# Patient Record
Sex: FEMALE | Race: BLACK OR AFRICAN AMERICAN | NOT HISPANIC OR LATINO | Employment: FULL TIME | URBAN - METROPOLITAN AREA
[De-identification: names, ages, dates, MRNs, and addresses within clinical notes are randomized per-mention and may not be internally consistent; named-entity substitution may affect disease eponyms.]

---

## 2018-11-07 ENCOUNTER — ANESTHESIA - HEALTHEAST (OUTPATIENT)
Dept: SURGERY | Facility: CLINIC | Age: 58
End: 2018-11-07

## 2018-11-07 ENCOUNTER — SURGERY - HEALTHEAST (OUTPATIENT)
Dept: SURGERY | Facility: CLINIC | Age: 58
End: 2018-11-07

## 2018-11-07 ASSESSMENT — MIFFLIN-ST. JEOR: SCORE: 1711.01

## 2018-11-08 ASSESSMENT — MIFFLIN-ST. JEOR: SCORE: 1721.22

## 2018-11-09 ASSESSMENT — MIFFLIN-ST. JEOR: SCORE: 1763.4

## 2018-11-29 ENCOUNTER — ANESTHESIA - HEALTHEAST (OUTPATIENT)
Dept: SURGERY | Facility: CLINIC | Age: 58
End: 2018-11-29

## 2018-11-30 ENCOUNTER — SURGERY - HEALTHEAST (OUTPATIENT)
Dept: SURGERY | Facility: CLINIC | Age: 58
End: 2018-11-30

## 2018-11-30 ASSESSMENT — MIFFLIN-ST. JEOR: SCORE: 1759.77

## 2018-12-04 ENCOUNTER — COMMUNICATION - HEALTHEAST (OUTPATIENT)
Dept: UROLOGY | Facility: CLINIC | Age: 58
End: 2018-12-04

## 2018-12-10 ENCOUNTER — AMBULATORY - HEALTHEAST (OUTPATIENT)
Dept: LAB | Facility: CLINIC | Age: 58
End: 2018-12-10

## 2018-12-10 ENCOUNTER — AMBULATORY - HEALTHEAST (OUTPATIENT)
Dept: UROLOGY | Facility: CLINIC | Age: 58
End: 2018-12-10

## 2018-12-10 DIAGNOSIS — N20.0 CALCULUS OF KIDNEY: ICD-10-CM

## 2018-12-10 LAB
ALBUMIN SERPL-MCNC: 2.6 G/DL (ref 3.5–5)
ALBUMIN UR-MCNC: ABNORMAL MG/DL
ANION GAP SERPL CALCULATED.3IONS-SCNC: 9 MMOL/L (ref 5–18)
APPEARANCE UR: ABNORMAL
BILIRUB UR QL STRIP: NEGATIVE
BUN SERPL-MCNC: 32 MG/DL (ref 8–22)
CALCIUM SERPL-MCNC: 9.2 MG/DL (ref 8.5–10.5)
CHLORIDE BLD-SCNC: 108 MMOL/L (ref 98–107)
CO2 SERPL-SCNC: 21 MMOL/L (ref 22–31)
COLOR UR AUTO: YELLOW
CREAT SERPL-MCNC: 4.51 MG/DL (ref 0.6–1.1)
GFR SERPL CREATININE-BSD FRML MDRD: 10 ML/MIN/1.73M2
GLUCOSE BLD-MCNC: 155 MG/DL (ref 70–125)
GLUCOSE UR STRIP-MCNC: ABNORMAL MG/DL
GRAM STAIN RESULT: NORMAL
HGB UR QL STRIP: ABNORMAL
KETONES UR STRIP-MCNC: NEGATIVE MG/DL
LEUKOCYTE ESTERASE UR QL STRIP: ABNORMAL
NITRATE UR QL: POSITIVE
PH UR STRIP: 7 [PH] (ref 5–8)
PHOSPHATE SERPL-MCNC: 3.5 MG/DL (ref 2.5–4.5)
POTASSIUM BLD-SCNC: 4.3 MMOL/L (ref 3.5–5)
SODIUM SERPL-SCNC: 138 MMOL/L (ref 136–145)
SP GR UR STRIP: 1.02 (ref 1–1.03)
UROBILINOGEN UR STRIP-ACNC: ABNORMAL

## 2018-12-13 ENCOUNTER — COMMUNICATION - HEALTHEAST (OUTPATIENT)
Dept: UROLOGY | Facility: CLINIC | Age: 58
End: 2018-12-13

## 2018-12-13 DIAGNOSIS — N30.01 ACUTE CYSTITIS WITH HEMATURIA: ICD-10-CM

## 2018-12-13 LAB — BACTERIA SPEC CULT: ABNORMAL

## 2018-12-17 ENCOUNTER — AMBULATORY - HEALTHEAST (OUTPATIENT)
Dept: UROLOGY | Facility: CLINIC | Age: 58
End: 2018-12-17

## 2018-12-17 DIAGNOSIS — N20.0 CALCULUS OF KIDNEY: ICD-10-CM

## 2018-12-17 DIAGNOSIS — N20.1 CALCULUS OF URETER: ICD-10-CM

## 2018-12-17 LAB
ALBUMIN UR-MCNC: ABNORMAL MG/DL
APPEARANCE UR: ABNORMAL
BILIRUB UR QL STRIP: NEGATIVE
COLOR UR AUTO: ABNORMAL
GLUCOSE UR STRIP-MCNC: ABNORMAL MG/DL
HGB UR QL STRIP: ABNORMAL
KETONES UR STRIP-MCNC: NEGATIVE MG/DL
LEUKOCYTE ESTERASE UR QL STRIP: ABNORMAL
NITRATE UR QL: NEGATIVE
PH UR STRIP: 6.5 [PH] (ref 5–8)
SP GR UR STRIP: 1.02 (ref 1–1.03)
UROBILINOGEN UR STRIP-ACNC: ABNORMAL

## 2019-08-03 ENCOUNTER — COMMUNICATION - HEALTHEAST (OUTPATIENT)
Dept: UROLOGY | Facility: CLINIC | Age: 59
End: 2019-08-03

## 2019-08-03 DIAGNOSIS — N20.1 CALCULUS OF URETER: ICD-10-CM

## 2020-08-30 ASSESSMENT — MIFFLIN-ST. JEOR: SCORE: 1754.33

## 2020-08-31 ENCOUNTER — SURGERY - HEALTHEAST (OUTPATIENT)
Dept: SURGERY | Facility: CLINIC | Age: 60
End: 2020-08-31

## 2020-08-31 ENCOUNTER — ANESTHESIA - HEALTHEAST (OUTPATIENT)
Dept: SURGERY | Facility: CLINIC | Age: 60
End: 2020-08-31

## 2020-09-01 ENCOUNTER — COMMUNICATION - HEALTHEAST (OUTPATIENT)
Dept: SCHEDULING | Facility: CLINIC | Age: 60
End: 2020-09-01

## 2020-09-05 ASSESSMENT — MIFFLIN-ST. JEOR: SCORE: 1728.47

## 2020-09-09 ENCOUNTER — COMMUNICATION - HEALTHEAST (OUTPATIENT)
Dept: UROLOGY | Facility: CLINIC | Age: 60
End: 2020-09-09

## 2020-09-15 ENCOUNTER — COMMUNICATION - HEALTHEAST (OUTPATIENT)
Dept: UROLOGY | Facility: CLINIC | Age: 60
End: 2020-09-15

## 2020-09-24 ENCOUNTER — AMBULATORY - HEALTHEAST (OUTPATIENT)
Dept: UROLOGY | Facility: CLINIC | Age: 60
End: 2020-09-24

## 2020-09-24 DIAGNOSIS — N20.1 CALCULUS OF URETER: ICD-10-CM

## 2020-09-24 DIAGNOSIS — N13.30 HYDRONEPHROSIS, UNSPECIFIED HYDRONEPHROSIS TYPE: ICD-10-CM

## 2020-10-22 ENCOUNTER — HOSPITAL ENCOUNTER (OUTPATIENT)
Dept: INTERVENTIONAL RADIOLOGY/VASCULAR | Facility: CLINIC | Age: 60
Discharge: HOME | End: 2020-10-22
Attending: INTERNAL MEDICINE | Admitting: RADIOLOGY

## 2020-10-22 DIAGNOSIS — N19 RENAL FAILURE: ICD-10-CM

## 2020-10-22 DIAGNOSIS — N17.9 ACUTE RENAL FAILURE, UNSPECIFIED ACUTE RENAL FAILURE TYPE (H): ICD-10-CM

## 2020-12-22 ENCOUNTER — ANESTHESIA - HEALTHEAST (OUTPATIENT)
Dept: SURGERY | Facility: CLINIC | Age: 60
End: 2020-12-22

## 2020-12-23 ENCOUNTER — SURGERY - HEALTHEAST (OUTPATIENT)
Dept: SURGERY | Facility: CLINIC | Age: 60
End: 2020-12-23

## 2020-12-23 ASSESSMENT — MIFFLIN-ST. JEOR: SCORE: 1605.55

## 2020-12-28 ENCOUNTER — AMBULATORY - HEALTHEAST (OUTPATIENT)
Dept: SURGERY | Facility: CLINIC | Age: 60
End: 2020-12-28

## 2020-12-28 DIAGNOSIS — Z11.59 ENCOUNTER FOR SCREENING FOR OTHER VIRAL DISEASES: ICD-10-CM

## 2020-12-29 ENCOUNTER — ANESTHESIA - HEALTHEAST (OUTPATIENT)
Dept: SURGERY | Facility: CLINIC | Age: 60
End: 2020-12-29

## 2020-12-29 ENCOUNTER — SURGERY - HEALTHEAST (OUTPATIENT)
Dept: SURGERY | Facility: CLINIC | Age: 60
End: 2020-12-29

## 2020-12-29 ASSESSMENT — MIFFLIN-ST. JEOR
SCORE: 1610.08
SCORE: 1628.23

## 2021-05-17 ENCOUNTER — RECORDS - HEALTHEAST (OUTPATIENT)
Dept: ADMINISTRATIVE | Facility: OTHER | Age: 61
End: 2021-05-17

## 2021-05-23 ENCOUNTER — HOSPITAL ENCOUNTER (EMERGENCY)
Dept: EMERGENCY MEDICINE | Facility: CLINIC | Age: 61
Discharge: HOME | End: 2021-05-23
Payer: COMMERCIAL

## 2021-05-23 DIAGNOSIS — H74.92 MASTOID DISORDER, LEFT: ICD-10-CM

## 2021-05-23 ASSESSMENT — MIFFLIN-ST. JEOR: SCORE: 1510.29

## 2021-05-27 VITALS — DIASTOLIC BLOOD PRESSURE: 57 MMHG | TEMPERATURE: 98.4 F | HEART RATE: 103 BPM | SYSTOLIC BLOOD PRESSURE: 122 MMHG

## 2021-05-29 ENCOUNTER — APPOINTMENT (OUTPATIENT)
Dept: CT IMAGING | Facility: CLINIC | Age: 61
End: 2021-05-29
Attending: EMERGENCY MEDICINE
Payer: COMMERCIAL

## 2021-05-29 ENCOUNTER — HOSPITAL ENCOUNTER (EMERGENCY)
Facility: CLINIC | Age: 61
Discharge: HOME | End: 2021-05-29
Attending: EMERGENCY MEDICINE | Admitting: EMERGENCY MEDICINE
Payer: COMMERCIAL

## 2021-05-29 ENCOUNTER — APPOINTMENT (OUTPATIENT)
Dept: GENERAL RADIOLOGY | Facility: CLINIC | Age: 61
End: 2021-05-29
Attending: EMERGENCY MEDICINE
Payer: COMMERCIAL

## 2021-05-29 VITALS
HEART RATE: 95 BPM | TEMPERATURE: 97.5 F | DIASTOLIC BLOOD PRESSURE: 86 MMHG | RESPIRATION RATE: 16 BRPM | OXYGEN SATURATION: 99 % | SYSTOLIC BLOOD PRESSURE: 140 MMHG

## 2021-05-29 DIAGNOSIS — Z98.890 HISTORY OF PERITONEAL DIALYSIS: ICD-10-CM

## 2021-05-29 DIAGNOSIS — R10.9 ABDOMINAL PAIN, UNSPECIFIED ABDOMINAL LOCATION: ICD-10-CM

## 2021-05-29 DIAGNOSIS — M25.552 HIP PAIN, LEFT: ICD-10-CM

## 2021-05-29 DIAGNOSIS — I27.20 PULMONARY HYPERTENSION (H): ICD-10-CM

## 2021-05-29 LAB
ALBUMIN SERPL-MCNC: 2.5 G/DL (ref 3.4–5)
ALBUMIN UR-MCNC: 100 MG/DL
ALP SERPL-CCNC: 71 U/L (ref 40–150)
ALT SERPL W P-5'-P-CCNC: 9 U/L (ref 0–50)
ANION GAP SERPL CALCULATED.3IONS-SCNC: 7 MMOL/L (ref 3–14)
APPEARANCE UR: CLEAR
AST SERPL W P-5'-P-CCNC: 5 U/L (ref 0–45)
BACTERIA #/AREA URNS HPF: ABNORMAL /HPF
BASOPHILS # BLD AUTO: 0.1 10E9/L (ref 0–0.2)
BASOPHILS NFR BLD AUTO: 0.8 %
BILIRUB SERPL-MCNC: 0.6 MG/DL (ref 0.2–1.3)
BILIRUB UR QL STRIP: NEGATIVE
BUN SERPL-MCNC: 43 MG/DL (ref 7–30)
CALCIUM SERPL-MCNC: 10.1 MG/DL (ref 8.5–10.1)
CHLORIDE SERPL-SCNC: 96 MMOL/L (ref 94–109)
CO2 SERPL-SCNC: 29 MMOL/L (ref 20–32)
COLOR UR AUTO: ABNORMAL
CREAT SERPL-MCNC: 12.3 MG/DL (ref 0.52–1.04)
DIFFERENTIAL METHOD BLD: ABNORMAL
EOSINOPHIL # BLD AUTO: 0.5 10E9/L (ref 0–0.7)
EOSINOPHIL NFR BLD AUTO: 3.8 %
ERYTHROCYTE [DISTWIDTH] IN BLOOD BY AUTOMATED COUNT: 14 % (ref 10–15)
GFR SERPL CREATININE-BSD FRML MDRD: 3 ML/MIN/{1.73_M2}
GLUCOSE SERPL-MCNC: 84 MG/DL (ref 70–99)
GLUCOSE UR STRIP-MCNC: NEGATIVE MG/DL
HCT VFR BLD AUTO: 41.2 % (ref 35–47)
HGB BLD-MCNC: 12.6 G/DL (ref 11.7–15.7)
HGB UR QL STRIP: ABNORMAL
IMM GRANULOCYTES # BLD: 0.1 10E9/L (ref 0–0.4)
IMM GRANULOCYTES NFR BLD: 0.5 %
KETONES UR STRIP-MCNC: NEGATIVE MG/DL
LACTATE BLD-SCNC: 1.3 MMOL/L (ref 0.7–2)
LEUKOCYTE ESTERASE UR QL STRIP: ABNORMAL
LIPASE SERPL-CCNC: 43 U/L (ref 73–393)
LYMPHOCYTES # BLD AUTO: 1.8 10E9/L (ref 0.8–5.3)
LYMPHOCYTES NFR BLD AUTO: 15.4 %
MCH RBC QN AUTO: 28.4 PG (ref 26.5–33)
MCHC RBC AUTO-ENTMCNC: 30.6 G/DL (ref 31.5–36.5)
MCV RBC AUTO: 93 FL (ref 78–100)
MONOCYTES # BLD AUTO: 1.1 10E9/L (ref 0–1.3)
MONOCYTES NFR BLD AUTO: 9.2 %
NEUTROPHILS # BLD AUTO: 8.3 10E9/L (ref 1.6–8.3)
NEUTROPHILS NFR BLD AUTO: 70.3 %
NITRATE UR QL: NEGATIVE
NRBC # BLD AUTO: 0 10*3/UL
NRBC BLD AUTO-RTO: 0 /100
PH UR STRIP: 7 PH (ref 5–7)
PLATELET # BLD AUTO: 321 10E9/L (ref 150–450)
POTASSIUM SERPL-SCNC: 3.7 MMOL/L (ref 3.4–5.3)
PROT SERPL-MCNC: 7.1 G/DL (ref 6.8–8.8)
RBC # BLD AUTO: 4.44 10E12/L (ref 3.8–5.2)
RBC #/AREA URNS AUTO: 8 /HPF (ref 0–2)
SODIUM SERPL-SCNC: 132 MMOL/L (ref 133–144)
SOURCE: ABNORMAL
SP GR UR STRIP: 1.01 (ref 1–1.03)
SQUAMOUS #/AREA URNS AUTO: <1 /HPF (ref 0–1)
TROPONIN I SERPL-MCNC: 0.02 UG/L (ref 0–0.04)
UROBILINOGEN UR STRIP-MCNC: NORMAL MG/DL (ref 0–2)
WBC # BLD AUTO: 11.8 10E9/L (ref 4–11)
WBC #/AREA URNS AUTO: 6 /HPF (ref 0–5)

## 2021-05-29 PROCEDURE — 96361 HYDRATE IV INFUSION ADD-ON: CPT

## 2021-05-29 PROCEDURE — 250N000009 HC RX 250: Performed by: EMERGENCY MEDICINE

## 2021-05-29 PROCEDURE — 85025 COMPLETE CBC W/AUTO DIFF WBC: CPT | Performed by: EMERGENCY MEDICINE

## 2021-05-29 PROCEDURE — 258N000003 HC RX IP 258 OP 636: Performed by: EMERGENCY MEDICINE

## 2021-05-29 PROCEDURE — 71275 CT ANGIOGRAPHY CHEST: CPT

## 2021-05-29 PROCEDURE — 83690 ASSAY OF LIPASE: CPT | Performed by: EMERGENCY MEDICINE

## 2021-05-29 PROCEDURE — 250N000011 HC RX IP 250 OP 636: Performed by: EMERGENCY MEDICINE

## 2021-05-29 PROCEDURE — 80053 COMPREHEN METABOLIC PANEL: CPT | Performed by: EMERGENCY MEDICINE

## 2021-05-29 PROCEDURE — 93005 ELECTROCARDIOGRAM TRACING: CPT

## 2021-05-29 PROCEDURE — 73502 X-RAY EXAM HIP UNI 2-3 VIEWS: CPT

## 2021-05-29 PROCEDURE — 96374 THER/PROPH/DIAG INJ IV PUSH: CPT | Mod: 59

## 2021-05-29 PROCEDURE — 84484 ASSAY OF TROPONIN QUANT: CPT | Performed by: EMERGENCY MEDICINE

## 2021-05-29 PROCEDURE — 83605 ASSAY OF LACTIC ACID: CPT | Performed by: EMERGENCY MEDICINE

## 2021-05-29 PROCEDURE — 99285 EMERGENCY DEPT VISIT HI MDM: CPT | Mod: 25

## 2021-05-29 PROCEDURE — 81001 URINALYSIS AUTO W/SCOPE: CPT | Performed by: EMERGENCY MEDICINE

## 2021-05-29 RX ORDER — FENTANYL CITRATE 50 UG/ML
50 INJECTION, SOLUTION INTRAMUSCULAR; INTRAVENOUS ONCE
Status: COMPLETED | OUTPATIENT
Start: 2021-05-29 | End: 2021-05-29

## 2021-05-29 RX ORDER — OXYCODONE HYDROCHLORIDE 5 MG/1
5 TABLET ORAL EVERY 6 HOURS PRN
Qty: 10 TABLET | Refills: 0 | Status: SHIPPED | OUTPATIENT
Start: 2021-05-29 | End: 2021-10-17

## 2021-05-29 RX ORDER — ASPIRIN 81 MG
100 TABLET, DELAYED RELEASE (ENTERIC COATED) ORAL DAILY
Qty: 10 TABLET | Refills: 0 | Status: SHIPPED | OUTPATIENT
Start: 2021-05-29 | End: 2021-10-17

## 2021-05-29 RX ORDER — ONDANSETRON 2 MG/ML
4 INJECTION INTRAMUSCULAR; INTRAVENOUS ONCE
Status: DISCONTINUED | OUTPATIENT
Start: 2021-05-29 | End: 2021-05-29 | Stop reason: HOSPADM

## 2021-05-29 RX ORDER — IOPAMIDOL 755 MG/ML
500 INJECTION, SOLUTION INTRAVASCULAR ONCE
Status: COMPLETED | OUTPATIENT
Start: 2021-05-29 | End: 2021-05-29

## 2021-05-29 RX ORDER — CEPHALEXIN 500 MG/1
500 CAPSULE ORAL 2 TIMES DAILY
Qty: 14 CAPSULE | Refills: 0 | Status: SHIPPED | OUTPATIENT
Start: 2021-05-29 | End: 2021-06-05

## 2021-05-29 RX ADMIN — FENTANYL CITRATE 50 MCG: 50 INJECTION, SOLUTION INTRAMUSCULAR; INTRAVENOUS at 16:36

## 2021-05-29 RX ADMIN — IOPAMIDOL 70 ML: 755 INJECTION, SOLUTION INTRAVENOUS at 17:20

## 2021-05-29 RX ADMIN — SODIUM CHLORIDE 87 ML: 9 INJECTION, SOLUTION INTRAVENOUS at 17:20

## 2021-05-29 RX ADMIN — SODIUM CHLORIDE 500 ML: 9 INJECTION, SOLUTION INTRAVENOUS at 16:37

## 2021-05-29 ASSESSMENT — ENCOUNTER SYMPTOMS
FATIGUE: 0
NAUSEA: 1
PALPITATIONS: 0
HEADACHES: 0
COUGH: 0
SHORTNESS OF BREATH: 1
ABDOMINAL PAIN: 1
NUMBNESS: 0
DIARRHEA: 1
APPETITE CHANGE: 1
BACK PAIN: 0
VOMITING: 0
FEVER: 0
WEAKNESS: 0
CHILLS: 0

## 2021-05-29 NOTE — ED TRIAGE NOTES
Patient presents to the ED reporting LLQ abdominal pain, decreased urine output and lower blood pressures at home. Is a peritoneal dialysis patient. States has also felt SOB.

## 2021-05-29 NOTE — ED PROVIDER NOTES
History     Chief Complaint:  Multiple complaints    HPI   Devora Garcia is a 60 year old female with a history of breast cancer, in remission, peritoneal dialysis, renal calculi presenting with a myriad of complaints.  Patient reports primarily coming to the ED given concerns for her blood pressure.  Yesterday she reports systolics in the high 80s.  She called her PCP who recommended she discontinue all of her blood pressure medications.  Of note, patient reports for the past month she has been on weekly vancomycin she instills every 5 days into her peritoneal dialysis.  She reports her last dose is supposed to be tomorrow.  She reports simultaneously feeling myalgias particularly in her lower legs, greatest in her left hip as well as mild abdominal pain.  She describes the abdominal pain as a cramping and intermittent sensation that is predominately in the left lower quadrant.  No exacerbating or relieving symptoms.  She does report accompanying decreased appetite, lower urine output, nausea and loose nonbloody stools.  She also admits to mild dyspnea at rest over the past few days though denies any at present.  No reported fever, cough, chest pain.  She denies any history of blood clots or trauma.  She is COVID-19 vaccinated.  No sick contacts or suspicious foods.      Result Date: 4/14/2021  EXAM: CT ABDOMEN PELVIS WO ORAL WO IV CONTRAST LOCATION: [facility] DATE/TIME: 4/14/2021   1. No abnormality to account for symptoms of left-sided abdominal pain. Interval placement of peritoneal dialysis catheter. No evidence of ascites nor loculated fluid collections to suggest abscess. 2. Resolution of previous right hydronephrosis with no urinary tract calculus.     Review of Systems   Constitutional: Positive for appetite change. Negative for chills, fatigue and fever.   Respiratory: Positive for shortness of breath. Negative for cough.    Cardiovascular: Negative for chest pain and  palpitations.   Gastrointestinal: Positive for abdominal pain, diarrhea and nausea. Negative for vomiting.   Genitourinary: Positive for decreased urine volume. Negative for dysuria, vaginal bleeding and vaginal discharge.   Musculoskeletal: Negative for back pain.   Neurological: Negative for weakness, numbness and headaches.   All other systems reviewed and are negative.    Allergies:    No Known Allergies      Medications:    Zyloprim  Norvasc  Bumex  Tums  Coreg  Catapres  Ergocalciferol  Lisinopril  Prilosec  Roxicodone  Vancomycin    Past Medical History:    Acute renal failure  Ureteral stone  Calculus of kidney  CKD  GUILLERMO  Type 2 diabetes  Cancer  Cyst of left ovary  GERD  Gout  Allergic rhinitis  Hypertension  Intraductal carcinoma  Psoriasis    Past Surgical History:    Reduction mammaplasty  Lumpectomy  Bariatric surgery  Cystoscopy  Ureteral stent    Family History:    Denies    Social History:  Denies smoking or alcohol use  Presents with     Physical Exam     Patient Vitals for the past 24 hrs:   BP Temp Pulse Resp SpO2   05/29/21 1556 (!) 151/89 97.5  F (36.4  C) 110 20 99 %       Physical Exam  Nursing note and vitals reviewed.  Constitutional: Well nourished. Resting comfortably.    Eyes: Conjunctiva normal.  Pupils are equal, round, and reactive to light.   ENT: Nose normal. Mucous membranes pink and moist.    Neck: Normal range of motion.  CVS: Sinus tachycardia.  Normal heart sounds.  2/2 DP pulses  Pulmonary: Lungs clear to auscultation bilaterally. No wheezes/rales/rhonchi.  GI: Abdomen soft. Minimal LLQ tenderness. No rigidity or guarding.  Peritoneal dialysis catheter in place.    MSK: No calf tenderness or swelling. L. Hip with minimal tenderness, though full active ROM. No overlying warmth/erythema  Neuro: Alert. Follows simple commands. Sensation intact x 4.  Skin: Skin is warm and dry. No rash noted.   Psychiatric: Normal affect.       Emergency Department Course   ECG:  ECG taken  at 1632, ECG read at 1632  Normal sinus rhythm. Indeterminate axis. Abnormal ECG   Rate 94 bpm. MN interval 192 ms. QRS duration 102 ms. QT/QTc 392/490 ms. P-R-T axes 45 204 29.     Imaging:  XR Pelvis w Hip Left 1 View   Final Result   IMPRESSION:       Left hip and pelvis negative for fracture or dislocation.      Minimal degenerative arthritic changes in both hips, with small marginal osteophytes but maintained joint spacing.      Heavy calcifications in the iliac and proximal femoral arteries.      Peritoneal tubing/catheter in place.      CT Chest (PE) Abdomen Pelvis w Contrast   Final Result   IMPRESSION:   1.  No pulmonary embolism. There is enlargement of central pulmonary arteries suggesting an element of pulmonary arterial hypertension.      2.  Previous vertical gastroplasty.      3.  No appendicitis.      4.  Atrophy of the native kidneys. Peritoneal dialysis catheter.      5.  Probable postsurgical changes in left breast with coarse calcifications.          Laboratory:  Labs Ordered and Resulted from Time of ED Arrival Up to the Time of Departure from the ED   CBC WITH PLATELETS DIFFERENTIAL - Abnormal; Notable for the following components:       Result Value    WBC 11.8 (*)     MCHC 30.6 (*)     All other components within normal limits   COMPREHENSIVE METABOLIC PANEL - Abnormal; Notable for the following components:    Sodium 132 (*)     Urea Nitrogen 43 (*)     Creatinine 12.30 (*)     GFR Estimate 3 (*)     GFR Estimate If Black 3 (*)     Albumin 2.5 (*)     All other components within normal limits   LIPASE - Abnormal; Notable for the following components:    Lipase 43 (*)     All other components within normal limits   UA MACROSCOPIC WITH REFLEX TO MICRO AND CULTURE - Abnormal; Notable for the following components:    Blood Urine Small (*)     Protein Albumin Urine 100 (*)     Leukocyte Esterase Urine Trace (*)     RBC Urine 8 (*)     WBC Urine 6 (*)     Bacteria Urine Few (*)     All other  components within normal limits   LACTIC ACID WHOLE BLOOD   TROPONIN I       Emergency Department Course:    Reviewed:    I reviewed nursing notes, vitals, past history and care everywhere    Assessments:   I obtained history and examined the patient as noted above.    I rechecked the patient and explained findings    Interventions:  Medications   fentaNYL (PF) (SUBLIMAZE) injection 50 mcg (50 mcg Intravenous Given 5/29/21 1636)   0.9% sodium chloride BOLUS (0 mLs Intravenous Stopped 5/29/21 1937)   iopamidol (ISOVUE-370) solution 500 mL (70 mLs Intravenous Given 5/29/21 1720)   sodium chloride 0.9 % bag 500mL for CT scan flush use (87 mLs Intravenous Given 5/29/21 1720)       Disposition:  The patient was discharged to home.    Impression & Plan      Medical Decision Making:  Patient is a 59 yo female known peritoneal dialysis patient currently on weekly antibiotic infusions presenting with a myriad of complaints.  Patient underwent a broad workup.  She expresses concerns for noted hypotension at home yesterday though none recorded here today.  Her labs are overall reassuring.  Patient reported mild dyspnea in addition to abdominal pain.  She did undergo formal CT which is fortunately without evidence of PE, pneumonia, fluid overload.  Concerns for pulmonary hypertension which I discussed with patient could be contributing mildly to her dyspnea.  EKG reassuring as is screening troponin; clinically lower suspicion for ACS.  She tested negative for COVID 19, sensitivity of testing discussed.  CT abdomen overall reassuring without evidence of intraabdominal catastrophe.  UA with concerns for possible early infection.  In the setting of her reported pain, will initiate keflex at this time, patient made aware urine culture pending and should this result negative, plan for antibiotic discontinuation.  Patient also reported L. Hip discomfort, though no evidence on exam to suggest septic joint/overlying cellulitis.  Xray  suggests arthritic changes which I discussed may be explaining patient's pain.  Incidental heavy calcifications in iliac and femoral arteries though patient neurovascularly intact, low suspicion for arterial occlusion or venous occlusion today.  Plan for close PCP f/u.  Patient repeat abdominal exam is benign; we discussed she is at risk for intraabdominal infection in the setting she is on peritoneal dialysis though is already currently getting antibiotics.  I do not feel further emergent workup is needed at this point.  Recommend close BP monitoring at home and to reinitiate her BP medications.  Recommend antibiotics for possible UTI.  Return for increasing dyspnea, chest pain, abdominal pain, fever or should symptoms worsen.     Covid-19  Devora Garcia was evaluated during a global COVID-19 pandemic, which necessitated consideration that the patient might be at risk for infection with the SARS-CoV-2 virus that causes COVID-19.   Applicable protocols for evaluation were followed during the patient's care.   COVID-19 was considered as part of the patient's evaluation. The plan for testing is:  a test was obtained during this visit.    Diagnosis:    ICD-10-CM    1. Abdominal pain, unspecified abdominal location  R10.9    2. Hip pain, left  M25.552    3. Pulmonary hypertension (H)  I27.20     concern for   4. History of peritoneal dialysis  Z98.890        Discharge Medications:  Discharge Medication List as of 5/29/2021  7:37 PM      START taking these medications    Details   cephALEXin (KEFLEX) 500 MG capsule Take 1 capsule (500 mg) by mouth 2 times daily for 7 days, Disp-14 capsule, R-0, Local Print      docusate sodium (COLACE) 100 MG tablet Take 1 tablet (100 mg) by mouth daily, Disp-10 tablet, R-0, Local Print      oxyCODONE (ROXICODONE) 5 MG tablet Take 1 tablet (5 mg) by mouth every 6 hours as needed for pain, Disp-10 tablet, R-0, Local Print               Scribe Disclosure:  Shanti PRAKASH, ,  am serving as a scribe at 4:02 PM on 5/29/2021 to document services personally performed by Shanti Toney DO based on my observations and the provider's statements to me.      Shanti Toney DO  05/30/21 0659

## 2021-05-30 ENCOUNTER — RECORDS - HEALTHEAST (OUTPATIENT)
Dept: ADMINISTRATIVE | Facility: CLINIC | Age: 61
End: 2021-05-30

## 2021-05-30 ASSESSMENT — ENCOUNTER SYMPTOMS: DYSURIA: 0

## 2021-06-01 LAB — INTERPRETATION ECG - MUSE: NORMAL

## 2021-06-02 ENCOUNTER — RECORDS - HEALTHEAST (OUTPATIENT)
Dept: ADMINISTRATIVE | Facility: CLINIC | Age: 61
End: 2021-06-02

## 2021-06-02 VITALS — BODY MASS INDEX: 51.92 KG/M2 | WEIGHT: 275 LBS | HEIGHT: 61 IN

## 2021-06-02 VITALS — WEIGHT: 275.8 LBS | HEIGHT: 61 IN | BODY MASS INDEX: 52.07 KG/M2

## 2021-06-03 ENCOUNTER — OFFICE VISIT - HEALTHEAST (OUTPATIENT)
Dept: OTOLARYNGOLOGY | Facility: CLINIC | Age: 61
End: 2021-06-03

## 2021-06-03 DIAGNOSIS — H70.12 MASTOIDITIS, CHRONIC, LEFT: ICD-10-CM

## 2021-06-03 DIAGNOSIS — H93.8X2 EAR FULLNESS, LEFT: ICD-10-CM

## 2021-06-04 VITALS — BODY MASS INDEX: 50.62 KG/M2 | HEIGHT: 61 IN | WEIGHT: 268.1 LBS

## 2021-06-05 VITALS — WEIGHT: 246 LBS | BODY MASS INDEX: 46.44 KG/M2 | HEIGHT: 61 IN

## 2021-06-05 VITALS — HEIGHT: 61 IN | BODY MASS INDEX: 45.5 KG/M2 | WEIGHT: 241 LBS

## 2021-06-11 NOTE — ANESTHESIA PREPROCEDURE EVALUATION
Anesthesia Evaluation      Patient summary reviewed   No history of anesthetic complications     Airway   Mallampati: III  Neck ROM: full   Pulmonary - normal exam    breath sounds clear to auscultation  (+) sleep apnea,   (-) COPD, asthma, not a smoker                         Cardiovascular - normal exam  (+) hypertension, ,     (-) murmur  Rhythm: regular  Rate: normal,    no murmur      Neuro/Psych    (-) no seizures, no CVA    Endo/Other    (+) diabetes mellitus type 2 poorly controlled using insulin, obesity (BMI 49.96),      GI/Hepatic/Renal    (+) GERD,   chronic renal disease (Acute on chronic kidney disease stage III-IV),      Other findings: Labs 11/30/18:  Na 140, K 4.2, BUN 32, Cr 4.3, Glucose 165      Dental - normal exam                          Anesthesia Plan  Planned anesthetic: MAC    ASA 3     Anesthetic plan and risks discussed with: patient  Anesthesia plan special considerations: antiemetics,   Post-op plan: routine recovery

## 2021-06-11 NOTE — PROGRESS NOTES
Patient educated regarding stent removal procedure and possible symptoms after removal.  Patient voiced understanding of information.  Handout given to patient.  Consent form signed.  Jackie Orantes RN    [facility] Timeout    Correct patient?: Yes  Correct site?:  Yes  Correct procedure?:  Yes  Correct laterality?:  Right  Consents verified?:  Yes  Relevant lab results available?:  Yes        Jackie Orantes RN

## 2021-06-11 NOTE — PATIENT INSTRUCTIONS - HE
Cystoscopy with Stent Removal    Cystoscopy is used to help diagnose urinary problems, or to remove a ureteral stent.    During a cystoscopy, your doctor examines the inside of your bladder with an instrument called a cystoscope. A cystoscope is a long, thin flexible tube with a camera at the end.    Your doctor will insert the scope into your urethra allowing him to visualize and evaluate the inside of the bladder for possible abnormalities. The urethra is the tube that carries urine to the outside of your body.    How is the stent removed?    Your stent will be removed in the [facility] with a small telescope and a grasping tool.  It usually takes less than 1 minute to remove the stent.    What should I expect after the stent is removed?     You should feel normal by the next day.    Some patients find:      An increase in back pain about an hour after the stent is removed as the kidney fills up with urine before it starts to empty.  It can be as uncomfortable as your initial stone episode.  Taking pain medications before stent removal may be helpful, but you would need someone else to drive you to and from your appointment.    Bladder symptoms usually disappear by the next morning.    Small amounts of blood in the urine may be seen occasionally for up to a week.    At Home:      It is important to drink plenty of fluids after your procedure    You may continue to use your pain medications as prescribed    What symptoms should I watch for?    Fever     Chills    Increasing back pain that is not relieved with pain medications    Large amounts of blood in the urine or large clots    Leakage of urine (incontinence)     Are not able to urinate for 8 hours    These symptoms may mean you have a blockage or infection. Call the [facility] 24 hours a day at [phone] immediately.

## 2021-06-11 NOTE — ANESTHESIA CARE TRANSFER NOTE
Last vitals:   Vitals:    08/31/20 1320   BP: 170/72   Pulse: 100   Resp: 16   Temp: 36.9  C (98.4  F)   SpO2: 100%     Patient's level of consciousness is drowsy  Spontaneous respirations: yes  Maintains airway independently: yes  Dentition unchanged: yes  Oropharynx: oropharynx clear of all foreign objects    QCDR Measures:  ASA# 20 - Surgical No data recorded  PQRS# 430 - Adult PONV Prevention: 4558F - Pt received => 2 anti-emetic agents (different classes) preop & intraop  ASA# 8 - Peds PONV Prevention: NA - Not pediatric patient, not GA or 2 or more risk factors NOT present  PQRS# 424 - [place]-op Temp Management: NA - MAC anesthesia or case < 60 minutes  PQRS# 426 - PACU Transfer Protocol: - Transfer of care checklist used  ASA# 14 - Acute Post-op Pain: ASA14B - Patient did NOT experience pain >= 7 out of 10

## 2021-06-11 NOTE — PROGRESS NOTES
Assessment/Plan:        Diagnoses and all orders for this visit:    Calculus of ureter  -     Cancel: Urinalysis Macroscopic  -     Cancel: Culture, Urine- Future; Future; Expected date: 10/24/2020  -     cephalexin capsule 500 mg (KEFLEX)  -     Cystoscopy with Stent Removal Education    Hydronephrosis, unspecified hydronephrosis type  -     Cystoscopy with Stent Removal Education    Other orders  -     cephalexin (KEFLEX) 250 MG capsule        Stone Management Plan  [facility] Stone Management 12/10/2018 12/17/2018 9/24/2020   Urinary Tract Infection Possible Infection No suspicion of infection No suspicion of infection   Renal Colic Well controlled symptoms Well controlled symptoms Well controlled symptoms   Renal Failure No suspicion of renal failure No suspicion of renal failure Chronic renal failure   Chronic Renal Failure - - <15ml/min/1.73m2   R Stone Event Established event Established event Established event   R Post-op status Other Stent Removal Stent Removal   L Stone Event No current event No current event No current event             Subjective:      HPI  Ms. Devora Garcia is a 59 y.o.  female returning to the [facility] for early postoperative follow up for anticipated stent removal.     She returns status post right ureteral stent insertion for renal failure and UTI. She has had no unanticipated post-operative events.    She has had no symptoms suspicious for infection and stent was mildly symptomatic with typical issues of flank discomfort and lower urinary tract irritation.     Flexible cystoscopy is performed and indwelling stent is removed without incident.    She is on dialysis with contact with nephrology. Will follow PRN.     ROS   Review of systems is negative except for HPI.    Past Medical History:   Diagnosis Date     Allergic rhinitis      Cancer (H)     left breast     Chronic kidney disease, stage 3 (H) 2010     Cyst of left ovary      Diabetes  mellitus (H)      GERD (gastroesophageal reflux disease)      Hypertension      Intraductal carcinoma 2015     Obstructive sleep apnea on CPAP     uses CPAP     Psoriasis        Past Surgical History:   Procedure Laterality Date     BARIATRIC SURGERY  2014     BREAST LUMPECTOMY Left 2015     IR TUNNELED CATHETER INSERT  9/2/2020     MS CYSTO/URETERO W/LITHOTRIPSY &INDWELL STENT INSRT Right 11/30/2018    Procedure: CYSTOSCOPY, RIGHT URETEROSCOPY, LASER LITHOTRIPSY STENT REMOVAL STENT INSERTION;  Surgeon: Dino Reynoso MD;  Location: [facility] OR;  Service: Urology     MS CYSTOSCOPY,INSERT URETERAL STENT Right 11/7/2018    Procedure: CYSTOSCOPY, WITH RIGHT URETERAL STENT INSERTION;  Surgeon: Dino Reynoso MD;  Location: [facility] OR;  Service: Urology     MS CYSTOSCOPY,INSERT URETERAL STENT Right 8/31/2020    Procedure: CYSTOSCOPY, WITH URETERAL STENT INSERTION;  Surgeon: Dino Reynoso MD;  Location: [facility] OR;  Service: Urology     REDUCTION MAMMAPLASTY  2015       Current Outpatient Medications   Medication Sig Dispense Refill     allopurinoL (ZYLOPRIM) 100 MG tablet Take 1 tablet (100 mg total) by mouth daily with breakfast. 90 tablet 0     amLODIPine (NORVASC) 5 MG tablet Take 1 tablet (5 mg total) by mouth 2 (two) times a day. 90 tablet 0     bumetanide (BUMEX) 2 MG tablet Take 1 tablet (2 mg total) by mouth 2 (two) times a day at 9am and 6pm. 90 tablet 0     calcium, as carbonate, (TUMS) 200 mg calcium (500 mg) chewable tablet Chew 1 tablet (200 mg total) 3 (three) times a day with meals.  0     carvediloL (COREG) 6.25 MG tablet Take 1 tablet (6.25 mg total) by mouth 2 (two) times a day. 90 tablet 0     ceFAZolin in NaCl 0.9 % 0.9 % 50 mL IVPB 2 gram IV on the first and second run of dialysis 1 each 0     cloNIDine (CATAPRES-TTS) 0.2 mg/24 hr Place 1 patch on the skin every 7 days.       ergocalciferol (ERGOCALCIFEROL) 1,250 mcg (50,000 unit) capsule Take 1 capsule (50,000 Units  total) by mouth once a week. 8 capsule 0     insulin detemir U-100 (LEVEMIR FLEXTOUCH U-100 INSULN) 100 unit/mL (3 mL) pen Inject 15 Units under the skin daily before breakfast. 3 mL PRN     insulin detemir U-100 (LEVEMIR FLEXTOUCH U-100 INSULN) 100 unit/mL (3 mL) pen Inject 10 Units under the skin at bedtime. 3 mL PRN     lisinopriL (PRINIVIL,ZESTRIL) 20 MG tablet Take 1 tablet (20 mg total) by mouth daily. 90 tablet 0     multivitamin therapeutic tablet Take 1 tablet by mouth daily.       NOVOLOG FLEXPEN U-100 INSULIN 100 unit/mL (3 mL) injection pen Check BG right before meal. BG  mg/dL: None - 0 Units  -180 mg/dL: 3 units  -220 mg/dL: 6 units  -260 mg/dL: 9 units  -300 mg/dL: 12 units  -340 mg/dL: 15 units  -400 mg/dL: 18 units  BG > 400 mg/dL: 21 units and call MD  0     omeprazole (PRILOSEC) 10 MG capsule Take 10 mg by mouth daily as needed.       sodium bicarbonate 650 MG tablet Take 2 tablets (1,300 mg total) by mouth 2 (two) times a day. OTC product 90 tablet 0     Current Facility-Administered Medications   Medication Dose Route Frequency Provider Last Rate Last Dose     cephalexin (KEFLEX) 250 MG capsule                Allergies   Allergen Reactions     Aspirin Nausea Only     Statins-Hmg-Coa Reductase Inhibitors Other (See Comments)     Other reaction(s): Renal Failure       Social History     Socioeconomic History     Marital status:      Spouse name: Not on file     Number of children: Not on file     Years of education: Not on file     Highest education level: Not on file   Occupational History     Not on file   Social Needs     Financial resource strain: Not on file     Food insecurity     Worry: Not on file     Inability: Not on file     Transportation needs     Medical: Not on file     Non-medical: Not on file   Tobacco Use     Smoking status: Never Smoker     Smokeless tobacco: Never Used   Substance and Sexual Activity     Alcohol use: Yes      Comment: Rare     Drug use: No     Sexual activity: Not on file   Lifestyle     Physical activity     Days per week: Not on file     Minutes per session: Not on file     Stress: Not on file   Relationships     Social connections     Talks on phone: Not on file     Gets together: Not on file     Attends Gnosticist service: Not on file     Active member of club or organization: Not on file     Attends meetings of clubs or organizations: Not on file     Relationship status: Not on file     Intimate partner violence     Fear of current or ex partner: Not on file     Emotionally abused: Not on file     Physically abused: Not on file     Forced sexual activity: Not on file   Other Topics Concern     Not on file   Social History Narrative     Not on file       Family History   Problem Relation Age of Onset     Kidney failure Father      Hypertension Father      Hypertension Brother      Diabetes Brother      Kidney failure Brother      Clotting disorder Neg Hx      Anesthesia problems Neg Hx      Objective:      Physical Exam  Vitals:    09/24/20 1042   BP: 122/57   Pulse: (!) 103   Temp: 98.4  F (36.9  C)     General - well developed, well nourished, appropriate for age. Appears no distress at this time  Abdomen - morbidly obese soft, non-tender, no hepatosplenomegaly, no masses.   - no flank tenderness, no suprapubic tenderness, kidney and bladder non-palpable  MSK - normal spinal curvature. no spinal tenderness. normal gait. muscular strength intact.  Psych - oriented to time, place, and person, normal mood and affect.      Labs   Urinalysis POC (Office):  Nitrite, UA   Date Value Ref Range Status   08/30/2020 Negative Negative Final   12/17/2018 Negative Negative Final   12/10/2018 Positive (!) Negative Final       Lab Urinalysis:  Blood, UA   Date Value Ref Range Status   08/30/2020 Moderate (!) Negative Final   12/17/2018 Large (!) Negative Final   12/10/2018 Small (!) Negative Final     Nitrite, UA   Date Value Ref  Range Status   08/30/2020 Negative Negative Final   12/17/2018 Negative Negative Final   12/10/2018 Positive (!) Negative Final     Leukocytes, UA   Date Value Ref Range Status   08/30/2020 Large (!) Negative Final   12/17/2018 Trace (!) Negative Final   12/10/2018 Small (!) Negative Final     pH, UA   Date Value Ref Range Status   08/30/2020 5.0 4.5 - 8.0 Final   12/17/2018 6.5 5.0 - 8.0 Final   12/10/2018 7.0 5.0 - 8.0 Final

## 2021-06-12 NOTE — PROCEDURES
[facility]    Procedure: IR Procedure Note    Date/Time: 10/22/2020 10:49 AM  Performed by: Maxx Galeas MD  Authorized by: Maxx Galeas MD       Universal Protocol    Site marked: Yes    Prior images obtained and reviewed: Yes    Required items: required blood products, implants, devices, and special equipment available    Patient identity confirmed: verbally with patient    Reevaluation: Patient was reevaluated immediately before administering moderate or deep sedation or anesthesia    Confirmation checklist: patient's identity using two indicators, relevant allergies, procedure was appropriate and matched the consent or emergent situation and correct equipment/implants were available    Time out: Immediately prior to procedure a time out was called to verify the correct patient, procedure, equipment, support staff and site/side marked as required    Universal Protocol: Joint Commission Universal Protocol was followed    Preparation: Patient was prepped and draped in the usual sterile fashion    Anesthesia    Local anesthesia used?: Yes    Anesthesia: see MAR for details    Sedation    Patient sedation: Yes    Sedation type: moderate (conscious) sedation    Vital signs: Vital signs monitored during sedation  Specimens: none  Complications: None    Post-procedure    Patient tolerance: Patient tolerated the procedure well with no immediate complications   Length of time physician present for 1:1 monitoring during sedation: 15

## 2021-06-14 NOTE — ANESTHESIA POSTPROCEDURE EVALUATION
Patient: Devora Garcia  Procedure(s):  LAPAROSCOPY , Enterolysis  Anesthesia type: general    Patient location: PACU  Last vitals:   Vitals Value Taken Time   /71 12/23/20 1000   Temp 36.7  C (98  F) 12/23/20 0933   Pulse 74 12/23/20 1010   Resp 16 12/23/20 0933   SpO2 96 % 12/23/20 1010   Vitals shown include unvalidated device data.  Post vital signs: stable  Level of consciousness: awake and responds to simple questions  Post-anesthesia pain: pain controlled  Post-anesthesia nausea and vomiting: no  Pulmonary: unassisted, return to baseline  Cardiovascular: stable and blood pressure at baseline  Hydration: adequate  Anesthetic events: no    QCDR Measures:  ASA# 11 - Idalia-op Cardiac Arrest: ASA11B - Patient did NOT experience unanticipated cardiac arrest  ASA# 12 - Idalia-op Mortality Rate: ASA12B - Patient did NOT die  ASA# 13 - PACU Re-Intubation Rate: ASA13B - Patient did NOT require a new airway mgmt  ASA# 10 - Composite Anes Safety: ASA10A - No serious adverse event    Additional Notes:

## 2021-06-14 NOTE — ANESTHESIA CARE TRANSFER NOTE
Last vitals:   Vitals:    12/29/20 1725   BP: 172/74   Pulse: 80   Resp: 16   Temp: 37.1  C (98.8  F)   SpO2: 100%     Patient's level of consciousness is drowsy  Spontaneous respirations: yes  Maintains airway independently: yes  Dentition unchanged: yes  Oropharynx: oropharynx clear of all foreign objects    QCDR Measures:  ASA# 20 - Surgical Safety Checklist: WHO surgical safety checklist completed prior to induction    PQRS# 430 - Adult PONV Prevention: 4558F - Pt received => 2 anti-emetic agents (different classes) preop & intraop  ASA# 8 - Peds PONV Prevention: NA - Not pediatric patient, not GA or 2 or more risk factors NOT present  PQRS# 424 - Idalia-op Temp Management: 4559F - At least one body temp DOCUMENTED => 35.5C or 95.9F within required timeframe  PQRS# 426 - PACU Transfer Protocol: - Transfer of care checklist used  ASA# 14 - Acute Post-op Pain: ASA14B - Patient did NOT experience pain >= 7 out of 10

## 2021-06-14 NOTE — ANESTHESIA POSTPROCEDURE EVALUATION
Patient: Devora Garcia  Procedure(s):  LAPAROSCOPY, De-Clot of Peritoneal Dialysis Catheter  Anesthesia type: general    Patient location: PACU  Last vitals:   Vitals Value Taken Time   /70 12/29/20 1750   Temp 37.1  C (98.8  F) 12/29/20 1725   Pulse 76 12/29/20 1757   Resp 19 12/29/20 1757   SpO2 94 % 12/29/20 1757   Vitals shown include unvalidated device data.  Post vital signs: stable  Level of consciousness: awake and responds to simple questions  Post-anesthesia pain: pain controlled  Post-anesthesia nausea and vomiting: no  Pulmonary: unassisted, return to baseline  Cardiovascular: stable and blood pressure at baseline  Hydration: adequate  Anesthetic events: no    QCDR Measures:  ASA# 11 - Idalia-op Cardiac Arrest: ASA11B - Patient did NOT experience unanticipated cardiac arrest  ASA# 12 - Idalia-op Mortality Rate: ASA12B - Patient did NOT die  ASA# 13 - PACU Re-Intubation Rate: ASA13B - Patient did NOT require a new airway mgmt  ASA# 10 - Composite Anes Safety: ASA10A - No serious adverse event    Additional Notes:

## 2021-06-14 NOTE — ANESTHESIA PREPROCEDURE EVALUATION
Anesthesia Evaluation      Patient summary reviewed   No history of anesthetic complications     Airway   Mallampati: II  Neck ROM: full   Pulmonary - negative ROS and normal exam    breath sounds clear to auscultation  (+) sleep apnea on CPAP, ,                          Cardiovascular - negative ROS and normal exam  Exercise tolerance: > or = 4 METS  (+) hypertension, ,     ECG reviewed (NSR 8/2020)  Rhythm: regular  Rate: normal,         Neuro/Psych - negative ROS     Endo/Other - negative ROS   (+) diabetes mellitus type 2 well controlled using insulin, obesity,      GI/Hepatic/Renal - negative ROS   (+)   chronic renal disease ESRD,           Dental - normal exam                        Anesthesia Plan  Planned anesthetic: general endotracheal    ASA 3   Induction: intravenous   Anesthetic plan and risks discussed with: patient  Anesthesia plan special considerations: antiemetics,   Post-op plan: routine recovery

## 2021-06-14 NOTE — ANESTHESIA CARE TRANSFER NOTE
Last vitals:   Vitals:    12/23/20 0848   BP: 180/79   Pulse: 78   Resp: 16   Temp: 36.5  C (97.7  F)   SpO2: 100%     Patient's level of consciousness is awake  Spontaneous respirations: yes  Maintains airway independently: yes  Dentition unchanged: yes  Oropharynx: oropharynx clear of all foreign objects    QCDR Measures:  ASA# 20 - Surgical Safety Checklist: WHO surgical safety checklist completed prior to induction    PQRS# 430 - Adult PONV Prevention: 4558F - Pt received => 2 anti-emetic agents (different classes) preop & intraop  ASA# 8 - Peds PONV Prevention: NA - Not pediatric patient, not GA or 2 or more risk factors NOT present  PQRS# 424 - Idalia-op Temp Management: 4559F - At least one body temp DOCUMENTED => 35.5C or 95.9F within required timeframe  PQRS# 426 - PACU Transfer Protocol: - Transfer of care checklist used  ASA# 14 - Acute Post-op Pain: ASA14B - Patient did NOT experience pain >= 7 out of 10

## 2021-06-16 PROBLEM — N17.9 ACUTE RENAL FAILURE (ARF) (H): Status: ACTIVE | Noted: 2020-08-30

## 2021-06-16 PROBLEM — N17.9 ACUTE RENAL FAILURE, UNSPECIFIED ACUTE RENAL FAILURE TYPE (H): Status: ACTIVE | Noted: 2020-08-30

## 2021-06-16 PROBLEM — T85.611A PERITONEAL DIALYSIS CATHETER DYSFUNCTION, INITIAL ENCOUNTER (H): Status: ACTIVE | Noted: 2020-12-26

## 2021-06-16 PROBLEM — N19 RENAL FAILURE: Status: ACTIVE | Noted: 2018-11-07

## 2021-06-16 PROBLEM — N20.1 URETERAL STONE: Status: ACTIVE | Noted: 2020-08-30

## 2021-06-16 PROBLEM — N20.1 CALCULUS OF URETER: Status: ACTIVE | Noted: 2018-11-07

## 2021-06-16 PROBLEM — N18.30 CKD (CHRONIC KIDNEY DISEASE) STAGE 3, GFR 30-59 ML/MIN (H): Status: ACTIVE | Noted: 2018-11-09

## 2021-06-17 NOTE — ED PROVIDER NOTES
EMERGENCY DEPARTMENT ENCOUNTER      NAME: Devora Garcia  AGE: 60 y.o. female  YOB: 1960  MRN: [id number]  EVALUATION DATE & TIME: 2021  8:51 PM    PCP: Josesito Evans MD    ED PROVIDER: Cristy Alegria PA-C      Chief Complaint   Patient presents with     Ear Pain         FINAL IMPRESSION:  1. Mastoid disorder, left          [employer] y.o. female with ESRD on peritoneal dialysis, T2DM presents to the Emergency Department for evaluation of 2 months of left ear fullness and discomfort.  Patient has been evaluated by her primary care provider and ENT for this without any abnormalities found. No drainage, no fevers.     Vital signs within normal range. On exam, patient appears well and is alert & oriented appropriately.  She is neurologically intact, no focal deficits.  No nystagmus or reproducible dizziness.  No visible left middle ear effusion, no TM erythema or bulging.  Normal external canal, no pain on manipulation of the pinna.  No erythema, warmth, tenderness of the mastoid.  No sinus tenderness.  Oropharynx is unremarkable.  There is no notable middle ear effusion on my exam, no other signs of suppurative otitis media or otitis externa.  No clinical signs of acute mastoiditis.  Patient is afebrile, no meningismus or other signs of meningitis/encephalitis.  Patient has not had any imaging yet for the symptoms, will obtain a CT to assess for effusion or masses.  Symptoms are less typical of vestibular neuroma.  Patient does not have neurologic deficits on exam that would suggest CVA.  She clinically appears well and I do not believe labs are indicated at this time.  She is declining any analgesia here.    CT IAC shows moderate left mastoid effusion without osseous destruction.  Minimal fluid in the middle ear cavity.  Soft tissue density in the upper right epitympanum is nonspecific but could be inflammatory cholesteatoma is less likely.  CT findings are likely  chronic in nature, low suspicion for acute mastoiditis or other infectious process at this time.  The right TM is unremarkable with no signs of infection.  Patient symptoms have been ongoing for 2 months and I do not believe further work-up is indicated in the emergency department.  Patient appears well, is ambulatory without difficulty, I believe she is appropriate for discharge and ENT follow-up.  I encouraged continued use of Sudafed, intranasal sprays, humidified air as these do provide some relief.  She has tried oral steroids in the past for the symptoms without relief, therefore I do not feel these are necessary today.  I placed a referral to our ENT group.  I discussed strict return precautions with the patient, including mastoid swelling or pain, severe headache, fevers, confusion.  Patient is agreeable and discharged in a comfortable, ambulatory state.    At the conclusion of the encounter I discussed the results of all of the tests and the disposition. The questions were answered. The patient or family acknowledged understanding and was agreeable with the care plan.     ED COURSE:  8:55 PM I met with the patient, obtained history, performed an initial exam, and discussed options and plan for diagnostics and treatment here in the ED. I was wearing PPE including gloves and surgical mask.  10:22 PM CT of internal auditory canals shows moderate left mastoid effusion without osseous destruction. Minimal fluid within the middle ear cavity.  10:29 PM I paged ENT.   10:42 PM I rechecked on the patient and updated her on imaging results. We discussed the plan for discharge and the patient is agreeable. Reviewed supportive cares, symptomatic treatment, outpatient follow up, and reasons to return to the Emergency Department. Patient to be discharged by ED RN.     MEDICATIONS GIVEN IN THE EMERGENCY:  Medications - No data to display    NEW PRESCRIPTIONS STARTED AT TODAY'S ER VISIT  Discharge Medication List as of  5/23/2021 10:49 PM      CONTINUE these medications which have NOT CHANGED    Details   allopurinoL (ZYLOPRIM) 100 MG tablet Take 1 tablet (100 mg total) by mouth daily with breakfast., Starting Sun 9/6/2020, Normal      amLODIPine (NORVASC) 5 MG tablet Take 1 tablet (5 mg total) by mouth 2 (two) times a day., Starting Sun 9/6/2020, Normal      bumetanide (BUMEX) 2 MG tablet Take 1 tablet (2 mg total) by mouth 2 (two) times a day at 9am and 6pm., Starting Mon 9/7/2020, Normal      calcium, as carbonate, (TUMS) 200 mg calcium (500 mg) chewable tablet Chew 1 tablet (200 mg total) 3 (three) times a day with meals., Starting Sun 9/6/2020, OTC      carvediloL (COREG) 6.25 MG tablet Take 1 tablet (6.25 mg total) by mouth 2 (two) times a day., Starting Sun 9/6/2020, Normal      cloNIDine (CATAPRES-TTS) 0.2 mg/24 hr Place 1 patch on the skin every 7 days. Mondays, Historical Med      ergocalciferol (ERGOCALCIFEROL) 1,250 mcg (50,000 unit) capsule Take 1 capsule (50,000 Units total) by mouth once a week., Starting Tue 9/8/2020, Normal      HYDROcodone-acetaminophen 5-325 mg per tablet Take 1 tablet by mouth every 8 (eight) hours as needed for pain., Starting Wed 4/14/2021, Print      lisinopriL (PRINIVIL,ZESTRIL) 20 MG tablet Take 1 tablet (20 mg total) by mouth daily., Starting Sun 9/6/2020, Normal      multivitamin therapeutic tablet Take 1 tablet by mouth daily., Until Discontinued, Historical Med      omeprazole (PRILOSEC) 10 MG capsule Take 10 mg by mouth daily as needed., Until Discontinued, Historical Med      oxyCODONE (ROXICODONE) 5 MG immediate release tablet Take 1 tablet (5 mg total) by mouth every 6 (six) hours as needed for pain., Starting Wed 12/23/2020, Normal                =================================================================    HPI    Patient information was obtained from: Patient    Use of : N/A        Devora Garcia is a 60 y.o. female with a pertinent history of hypertension,  "DM II, and CKD (stage 3) who presents to this ED via walk-in with her  for evaluation of ear pain.    Per chart review,   Patient was seen in clinic 05/11/21 for evaluation of sinus issues. She was given a shot of Rocephin and discharged with a 5 day course of low-dose prednisone. Patient was additionally seen by ENT 05/17 with similar symptoms and ear pain. No additional treatments at that time.     Patient reports intermittent left ear pain that feels like she has fluid in her ear for the past month. Her hearing is \"foggy\" and she hears an echo when talking. Patient also endorses associated positional dizziness as well as difficulty sleeping secondary to the discomfort. Patient states the steroids and antibiotics given to her in clinic did not help. She has additionally tried a  humidifier, Claritin, Sudafed, Flonase, and an inhaler which have provided temporary relief, but her pain and symptoms continue to return. Patient denies having previous imaging done pertaining to this issue. Of note, patient has a follow-up appointment with ENT 06/02.     Patient denies any ear discharge, fevers, chills, cough, congestion, sore throat, runny nose, vomiting, syncope, gait problem, or other symptoms or concerns at this time.    REVIEW OF SYSTEMS   Review of Systems   Constitutional: Negative for chills and fever.   HENT: Positive for ear pain (left, intermittent) and hearing loss (\"foggy\" hearing). Negative for congestion, ear discharge, rhinorrhea and sore throat.    Respiratory: Negative for cough.    Gastrointestinal: Negative for vomiting.   Musculoskeletal: Negative for gait problem.   Neurological: Positive for dizziness (associated with ear pain). Negative for syncope.   Psychiatric/Behavioral: Positive for sleep disturbance (secondary to ear pain).   All other systems reviewed and are negative.      PAST MEDICAL HISTORY:  Past Medical History:   Diagnosis Date     Allergic rhinitis      Cancer (H)     left " breast     Chronic kidney disease      Chronic kidney disease, stage 3 2010     Cyst of left ovary      Diabetes mellitus (H)     12/2020 states no longer takin insulin     GERD (gastroesophageal reflux disease)      Gout      Hypertension      Intraductal carcinoma 2015     Obstructive sleep apnea on CPAP     uses CPAP     Psoriasis        PAST SURGICAL HISTORY:  Past Surgical History:   Procedure Laterality Date     BARIATRIC SURGERY  2014     BREAST LUMPECTOMY Left 2015     IR TUNNELED CATHETER COMPLETE REPLACEMENT  10/22/2020     IR TUNNELED CATHETER INSERT  9/2/2020     peritoneal dialsis catheter placement  12/02/2020     NH CYSTO/URETERO W/LITHOTRIPSY &INDWELL STENT INSRT Right 11/30/2018    Procedure: CYSTOSCOPY, RIGHT URETEROSCOPY, LASER LITHOTRIPSY STENT REMOVAL STENT INSERTION;  Surgeon: Dino Reynoso MD;  Location: [facility] Main OR;  Service: Urology     NH CYSTOSCOPY,INSERT URETERAL STENT Right 11/7/2018    Procedure: CYSTOSCOPY, WITH RIGHT URETERAL STENT INSERTION;  Surgeon: Dino Reynoso MD;  Location: [facility] Main OR;  Service: Urology     NH CYSTOSCOPY,INSERT URETERAL STENT Right 8/31/2020    Procedure: CYSTOSCOPY, WITH URETERAL STENT INSERTION;  Surgeon: Dino Reynoso MD;  Location: [facility] Main OR;  Service: Urology     NH LAP INSERTION TUNNELED INTRAPERITONEAL CATHETER N/A 12/23/2020    Procedure: LAPAROSCOPY , Enterolysis;  Surgeon: Tigre Rivas MD;  Location: [facility] Main OR;  Service: General     NH LAP,DIAGNOSTIC ABDOMEN N/A 12/29/2020    Procedure: LAPAROSCOPY, De-Clot of Peritoneal Dialysis Catheter;  Surgeon: Tigre Rivas MD;  Location: [facility] Main OR;  Service: General     REDUCTION MAMMAPLASTY  2015           CURRENT MEDICATIONS:    No current facility-administered medications on file prior to encounter.      Current Outpatient Medications on File Prior to Encounter   Medication Sig     allopurinoL (ZYLOPRIM) 100 MG tablet Take 1 tablet (100 mg total)  by mouth daily with breakfast.     amLODIPine (NORVASC) 5 MG tablet Take 1 tablet (5 mg total) by mouth 2 (two) times a day. (Patient taking differently: Take 5 mg by mouth daily. )     bumetanide (BUMEX) 2 MG tablet Take 1 tablet (2 mg total) by mouth 2 (two) times a day at 9am and 6pm.     calcium, as carbonate, (TUMS) 200 mg calcium (500 mg) chewable tablet Chew 1 tablet (200 mg total) 3 (three) times a day with meals.     carvediloL (COREG) 6.25 MG tablet Take 1 tablet (6.25 mg total) by mouth 2 (two) times a day.     cloNIDine (CATAPRES-TTS) 0.2 mg/24 hr Place 1 patch on the skin every 7 days. Mondays     ergocalciferol (ERGOCALCIFEROL) 1,250 mcg (50,000 unit) capsule Take 1 capsule (50,000 Units total) by mouth once a week. (Patient taking differently: Take 50,000 Units by mouth once a week. Mondays)     HYDROcodone-acetaminophen 5-325 mg per tablet Take 1 tablet by mouth every 8 (eight) hours as needed for pain.     lisinopriL (PRINIVIL,ZESTRIL) 20 MG tablet Take 1 tablet (20 mg total) by mouth daily.     multivitamin therapeutic tablet Take 1 tablet by mouth daily.     omeprazole (PRILOSEC) 10 MG capsule Take 10 mg by mouth daily as needed.     oxyCODONE (ROXICODONE) 5 MG immediate release tablet Take 1 tablet (5 mg total) by mouth every 6 (six) hours as needed for pain.       ALLERGIES:  Allergies   Allergen Reactions     Aspirin Nausea Only     Statins-Hmg-Coa Reductase Inhibitors Other (See Comments)     Other reaction(s): Renal Failure       FAMILY HISTORY:  Family History   Problem Relation Age of Onset     Kidney failure Father      Hypertension Father      Hypertension Brother      Diabetes Brother      Kidney failure Brother      Clotting disorder Neg Hx      Anesthesia problems Neg Hx        SOCIAL HISTORY:   Social History     Socioeconomic History     Marital status:      Spouse name: None     Number of children: None     Years of education: None     Highest education level: None  "  Occupational History     None   Social Needs     Financial resource strain: None     Food insecurity     Worry: None     Inability: None     Transportation needs     Medical: None     Non-medical: None   Tobacco Use     Smoking status: Never Smoker     Smokeless tobacco: Never Used   Substance and Sexual Activity     Alcohol use: Yes     Comment: Rare, 2/month     Drug use: No     Sexual activity: None   Lifestyle     Physical activity     Days per week: None     Minutes per session: None     Stress: None   Relationships     Social connections     Talks on phone: None     Gets together: None     Attends Baptism service: None     Active member of club or organization: None     Attends meetings of clubs or organizations: None     Relationship status: None     Intimate partner violence     Fear of current or ex partner: None     Emotionally abused: None     Physically abused: None     Forced sexual activity: None   Other Topics Concern     None   Social History Narrative     None       VITALS:  Patient Vitals for the past 24 hrs:   BP Temp Temp src Pulse Resp SpO2 Height Weight   05/23/21 2251 119/75 -- -- 80 -- 98 % -- --   05/23/21 1953 139/86 97.6  F (36.4  C) Oral 83 16 98 % 5' 1\" (1.549 m) 220 lb (99.8 kg)       PHYSICAL EXAM    General Appearance:  Well developed, well nourished. Alert, cooperative, no acute distress, nontoxic, appears stated age.   HENT: Normocephalic without obvious deformity, atraumatic. Mucous membranes moist. No visible left middle ear effusion, no TM erythema or bulging.  Normal external canal, no pain on manipulation of the pinna.  No erythema, warmth, tenderness of the mastoid.  No sinus tenderness. Oropharynx without erythema, exudate, uvular deviation, or soft palate edema. Neck is supple, no cervical lymphadenopathy, no nuchal rigidity. Normal left and right TM.   Eyes: Conjunctiva clear. Lids normal. No discharge.   Respiratory: No distress. Lungs clear to ausculation bilaterally. " No wheezes, rhonchi, rales, or stridor.  Cardiovascular: Regular rate and rhythm. No gallops, rubs, or murmurs. Capillary refill less than 2 seconds. No peripheral edema.   GI: Abdomen soft, nontender, normal bowel sounds. No rebound or guarding.   : No CVA tenderness.   Musculoskeletal: Moving all extremities. No swelling. No deformity. Able to ambulate independently.   Integument: Warm, dry, no rashes, petechiae, or lesions.   Neurologic: Alert and orientated x3. No focal deficits. CN III-XII grossly intact. Normal motor function. Normal sensory function. GCS 15.   Psych: Normal mood and affect. Judgement normal.      LAB:  All pertinent labs reviewed and interpreted.  No results found for this visit on 05/23/21.    RADIOLOGY:  Reviewed all pertinent imaging. Please see official radiology report.  Ct Internal Auditory Canals Without Contrast    Result Date: 5/23/2021  EXAM: CT INTERNAL AUDITORY CANALS WO CONTRAST LOCATION: [facility] DATE/TIME: 5/23/2021 9:29 PM INDICATION: Hearing loss, sensorineural. Ear pain, fluid in ear x 2 months, on dialysis. COMPARISON: None. TECHNIQUE:  Routine without contrast including dedicated thin section multiplanar reformats of each temporal bone. Dose reduction techniques were used. FINDINGS: RIGHT TEMPORAL BONE: 2.0 x 2.5 x 2.0 mm rounded soft tissue density in the superior most aspect of the epitympanum. No evidence of osseous destruction. This is favored to represent inflammatory change. It is not in location typically seen for a paraganglioma.  Normal external auditory canal and tympanic membrane. Otherwise normal middle ear cavity and ossicles. No mastoid effusion. No evidence for otosclerosis. Normal cochlea, semicircular canals, vestibule, and vestibular aqueduct. Intact bony  carotid canal and facial nerve canal. LEFT TEMPORAL BONE: Normal external auditory canal and tympanic membrane. Normal middle ear cavity and ossicles. Moderate left mastoid  effusion without osseous destruction. Trace fluid in the medial mesotympanum. The scutum is not blunted. No evidence for otosclerosis. Normal cochlea, semicircular canals, vestibule, and vestibular aqueduct. Intact bony carotid canal and facial nerve canal. OTHER: No visualized paranasal sinus mucosal disease. The visualized intracranial compartment is unremarkable.     Right temporal bone CT: 1. Small rounded soft tissue density in the upper right epitympanum is nonspecific though may be inflammatory. Lesion such as cholesteatoma considered less likely. This is not in the typical location of a globus tympanicum paraganglioma. However, 3-6 month interval follow-up is suggested for further evaluation. Left temporal bone CT: 1. Moderate left mastoid effusion without osseous destruction. Minimal fluid within the middle ear cavity.        I, Ananya Gray, am serving as a scribe to document services personally performed by Cristy Alegria PA-C based on my observation and the provider's statements to me. I, Cristy Alegria PA-C, attest that Ananya Gray is acting in a scribe capacity, has observed my performance of the services and has documented them in accordance with my direction.      This note has been dictated using voice recognition software. Any grammatical or context distortions are unintentional and inherent to the software.      Cristy Alergia PA-C  Emergency Medicine  [facility] EMERGENCY ROOM  [address] [address]  Dept: [phone]  Loc: [phone]       Cristy Mead PA-C  05/23/21 0880

## 2021-06-17 NOTE — ED TRIAGE NOTES
Patient is here with left ear pain that has been there for 1.5 months and has worsened last week. She did go to her primary, ENT and checked out ok She cant hear well out of the ear it is muffled.

## 2021-06-21 NOTE — ANESTHESIA CARE TRANSFER NOTE
Last vitals:   Vitals:    11/07/18 2136   BP: (!) 183/86   Pulse: 89   Resp: 16   Temp: 36.8  C (98.2  F)   SpO2: 94%     Patient's level of consciousness is awake and drowsy  Spontaneous respirations: yes  Maintains airway independently: yes  Dentition unchanged: yes  Oropharynx: oropharynx clear of all foreign objects    QCDR Measures:  ASA# 20 - Surgical Safety Checklist: WHO surgical safety checklist completed prior to induction  PQRS# 430 - Adult PONV Prevention: 4558F - Pt received => 2 anti-emetic agents (different classes) preop & intraop  ASA# 8 - Peds PONV Prevention: NA - Not pediatric patient, not GA or 2 or more risk factors NOT present  PQRS# 424 - Idalia-op Temp Management: 4559F - At least one body temp DOCUMENTED => 35.5C or 95.9F within required timeframe  PQRS# 426 - PACU Transfer Protocol: - Transfer of care checklist used  ASA# 14 - Acute Post-op Pain: ASA14B - Patient did NOT experience pain >= 7 out of 10

## 2021-06-21 NOTE — ANESTHESIA PREPROCEDURE EVALUATION
Anesthesia Evaluation      Patient summary reviewed   No history of anesthetic complications     Airway   Mallampati: II  Neck ROM: full   Pulmonary - normal exam    breath sounds clear to auscultation  (+) sleep apnea on CPAP, ,   (-) COPD, asthma, not a smoker                         Cardiovascular - normal exam  (+) hypertension, ,     Rhythm: regular  Rate: normal,         Neuro/Psych    (-) no seizures, no CVA    Endo/Other    (+) diabetes mellitus type 2 poorly controlled using insulin, obesity (BMI 49.96),      GI/Hepatic/Renal    (+) GERD,   chronic renal disease (Acute on chronic kidney disease stage III-IV) CRI and ARF,      Other findings: Labs 11/7/18:  WBC 8.6, Hgb 11.4, Plt 254  Na 136, K 3.8, BUN 52, Cr 6.40, Glucose 313      Dental - normal exam                        Anesthesia Plan  Planned anesthetic: MAC  Routine sedation meds  ASA 3     Anesthetic plan and risks discussed with: patient  Anesthesia plan special considerations: increased risk of difficult airway,   Post-op plan: routine recovery

## 2021-06-22 NOTE — ANESTHESIA PREPROCEDURE EVALUATION
Anesthesia Evaluation      Patient summary reviewed   No history of anesthetic complications     Airway   Mallampati: III  Neck ROM: full   Pulmonary - normal exam    breath sounds clear to auscultation  (+) sleep apnea,   (-) COPD, asthma, not a smoker                         Cardiovascular - normal exam  (+) hypertension, ,     (-) murmur  Rhythm: regular  Rate: normal,    no murmur      Neuro/Psych    (-) no seizures, no CVA    Endo/Other    (+) diabetes mellitus type 2 poorly controlled using insulin, obesity (BMI 49.96),      GI/Hepatic/Renal    (+) GERD,   chronic renal disease (Acute on chronic kidney disease stage III-IV),      Other findings: Labs 11/30/18:  Na 140, K 4.2, BUN 32, Cr 4.3, Glucose 165      Dental - normal exam                          Anesthesia Plan  Planned anesthetic: general endotracheal  GETA  Ketamine 25 mg post-induction  Glidescope available.  Dexamethasone (4 mg 2/2 T2DM) and zofran for PONV ppx.  Discussed that her kidney function is unlikely to improve significantly by delaying surgery further and she is unlikely to be optimized further by additional delay in intervention. Discussed that she needs to establish care with a nephrologist and continue to manage her diabetes and HTN as she is trending down a path toward ESRD with eventual dialysis as is not an optimal candidate for transplant.   ASA 3   Induction: intravenous   Anesthetic plan and risks discussed with: patient  Anesthesia plan special considerations: video-assisted, antiemetics,   Post-op plan: routine recovery

## 2021-06-22 NOTE — ANESTHESIA CARE TRANSFER NOTE
Last vitals:   Vitals:    11/30/18 0947   BP: 139/79   Pulse: 94   Resp: 16   Temp: 36.9  C (98.5  F)   SpO2: 99%     Patient's level of consciousness is drowsy  Spontaneous respirations: yes  Maintains airway independently: yes  Dentition unchanged: yes  Oropharynx: oropharynx clear of all foreign objects    QCDR Measures:  ASA# 20 - Surgical Safety Checklist: WHO surgical safety checklist completed prior to induction    PQRS# 430 - Adult PONV Prevention: 4558F - Pt received => 2 anti-emetic agents (different classes) preop & intraop  ASA# 8 - Peds PONV Prevention: NA - Not pediatric patient, not GA or 2 or more risk factors NOT present  PQRS# 424 - Idalia-op Temp Management: 4559F - At least one body temp DOCUMENTED => 35.5C or 95.9F within required timeframe  PQRS# 426 - PACU Transfer Protocol: - Transfer of care checklist used  ASA# 14 - Acute Post-op Pain: ASA14B - Patient did NOT experience pain >= 7 out of 10

## 2021-06-22 NOTE — ANESTHESIA POSTPROCEDURE EVALUATION
Patient: Devora Garcia  CYSTOSCOPY, RIGHT URETEROSCOPY, LASER LITHOTRIPSY STENT REMOVAL STENT INSERTION  Anesthesia type: general    Patient location: PACU  Last vitals:   Vitals:    11/30/18 1115   BP: 174/74   Pulse: 84   Resp: 20   Temp:    SpO2: 95%     Post vital signs: stable  Level of consciousness: awake and responds to simple questions  Post-anesthesia pain: pain controlled  Post-anesthesia nausea and vomiting: no  Pulmonary: unassisted, return to baseline  Cardiovascular: stable and blood pressure at baseline  Hydration: adequate  Anesthetic events: no    QCDR Measures:  ASA# 11 - Idalia-op Cardiac Arrest: ASA11B - Patient did NOT experience unanticipated cardiac arrest  ASA# 12 - Idalia-op Mortality Rate: ASA12B - Patient did NOT die  ASA# 13 - PACU Re-Intubation Rate: ASA13B - Patient did NOT require a new airway mgmt  ASA# 10 - Composite Anes Safety: ASA10A - No serious adverse event    Additional Notes:

## 2021-06-22 NOTE — PROGRESS NOTES
Assessment/Plan:        Diagnoses and all orders for this visit:    Calculus of kidney  -     Urinalysis Macroscopic  -     Culture, Urine- Future; Future; Expected date: 01/09/2019  -     Culture, Urine- Future  -     Renal Function Profile- Today; Future; Expected date: 12/10/2018  -     Stat Gram Stain; Future; Expected date: 12/10/2018  -     Urine,Microscopic- Today; Future; Expected date: 12/10/2018  -     sulfamethoxazole-trimethoprim (BACTRIM) 400-80 mg per tablet; Take 1 tablet by mouth Daily at 5 pm. for 10 days.  Dispense: 10 tablet; Refill: 0      Stone Management Plan  [facility] Stone [facility] 12/10/2018   Urinary Tract Infection Possible Infection   Renal Colic Well controlled symptoms   Renal Failure No suspicion of renal failure   R Stone Event Established event   R Post-op status Other   L Stone Event No current event             Subjective:      HPI  Ms. Devora Garcia is a 58 y.o.  female returning to the [facility] [place] for early postoperative follow up for anticipated stent removal.     She returns status post right ureteroscopic laser lithotripsy for renal and ureteral stone. She has had no unanticipated post-operative events.    She has had no symptoms suspicious for infection and stent was very well tolerated.     Because of suspicion of active urinary tract infection, stent extraction was deferred today.    She was prescribed  Bactrim and will return for stent extraction pending urine culture and sensitivities.    Her pre-op urine culture and intra-op stone culture were no growth. We are reacting with caution to nitrite positive urine.    Will check urine micro, culture, and Gram stain. As well will recheck renal panel.     ROS   Review of systems is negative except for HPI.    Past Medical History:   Diagnosis Date     Allergic rhinitis      Cancer (H)     left breast     Chronic kidney disease, stage 3 (H) 2010     Cyst of left ovary      Diabetes mellitus  (H)      GERD (gastroesophageal reflux disease)      Hypertension      Intraductal carcinoma 2015     Obstructive sleep apnea on CPAP     uses CPAP     Psoriasis        Past Surgical History:   Procedure Laterality Date     BARIATRIC SURGERY  2014     BREAST LUMPECTOMY Left 2015     NE CYSTO/URETERO W/LITHOTRIPSY &INDWELL STENT INSRT Right 11/30/2018    Procedure: CYSTOSCOPY, RIGHT URETEROSCOPY, LASER LITHOTRIPSY STENT REMOVAL STENT INSERTION;  Surgeon: Dino Reynoso MD;  Location: [facility] OR;  Service: Urology     NE CYSTOSCOPY,INSERT URETERAL STENT Right 11/7/2018    Procedure: CYSTOSCOPY, WITH RIGHT URETERAL STENT INSERTION;  Surgeon: Dino Reynoso MD;  Location: [facility];  Service: Urology     REDUCTION MAMMAPLASTY  2015       Current Outpatient Medications   Medication Sig Dispense Refill     acetaminophen (TYLENOL) 500 MG tablet Take 1-2 tablets (500-1,000 mg total) by mouth every 6 (six) hours as needed.  0     allopurinol (ZYLOPRIM) 100 MG tablet Take 1 tablet (100 mg total) by mouth daily with breakfast. 30 tablet 0     amLODIPine (NORVASC) 5 MG tablet Take 5 mg by mouth daily.       ciprofloxacin HCl (CIPRO) 500 MG tablet Take 0.5 tablets (250 mg total) by mouth 2 (two) times a day for 14 days. 28 tablet 0     citric acid-sodium citrate (BICITRA) 500-334 mg/5 mL solution Take 15 mL by mouth 2 (two) times a day. 900 mL 0     cloNIDine HCl (CATAPRES) 0.1 MG tablet Take 0.1 mg by mouth 2 (two) times a day.       cyanocobalamin 1000 MCG tablet Take 1,000 mcg by mouth once a week. Sundays       insulin detemir U-100 (LEVEMIR) 100 unit/mL injection Inject 30 Units under the skin daily.       multivitamin therapeutic tablet Take 1 tablet by mouth daily.       NOVOLOG FLEXPEN U-100 INSULIN 100 unit/mL injection pen Check blood sugar four (4) times daily.  11.65 Type 2 with hyperglycemia  BD Ultra-fine Janina Pen Needles - NDC [address]-59 - dispense 1 case, refill PRN for 1 year  Accu-chek  Guide test strips (50 ct. boxes) - dispense 1, refill PRN for 1 year  Accu-chek FastClix lancets (box of 102 ct.) - dispense 1, refill PRN for 1 year 5 Pre-filled Pen Syringe PRN     omeprazole (PRILOSEC) 10 MG capsule Take 10 mg by mouth daily as needed.       oxyCODONE (ROXICODONE) 5 MG immediate release tablet 1-2 tablets every four hours as required for severe pain. 20 tablet 0     sulfamethoxazole-trimethoprim (BACTRIM) 400-80 mg per tablet Take 1 tablet by mouth Daily at 5 pm. for 10 days. 10 tablet 0     No current facility-administered medications for this visit.        Allergies   Allergen Reactions     Aspirin Nausea Only       Social History     Socioeconomic History     Marital status:      Spouse name: Not on file     Number of children: Not on file     Years of education: Not on file     Highest education level: Not on file   Social Needs     Financial resource strain: Not on file     Food insecurity - worry: Not on file     Food insecurity - inability: Not on file     Transportation needs - medical: Not on file     Transportation needs - non-medical: Not on file   Occupational History     Not on file   Tobacco Use     Smoking status: Never Smoker     Smokeless tobacco: Never Used   Substance and Sexual Activity     Alcohol use: Yes     Comment: Rare     Drug use: No     Sexual activity: Not on file   Other Topics Concern     Not on file   Social History Narrative     Not on file       Family History   Problem Relation Age of Onset     Kidney failure Father      Hypertension Father      Hypertension Brother      Diabetes Brother      Kidney failure Brother      Clotting disorder Neg Hx      Anesthesia problems Neg Hx      Objective:      Physical Exam  Vitals:    12/10/18 0924   BP: 151/66   Pulse: 64   Temp: 97.7  F (36.5  C)     General - well developed, well nourished, appropriate for age. Appears no distress at this time  Abdomen - moderately obese soft, non-tender, no hepatosplenomegaly, no  masses.   - no flank tenderness, no suprapubic tenderness, kidney and bladder non-palpable  MSK - normal spinal curvature. no spinal tenderness. normal gait. muscular strength intact.  Psych - oriented to time, place, and person, normal mood and affect.      Labs   Urinalysis POC (Office):  Nitrite, UA   Date Value Ref Range Status   12/10/2018 Positive (!) Negative Final   11/07/2018 Negative Negative Final   07/19/2010 Negative (Negative) Final       Lab Urinalysis:  Blood, UA   Date Value Ref Range Status   12/10/2018 Small (!) Negative Final   11/07/2018 Small (!) Negative Final   07/19/2010 Moderate (!) (Negative) Final     Nitrite, UA   Date Value Ref Range Status   12/10/2018 Positive (!) Negative Final   11/07/2018 Negative Negative Final   07/19/2010 Negative (Negative) Final     Leukocytes, UA   Date Value Ref Range Status   12/10/2018 Small (!) Negative Final   11/07/2018 Small (!) Negative Final   07/19/2010 Negative (Negative) Final     pH, UA   Date Value Ref Range Status   12/10/2018 7.0 5.0 - 8.0 Final   11/07/2018 5.0 4.5 - 8.0 Final   07/19/2010 5.0 4.5 - 8.0 Final    and Acute Labs   Urine Culture    Culture   Date Value Ref Range Status   11/30/2018 No Growth  Final   11/07/2018 No Growth  Final

## 2021-06-22 NOTE — PROGRESS NOTES
Assessment/Plan:        Diagnoses and all orders for this visit:    Calculus of ureter  -     Cystoscopy with Stent Removal Education  -     Patient Stated Goal: Prevent further stones  -     CT Abdomen Pelvis Without Oral Without IV Contrast; Future; Expected date: 01/17/2019  -     RENAL FUNCTION PROFILE; Future; Expected date: 01/17/2019    Calculus of kidney  -     Urinalysis Macroscopic  -     Cancel: Culture, Urine- Future; Future; Expected date: 01/16/2019  -     Culture, Urine- Future  -     CT Abdomen Pelvis Without Oral Without IV Contrast; Future; Expected date: 01/17/2019      Stone Management Plan  [facility] Stone Management 12/10/2018 12/17/2018   Urinary Tract Infection Possible Infection No suspicion of infection   Renal Colic Well controlled symptoms Well controlled symptoms   Renal Failure No suspicion of renal failure No suspicion of renal failure   R Stone Event Established event Established event   R Post-op status Other Stent Removal   L Stone Event No current event No current event             Subjective:      HPI  Ms. Devora Garcia is a 58 y.o.  female returning to the [facility] [place] for early postoperative follow up for anticipated stent removal.     She returns status post right ureteroscopic laser lithotripsy for renal and ureteral stone. She has had outpatient management of urinary tract infection.    She has had no symptoms suspicious for infection and stent was mildly symptomatic with typical issues of flank discomfort and lower urinary tract irritation.     Flexible cystoscopy is performed and indwelling stent is removed without incident.    She will follow up in the office in one month with imaging.    Her creatinine remains significantly elevated. She will be seeing Dr Wheeler from Nephrology in the next few weeks. She is encouraged to make the most of that encounter.     ROS   Review of systems is negative except for HPI.    Past Medical History:    Diagnosis Date     Allergic rhinitis      Cancer (H)     left breast     Chronic kidney disease, stage 3 (H) 2010     Cyst of left ovary      Diabetes mellitus (H)      GERD (gastroesophageal reflux disease)      Hypertension      Intraductal carcinoma 2015     Obstructive sleep apnea on CPAP     uses CPAP     Psoriasis        Past Surgical History:   Procedure Laterality Date     BARIATRIC SURGERY  2014     BREAST LUMPECTOMY Left 2015     [place] CYSTO/URETERO W/LITHOTRIPSY &INDWELL STENT INSRT Right 11/30/2018    Procedure: CYSTOSCOPY, RIGHT URETEROSCOPY, LASER LITHOTRIPSY STENT REMOVAL STENT INSERTION;  Surgeon: Dino Reynoso MD;  Location: [facility] OR;  Service: Urology     [place] CYSTOSCOPY,INSERT URETERAL STENT Right 11/7/2018    Procedure: CYSTOSCOPY, WITH RIGHT URETERAL STENT INSERTION;  Surgeon: Dino Reynoso MD;  Location: [facility];  Service: Urology     REDUCTION MAMMAPLASTY  2015       Current Outpatient Medications   Medication Sig Dispense Refill     allopurinol (ZYLOPRIM) 100 MG tablet Take 1 tablet (100 mg total) by mouth daily with breakfast. 30 tablet 0     amLODIPine (NORVASC) 5 MG tablet Take 5 mg by mouth daily.       citric acid-sodium citrate (BICITRA) 500-334 mg/5 mL solution Take 15 mL by mouth 2 (two) times a day. 900 mL 0     cloNIDine HCl (CATAPRES) 0.1 MG tablet Take 0.1 mg by mouth 2 (two) times a day.       cyanocobalamin 1000 MCG tablet Take 1,000 mcg by mouth once a week. Sundays       doxycycline (VIBRA-TABS) 100 MG tablet Take 1 tablet (100 mg total) by mouth 2 (two) times a day for 10 days. 20 tablet 0     insulin detemir U-100 (LEVEMIR) 100 unit/mL injection Inject 30 Units under the skin daily.       multivitamin therapeutic tablet Take 1 tablet by mouth daily.       NOVOLOG FLEXPEN U-100 INSULIN 100 unit/mL injection pen Check blood sugar four (4) times daily.  11.65 Type 2 with hyperglycemia  BD Ultra-fine Janina Pen Needles - NDC [address]-72 - dispense 1  case, refill PRN for 1 year  Accu-chek Guide test strips (50 ct. boxes) - dispense 1, refill PRN for 1 year  Accu-chek FastClix lancets (box of 102 ct.) - dispense 1, refill PRN for 1 year 5 Pre-filled Pen Syringe PRN     omeprazole (PRILOSEC) 10 MG capsule Take 10 mg by mouth daily as needed.       oxyCODONE (ROXICODONE) 5 MG immediate release tablet 1-2 tablets every four hours as required for severe pain. 20 tablet 0     acetaminophen (TYLENOL) 500 MG tablet Take 1-2 tablets (500-1,000 mg total) by mouth every 6 (six) hours as needed.  0     No current facility-administered medications for this visit.        Allergies   Allergen Reactions     Aspirin Nausea Only       Social History     Socioeconomic History     Marital status:      Spouse name: Not on file     Number of children: Not on file     Years of education: Not on file     Highest education level: Not on file   Social Needs     Financial resource strain: Not on file     Food insecurity - worry: Not on file     Food insecurity - inability: Not on file     Transportation needs - medical: Not on file     Transportation needs - non-medical: Not on file   Occupational History     Not on file   Tobacco Use     Smoking status: Never Smoker     Smokeless tobacco: Never Used   Substance and Sexual Activity     Alcohol use: Yes     Comment: Rare     Drug use: No     Sexual activity: Not on file   Other Topics Concern     Not on file   Social History Narrative     Not on file       Family History   Problem Relation Age of Onset     Kidney failure Father      Hypertension Father      Hypertension Brother      Diabetes Brother      Kidney failure Brother      Clotting disorder Neg Hx      Anesthesia problems Neg Hx      Objective:      Physical Exam  Vitals:    12/17/18 0850   BP: 152/72   Pulse: 78   Temp: 97.5  F (36.4  C)     General - well developed, well nourished, appropriate for age. Appears no distress at this time  Abdomen - moderately obese soft,  non-tender, no hepatosplenomegaly, no masses.   - no flank tenderness, no suprapubic tenderness, kidney and bladder non-palpable  MSK - normal spinal curvature. no spinal tenderness. normal gait. muscular strength intact.  Psych - oriented to time, place, and person, normal mood and affect.      Labs   Urinalysis POC (Office):  Nitrite, UA   Date Value Ref Range Status   12/17/2018 Negative Negative Final   12/10/2018 Positive (!) Negative Final   11/07/2018 Negative Negative Final       Lab Urinalysis:  Blood, UA   Date Value Ref Range Status   12/17/2018 Large (!) Negative Final   12/10/2018 Small (!) Negative Final   11/07/2018 Small (!) Negative Final     Nitrite, UA   Date Value Ref Range Status   12/17/2018 Negative Negative Final   12/10/2018 Positive (!) Negative Final   11/07/2018 Negative Negative Final     Leukocytes, UA   Date Value Ref Range Status   12/17/2018 Trace (!) Negative Final   12/10/2018 Small (!) Negative Final   11/07/2018 Small (!) Negative Final     pH, UA   Date Value Ref Range Status   12/17/2018 6.5 5.0 - 8.0 Final   12/10/2018 7.0 5.0 - 8.0 Final   11/07/2018 5.0 4.5 - 8.0 Final    and Acute Labs   Renal Panel  [facility]  Creatinine   Date Value Ref Range Status   12/10/2018 4.51 (H) 0.60 - 1.10 mg/dL Final   11/30/2018 4.30 (H) 0.60 - 1.10 mg/dL Final   11/30/2018 4.30 (H) 0.60 - 1.10 mg/dL Final     Potassium   Date Value Ref Range Status   12/10/2018 4.3 3.5 - 5.0 mmol/L Final   11/30/2018 4.2 3.5 - 5.0 mmol/L Final   11/30/2018 4.2 3.5 - 5.0 mmol/L Final     Calcium   Date Value Ref Range Status   12/10/2018 9.2 8.5 - 10.5 mg/dL Final   11/30/2018 9.0 8.5 - 10.5 mg/dL Final   11/30/2018 9.0 8.5 - 10.5 mg/dL Final

## 2021-06-22 NOTE — PROGRESS NOTES
Patient in office today for cystoscopy right ureteral stent removal procedure.    Urine analysis is nitrate positive.  No stent removal done today.

## 2021-06-25 NOTE — PROGRESS NOTES
CHIEF COMPLAINT:     Chief Complaint   Patient presents with     Consult     Mastoid disorder. Pressure and fluid both ears.            HISTORY OF PRESENT ILLNESS    Devora Garcia   was seen at the behest of Cristy Mead PA-C  for left ear fullness/pressure.     Patient was originally seen in the emergency department for some left-sided ear discomfort and fullness.  CT of the temporal bones showed fluid in the mastoid bone shaped.  She then had a follow-up appointment with and [facility] provider who recommended a repeat CT scan in 3 months time.  An audiogram was done at that time which showed possibly some slight sensory hearing loss in the high frequencies in the left ear with normal type a temps word recognition scores 100% bilaterally.  Patient denies vertigo.  She denies tinnitus.  She does have some lightheadedness when standing.  No history of otologic disease or otologic surgery.  Patient says symptoms began around Easter of this year.  She has had a significant weight loss of 30 of approximately 40 to 50 pounds over the past year.  Chief Complaint   Patient presents with     Consult     Mastoid disorder. Pressure and fluid both ears.        FINAL IMPRESSION:  1. Mastoid disorder, left             MEDICAL DECISION MAKIN y.o. female with ESRD on peritoneal dialysis, T2DM presents to the Emergency Department for evaluation of 2 months of left ear fullness and discomfort.  Patient has been evaluated by her primary care provider and ENT for this without any abnormalities found. No drainage, no fevers.     Left temporal bone CT:  1. Moderate left mastoid effusion without osseous destruction. Minimal fluid within the middle ear cavity.      Assessment: 60 y.o. female with:   (H74.92) Mastoid disorder, left (primary encounter diagnosis)  (H93.8X3) Sensation of fullness in both ears    Plan:   Ears look good on exam today. I have no concern for mastoid infection. Recommend we repeat CT temporal bone  in 3 months. Continue Flonase. Other causes of ear fullness include TMJ disorder. Soft diet recommend for 2 weeks. Questions answered.     Today I personally spent 30 minutes for patient care.     No orders of the defined types were placed in this encounter.         REVIEW OF SYSTEMS    Review of Systems: a 10-system review is reviewed at this encounter.  See scanned document.         PHYSICAL EXAM:        HEAD: Normal appearance and symmetry:  No cutaneous lesions.      EARS:    Normal TM's bilaterally. Normal auditory canals and external ears. Non-tender.  Avila:  256 hz midline         NOSE:    Dorsum:   straight  Septum: normal  Mucosa:  moist  Inferior turbinates:  normal       ORAL CAVITY/OROPHARYNX:    Lips:  Normal.  Tongue: normal, midline  Mucosa:   no lesions  Tonsils:  1+      NECK:  Trachea:  midline.   Thyroid:  normal   Adenopathy:  none       NEURO:   Alert and Oriented       RESPIRATORY:   Symmetry and Respiratory effort    PSYCH:   normal mood and affect    SKIN:  warm and dry     CT temporal bones shows an effusion in the left mastoid cavity middle ear cleft is clear no evidence of superior canal dehiscence    IMPRESSION:    Encounter Diagnoses   Name Primary?     Ear fullness, left Yes     Mastoiditis, chronic, left        RECOMMENDATIONS:    Discussed the nature of the effusion with the patient.  Most often these effusions are non-'s asymptomatic.  I do not see any evidence of eustachian tube dysfunction.  I offered her a trial of oral prednisone to see if we can clear the effusion and improve her symptoms of pressure fullness.  She is agreeable to this.  I described I discussed the risks including osteonecrosis of the femoral head.  We will see her back in 1 month with a repeat audiogram.  If she is not improved we may consider a sinus CT.  Another also in the differential diagnosis is patulous eustachian tube.  All questions were answered she is agreeable this plan of care  Medications Ordered    Medications     predniSONE (DELTASONE) 10 mg tablet     Sig: Take 10 mg by mouth 2 (two) times a day for 10 days.     Dispense:  20 tablet     Refill:  0     Take 1 tab with breakfast, 1 tab with lunch

## 2021-07-01 ENCOUNTER — HOSPITAL ENCOUNTER (OUTPATIENT)
Dept: CT IMAGING | Facility: CLINIC | Age: 61
Discharge: HOME | End: 2021-07-01
Attending: OTOLARYNGOLOGY
Payer: COMMERCIAL

## 2021-07-01 DIAGNOSIS — H93.8X3 EAR FULLNESS, BILATERAL: ICD-10-CM

## 2021-07-06 VITALS — BODY MASS INDEX: 41.54 KG/M2 | WEIGHT: 220 LBS | HEIGHT: 61 IN

## 2021-07-07 ENCOUNTER — AMBULATORY - HEALTHEAST (OUTPATIENT)
Dept: OTOLARYNGOLOGY | Facility: CLINIC | Age: 61
End: 2021-07-07

## 2021-07-07 DIAGNOSIS — H93.8X3 EAR FULLNESS, BILATERAL: ICD-10-CM

## 2021-07-07 DIAGNOSIS — Q16.5 TEGMEN DEFECT OF BASE OF SKULL: ICD-10-CM

## 2021-07-07 DIAGNOSIS — H70.93 UNSPECIFIED MASTOIDITIS, BILATERAL: ICD-10-CM

## 2021-07-08 ENCOUNTER — COMMUNICATION - HEALTHEAST (OUTPATIENT)
Dept: OTOLARYNGOLOGY | Facility: CLINIC | Age: 61
End: 2021-07-08

## 2021-07-08 NOTE — TELEPHONE ENCOUNTER
Telephone Encounter by Alma Oh RN at 7/8/2021 10:09 AM     Author: Alma Oh RN Service: -- Author Type: Registered Nurse    Filed: 7/8/2021 10:09 AM Encounter Date: 7/8/2021 Status: Signed    : Alma Oh RN (Registered Nurse)       ----- Message from Fitz Alfredo MD sent at 7/7/2021  1:39 PM CDT -----  Regarding: Please contact patient  I am ordering an MRI based her recent CT of the ear bones, which now shows fluid in the right ear (mastoid) as well.    Can you arrange follow up with me in 1-2 weeks.   ----- Message -----  From: Interface, Rad Results In  Sent: 7/2/2021  12:13 PM CDT  To: Fitz Alfredo MD

## 2021-07-11 ENCOUNTER — HOSPITAL ENCOUNTER (OUTPATIENT)
Dept: MRI IMAGING | Facility: CLINIC | Age: 61
Discharge: HOME | End: 2021-07-11
Attending: OTOLARYNGOLOGY | Admitting: OTOLARYNGOLOGY
Payer: COMMERCIAL

## 2021-07-11 DIAGNOSIS — H93.8X3 EAR FULLNESS, BILATERAL: ICD-10-CM

## 2021-07-11 DIAGNOSIS — H70.93 UNSPECIFIED MASTOIDITIS, BILATERAL: ICD-10-CM

## 2021-07-11 DIAGNOSIS — Q16.5 TEGMEN DEFECT OF BASE OF SKULL: ICD-10-CM

## 2021-07-11 PROCEDURE — 70553 MRI BRAIN STEM W/O & W/DYE: CPT

## 2021-07-11 PROCEDURE — 255N000002 HC RX 255 OP 636: Performed by: OTOLARYNGOLOGY

## 2021-07-11 PROCEDURE — A9585 GADOBUTROL INJECTION: HCPCS | Performed by: OTOLARYNGOLOGY

## 2021-07-11 RX ORDER — GADOBUTROL 604.72 MG/ML
7.5 INJECTION INTRAVENOUS ONCE
Status: COMPLETED | OUTPATIENT
Start: 2021-07-11 | End: 2021-07-11

## 2021-07-11 RX ADMIN — GADOBUTROL 5 ML: 604.72 INJECTION INTRAVENOUS at 17:02

## 2021-07-23 ENCOUNTER — OFFICE VISIT (OUTPATIENT)
Dept: OTOLARYNGOLOGY | Facility: CLINIC | Age: 61
End: 2021-07-23
Payer: COMMERCIAL

## 2021-07-23 DIAGNOSIS — R93.89 ABNORMAL FINDING ON CT SCAN: ICD-10-CM

## 2021-07-23 DIAGNOSIS — H69.01 PATULOUS EUSTACHIAN TUBE OF RIGHT EAR: Primary | ICD-10-CM

## 2021-07-23 PROCEDURE — 99214 OFFICE O/P EST MOD 30 MIN: CPT | Performed by: OTOLARYNGOLOGY

## 2021-07-23 NOTE — PROGRESS NOTES
CHIEF COMPLAINT:  Recheck      HISTORY OF PRESENT ILLNESS    Marcia was seen in follow up for CT/MRI review.  Patient is here for MRI CT review.  She continues to have fullness plugging in both ears right greater than left.  It is intermittent however.  She is suspicious that this may be having to do with her dialysis because they have been taking more fluid off and she has been issue having issues with low blood pressure.  She does find that if she puts her head in the dependent position improves or eliminates the symptoms      MRI review:     IMPRESSION:  1.  There is a nonspecific abnormal focus of signal intensity within the right epitympanum, but without significant associated enhancement. 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 However, this process demonstrated interval enlargement on the dedicated IAC CT study July 1, 2021. Given that ultimately it is indeterminate, and that it recently increased in size, would suggest short-term follow-up brain MR to ensure   stability with dedicated IAC imaging, as was performed on this study, in 3-6 months.     2.  Bilateral mastoid effusions again noted with unremarkable appearance of nasopharynx.    CT review:    IMPRESSION:  1.  Increased size of the rounded soft tissue density lesion in the right epitympanum. Dehiscence of the overlying tegmen tympani raises stability of cephalocele. Consider MRI for further characterization.  2.  New mild right mastoid effusion, nonspecific.  3.  Mildly improved left mastoid effusion.    RECOMMENDATIONS:  Bilateral aural fullness with no evident with normal hearing and no evidence of middle ear fluid.  Suspicious about that this is more an inflammatory reaction of her TM joint.  Recommend conservative therapy.  At patient's request we will repeat a CT of the temporal bones although I do not think the I think the middle ear fluid seen on the last CT was a  is a nonspecific finding and not contribute to her symptoms.  I will notify her CT results and have her start on prednisone if there is fluid present.  Otherwise we may consider a PT referral for TMJ related fullness.     No orders of the defined types were placed in this encounter.       REVIEW OF SYSTEMS    Review of Systems: a 10-system review is reviewed at this encounter.  See scanned document.     Aspirin and Statins-hmg-coa reductase inhibitors [hmg-coa-r inhibitors]     PHYSICAL EXAM:        HEAD: Normal appearance and symmetry:  No cutaneous lesions.      EARS:   Auricles normal         NOSE:    Dorsum:   straight       ORAL CAVITY/OROPHARYNX:    Lips:  Normal.     NECK:  Trachea:  midline       NEURO:   Alert and Oriented    GAIT AND STATION:  normal     RESPIRATORY:   Symmetry and Respiratory effort    PSYCH:   normal mood and affect    SKIN:  warm and dry         IMPRESSION:   Encounter Diagnoses   Name Primary?     Patulous eustachian tube of right ear Yes     Abnormal finding on CT scan               RECOMMENDATIONS:    Patient has signs and symptoms of patulous eustachian tube on the right-hand side likely due to dehydration from dialysis.  They are adjusting her dialysis regimen.  We discussed the nature of pathophysiology of patches stage III station 2.  Also recommended patch or land nasal drops that sometimes may be of benefit.  She can order these directly online if she wishes to pursue this.  I like to repeat a CT of the temporal bones in 6 months to make sure that the soft tissue in the epitympanum on the right-hand side is not changed which may represent a small encephalocele.

## 2021-07-23 NOTE — LETTER
7/23/2021         RE: Devora Garcia  [address] [place] [address]        Dear Colleague,    Thank you for referring your patient, Devora Garcia, to the [facility]. Please see a copy of my visit note below.    CHIEF COMPLAINT:  Recheck      HISTORY OF PRESENT ILLNESS    Marcia was seen in follow up for CT/MRI review.      MRI review:     IMPRESSION:  1.  There is a nonspecific abnormal focus of signal intensity within the right epitympanum, but without significant associated enhancement. 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 However, this process demonstrated interval enlargement on the dedicated IAC CT study July 1, 2021. Given that ultimately it is indeterminate, and that it recently increased in size, would suggest short-term follow-up brain MR to ensure   stability with dedicated IAC imaging, as was performed on this study, in 3-6 months.     2.  Bilateral mastoid effusions again noted with unremarkable appearance of nasopharynx.    CT review:    IMPRESSION:  1.  Increased size of the rounded soft tissue density lesion in the right epitympanum. Dehiscence of the overlying tegmen tympani raises stability of cephalocele. Consider MRI for further characterization.  2.  New mild right mastoid effusion, nonspecific.  3.  Mildly improved left mastoid effusion.    RECOMMENDATIONS:  Bilateral aural fullness with no evident with normal hearing and no evidence of middle ear fluid.  Suspicious about that this is more an inflammatory reaction of her TM joint.  Recommend conservative therapy.  At patient's request we will repeat a CT of the temporal bones although I do not think the I think the middle ear fluid seen on the last CT was a is a nonspecific finding and not contribute to her symptoms.  I will notify her CT results and have her start on prednisone if there is fluid present.  Otherwise we may  consider a PT referral for TMJ related fullness.     No orders of the defined types were placed in this encounter.       REVIEW OF SYSTEMS    Review of Systems: a 10-system review is reviewed at this encounter.  See scanned document.     Aspirin and Statins-hmg-coa reductase inhibitors [hmg-coa-r inhibitors]     PHYSICAL EXAM:        HEAD: Normal appearance and symmetry:  No cutaneous lesions.      EARS:   Auricles normal         NOSE:    Dorsum:   straight       ORAL CAVITY/OROPHARYNX:    Lips:  Normal.     NECK:  Trachea:  midline       NEURO:   Alert and Oriented    GAIT AND STATION:  normal     RESPIRATORY:   Symmetry and Respiratory effort    PSYCH:   normal mood and affect    SKIN:  warm and dry         IMPRESSION: No diagnosis found.           RECOMMENDATIONS:    No orders of the defined types were placed in this encounter.     No orders of the defined types were placed in this encounter.           Again, thank you for allowing me to participate in the care of your patient.        Sincerely,        Fitz Alfredo MD

## 2021-10-17 ENCOUNTER — APPOINTMENT (OUTPATIENT)
Dept: ULTRASOUND IMAGING | Facility: CLINIC | Age: 61
End: 2021-10-17
Payer: COMMERCIAL

## 2021-10-17 ENCOUNTER — HOSPITAL ENCOUNTER (EMERGENCY)
Facility: CLINIC | Age: 61
Discharge: HOME | End: 2021-10-17
Admitting: EMERGENCY MEDICINE
Payer: COMMERCIAL

## 2021-10-17 VITALS
OXYGEN SATURATION: 100 % | BODY MASS INDEX: 35.85 KG/M2 | SYSTOLIC BLOOD PRESSURE: 183 MMHG | RESPIRATION RATE: 16 BRPM | WEIGHT: 210 LBS | HEIGHT: 64 IN | TEMPERATURE: 96.2 F | HEART RATE: 67 BPM | DIASTOLIC BLOOD PRESSURE: 83 MMHG

## 2021-10-17 DIAGNOSIS — M79.651 PAIN OF RIGHT THIGH: ICD-10-CM

## 2021-10-17 PROBLEM — I10 HTN (HYPERTENSION): Status: ACTIVE | Noted: 2021-10-17

## 2021-10-17 PROCEDURE — 99284 EMERGENCY DEPT VISIT MOD MDM: CPT | Mod: 25

## 2021-10-17 PROCEDURE — 93971 EXTREMITY STUDY: CPT | Mod: RT

## 2021-10-17 RX ORDER — LISINOPRIL 40 MG/1
40 TABLET ORAL DAILY
COMMUNITY
Start: 2021-02-10 | End: 2023-01-01

## 2021-10-17 RX ORDER — ERGOCALCIFEROL 1.25 MG/1
50000 CAPSULE ORAL WEEKLY
COMMUNITY
Start: 2020-09-08 | End: 2023-01-01

## 2021-10-17 RX ORDER — BUMETANIDE 2 MG/1
TABLET ORAL
COMMUNITY
Start: 2021-01-22 | End: 2023-01-01

## 2021-10-17 RX ORDER — CLONIDINE 0.2 MG/24H
1 PATCH, EXTENDED RELEASE TRANSDERMAL
COMMUNITY
Start: 2021-01-14 | End: 2023-01-01

## 2021-10-17 RX ORDER — ALLOPURINOL 100 MG/1
100 TABLET ORAL
COMMUNITY
Start: 2020-09-06 | End: 2023-01-01

## 2021-10-17 RX ORDER — CARVEDILOL 6.25 MG/1
TABLET ORAL
COMMUNITY
Start: 2021-09-25 | End: 2023-01-01

## 2021-10-17 RX ORDER — OMEPRAZOLE 10 MG/1
CAPSULE, DELAYED RELEASE ORAL
COMMUNITY
Start: 2021-01-22 | End: 2023-01-01

## 2021-10-17 RX ORDER — FLURBIPROFEN 100 MG
TABLET ORAL
COMMUNITY
Start: 2021-02-23 | End: 2023-01-01

## 2021-10-17 RX ORDER — CYCLOBENZAPRINE HCL 10 MG
5 TABLET ORAL 2 TIMES DAILY PRN
Qty: 10 TABLET | Refills: 0 | Status: SHIPPED | OUTPATIENT
Start: 2021-10-17 | End: 2021-10-23

## 2021-10-17 RX ORDER — ESZOPICLONE 2 MG/1
TABLET, FILM COATED ORAL
COMMUNITY
Start: 2021-01-22 | End: 2023-01-01

## 2021-10-17 RX ORDER — AMLODIPINE BESYLATE 5 MG/1
5 TABLET ORAL
COMMUNITY
Start: 2020-09-06 | End: 2023-01-01

## 2021-10-17 ASSESSMENT — ENCOUNTER SYMPTOMS
COLOR CHANGE: 0
MYALGIAS: 1
FEVER: 0
CHILLS: 0

## 2021-10-17 ASSESSMENT — MIFFLIN-ST. JEOR: SCORE: 1507.55

## 2021-10-17 NOTE — DISCHARGE INSTRUCTIONS
The ultrasound of your right leg does not show any blood clot.    Take the muscle relaxer as prescribed.    Follow up with orthopedics in 1-2 weeks for recheck    Return to the ER for worsening pain, fever, swelling, redness, or other new / worsening symptoms or for other concerns.

## 2021-10-17 NOTE — ED TRIAGE NOTES
Patient is here with right upper thigh pain, worse when she sleeps. Walking seems to make it better. No known injury. Denies numbness or tingling in her leg.

## 2021-10-17 NOTE — ED PROVIDER NOTES
EMERGENCY DEPARTMENT ENCOUNTER      NAME: Devora Garcia  AGE: 61 year old female  YOB: 1960  MRN: [id number]  EVALUATION DATE & TIME: 10/17/2021  1:24 PM    PCP: Josesito Evans    ED PROVIDER: FAINA Martinez, CNP      Chief Complaint   Patient presents with     Leg Pain         FINAL IMPRESSION:  1. Pain of right thigh          ED COURSE & MEDICAL DECISION MAKIN:34 PM I met with the patient, obtained history, performed an initial exam, and discussed options and plan for treatment here in the ED.  1:46 PM I rechecked and updated the patient. I discussed the plan for discharge with the patient, and patient is agreeable. We discussed supportive cares at home and reasons for return to the ER including new or worsening symptoms - all questions and concerns addressed. Patient to be discharged by RN.    Pertinent Labs & Imaging studies reviewed. (See chart for details)  61 year old female presents to the Emergency Department for evaluation of right leg pain. Pain involves the lateral thigh. No evidence for infection on exam. No recent trauma and low suspicion fracture. US of the left leg negative for DVT. Denies other new symptoms. Declined xray. Will refer to orthopedics for ongoing evaluation. Requested muscle relaxer. Given flexeril. Also given return precautions.     At the conclusion of the encounter I discussed the results of all of the tests and the disposition. The questions were answered. The patient or family acknowledged understanding and was agreeable with the care plan.     PPE: Provider wore gloves, N95 mask, eye protection, surgical cap, and paper mask.     MEDICATIONS GIVEN IN THE EMERGENCY:  Medications - No data to display    NEW PRESCRIPTIONS STARTED AT TODAY'S ER VISIT  Discharge Medication List as of 10/17/2021  1:58 PM      START taking these medications    Details   cyclobenzaprine (FLEXERIL) 10 MG tablet Take 0.5 tablets (5 mg) by mouth 2 times daily as needed for  muscle spasms, Disp-10 tablet, R-0, Local Print                  =================================================================    HPI    Patient information was obtained from: the patient     Use of Intrepreter: N/A        Devora PHILLIP Garcia is a 61 year old female with a history of renal failure, type 2 diabetes mellitus, ductal carcinoma of breast, hypertension, and CAD who presents to the ED via walk in for evaluation of leg pain.     The patient reports that she has had ongoing right thigh pain for the past month. This pain gets worse at night, and wakes her up, and usually gets better during the day. Today the pain has not gotten better, even after she's walked around, which usually eases the pain. The patient endorses leg weakness and trouble sleeping through the night because of the pain. She denies swelling or redness to the area. She has taken tylenol for the pain, which offers brief relief. The patient partakes in peritoneal dialysis. The patient denies taking cholesterol medication. The patient denies fever, chills, and any other symptoms or complaints at this time.     REVIEW OF SYSTEMS   Review of Systems   Constitutional: Negative for chills and fever.   Cardiovascular: Negative for leg swelling.   Musculoskeletal: Positive for myalgias (right thigh).   Skin: Negative for color change.   All other systems reviewed and are negative.       PAST MEDICAL HISTORY:  Past Medical History:   Diagnosis Date     Allergic rhinitis      Cancer (H)     left breast     Chronic kidney disease      Chronic kidney disease, stage 3 (H) 2010     Cyst of left ovary      Diabetes mellitus (H)     12/2020 states no longer takin insulin     GERD (gastroesophageal reflux disease)      Gout      Hypertension      Intraductal carcinoma 2015     Obstructive sleep apnea on CPAP     uses CPAP     Psoriasis        PAST SURGICAL HISTORY:  Past Surgical History:   Procedure Laterality Date     BARIATRIC SURGERY  2014       CYSTOSCOPY,INSERT URETERAL STENT Right 11/7/2018    Procedure: CYSTOSCOPY, WITH RIGHT URETERAL STENT INSERTION;  Surgeon: Dino Reynoso MD;  Location: [facility] Main OR;  Service: Urology     HC CYSTOSCOPY,INSERT URETERAL STENT Right 8/31/2020    Procedure: CYSTOSCOPY, WITH URETERAL STENT INSERTION;  Surgeon: Dino Reynoso MD;  Location: [facility] Main OR;  Service: Urology     IR CVC TUNNEL PLACEMENT > 5 YRS OF AGE  9/2/2020     IR CVC TUNNEL REVISION RIGHT  10/22/2020     IR TUNNELED CATHETER COMPLETE REPLACEMENT  10/22/2020     IR TUNNELED CATHETER INSERT  9/2/2020     LUMPECTOMY BREAST Left 2015     MAMMOPLASTY REDUCTION  2015     OTHER SURGICAL HISTORY  12/02/2020    peritoneal dialsis catheter placement     MA CYSTO/URETERO W/LITHOTRIPSY &INDWELL STENT INSRT Right 11/30/2018    Procedure: CYSTOSCOPY, RIGHT URETEROSCOPY, LASER LITHOTRIPSY STENT REMOVAL STENT INSERTION;  Surgeon: Dino Reynoso MD;  Location: [facility] Main OR;  Service: Urology     MA LAP INSERTION TUNNELED INTRAPERITONEAL CATHETER N/A 12/23/2020    Procedure: LAPAROSCOPY , Enterolysis;  Surgeon: Tigre Rivas MD;  Location: [facility] Main OR;  Service: General     MA LAP,DIAGNOSTIC ABDOMEN N/A 12/29/2020    Procedure: LAPAROSCOPY, De-Clot of Peritoneal Dialysis Catheter;  Surgeon: Tigre Rivas MD;  Location: [facility] Main OR;  Service: General           CURRENT MEDICATIONS:    Prior to Admission Medications   Prescriptions Last Dose Informant Patient Reported? Taking?   allopurinol (ZYLOPRIM) 100 MG tablet   Yes Yes   Sig: Take 100 mg by mouth   amLODIPine (NORVASC) 5 MG tablet   Yes Yes   Sig: Take 5 mg by mouth   bumetanide (BUMEX) 2 MG tablet   Yes No   carvedilol (COREG) 6.25 MG tablet   Yes No   Sig: TAKE 1 TABLET BY MOUTH TWICE DAILY WITH MEALS   cloNIDine (CATAPRES-TTS2) 0.2 MG/24HR WK patch   Yes Yes   Sig: Place 1 patch onto the skin   ergocalciferol (ERGOCALCIFEROL) 1.25 MG (73158 UT) capsule   Yes Yes    eszopiclone (LUNESTA) 2 MG tablet   Yes No   Sig: TAKE 1 TABLET BY MOUTH AT BEDTIME AS NEEDED FOR SLEEP   flurbiprofen (ANSAID) 100 MG tablet   Yes No   Sig: TAKE 1 TABLET BY MOUTH EVERY 8 HOURS AS NEEDED FOR PAIN   insulin aspart (NOVOLOG PEN) 100 UNIT/ML pen   Yes Yes   Sig: Inject 6 Units Subcutaneous   lisinopril (ZESTRIL) 40 MG tablet   Yes No   Sig: Take 40 mg by mouth daily   omeprazole (PRILOSEC) 10 MG DR capsule   Yes No   Sig: TAKE 1 CAPSULE BY MOUTH EVERY DAY BEFORE A MEAL   vitamin B-12 (CYANOCOBALAMIN) 1000 MCG tablet   Yes No   Sig: Take 1,000 mcg by mouth      Facility-Administered Medications: None           ALLERGIES:  Allergies   Allergen Reactions     Aspirin Nausea     Statins-Hmg-Coa Reductase Inhibitors [Hmg-Coa-R Inhibitors] Other (See Comments)     Other reaction(s): Renal Failure, Other reaction(s): Renal Failure       FAMILY HISTORY:  Family History   Problem Relation Age of Onset     Kidney failure Father      Hypertension Father      Hypertension Brother      Diabetes Brother      Kidney failure Brother      Clotting Disorder No family hx of      Anesthesia Reaction No family hx of        SOCIAL HISTORY:   Social History     Socioeconomic History     Marital status:      Spouse name: None     Number of children: None     Years of education: None     Highest education level: None   Occupational History     None   Tobacco Use     Smoking status: Never Smoker     Smokeless tobacco: Never Used   Substance and Sexual Activity     Alcohol use: Yes     Comment: Alcoholic Drinks/day: Rare, 2/month     Drug use: No     Sexual activity: None   Other Topics Concern     None   Social History Narrative     None     Social Determinants of Health     Financial Resource Strain:      Difficulty of Paying Living Expenses:    Food Insecurity:      Worried About Running Out of Food in the Last Year:      Ran Out of Food in the Last Year:    Transportation Needs:      Lack of Transportation  "(Medical):      Lack of Transportation (Non-Medical):    Physical Activity:      Days of Exercise per Week:      Minutes of Exercise per Session:    Stress:      Feeling of Stress :    Social Connections:      Frequency of Communication with Friends and Family:      Frequency of Social Gatherings with Friends and Family:      Attends Rastafarian Services:      Active Member of Clubs or Organizations:      Attends Club or Organization Meetings:      Marital Status:    Intimate Partner Violence:      Fear of Current or Ex-Partner:      Emotionally Abused:      Physically Abused:      Sexually Abused:          VITALS:  Patient Vitals for the past 24 hrs:   BP Temp Temp src Pulse Resp SpO2 Height Weight   10/17/21 1404 (!) 183/83 -- -- 67 16 100 % -- --   10/17/21 1153 (!) 160/98 (!) 96.2  F (35.7  C) Temporal 80 16 100 % 1.626 m (5' 4\") 95.3 kg (210 lb)       PHYSICAL EXAM    Constitutional:  Alert, no distress  EYES: Conjunctivae clear  HENT:  Atraumatic, normocephalic  Respiratory:  No respiratory distress  Musculoskeletal: 1+ bilateral ankle edema.  There is tenderness right vastus lateralis.  No crepitus, swelling, or erythema.  DP pulses are 1+.  The pain in the right thigh does increase with right hip flexion and knee extension.  Integument: Warm, Dry  Neurologic:  Alert & oriented x 3     LAB:  All pertinent labs reviewed and interpreted.  Results for orders placed or performed during the hospital encounter of 10/17/21   US Lower Extremity Venous Duplex Right    Impression    IMPRESSION:  1.  No deep venous thrombosis in the right lower extremity.       RADIOLOGY:  Reviewed all pertinent imaging. Please see official radiology report.  US Lower Extremity Venous Duplex Right   Final Result   IMPRESSION:   1.  No deep venous thrombosis in the right lower extremity.            PROCEDURES:   None      Yasmin PRAKASH, am serving as a scribe to document services personally performed by Reza Harper CNP. based on my " observation and the provider's statements to me. I, Reza Harper CNP attest that Yasmin Abdullahi is acting in a scribe capacity, has observed my performance of the services and has documented them in accordance with my direction.    FAINA Martinez, CNP  Emergency Medicine  [facility] EMERGENCY ROOM  [address] [address]  [phone]  Dept: [phone]          Reza Harper APRN CNP  10/17/21 1527

## 2021-11-15 ENCOUNTER — APPOINTMENT (OUTPATIENT)
Dept: RADIOLOGY | Facility: CLINIC | Age: 61
End: 2021-11-15
Attending: EMERGENCY MEDICINE
Payer: COMMERCIAL

## 2021-11-15 ENCOUNTER — HOSPITAL ENCOUNTER (EMERGENCY)
Facility: CLINIC | Age: 61
Discharge: HOME | End: 2021-11-15
Admitting: PHYSICIAN ASSISTANT
Payer: COMMERCIAL

## 2021-11-15 VITALS
SYSTOLIC BLOOD PRESSURE: 168 MMHG | HEIGHT: 61 IN | RESPIRATION RATE: 18 BRPM | WEIGHT: 206 LBS | BODY MASS INDEX: 38.89 KG/M2 | TEMPERATURE: 98.1 F | HEART RATE: 80 BPM | DIASTOLIC BLOOD PRESSURE: 92 MMHG | OXYGEN SATURATION: 99 %

## 2021-11-15 DIAGNOSIS — S63.501A WRIST SPRAIN, RIGHT, INITIAL ENCOUNTER: ICD-10-CM

## 2021-11-15 PROCEDURE — 99283 EMERGENCY DEPT VISIT LOW MDM: CPT

## 2021-11-15 PROCEDURE — 73100 X-RAY EXAM OF WRIST: CPT | Mod: RT

## 2021-11-15 ASSESSMENT — ENCOUNTER SYMPTOMS
LIGHT-HEADEDNESS: 0
HEADACHES: 0
COLOR CHANGE: 1
DIZZINESS: 0
AGITATION: 0
CONFUSION: 0

## 2021-11-15 ASSESSMENT — MIFFLIN-ST. JEOR: SCORE: 1441.79

## 2021-11-15 NOTE — ED PROVIDER NOTES
EMERGENCY DEPARTMENT ENCOUNTER      NAME: Devora Garcia  AGE: 61 year old female  YOB: 1960  MRN: [id number]  EVALUATION DATE & TIME: 11/15/2021 11:58 AM    PCP: Josesito Evans    ED PROVIDER: Shanti Lezama PA-C      Chief Complaint   Patient presents with     Trauma     Wrist Pain         FINAL IMPRESSION:  1. Wrist sprain, right, initial encounter          MEDICAL DECISION MAKING:    Pertinent Labs & Imaging studies reviewed. (See chart for details)  61 year old female with a h/o HTN, DM II, cancer, ESRD, presents to the Emergency Department for evaluation of right wrist pain following a fall on the outstretched hand last night.  She reports tenderness and swelling to the thenar eminence.  No decreased range of motion.  She has been applying ice and wrapping it in an Ace wrap.    On exam she is alert, nontoxic-appearing in no acute distress.  Vital signs are WNL.  She does have full range of motion of the wrist with flexion extension, she is able to extend all of her fingers and radial and ulnar deviation are intact.  There is no tenderness to palpation over the thenar eminence with overlying bruising.  She does not have discrete snuffbox tenderness, however there is tenderness at the base of her first meta carpal.    Differential diagnosis includes muscle strain, sprain, bony fracture or dislocation.  X-ray does not reveal any acute displaced right wrist fracture.  There is soft tissue swelling and mild right thumb CMC joint osteoarthritis.  We discussed conservative management, will provide a thumb spica splint.  She is recommended to proceed with Tylenol, ice.  To follow-up with primary care if her pain continues in 2 weeks time for consideration of repeat x-rays.    There is no evidence of acute or emergent process requiring intervention at this time. Pt is appropriate for outpatient management. Provisional nature of today's diagnosis was discussed and strict return precautions were  given. Pt expressed understanding and She was discharged to home in good condition.     CRITICAL CARE: None    ED COURSE  12:50 PM   Met and evaluated patient. Discussed ED plan.   1:17 PM discharged to home in good condition by RN.      MEDICATIONS GIVEN IN THE EMERGENCY:  Medications - No data to display    NEW PRESCRIPTIONS STARTED AT TODAY'S ER VISIT  Discharge Medication List as of 11/15/2021  1:10 PM             =================================================================    HPI    Patient information was obtained from: Patient    Use of Intrepreter: N/A         Devora Garcia is a 61 year old female with a history of HTN, DM II, cancer, ESRD, who presents for evaluation of right wrist pain.    Patient reports last night she tripped and fell, landing and catching her weight on her right hand. She endorses pain and swelling to her right wrist as well as bruising to the palm of her right hand. Denies any elbow or shoulder pain. Patient does not a history of right wrist fracture. She has been taking Tylenol with minimal relief. Patient is right handed. Patient denies additional medical concerns or complaints at this time.      REVIEW OF SYSTEMS   Review of Systems   Musculoskeletal:        Positive for right wrist pain and swelling. No elbow or shoulder pain.   Skin: Positive for color change (bruising to palm of right hand).   Neurological: Negative for dizziness, light-headedness and headaches.   Psychiatric/Behavioral: Negative for agitation, behavioral problems and confusion.   All other systems reviewed and are negative.        PAST MEDICAL HISTORY:  Past Medical History:   Diagnosis Date     Allergic rhinitis      Cancer (H)     left breast     Chronic kidney disease      Chronic kidney disease, stage 3 (H) 2010     Cyst of left ovary      Diabetes mellitus (H)     12/2020 states no longer takin insulin     GERD (gastroesophageal reflux disease)      Gout      Hypertension      Intraductal  carcinoma 2015     Obstructive sleep apnea on CPAP     uses CPAP     Psoriasis        PAST SURGICAL HISTORY:  Past Surgical History:   Procedure Laterality Date     BARIATRIC SURGERY  2014     HC CYSTOSCOPY,INSERT URETERAL STENT Right 11/7/2018    Procedure: CYSTOSCOPY, WITH RIGHT URETERAL STENT INSERTION;  Surgeon: Dino Reynoso MD;  Location: [facility] Main OR;  Service: Urology     HC CYSTOSCOPY,INSERT URETERAL STENT Right 8/31/2020    Procedure: CYSTOSCOPY, WITH URETERAL STENT INSERTION;  Surgeon: Dino Reynoso MD;  Location: [facility] Main OR;  Service: Urology     IR CVC TUNNEL PLACEMENT > 5 YRS OF AGE  9/2/2020     IR CVC TUNNEL REVISION RIGHT  10/22/2020     IR TUNNELED CATHETER COMPLETE REPLACEMENT  10/22/2020     IR TUNNELED CATHETER INSERT  9/2/2020     LUMPECTOMY BREAST Left 2015     MAMMOPLASTY REDUCTION  2015     OTHER SURGICAL HISTORY  12/02/2020    peritoneal dialsis catheter placement     ID CYSTO/URETERO W/LITHOTRIPSY &INDWELL STENT INSRT Right 11/30/2018    Procedure: CYSTOSCOPY, RIGHT URETEROSCOPY, LASER LITHOTRIPSY STENT REMOVAL STENT INSERTION;  Surgeon: Dino Reynoso MD;  Location: [facility] Main OR;  Service: Urology     ID LAP INSERTION TUNNELED INTRAPERITONEAL CATHETER N/A 12/23/2020    Procedure: LAPAROSCOPY , Enterolysis;  Surgeon: Tigre Rivas MD;  Location: [facility] Main OR;  Service: General     ID LAP,DIAGNOSTIC ABDOMEN N/A 12/29/2020    Procedure: LAPAROSCOPY, De-Clot of Peritoneal Dialysis Catheter;  Surgeon: Tigre Rivas MD;  Location: [facility] Main OR;  Service: General           CURRENT MEDICATIONS:    allopurinol (ZYLOPRIM) 100 MG tablet  amLODIPine (NORVASC) 5 MG tablet  bumetanide (BUMEX) 2 MG tablet  carvedilol (COREG) 6.25 MG tablet  cloNIDine (CATAPRES-TTS2) 0.2 MG/24HR WK patch  ergocalciferol (ERGOCALCIFEROL) 1.25 MG (97965 UT) capsule  eszopiclone (LUNESTA) 2 MG tablet  flurbiprofen (ANSAID) 100 MG tablet  insulin aspart (NOVOLOG PEN) 100  "UNIT/ML pen  lisinopril (ZESTRIL) 40 MG tablet  omeprazole (PRILOSEC) 10 MG DR capsule  vitamin B-12 (CYANOCOBALAMIN) 1000 MCG tablet        ALLERGIES:  Allergies   Allergen Reactions     Aspirin Nausea     Statins-Hmg-Coa Reductase Inhibitors [Hmg-Coa-R Inhibitors] Other (See Comments)     Other reaction(s): Renal Failure, Other reaction(s): Renal Failure       FAMILY HISTORY:  Family History   Problem Relation Age of Onset     Kidney failure Father      Hypertension Father      Hypertension Brother      Diabetes Brother      Kidney failure Brother      Clotting Disorder No family hx of      Anesthesia Reaction No family hx of        SOCIAL HISTORY:   Social History     Socioeconomic History     Marital status:      Spouse name: Not on file     Number of children: Not on file     Years of education: Not on file     Highest education level: Not on file   Occupational History     Not on file   Tobacco Use     Smoking status: Never Smoker     Smokeless tobacco: Never Used   Substance and Sexual Activity     Alcohol use: Yes     Comment: Alcoholic Drinks/day: Rare, 2/month     Drug use: No     Sexual activity: Not on file   Other Topics Concern     Not on file   Social History Narrative     Not on file     Social Determinants of Health     Financial Resource Strain: Not on file   Food Insecurity: Not on file   Transportation Needs: Not on file   Physical Activity: Not on file   Stress: Not on file   Social Connections: Not on file   Intimate Partner Violence: Not on file   Housing Stability: Not on file         VITALS:  Patient Vitals for the past 24 hrs:   BP Temp Temp src Pulse Resp SpO2 Height Weight   11/15/21 1329 (!) 168/92 -- -- 80 18 99 % -- --   11/15/21 1153 (!) 174/102 98.1  F (36.7  C) Oral 77 16 98 % 1.549 m (5' 1\") 93.4 kg (206 lb)       PHYSICAL EXAM    Physical Exam  Vitals reviewed.   Constitutional:       General: She is not in acute distress.     Appearance: Normal appearance. She is not " ill-appearing, toxic-appearing or diaphoretic.   HENT:      Head: Normocephalic and atraumatic.      Nose: No congestion.      Mouth/Throat:      Mouth: Mucous membranes are moist.      Pharynx: Oropharynx is clear.   Eyes:      General: No scleral icterus.        Right eye: No discharge.         Left eye: No discharge.   Cardiovascular:      Rate and Rhythm: Normal rate and regular rhythm.      Heart sounds: No murmur heard.      Pulmonary:      Effort: Pulmonary effort is normal. No respiratory distress.   Musculoskeletal:         General: Swelling and tenderness (thenar emminence right hand) present. No deformity. Normal range of motion.      Cervical back: Normal range of motion and neck supple.      Right lower leg: No edema.      Left lower leg: No edema.   Skin:     General: Skin is warm.      Capillary Refill: Capillary refill takes less than 2 seconds.      Findings: No rash.   Neurological:      General: No focal deficit present.      Mental Status: She is alert and oriented to person, place, and time.   Psychiatric:         Mood and Affect: Mood normal.         Behavior: Behavior normal.          LAB:  All pertinent labs reviewed and interpreted.    Labs Ordered and Resulted from Time of ED Arrival to Time of ED Departure - No data to display      RADIOLOGY:  Reviewed all pertinent imaging. Please see official radiology report    XR Wrist Right 2 Views   Final Result   IMPRESSION:    Anatomic alignment right wrist. No acute displaced right wrist fracture. Mild right thumb CMC joint osteoarthritis. Mild dorsal metacarpal soft tissue swelling.          I, Selina Kang, am serving as a scribe to document services personally performed by Shanti Lezama PA-C based on my observation and the provider's statements to me. I, Shanti Lezama PA-C attest that Selina Kang is acting in a scribe capacity, has observed my performance of the services and has documented them in accordance with my  direction.      Shanti Lezama PA-C  Emergency Medicine  [facility] EMERGENCY ROOM  [address] [address] [phone]  Dept: [phone]    This note has in part been created with speech recognition technology and may create an occasional, unintended word/grammar substitution. Errors are generally corrected in real time. Please message me via [employer] In Basket if you note any errors requiring clarification.     Shanti Lezama PA-C  11/15/21 5099

## 2021-11-15 NOTE — ED TRIAGE NOTES
Tripped on a rug last night while getting out of a chair.  Fell onto her right hand hyperextending her right wrist.

## 2021-11-15 NOTE — DISCHARGE INSTRUCTIONS
You were seen in the emergency department for concern of right wrist pain after a fall onto the hand yesterday evening.  Thankfully, your x-ray does not show any evidence of a broken bone.  It is possible to have tiny breaks in the bones of the wrist that do not show up for couple weeks.  I do suspect you have a wrist sprain right now, we will place you in a splint.  You may use this for comfort along with ice and Tylenol.    If your pain has not improved in 10 to 14 days, you should get repeat x-ray imaging to make sure there is not a hidden fracture.    The recommend dosing for Tylenol is 4000 mg in 24 hours, I recommend that you take 8000 mg every 6 hours.    You may return to the emergency department for any other symptoms concerning to you, otherwise follow-up with primary care.  I do recommend you make an appointment for primary care for 2 weeks time, you may cancel this if you do not need it.

## 2022-01-01 ENCOUNTER — LAB REQUISITION (OUTPATIENT)
Dept: LAB | Facility: CLINIC | Age: 62
End: 2022-01-01
Payer: COMMERCIAL

## 2022-01-01 DIAGNOSIS — D64.9 ANEMIA, UNSPECIFIED: ICD-10-CM

## 2022-01-01 LAB
BASOPHILS # BLD AUTO: 0.1 10E3/UL (ref 0–0.2)
BASOPHILS NFR BLD AUTO: 1 %
EOSINOPHIL # BLD AUTO: 0.3 10E3/UL (ref 0–0.7)
EOSINOPHIL NFR BLD AUTO: 4 %
ERYTHROCYTE [DISTWIDTH] IN BLOOD BY AUTOMATED COUNT: 14.2 % (ref 10–15)
HCT VFR BLD AUTO: 35.9 % (ref 35–47)
HGB BLD-MCNC: 10.8 G/DL (ref 11.7–15.7)
IMM GRANULOCYTES # BLD: 0 10E3/UL
IMM GRANULOCYTES NFR BLD: 0 %
LYMPHOCYTES # BLD AUTO: 1.7 10E3/UL (ref 0.8–5.3)
LYMPHOCYTES NFR BLD AUTO: 23 %
MCH RBC QN AUTO: 27.7 PG (ref 26.5–33)
MCHC RBC AUTO-ENTMCNC: 30.1 G/DL (ref 31.5–36.5)
MCV RBC AUTO: 92 FL (ref 78–100)
MONOCYTES # BLD AUTO: 0.7 10E3/UL (ref 0–1.3)
MONOCYTES NFR BLD AUTO: 9 %
NEUTROPHILS # BLD AUTO: 4.5 10E3/UL (ref 1.6–8.3)
NEUTROPHILS NFR BLD AUTO: 63 %
NRBC # BLD AUTO: 0 10E3/UL
NRBC BLD AUTO-RTO: 0 /100
PLATELET # BLD AUTO: 272 10E3/UL (ref 150–450)
RBC # BLD AUTO: 3.9 10E6/UL (ref 3.8–5.2)
WBC # BLD AUTO: 7.3 10E3/UL (ref 4–11)

## 2022-01-01 PROCEDURE — 85025 COMPLETE CBC W/AUTO DIFF WBC: CPT | Mod: ORL | Performed by: INTERNAL MEDICINE

## 2022-06-07 ENCOUNTER — APPOINTMENT (OUTPATIENT)
Dept: CT IMAGING | Facility: CLINIC | Age: 62
End: 2022-06-07
Payer: COMMERCIAL

## 2022-06-07 ENCOUNTER — HOSPITAL ENCOUNTER (EMERGENCY)
Facility: CLINIC | Age: 62
Discharge: HOME | End: 2022-06-08
Attending: EMERGENCY MEDICINE | Admitting: EMERGENCY MEDICINE
Payer: COMMERCIAL

## 2022-06-07 DIAGNOSIS — R10.12 ABDOMINAL PAIN, LEFT UPPER QUADRANT: ICD-10-CM

## 2022-06-07 LAB
BASOPHILS # BLD AUTO: 0.1 10E3/UL (ref 0–0.2)
BASOPHILS NFR BLD AUTO: 1 %
EOSINOPHIL # BLD AUTO: 0.3 10E3/UL (ref 0–0.7)
EOSINOPHIL NFR BLD AUTO: 4 %
ERYTHROCYTE [DISTWIDTH] IN BLOOD BY AUTOMATED COUNT: 14.4 % (ref 10–15)
HCT VFR BLD AUTO: 33.3 % (ref 35–47)
HGB BLD-MCNC: 10.3 G/DL (ref 11.7–15.7)
IMM GRANULOCYTES # BLD: 0 10E3/UL
IMM GRANULOCYTES NFR BLD: 0 %
LYMPHOCYTES # BLD AUTO: 2.6 10E3/UL (ref 0.8–5.3)
LYMPHOCYTES NFR BLD AUTO: 31 %
MCH RBC QN AUTO: 28.5 PG (ref 26.5–33)
MCHC RBC AUTO-ENTMCNC: 30.9 G/DL (ref 31.5–36.5)
MCV RBC AUTO: 92 FL (ref 78–100)
MONOCYTES # BLD AUTO: 0.8 10E3/UL (ref 0–1.3)
MONOCYTES NFR BLD AUTO: 9 %
NEUTROPHILS # BLD AUTO: 4.6 10E3/UL (ref 1.6–8.3)
NEUTROPHILS NFR BLD AUTO: 55 %
NRBC # BLD AUTO: 0 10E3/UL
NRBC BLD AUTO-RTO: 0 /100
PLATELET # BLD AUTO: 309 10E3/UL (ref 150–450)
RBC # BLD AUTO: 3.61 10E6/UL (ref 3.8–5.2)
WBC # BLD AUTO: 8.4 10E3/UL (ref 4–11)

## 2022-06-07 PROCEDURE — 96374 THER/PROPH/DIAG INJ IV PUSH: CPT | Mod: 59

## 2022-06-07 PROCEDURE — 36415 COLL VENOUS BLD VENIPUNCTURE: CPT | Performed by: STUDENT IN AN ORGANIZED HEALTH CARE EDUCATION/TRAINING PROGRAM

## 2022-06-07 PROCEDURE — 87205 SMEAR GRAM STAIN: CPT | Performed by: EMERGENCY MEDICINE

## 2022-06-07 PROCEDURE — 74177 CT ABD & PELVIS W/CONTRAST: CPT

## 2022-06-07 PROCEDURE — 87070 CULTURE OTHR SPECIMN AEROBIC: CPT | Performed by: EMERGENCY MEDICINE

## 2022-06-07 PROCEDURE — 89050 BODY FLUID CELL COUNT: CPT | Performed by: EMERGENCY MEDICINE

## 2022-06-07 PROCEDURE — 250N000011 HC RX IP 250 OP 636: Performed by: EMERGENCY MEDICINE

## 2022-06-07 PROCEDURE — 85025 COMPLETE CBC W/AUTO DIFF WBC: CPT | Performed by: STUDENT IN AN ORGANIZED HEALTH CARE EDUCATION/TRAINING PROGRAM

## 2022-06-07 PROCEDURE — 99285 EMERGENCY DEPT VISIT HI MDM: CPT | Mod: 25

## 2022-06-07 RX ORDER — HYDROMORPHONE HYDROCHLORIDE 1 MG/ML
0.5 INJECTION, SOLUTION INTRAMUSCULAR; INTRAVENOUS; SUBCUTANEOUS ONCE
Status: COMPLETED | OUTPATIENT
Start: 2022-06-07 | End: 2022-06-08

## 2022-06-07 RX ORDER — IOPAMIDOL 755 MG/ML
100 INJECTION, SOLUTION INTRAVASCULAR ONCE
Status: COMPLETED | OUTPATIENT
Start: 2022-06-08 | End: 2022-06-07

## 2022-06-07 RX ADMIN — IOPAMIDOL 75 ML: 755 INJECTION, SOLUTION INTRAVENOUS at 23:51

## 2022-06-08 VITALS
RESPIRATION RATE: 19 BRPM | HEIGHT: 61 IN | DIASTOLIC BLOOD PRESSURE: 70 MMHG | WEIGHT: 189 LBS | BODY MASS INDEX: 35.68 KG/M2 | TEMPERATURE: 98.2 F | HEART RATE: 75 BPM | SYSTOLIC BLOOD PRESSURE: 155 MMHG | OXYGEN SATURATION: 95 %

## 2022-06-08 LAB
ALBUMIN SERPL-MCNC: 2.1 G/DL (ref 3.5–5)
ALP SERPL-CCNC: 49 U/L (ref 45–120)
ALT SERPL W P-5'-P-CCNC: <9 U/L (ref 0–45)
ANION GAP SERPL CALCULATED.3IONS-SCNC: 11 MMOL/L (ref 5–18)
AST SERPL W P-5'-P-CCNC: 8 U/L (ref 0–40)
BASOPHILS # BLD AUTO: 0.1 10E3/UL (ref 0–0.2)
BASOPHILS NFR BLD AUTO: 1 %
BILIRUB SERPL-MCNC: 0.3 MG/DL (ref 0–1)
BUN SERPL-MCNC: 45 MG/DL (ref 8–22)
CALCIUM SERPL-MCNC: 9.3 MG/DL (ref 8.5–10.5)
CHLORIDE BLD-SCNC: 101 MMOL/L (ref 98–107)
CO2 SERPL-SCNC: 25 MMOL/L (ref 22–31)
CREAT SERPL-MCNC: 10.38 MG/DL (ref 0.6–1.1)
EOSINOPHIL # BLD AUTO: 0.3 10E3/UL (ref 0–0.7)
EOSINOPHIL NFR BLD AUTO: 4 %
ERYTHROCYTE [DISTWIDTH] IN BLOOD BY AUTOMATED COUNT: 14.6 % (ref 10–15)
GFR SERPL CREATININE-BSD FRML MDRD: 4 ML/MIN/1.73M2
GLUCOSE BLD-MCNC: 79 MG/DL (ref 70–125)
HCT VFR BLD AUTO: 29.9 % (ref 35–47)
HGB BLD-MCNC: 9.6 G/DL (ref 11.7–15.7)
IMM GRANULOCYTES # BLD: 0 10E3/UL
IMM GRANULOCYTES NFR BLD: 0 %
LIPASE SERPL-CCNC: 114 U/L (ref 0–52)
LYMPHOCYTES # BLD AUTO: 2.4 10E3/UL (ref 0.8–5.3)
LYMPHOCYTES NFR BLD AUTO: 31 %
MCH RBC QN AUTO: 28.6 PG (ref 26.5–33)
MCHC RBC AUTO-ENTMCNC: 32.1 G/DL (ref 31.5–36.5)
MCV RBC AUTO: 89 FL (ref 78–100)
MONOCYTES # BLD AUTO: 0.7 10E3/UL (ref 0–1.3)
MONOCYTES NFR BLD AUTO: 9 %
NEUTROPHILS # BLD AUTO: 4.3 10E3/UL (ref 1.6–8.3)
NEUTROPHILS NFR BLD AUTO: 55 %
NRBC # BLD AUTO: 0 10E3/UL
NRBC BLD AUTO-RTO: 0 /100
PLATELET # BLD AUTO: 284 10E3/UL (ref 150–450)
POTASSIUM BLD-SCNC: 3.8 MMOL/L (ref 3.5–5)
PROT SERPL-MCNC: 5.7 G/DL (ref 6–8)
RBC # BLD AUTO: 3.36 10E6/UL (ref 3.8–5.2)
SODIUM SERPL-SCNC: 137 MMOL/L (ref 136–145)
WBC # BLD AUTO: 7.8 10E3/UL (ref 4–11)

## 2022-06-08 PROCEDURE — 36415 COLL VENOUS BLD VENIPUNCTURE: CPT | Performed by: EMERGENCY MEDICINE

## 2022-06-08 PROCEDURE — 250N000011 HC RX IP 250 OP 636: Performed by: STUDENT IN AN ORGANIZED HEALTH CARE EDUCATION/TRAINING PROGRAM

## 2022-06-08 PROCEDURE — 83690 ASSAY OF LIPASE: CPT | Performed by: EMERGENCY MEDICINE

## 2022-06-08 PROCEDURE — 85025 COMPLETE CBC W/AUTO DIFF WBC: CPT | Performed by: EMERGENCY MEDICINE

## 2022-06-08 PROCEDURE — 80053 COMPREHEN METABOLIC PANEL: CPT | Performed by: EMERGENCY MEDICINE

## 2022-06-08 RX ORDER — HYDROMORPHONE HYDROCHLORIDE 2 MG/1
2 TABLET ORAL EVERY 6 HOURS PRN
Qty: 10 TABLET | Refills: 0 | Status: SHIPPED | OUTPATIENT
Start: 2022-06-08 | End: 2022-06-11

## 2022-06-08 RX ADMIN — HYDROMORPHONE HYDROCHLORIDE 0.5 MG: 1 INJECTION, SOLUTION INTRAMUSCULAR; INTRAVENOUS; SUBCUTANEOUS at 00:00

## 2022-06-08 NOTE — ED TRIAGE NOTES
"Patient presents to the ED with c/o left mid/upper quadrant pain that has been going on for \"some time\" but over the last week the pain has been more constant. Patient does peritoneal dialysis during the day manually. Patient states she has a history of heart burn and isn't sure if her pain is related to a stomach ulcer or not.     Triage Assessment     Row Name 06/07/22 6991       Triage Assessment (Adult)    Airway WDL WDL       Respiratory WDL    Respiratory WDL WDL       Skin Circulation/Temperature WDL    Skin Circulation/Temperature WDL WDL       Cardiac WDL    Cardiac WDL WDL       Peripheral/Neurovascular WDL    Peripheral Neurovascular WDL WDL       Cognitive/Neuro/Behavioral WDL    Cognitive/Neuro/Behavioral WDL WDL              "

## 2022-06-08 NOTE — DISCHARGE INSTRUCTIONS
Take a daily stool softener if you are taking pain medication  Continue your daily antacid whether you think you needed or not  Discuss a possible ulcer with your doctor and the need for possible upper endoscopy to further evaluate for ulcer  We will call you if your dialysis fluid is infected  Return to the emergency department for worsening problems or concerns

## 2022-06-08 NOTE — ED PROVIDER NOTES
I am seeing this patient along with resident, Dr. Yessy Hernandez. I had a face to face encounter with this patient seen by the Advanced Practice Provider (JUAQUIN).  I have seen, examined, and discussed the patient with the JUAQUIN and agree with their assessment and plan of management. I personally saw the patient and performed a substantive portion of the visit including all aspects of the medical decision making.    HPI:  Devora Garcia is a 61 year old female who presents with left-sided abdominal pain. Reports she has felt an intermittent, left-sided, burning abdominal pain for the past 6 months. In April (2 months ago), pain became sharp and constant. Patient tried Tylenol without relief. She also thought this may be GERD and tried antacids without relief. Patient is on dialysis and her last run was yesterday. Reports that every time after dialysis, she feels this abdominal pain. Pain has never radiated anywhere other than the left side of her abdomen. Patient endorses having chronic constipation which she takes laxatives for. Last bowel movement was 6/5/2022 (2 days ago) and was normal. Patient is passing gas normally. Patient denies nausea, vomiting, fever, or any other complaints at this time.     ROS:    Const: Negative for fever  : Positive for abdominal pain (left-sided) and constipation (chronic). Negative for nausea or vomiting.    Physical Exam: Generally well-appearing patient, nontoxic.  Vital signs normal.  Mild left-sided upper, mid, and lower abdominal tenderness with no peritoneal signs.  Dialysis catheter present, clean dry and intact      LABS  Pertinent lab results reviewed in chart.  Labs Ordered and Resulted from Time of ED Arrival to Time of ED Departure - No data to display        RADIOLOGY  CT Abdomen Pelvis w Contrast    (Results Pending)       PROCEDURES       ED COURSE & MEDICAL DECISION MAKING    Pertinent Labs and Imagaing reviewed (see chart for details)    61 year old female here with  left-sided abdominal discomfort.  Patient is afebrile but does have new tenderness in the setting of a patient on peritoneal dialysis.  Obviously SBP is a concern, but we also considered other pathology including obstruction, diverticulitis, colitis or other typical things like ileus, constipation.  The patient was able to get only a couple of cc of peritoneal fluid out as she just had her dialysis, and we did prioritize sending this for a culture to evaluate for SBP.  There is been a delay in obtaining lab studies due to difficult IV access, and labs to look for something like leukocytosis are still pending.  I have also sent her for CT scan to evaluate for the other pathology.  Unfortunately all of this is still pending at time of signout and I will sign patient out to oncoming provider awaiting results.    10:10 PM Discussed patient with resident, Dr. Yessy Hernandez.  10:38 PM Introduced myself to the patient and performed a brief exam.       At the conclusion of the encounter I discussed  the results of all of the tests and the disposition.   The questions were answered.  The patient or family acknowledged understanding and was agreeable with the care plan.       FINAL IMPRESSION      No diagnosis found.   Abdominal pain              I, Soco Soriano, am serving as a scribe to document services personally performed by Rocio El MD, based on my observations and the provider's statements to me.  I, Rocio El MD, attest that Soco Soriano is acting in a scribe capacity, has observed my performance of the services and has documented them in accordance with my direction.      Rocio El MD  6/7/2022 10:10 PM     Rocio El MD  06/07/22 [id number]

## 2022-06-08 NOTE — ED PROVIDER NOTES
EMERGENCY DEPARTMENT SIGN OUT NOTE        ED COURSE AND MEDICAL DECISION MAKING  0000 Patient was signed out to me by Dr Rocio Levy.  0136 Introduced myself to patient. Assessed patient and updated her on her results.   Patient reports her pain has improved. I discussed the plan for discharge with the patient, and patient is agreeable. We discussed supportive cares at home and reasons for return to the ER including new or worsening symptoms - all questions and concerns addressed. Patient to be discharged by RN.      In brief, Devora Garcia is a 61 year old female who initially presented with left-sided abdominal pain. Pain has been worsening since April (2 months ago), becoming more sharp and burning.     At time of sign out, disposition was pending labs and CT scan.    FINAL IMPRESSION    1. Abdominal pain, left upper quadrant        ED MEDS  Medications   iohexol (OMNIPAQUE) 140 MG/ML solution for oral use 25 mL (25 mLs Oral Not Given 6/7/22 2349)   HYDROmorphone (PF) (DILAUDID) injection 0.5 mg (0.5 mg Intravenous Given 6/8/22 0000)   iopamidol (ISOVUE-370) solution 100 mL (75 mLs Intravenous Given 6/7/22 2351)       LAB  Labs Ordered and Resulted from Time of ED Arrival to Time of ED Departure   CBC WITH PLATELETS AND DIFFERENTIAL - Abnormal       Result Value    WBC Count 8.4      RBC Count 3.61 (*)     Hemoglobin 10.3 (*)     Hematocrit 33.3 (*)     MCV 92      MCH 28.5      MCHC 30.9 (*)     RDW 14.4      Platelet Count 309      % Neutrophils 55      % Lymphocytes 31      % Monocytes 9      % Eosinophils 4      % Basophils 1      % Immature Granulocytes 0      NRBCs per 100 WBC 0      Absolute Neutrophils 4.6      Absolute Lymphocytes 2.6      Absolute Monocytes 0.8      Absolute Eosinophils 0.3      Absolute Basophils 0.1      Absolute Immature Granulocytes 0.0      Absolute NRBCs 0.0     COMPREHENSIVE METABOLIC PANEL - Abnormal    Sodium 137      Potassium 3.8      Chloride 101      Carbon Dioxide  (CO2) 25      Anion Gap 11      Urea Nitrogen 45 (*)     Creatinine 10.38 (*)     Calcium 9.3      Glucose 79      Alkaline Phosphatase 49      AST 8      ALT <9      Protein Total 5.7 (*)     Albumin 2.1 (*)     Bilirubin Total 0.3      GFR Estimate 4 (*)    LIPASE - Abnormal    Lipase 114 (*)    CBC WITH PLATELETS AND DIFFERENTIAL - Abnormal    WBC Count 7.8      RBC Count 3.36 (*)     Hemoglobin 9.6 (*)     Hematocrit 29.9 (*)     MCV 89      MCH 28.6      MCHC 32.1      RDW 14.6      Platelet Count 284      % Neutrophils 55      % Lymphocytes 31      % Monocytes 9      % Eosinophils 4      % Basophils 1      % Immature Granulocytes 0      NRBCs per 100 WBC 0      Absolute Neutrophils 4.3      Absolute Lymphocytes 2.4      Absolute Monocytes 0.7      Absolute Eosinophils 0.3      Absolute Basophils 0.1      Absolute Immature Granulocytes 0.0      Absolute NRBCs 0.0     AEROBIC BACTERIAL CULTURE ROUTINE    Gram Stain Result No organisms seen           RADIOLOGY    CT Abdomen Pelvis w Contrast   Final Result   IMPRESSION:    1.  No evidence for an etiology for the patient's left-sided abdominal pain.      2.  No bowel dilatation or obstruction.      3.  Indwelling peritoneal dialysis catheter.      4.  Moderate subcutaneous edema.      5.  Marked bilateral renal atrophy without urinary collecting system dilatation or calculi.          DISCHARGE MEDS  New Prescriptions    HYDROMORPHONE (DILAUDID) 2 MG TABLET    Take 1 tablet (2 mg) by mouth every 6 hours as needed for pain       Savanah Lawson MD  [facility] EMERGENCY ROOM  [address] [address] [phone]

## 2022-06-08 NOTE — ED PROVIDER NOTES
EMERGENCY DEPARTMENT ENCOUNTER      NAME: Devora Garcia  AGE: 61 year old female  YOB: 1960  MRN: [id number]  EVALUATION DATE & TIME: 6/7/2022  9:49 PM    PCP: Josesito Evans    ED PROVIDER: Yessy Hernandez MD      Chief Complaint   Patient presents with     Abdominal Pain         FINAL IMPRESSION:  No diagnosis found.      ED COURSE & MEDICAL DECISION MAKING:    Pertinent Labs & Imaging studies reviewed. (See chart for details)  61 year old female presents to the Emergency Department for evaluation of abdominal pain over 6 months and became more worsening since last April.  Patient on peritoneal dialysis.  Previous history of SBP.  Diffuse abdomen but no rebound tenderness.  Broad differential includes SBP, diverticulitis, stool impaction, and organ irritation secondary to peritoneal cath.  All results were pending, will be reviewed by our physician.          minutes of critical care time     MEDICATIONS GIVEN IN THE EMERGENCY:  Medications   iohexol (OMNIPAQUE) 140 MG/ML solution for oral use 25 mL (has no administration in time range)   HYDROmorphone (PF) (DILAUDID) injection 0.5 mg (has no administration in time range)       NEW PRESCRIPTIONS STARTED AT TODAY'S ER VISIT  New Prescriptions    No medications on file          =================================================================    HPI    Patient information was obtained from: Pt    Use of : N/A       Devora Garcia is a 61 year old female with a pertinent history of CKD on peritoneal dialysis, GUILLERMO on CPAP, ductal carcinoma, hypertension, and kidney stone who presents to this ED [facility] for evaluation of abdominal pain.  Her pain started 6 months ago, mainly on the left side, intermittent, burning.  Since last April her pain worsening became sharp/burning, more radicular, no relieving or exacerbating factor and more local colitis on the left side, above her peritoneal dialysis cath.  Patient tried Tylenol and antiacid  without relief.  Endorses nausea, not vomited.  No fever.  Patient has a history of constipation, on laxative, last BM was 3 days ago.  Previous history of peritonitis.      REVIEW OF SYSTEMS   Review of Systems. per PHI    PAST MEDICAL HISTORY:  Past Medical History:   Diagnosis Date     Allergic rhinitis      Cancer (H)     left breast     Chronic kidney disease      Chronic kidney disease, stage 3 (H) 2010     Cyst of left ovary      Diabetes mellitus (H)     12/2020 states no longer takin insulin     GERD (gastroesophageal reflux disease)      Gout      Hypertension      Intraductal carcinoma 2015     Obstructive sleep apnea on CPAP     uses CPAP     Psoriasis        PAST SURGICAL HISTORY:  Past Surgical History:   Procedure Laterality Date     BARIATRIC SURGERY  2014     HC CYSTOSCOPY,INSERT URETERAL STENT Right 11/7/2018    Procedure: CYSTOSCOPY, WITH RIGHT URETERAL STENT INSERTION;  Surgeon: Dino Reynoso MD;  Location: [facility] OR;  Service: Urology     HC CYSTOSCOPY,INSERT URETERAL STENT Right 8/31/2020    Procedure: CYSTOSCOPY, WITH URETERAL STENT INSERTION;  Surgeon: Dino Reynoso MD;  Location: [facility] OR;  Service: Urology     IR CVC TUNNEL PLACEMENT > 5 YRS OF AGE  9/2/2020     IR CVC TUNNEL REVISION RIGHT  10/22/2020     IR TUNNELED CATHETER COMPLETE REPLACEMENT  10/22/2020     IR TUNNELED CATHETER INSERT  9/2/2020     LUMPECTOMY BREAST Left 2015     MAMMOPLASTY REDUCTION  2015     OTHER SURGICAL HISTORY  12/02/2020    peritoneal dialsis catheter placement     ME CYSTO/URETERO W/LITHOTRIPSY &INDWELL STENT INSRT Right 11/30/2018    Procedure: CYSTOSCOPY, RIGHT URETEROSCOPY, LASER LITHOTRIPSY STENT REMOVAL STENT INSERTION;  Surgeon: Dino Reynoso MD;  Location: [facility] OR;  Service: Urology     ME LAP INSERTION TUNNELED INTRAPERITONEAL CATHETER N/A 12/23/2020    Procedure: LAPAROSCOPY , Enterolysis;  Surgeon: Tigre Rivas MD;  Location: [facility] OR;   "Service: General     KS LAP,DIAGNOSTIC ABDOMEN N/A 12/29/2020    Procedure: LAPAROSCOPY, De-Clot of Peritoneal Dialysis Catheter;  Surgeon: Tigre Rivas MD;  Location: [facility] OR;  Service: General           CURRENT MEDICATIONS:    allopurinol (ZYLOPRIM) 100 MG tablet  amLODIPine (NORVASC) 5 MG tablet  bumetanide (BUMEX) 2 MG tablet  carvedilol (COREG) 6.25 MG tablet  cloNIDine (CATAPRES-TTS2) 0.2 MG/24HR WK patch  ergocalciferol (ERGOCALCIFEROL) 1.25 MG (53722 UT) capsule  eszopiclone (LUNESTA) 2 MG tablet  flurbiprofen (ANSAID) 100 MG tablet  insulin aspart (NOVOLOG PEN) 100 UNIT/ML pen  lisinopril (ZESTRIL) 40 MG tablet  omeprazole (PRILOSEC) 10 MG DR capsule  vitamin B-12 (CYANOCOBALAMIN) 1000 MCG tablet        ALLERGIES:  Allergies   Allergen Reactions     Aspirin Nausea     Statins-Hmg-Coa Reductase Inhibitors [Hmg-Coa-R Inhibitors] Other (See Comments)     Other reaction(s): Renal Failure, Other reaction(s): Renal Failure       FAMILY HISTORY:  Family History   Problem Relation Age of Onset     Kidney failure Father      Hypertension Father      Hypertension Brother      Diabetes Brother      Kidney failure Brother      Clotting Disorder No family hx of      Anesthesia Reaction No family hx of        SOCIAL HISTORY:   Social History     Socioeconomic History     Marital status:    Tobacco Use     Smoking status: Never Smoker     Smokeless tobacco: Never Used   Substance and Sexual Activity     Alcohol use: Yes     Comment: Alcoholic Drinks/day: Rare, 2/month     Drug use: No       VITALS:  BP (!) 182/94 (BP Location: Left arm, Patient Position: Chair, Cuff Size: Adult Small)   Pulse 76   Temp 98.2  F (36.8  C) (Oral)   Resp 19   Ht 1.549 m (5' 1\")   Wt 85.7 kg (189 lb)   SpO2 98%   BMI 35.71 kg/m      PHYSICAL EXAM    Constitutional: Well developed, Well nourished, NAD  HENT: Normocephalic, Atraumatic, Bilateral external ears normal, Oropharynx normal, mucous membranes moist, Nose " normal. Neck-  Normal range of motion, No tenderness, Supple, No stridor.    Eyes: PERRL, EOMI, Conjunctiva normal, No discharge.   Respiratory: Normal breath sounds, No respiratory distress, No wheezing, Speaks full sentences easily. No cough.    Cardiovascular: Normal heart rate, Regular rhythm,  No murmurs, No rubs, No gallops. Chest wall nontender.    GI: excessive obesity. Bowel sounds normal, diffusely tender, mainly on the left side.  Catheter area is well-healed, no erythema or discharge. No rebound or guarding.    : Deferred  Musculoskeletal: 2+ DP pulses. No edema.  No cyanosis, No clubbing. Good range of motion in all major joints. No tenderness to palpation or major deformities noted. No tenderness of the CTLS spine.    Integument: Warm, Dry, No erythema, No rash. No petechiae.  tattoos.    Neurologic: Alert & oriented x 3, Normal motor function, Normal sensory function, No focal deficits noted. Normal gait.    Psychiatric: Affect normal, Judgment normal, Mood normal. Cooperative.      LAB:  All pertinent labs reviewed and interpreted.       RADIOLOGY:  Reviewed all pertinent imaging. Please see official radiology report.  CT Abdomen Pelvis w Contrast    (Results Pending)       EKG:    Performed at: Was not done    I have independently reviewed and interpreted the EKG(s) documented above.    PROCEDURES:   None      [facility] Documentation:   CMS Diagnoses:         Patient was discussed with attending physician, Dr. El who agreed for the plan  Yessy Hernandez MD  [facility] EMERGENCY ROOM  [facility] [facility] [id number] [phone]     Yessy Hernandez MD  Resident  06/07/22 [id number]

## 2022-06-13 LAB
BACTERIA FLD CULT: NO GROWTH
GRAM STAIN RESULT: NORMAL
GRAM STAIN RESULT: NORMAL

## 2023-01-01 ENCOUNTER — APPOINTMENT (OUTPATIENT)
Dept: CARDIOLOGY | Facility: CLINIC | Age: 63
End: 2023-01-01
Attending: INTERNAL MEDICINE
Payer: MEDICARE

## 2023-01-01 ENCOUNTER — APPOINTMENT (OUTPATIENT)
Dept: OCCUPATIONAL THERAPY | Facility: CLINIC | Age: 63
End: 2023-01-01
Payer: MEDICARE

## 2023-01-01 ENCOUNTER — APPOINTMENT (OUTPATIENT)
Dept: CT IMAGING | Facility: CLINIC | Age: 63
End: 2023-01-01
Attending: EMERGENCY MEDICINE
Payer: MEDICARE

## 2023-01-01 ENCOUNTER — APPOINTMENT (OUTPATIENT)
Dept: GENERAL RADIOLOGY | Facility: CLINIC | Age: 63
End: 2023-01-01
Attending: NURSE PRACTITIONER
Payer: MEDICARE

## 2023-01-01 ENCOUNTER — APPOINTMENT (OUTPATIENT)
Dept: PHYSICAL THERAPY | Facility: CLINIC | Age: 63
End: 2023-01-01
Payer: MEDICARE

## 2023-01-01 ENCOUNTER — APPOINTMENT (OUTPATIENT)
Dept: CT IMAGING | Facility: CLINIC | Age: 63
End: 2023-01-01
Attending: INTERNAL MEDICINE
Payer: MEDICARE

## 2023-01-01 ENCOUNTER — APPOINTMENT (OUTPATIENT)
Dept: GENERAL RADIOLOGY | Facility: CLINIC | Age: 63
End: 2023-01-01
Attending: STUDENT IN AN ORGANIZED HEALTH CARE EDUCATION/TRAINING PROGRAM
Payer: MEDICARE

## 2023-01-01 ENCOUNTER — APPOINTMENT (OUTPATIENT)
Dept: ULTRASOUND IMAGING | Facility: CLINIC | Age: 63
End: 2023-01-01
Attending: HOSPITALIST
Payer: MEDICARE

## 2023-01-01 ENCOUNTER — HOSPITAL ENCOUNTER (OUTPATIENT)
Facility: CLINIC | Age: 63
Setting detail: OBSERVATION
Discharge: NURSING, REHAB OR LONG-TERM CARE | End: 2023-09-15
Attending: EMERGENCY MEDICINE | Admitting: INTERNAL MEDICINE
Payer: MEDICARE

## 2023-01-01 ENCOUNTER — APPOINTMENT (OUTPATIENT)
Dept: NUCLEAR MEDICINE | Facility: CLINIC | Age: 63
End: 2023-01-01
Attending: INTERNAL MEDICINE
Payer: MEDICARE

## 2023-01-01 ENCOUNTER — HOSPITAL ENCOUNTER (OUTPATIENT)
Facility: CLINIC | Age: 63
Setting detail: OBSERVATION
End: 2023-10-01
Attending: EMERGENCY MEDICINE | Admitting: HOSPITALIST
Payer: MEDICARE

## 2023-01-01 ENCOUNTER — ANESTHESIA (OUTPATIENT)
Dept: MEDSURG UNIT | Facility: CLINIC | Age: 63
End: 2023-01-01
Payer: MEDICARE

## 2023-01-01 ENCOUNTER — PATIENT OUTREACH (OUTPATIENT)
Dept: CARE COORDINATION | Facility: CLINIC | Age: 63
End: 2023-01-01
Payer: COMMERCIAL

## 2023-01-01 ENCOUNTER — APPOINTMENT (OUTPATIENT)
Dept: GENERAL RADIOLOGY | Facility: CLINIC | Age: 63
End: 2023-01-01
Attending: EMERGENCY MEDICINE
Payer: MEDICARE

## 2023-01-01 ENCOUNTER — APPOINTMENT (OUTPATIENT)
Dept: OCCUPATIONAL THERAPY | Facility: CLINIC | Age: 63
End: 2023-01-01
Attending: INTERNAL MEDICINE
Payer: MEDICARE

## 2023-01-01 ENCOUNTER — ANESTHESIA EVENT (OUTPATIENT)
Dept: MEDSURG UNIT | Facility: CLINIC | Age: 63
End: 2023-01-01
Payer: MEDICARE

## 2023-01-01 ENCOUNTER — APPOINTMENT (OUTPATIENT)
Dept: PHYSICAL THERAPY | Facility: CLINIC | Age: 63
End: 2023-01-01
Attending: INTERNAL MEDICINE
Payer: MEDICARE

## 2023-01-01 ENCOUNTER — APPOINTMENT (OUTPATIENT)
Dept: OCCUPATIONAL THERAPY | Facility: CLINIC | Age: 63
End: 2023-01-01
Attending: HOSPITALIST
Payer: MEDICARE

## 2023-01-01 ENCOUNTER — HOSPITAL ENCOUNTER (OUTPATIENT)
Facility: CLINIC | Age: 63
Setting detail: OBSERVATION
Discharge: HOME | End: 2023-05-12
Attending: EMERGENCY MEDICINE | Admitting: INTERNAL MEDICINE
Payer: MEDICARE

## 2023-01-01 VITALS
DIASTOLIC BLOOD PRESSURE: 70 MMHG | WEIGHT: 213.85 LBS | RESPIRATION RATE: 18 BRPM | HEART RATE: 68 BPM | SYSTOLIC BLOOD PRESSURE: 126 MMHG | TEMPERATURE: 98 F | OXYGEN SATURATION: 94 % | BODY MASS INDEX: 40.41 KG/M2

## 2023-01-01 VITALS
HEART RATE: 93 BPM | RESPIRATION RATE: 16 BRPM | OXYGEN SATURATION: 100 % | WEIGHT: 210.54 LBS | DIASTOLIC BLOOD PRESSURE: 75 MMHG | TEMPERATURE: 97.8 F | HEIGHT: 61 IN | BODY MASS INDEX: 39.75 KG/M2 | SYSTOLIC BLOOD PRESSURE: 140 MMHG

## 2023-01-01 VITALS
DIASTOLIC BLOOD PRESSURE: 67 MMHG | TEMPERATURE: 97.8 F | SYSTOLIC BLOOD PRESSURE: 142 MMHG | BODY MASS INDEX: 41.84 KG/M2 | HEART RATE: 76 BPM | OXYGEN SATURATION: 95 % | HEIGHT: 61 IN | WEIGHT: 221.6 LBS | RESPIRATION RATE: 20 BRPM

## 2023-01-01 DIAGNOSIS — M62.81 GENERALIZED MUSCLE WEAKNESS: ICD-10-CM

## 2023-01-01 DIAGNOSIS — L08.9 WOUND INFECTION: ICD-10-CM

## 2023-01-01 DIAGNOSIS — G47.33 OBSTRUCTIVE SLEEP APNEA ON CPAP: ICD-10-CM

## 2023-01-01 DIAGNOSIS — N18.6 ESRD (END STAGE RENAL DISEASE) ON DIALYSIS (H): Primary | ICD-10-CM

## 2023-01-01 DIAGNOSIS — D64.9 ANEMIA, UNSPECIFIED TYPE: ICD-10-CM

## 2023-01-01 DIAGNOSIS — N18.9 CHRONIC RENAL FAILURE, UNSPECIFIED CKD STAGE: ICD-10-CM

## 2023-01-01 DIAGNOSIS — N20.0 CALCULUS OF KIDNEY: ICD-10-CM

## 2023-01-01 DIAGNOSIS — N17.9 ACUTE RENAL FAILURE, UNSPECIFIED ACUTE RENAL FAILURE TYPE (H): ICD-10-CM

## 2023-01-01 DIAGNOSIS — R06.02 SOB (SHORTNESS OF BREATH): ICD-10-CM

## 2023-01-01 DIAGNOSIS — T14.8XXA WOUND INFECTION: ICD-10-CM

## 2023-01-01 DIAGNOSIS — E11.9 INSULIN DEPENDENT TYPE 2 DIABETES MELLITUS (H): ICD-10-CM

## 2023-01-01 DIAGNOSIS — G47.33 OBSTRUCTIVE SLEEP APNEA SYNDROME: ICD-10-CM

## 2023-01-01 DIAGNOSIS — K59.00 CONSTIPATION, UNSPECIFIED CONSTIPATION TYPE: ICD-10-CM

## 2023-01-01 DIAGNOSIS — L29.9 ITCHING: ICD-10-CM

## 2023-01-01 DIAGNOSIS — G47.00 INSOMNIA, UNSPECIFIED TYPE: ICD-10-CM

## 2023-01-01 DIAGNOSIS — R07.9 ACUTE CHEST PAIN: ICD-10-CM

## 2023-01-01 DIAGNOSIS — I10 HYPERTENSION, UNSPECIFIED TYPE: ICD-10-CM

## 2023-01-01 DIAGNOSIS — N20.1 URETERAL STONE: ICD-10-CM

## 2023-01-01 DIAGNOSIS — I15.9 SECONDARY HYPERTENSION: ICD-10-CM

## 2023-01-01 DIAGNOSIS — E83.39 HYPERPHOSPHATEMIA: ICD-10-CM

## 2023-01-01 DIAGNOSIS — T85.611A PERITONEAL DIALYSIS CATHETER DYSFUNCTION, INITIAL ENCOUNTER (H): ICD-10-CM

## 2023-01-01 DIAGNOSIS — S70.02XA CONTUSION OF LEFT HIP, INITIAL ENCOUNTER: ICD-10-CM

## 2023-01-01 DIAGNOSIS — R11.2 NAUSEA AND VOMITING, UNSPECIFIED VOMITING TYPE: ICD-10-CM

## 2023-01-01 DIAGNOSIS — N20.1 CALCULUS OF URETER: ICD-10-CM

## 2023-01-01 DIAGNOSIS — Z79.4 INSULIN DEPENDENT TYPE 2 DIABETES MELLITUS (H): ICD-10-CM

## 2023-01-01 DIAGNOSIS — I15.9 SECONDARY HYPERTENSION: Primary | ICD-10-CM

## 2023-01-01 DIAGNOSIS — Z99.2 ESRD (END STAGE RENAL DISEASE) ON DIALYSIS (H): Primary | ICD-10-CM

## 2023-01-01 DIAGNOSIS — R19.7 DIARRHEA, UNSPECIFIED TYPE: ICD-10-CM

## 2023-01-01 DIAGNOSIS — K21.00 GASTROESOPHAGEAL REFLUX DISEASE WITH ESOPHAGITIS WITHOUT HEMORRHAGE: ICD-10-CM
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
## 2023-01-01 DIAGNOSIS — R62.7 FAILURE TO THRIVE IN ADULT: ICD-10-CM

## 2023-01-01 DIAGNOSIS — R52 PAIN: Primary | ICD-10-CM

## 2023-01-01 DIAGNOSIS — E87.20 ACIDOSIS: ICD-10-CM

## 2023-01-01 DIAGNOSIS — R55 SYNCOPE: ICD-10-CM

## 2023-01-01 LAB
ALBUMIN SERPL BCG-MCNC: 2.1 G/DL (ref 3.5–5.2)
ALBUMIN SERPL BCG-MCNC: 2.3 G/DL (ref 3.5–5.2)
ALBUMIN SERPL BCG-MCNC: 2.5 G/DL (ref 3.5–5.2)
ALBUMIN SERPL BCG-MCNC: 2.9 G/DL (ref 3.5–5.2)
ALBUMIN SERPL-MCNC: 2.3 G/DL (ref 3.5–5)
ALBUMIN SERPL-MCNC: 2.4 G/DL (ref 3.5–5)
ALBUMIN SERPL-MCNC: 2.5 G/DL (ref 3.5–5)
ALP SERPL-CCNC: 106 U/L (ref 35–104)
ALP SERPL-CCNC: 61 U/L (ref 45–120)
ALT SERPL W P-5'-P-CCNC: <5 U/L (ref 0–50)
ALT SERPL W P-5'-P-CCNC: <9 U/L (ref 0–45)
ANION GAP BLD CALCULATED.3IONS-SCNC: 19 MMOL/L (ref 3–14)
ANION GAP SERPL CALCULATED.3IONS-SCNC: 16 MMOL/L (ref 5–18)
ANION GAP SERPL CALCULATED.3IONS-SCNC: 16 MMOL/L (ref 5–18)
ANION GAP SERPL CALCULATED.3IONS-SCNC: 17 MMOL/L (ref 5–18)
ANION GAP SERPL CALCULATED.3IONS-SCNC: 17 MMOL/L (ref 7–15)
ANION GAP SERPL CALCULATED.3IONS-SCNC: 17 MMOL/L (ref 7–15)
ANION GAP SERPL CALCULATED.3IONS-SCNC: 18 MMOL/L (ref 7–15)
ANION GAP SERPL CALCULATED.3IONS-SCNC: 19 MMOL/L (ref 7–15)
ANION GAP SERPL CALCULATED.3IONS-SCNC: 20 MMOL/L (ref 7–15)
ANION GAP SERPL CALCULATED.3IONS-SCNC: 21 MMOL/L (ref 7–15)
ANION GAP SERPL CALCULATED.3IONS-SCNC: 21 MMOL/L (ref 7–15)
ANION GAP SERPL CALCULATED.3IONS-SCNC: 22 MMOL/L (ref 7–15)
ANION GAP SERPL CALCULATED.3IONS-SCNC: 25 MMOL/L (ref 7–15)
ANION GAP SERPL CALCULATED.3IONS-SCNC: 27 MMOL/L (ref 7–15)
AST SERPL W P-5'-P-CCNC: 19 U/L (ref 0–45)
AST SERPL W P-5'-P-CCNC: 9 U/L (ref 0–40)
ATRIAL RATE - MUSE: 63 BPM
ATRIAL RATE - MUSE: 67 BPM
ATRIAL RATE - MUSE: 69 BPM
ATRIAL RATE - MUSE: 81 BPM
BASE EXCESS BLDV CALC-SCNC: 1.9 MMOL/L (ref -7.7–1.9)
BASOPHILS # BLD AUTO: 0.1 10E3/UL (ref 0–0.2)
BASOPHILS NFR BLD AUTO: 1 %
BILIRUB DIRECT SERPL-MCNC: 0.2 MG/DL
BILIRUB SERPL-MCNC: 0.4 MG/DL
BILIRUB SERPL-MCNC: 0.5 MG/DL (ref 0–1)
BUN SERPL-MCNC: 39.9 MG/DL (ref 8–23)
BUN SERPL-MCNC: 40.9 MG/DL (ref 8–23)
BUN SERPL-MCNC: 42.5 MG/DL (ref 8–23)
BUN SERPL-MCNC: 43.7 MG/DL (ref 8–23)
BUN SERPL-MCNC: 44.7 MG/DL (ref 8–23)
BUN SERPL-MCNC: 47.4 MG/DL (ref 8–23)
BUN SERPL-MCNC: 47.8 MG/DL (ref 8–23)
BUN SERPL-MCNC: 50.3 MG/DL (ref 8–23)
BUN SERPL-MCNC: 51.5 MG/DL (ref 8–23)
BUN SERPL-MCNC: 54 MG/DL (ref 8–23)
BUN SERPL-MCNC: 54 MG/DL (ref 8–23)
BUN SERPL-MCNC: 57.6 MG/DL (ref 8–23)
BUN SERPL-MCNC: 63.2 MG/DL (ref 8–23)
BUN SERPL-MCNC: 64.4 MG/DL (ref 8–23)
BUN SERPL-MCNC: 64.5 MG/DL (ref 8–23)
BUN SERPL-MCNC: 66.2 MG/DL (ref 8–23)
BUN SERPL-MCNC: 66.6 MG/DL (ref 8–23)
BUN SERPL-MCNC: 69 MG/DL (ref 7–30)
BUN SERPL-MCNC: 77 MG/DL (ref 8–22)
BUN SERPL-MCNC: 78 MG/DL (ref 8–22)
BUN SERPL-MCNC: 80 MG/DL (ref 8–22)
CA-I BLD-MCNC: 4.3 MG/DL (ref 4.4–5.2)
CALCIUM SERPL-MCNC: 8.5 MG/DL (ref 8.8–10.2)
CALCIUM SERPL-MCNC: 8.9 MG/DL (ref 8.5–10.5)
CALCIUM SERPL-MCNC: 8.9 MG/DL (ref 8.8–10.2)
CALCIUM SERPL-MCNC: 9 MG/DL (ref 8.8–10.2)
CALCIUM SERPL-MCNC: 9.1 MG/DL (ref 8.8–10.2)
CALCIUM SERPL-MCNC: 9.2 MG/DL (ref 8.5–10.5)
CALCIUM SERPL-MCNC: 9.2 MG/DL (ref 8.8–10.2)
CALCIUM SERPL-MCNC: 9.3 MG/DL (ref 8.5–10.5)
CALCIUM SERPL-MCNC: 9.3 MG/DL (ref 8.8–10.2)
CALCIUM SERPL-MCNC: 9.4 MG/DL (ref 8.8–10.2)
CALCIUM SERPL-MCNC: 9.5 MG/DL (ref 8.8–10.2)
CALCIUM SERPL-MCNC: 9.6 MG/DL (ref 8.8–10.2)
CHLORIDE BLD-SCNC: 100 MMOL/L (ref 98–107)
CHLORIDE BLD-SCNC: 101 MMOL/L (ref 98–107)
CHLORIDE BLD-SCNC: 95 MMOL/L (ref 94–109)
CHLORIDE BLD-SCNC: 98 MMOL/L (ref 98–107)
CHLORIDE SERPL-SCNC: 100 MMOL/L (ref 98–107)
CHLORIDE SERPL-SCNC: 90 MMOL/L (ref 98–107)
CHLORIDE SERPL-SCNC: 91 MMOL/L (ref 98–107)
CHLORIDE SERPL-SCNC: 92 MMOL/L (ref 98–107)
CHLORIDE SERPL-SCNC: 92 MMOL/L (ref 98–107)
CHLORIDE SERPL-SCNC: 94 MMOL/L (ref 98–107)
CHLORIDE SERPL-SCNC: 95 MMOL/L (ref 98–107)
CHLORIDE SERPL-SCNC: 96 MMOL/L (ref 98–107)
CHLORIDE SERPL-SCNC: 97 MMOL/L (ref 98–107)
CHLORIDE SERPL-SCNC: 98 MMOL/L (ref 98–107)
CO2 BLD-SCNC: 24 MMOL/L (ref 20–32)
CO2 SERPL-SCNC: 21 MMOL/L (ref 22–31)
CO2 SERPL-SCNC: 22 MMOL/L (ref 22–31)
CO2 SERPL-SCNC: 22 MMOL/L (ref 22–31)
CREAT BLD-MCNC: 18.3 MG/DL (ref 0.5–1)
CREAT SERPL-MCNC: 12.27 MG/DL (ref 0.51–0.95)
CREAT SERPL-MCNC: 12.66 MG/DL (ref 0.51–0.95)
CREAT SERPL-MCNC: 12.81 MG/DL (ref 0.51–0.95)
CREAT SERPL-MCNC: 12.85 MG/DL (ref 0.51–0.95)
CREAT SERPL-MCNC: 12.9 MG/DL (ref 0.51–0.95)
CREAT SERPL-MCNC: 13.24 MG/DL (ref 0.51–0.95)
CREAT SERPL-MCNC: 13.34 MG/DL (ref 0.51–0.95)
CREAT SERPL-MCNC: 13.45 MG/DL (ref 0.51–0.95)
CREAT SERPL-MCNC: 13.83 MG/DL (ref 0.51–0.95)
CREAT SERPL-MCNC: 13.91 MG/DL (ref 0.51–0.95)
CREAT SERPL-MCNC: 14.49 MG/DL (ref 0.51–0.95)
CREAT SERPL-MCNC: 15.17 MG/DL (ref 0.51–0.95)
CREAT SERPL-MCNC: 15.19 MG/DL (ref 0.51–0.95)
CREAT SERPL-MCNC: 15.39 MG/DL (ref 0.51–0.95)
CREAT SERPL-MCNC: 15.44 MG/DL (ref 0.51–0.95)
CREAT SERPL-MCNC: 15.5 MG/DL (ref 0.51–0.95)
CREAT SERPL-MCNC: 15.57 MG/DL (ref 0.51–0.95)
CREAT SERPL-MCNC: 15.6 MG/DL (ref 0.6–1.1)
CREAT SERPL-MCNC: 15.72 MG/DL (ref 0.6–1.1)
CREAT SERPL-MCNC: 16.11 MG/DL (ref 0.6–1.1)
CV STRESS CURRENT BP HE: NORMAL
CV STRESS CURRENT HR HE: 81
CV STRESS CURRENT HR HE: 82
CV STRESS CURRENT HR HE: 82
CV STRESS CURRENT HR HE: 89
CV STRESS CURRENT HR HE: 89
CV STRESS CURRENT HR HE: 90
CV STRESS CURRENT HR HE: 90
CV STRESS CURRENT HR HE: 91
CV STRESS CURRENT HR HE: 92
CV STRESS CURRENT HR HE: 93
CV STRESS CURRENT HR HE: 95
CV STRESS DEVIATION TIME HE: NORMAL
CV STRESS ECHO PERCENT HR HE: NORMAL
CV STRESS EXERCISE STAGE HE: NORMAL
CV STRESS FINAL RESTING BP HE: NORMAL
CV STRESS FINAL RESTING HR HE: 89
CV STRESS MAX HR HE: 96
CV STRESS MAX TREADMILL GRADE HE: 0
CV STRESS MAX TREADMILL SPEED HE: 0
CV STRESS PEAK DIA BP HE: NORMAL
CV STRESS PEAK SYS BP HE: NORMAL
CV STRESS PHASE HE: NORMAL
CV STRESS PROTOCOL HE: NORMAL
CV STRESS RESTING PT POSITION HE: NORMAL
CV STRESS ST DEVIATION AMOUNT HE: NORMAL
CV STRESS ST DEVIATION ELEVATION HE: NORMAL
CV STRESS ST EVELATION AMOUNT HE: NORMAL
CV STRESS TEST TYPE HE: NORMAL
CV STRESS TOTAL STAGE TIME MIN 1 HE: NORMAL
DEPRECATED HCO3 PLAS-SCNC: 19 MMOL/L (ref 22–29)
DEPRECATED HCO3 PLAS-SCNC: 20 MMOL/L (ref 22–29)
DEPRECATED HCO3 PLAS-SCNC: 22 MMOL/L (ref 22–29)
DEPRECATED HCO3 PLAS-SCNC: 24 MMOL/L (ref 22–29)
DEPRECATED HCO3 PLAS-SCNC: 24 MMOL/L (ref 22–29)
DEPRECATED HCO3 PLAS-SCNC: 25 MMOL/L (ref 22–29)
DEPRECATED HCO3 PLAS-SCNC: 26 MMOL/L (ref 22–29)
DIASTOLIC BLOOD PRESSURE - MUSE: 92 MMHG
DIASTOLIC BLOOD PRESSURE - MUSE: NORMAL MMHG
EGFRCR SERPLBLD CKD-EPI 2021: 2 ML/MIN/1.73M2
EGFRCR SERPLBLD CKD-EPI 2021: 2 ML/MIN/1.73M2
EGFRCR SERPLBLD CKD-EPI 2021: 3 ML/MIN/1.73M2
EOSINOPHIL # BLD AUTO: 0.2 10E3/UL (ref 0–0.7)
EOSINOPHIL # BLD AUTO: 0.3 10E3/UL (ref 0–0.7)
EOSINOPHIL # BLD AUTO: 0.4 10E3/UL (ref 0–0.7)
EOSINOPHIL # BLD AUTO: 0.4 10E3/UL (ref 0–0.7)
EOSINOPHIL NFR BLD AUTO: 2 %
EOSINOPHIL NFR BLD AUTO: 3 %
EOSINOPHIL NFR BLD AUTO: 3 %
EOSINOPHIL NFR BLD AUTO: 4 %
EOSINOPHIL NFR BLD AUTO: 4 %
EOSINOPHIL NFR BLD AUTO: 5 %
ERYTHROCYTE [DISTWIDTH] IN BLOOD BY AUTOMATED COUNT: 13 % (ref 10–15)
ERYTHROCYTE [DISTWIDTH] IN BLOOD BY AUTOMATED COUNT: 13.3 % (ref 10–15)
ERYTHROCYTE [DISTWIDTH] IN BLOOD BY AUTOMATED COUNT: 16.2 % (ref 10–15)
ERYTHROCYTE [DISTWIDTH] IN BLOOD BY AUTOMATED COUNT: 16.2 % (ref 10–15)
ERYTHROCYTE [DISTWIDTH] IN BLOOD BY AUTOMATED COUNT: 16.3 % (ref 10–15)
ERYTHROCYTE [DISTWIDTH] IN BLOOD BY AUTOMATED COUNT: 16.4 % (ref 10–15)
ERYTHROCYTE [DISTWIDTH] IN BLOOD BY AUTOMATED COUNT: 16.5 % (ref 10–15)
ERYTHROCYTE [DISTWIDTH] IN BLOOD BY AUTOMATED COUNT: 16.5 % (ref 10–15)
ERYTHROCYTE [DISTWIDTH] IN BLOOD BY AUTOMATED COUNT: 16.7 % (ref 10–15)
ERYTHROCYTE [DISTWIDTH] IN BLOOD BY AUTOMATED COUNT: 16.8 % (ref 10–15)
GFR SERPL CREATININE-BSD FRML MDRD: 2 ML/MIN/1.73M2
GFR SERPL CREATININE-BSD FRML MDRD: 3 ML/MIN/1.73M2
GFR SERPL CREATININE-BSD FRML MDRD: 3 ML/MIN/1.73M2
GLUCOSE BLD-MCNC: 103 MG/DL (ref 70–125)
GLUCOSE BLD-MCNC: 78 MG/DL (ref 70–99)
GLUCOSE BLD-MCNC: 85 MG/DL (ref 70–125)
GLUCOSE BLD-MCNC: 87 MG/DL (ref 70–125)
GLUCOSE BLDC GLUCOMTR-MCNC: 136 MG/DL (ref 70–99)
GLUCOSE SERPL-MCNC: 100 MG/DL (ref 70–99)
GLUCOSE SERPL-MCNC: 101 MG/DL (ref 70–99)
GLUCOSE SERPL-MCNC: 105 MG/DL (ref 70–99)
GLUCOSE SERPL-MCNC: 105 MG/DL (ref 70–99)
GLUCOSE SERPL-MCNC: 111 MG/DL (ref 70–99)
GLUCOSE SERPL-MCNC: 111 MG/DL (ref 70–99)
GLUCOSE SERPL-MCNC: 127 MG/DL (ref 70–99)
GLUCOSE SERPL-MCNC: 131 MG/DL (ref 70–99)
GLUCOSE SERPL-MCNC: 142 MG/DL (ref 70–99)
GLUCOSE SERPL-MCNC: 145 MG/DL (ref 70–99)
GLUCOSE SERPL-MCNC: 146 MG/DL (ref 70–99)
GLUCOSE SERPL-MCNC: 83 MG/DL (ref 70–99)
GLUCOSE SERPL-MCNC: 84 MG/DL (ref 70–99)
GLUCOSE SERPL-MCNC: 90 MG/DL (ref 70–99)
GLUCOSE SERPL-MCNC: 95 MG/DL (ref 70–99)
GLUCOSE SERPL-MCNC: 96 MG/DL (ref 70–99)
GLUCOSE SERPL-MCNC: 99 MG/DL (ref 70–99)
HBV SURFACE AB SERPL IA-ACNC: 0.36 M[IU]/ML
HBV SURFACE AB SERPL IA-ACNC: 0.76 M[IU]/ML
HBV SURFACE AB SERPL IA-ACNC: NONREACTIVE M[IU]/ML
HBV SURFACE AB SERPL IA-ACNC: NONREACTIVE M[IU]/ML
HBV SURFACE AG SERPL QL IA: NONREACTIVE
HBV SURFACE AG SERPL QL IA: NONREACTIVE
HCO3 BLDV-SCNC: 28 MMOL/L (ref 21–28)
HCO3 SERPL-SCNC: 26 MMOL/L (ref 22–29)
HCT VFR BLD AUTO: 25.1 % (ref 35–47)
HCT VFR BLD AUTO: 25.4 % (ref 35–47)
HCT VFR BLD AUTO: 31.8 % (ref 35–47)
HCT VFR BLD AUTO: 34.6 % (ref 35–47)
HCT VFR BLD AUTO: 34.7 % (ref 35–47)
HCT VFR BLD AUTO: 34.8 % (ref 35–47)
HCT VFR BLD AUTO: 35.5 % (ref 35–47)
HCT VFR BLD AUTO: 36 % (ref 35–47)
HCT VFR BLD AUTO: 38 % (ref 35–47)
HCT VFR BLD AUTO: 40.9 % (ref 35–47)
HCT VFR BLD CALC: 35 % (ref 35–47)
HGB BLD-MCNC: 10.5 G/DL (ref 11.7–15.7)
HGB BLD-MCNC: 10.5 G/DL (ref 11.7–15.7)
HGB BLD-MCNC: 10.9 G/DL (ref 11.7–15.7)
HGB BLD-MCNC: 11 G/DL (ref 11.7–15.7)
HGB BLD-MCNC: 11.2 G/DL (ref 11.7–15.7)
HGB BLD-MCNC: 11.8 G/DL (ref 11.7–15.7)
HGB BLD-MCNC: 11.9 G/DL (ref 11.7–15.7)
HGB BLD-MCNC: 11.9 G/DL (ref 11.7–15.7)
HGB BLD-MCNC: 12 G/DL (ref 11.7–15.7)
HGB BLD-MCNC: 7.6 G/DL (ref 11.7–15.7)
HGB BLD-MCNC: 7.7 G/DL (ref 11.7–15.7)
HGB BLD-MCNC: 8 G/DL (ref 11.7–15.7)
HGB BLD-MCNC: 9.5 G/DL (ref 11.7–15.7)
HOLD SPECIMEN: NORMAL
IMM GRANULOCYTES # BLD: 0 10E3/UL
IMM GRANULOCYTES # BLD: 0.1 10E3/UL
IMM GRANULOCYTES # BLD: 0.1 10E3/UL
IMM GRANULOCYTES NFR BLD: 0 %
IMM GRANULOCYTES NFR BLD: 0 %
IMM GRANULOCYTES NFR BLD: 1 %
INTERPRETATION ECG - MUSE: NORMAL
LACTATE SERPL-SCNC: 1 MMOL/L (ref 0.7–2)
LACTATE SERPL-SCNC: 1.7 MMOL/L (ref 0.7–2)
LIPASE SERPL-CCNC: 42 U/L (ref 0–52)
LVEF ECHO: NORMAL
LYMPHOCYTES # BLD AUTO: 1 10E3/UL (ref 0.8–5.3)
LYMPHOCYTES # BLD AUTO: 1.1 10E3/UL (ref 0.8–5.3)
LYMPHOCYTES # BLD AUTO: 1.1 10E3/UL (ref 0.8–5.3)
LYMPHOCYTES # BLD AUTO: 1.2 10E3/UL (ref 0.8–5.3)
LYMPHOCYTES # BLD AUTO: 1.2 10E3/UL (ref 0.8–5.3)
LYMPHOCYTES # BLD AUTO: 2 10E3/UL (ref 0.8–5.3)
LYMPHOCYTES NFR BLD AUTO: 10 %
LYMPHOCYTES NFR BLD AUTO: 12 %
LYMPHOCYTES NFR BLD AUTO: 12 %
LYMPHOCYTES NFR BLD AUTO: 15 %
LYMPHOCYTES NFR BLD AUTO: 15 %
LYMPHOCYTES NFR BLD AUTO: 23 %
MAGNESIUM SERPL-MCNC: 2.4 MG/DL (ref 1.7–2.3)
MAGNESIUM SERPL-MCNC: 2.6 MG/DL (ref 1.7–2.3)
MAGNESIUM SERPL-MCNC: 3.1 MG/DL (ref 1.8–2.6)
MCH RBC QN AUTO: 25.4 PG (ref 26.5–33)
MCH RBC QN AUTO: 25.5 PG (ref 26.5–33)
MCH RBC QN AUTO: 25.5 PG (ref 26.5–33)
MCH RBC QN AUTO: 25.8 PG (ref 26.5–33)
MCH RBC QN AUTO: 25.9 PG (ref 26.5–33)
MCH RBC QN AUTO: 26 PG (ref 26.5–33)
MCH RBC QN AUTO: 27.4 PG (ref 26.5–33)
MCH RBC QN AUTO: 27.8 PG (ref 26.5–33)
MCHC RBC AUTO-ENTMCNC: 29.3 G/DL (ref 31.5–36.5)
MCHC RBC AUTO-ENTMCNC: 29.9 G/DL (ref 31.5–36.5)
MCHC RBC AUTO-ENTMCNC: 30.2 G/DL (ref 31.5–36.5)
MCHC RBC AUTO-ENTMCNC: 30.3 G/DL (ref 31.5–36.5)
MCHC RBC AUTO-ENTMCNC: 30.3 G/DL (ref 31.5–36.5)
MCHC RBC AUTO-ENTMCNC: 30.7 G/DL (ref 31.5–36.5)
MCHC RBC AUTO-ENTMCNC: 31.1 G/DL (ref 31.5–36.5)
MCHC RBC AUTO-ENTMCNC: 31.5 G/DL (ref 31.5–36.5)
MCHC RBC AUTO-ENTMCNC: 31.5 G/DL (ref 31.5–36.5)
MCHC RBC AUTO-ENTMCNC: 31.7 G/DL (ref 31.5–36.5)
MCV RBC AUTO: 82 FL (ref 78–100)
MCV RBC AUTO: 82 FL (ref 78–100)
MCV RBC AUTO: 83 FL (ref 78–100)
MCV RBC AUTO: 84 FL (ref 78–100)
MCV RBC AUTO: 85 FL (ref 78–100)
MCV RBC AUTO: 86 FL (ref 78–100)
MCV RBC AUTO: 86 FL (ref 78–100)
MCV RBC AUTO: 87 FL (ref 78–100)
MCV RBC AUTO: 87 FL (ref 78–100)
MCV RBC AUTO: 91 FL (ref 78–100)
MONOCYTES # BLD AUTO: 0.7 10E3/UL (ref 0–1.3)
MONOCYTES # BLD AUTO: 0.8 10E3/UL (ref 0–1.3)
MONOCYTES # BLD AUTO: 0.9 10E3/UL (ref 0–1.3)
MONOCYTES # BLD AUTO: 1 10E3/UL (ref 0–1.3)
MONOCYTES NFR BLD AUTO: 10 %
MONOCYTES NFR BLD AUTO: 10 %
MONOCYTES NFR BLD AUTO: 12 %
MONOCYTES NFR BLD AUTO: 8 %
MONOCYTES NFR BLD AUTO: 8 %
MONOCYTES NFR BLD AUTO: 9 %
NEUTROPHILS # BLD AUTO: 4.9 10E3/UL (ref 1.6–8.3)
NEUTROPHILS # BLD AUTO: 5.3 10E3/UL (ref 1.6–8.3)
NEUTROPHILS # BLD AUTO: 5.8 10E3/UL (ref 1.6–8.3)
NEUTROPHILS # BLD AUTO: 6 10E3/UL (ref 1.6–8.3)
NEUTROPHILS # BLD AUTO: 7.5 10E3/UL (ref 1.6–8.3)
NEUTROPHILS # BLD AUTO: 8.6 10E3/UL (ref 1.6–8.3)
NEUTROPHILS NFR BLD AUTO: 62 %
NEUTROPHILS NFR BLD AUTO: 67 %
NEUTROPHILS NFR BLD AUTO: 70 %
NEUTROPHILS NFR BLD AUTO: 73 %
NEUTROPHILS NFR BLD AUTO: 76 %
NEUTROPHILS NFR BLD AUTO: 77 %
NRBC # BLD AUTO: 0 10E3/UL
NRBC BLD AUTO-RTO: 0 /100
NT-PROBNP SERPL-MCNC: ABNORMAL PG/ML (ref 0–900)
NUC STRESS EJECTION FRACTION: 70 %
O2/TOTAL GAS SETTING VFR VENT: 0 %
P AXIS - MUSE: 46 DEGREES
P AXIS - MUSE: 50 DEGREES
P AXIS - MUSE: 58 DEGREES
P AXIS - MUSE: 66 DEGREES
PCO2 BLDV: 48 MM HG (ref 40–50)
PH BLDV: 7.37 [PH] (ref 7.32–7.43)
PHOSPHATE SERPL-MCNC: 11.1 MG/DL (ref 2.5–4.5)
PHOSPHATE SERPL-MCNC: 11.1 MG/DL (ref 2.5–4.5)
PHOSPHATE SERPL-MCNC: 11.5 MG/DL (ref 2.5–4.5)
PHOSPHATE SERPL-MCNC: 7.4 MG/DL (ref 2.5–4.5)
PHOSPHATE SERPL-MCNC: 8.2 MG/DL (ref 2.5–4.5)
PHOSPHATE SERPL-MCNC: 9.5 MG/DL (ref 2.5–4.5)
PHOSPHATE SERPL-MCNC: 9.6 MG/DL (ref 2.5–4.5)
PLATELET # BLD AUTO: 165 10E3/UL (ref 150–450)
PLATELET # BLD AUTO: 179 10E3/UL (ref 150–450)
PLATELET # BLD AUTO: 202 10E3/UL (ref 150–450)
PLATELET # BLD AUTO: 210 10E3/UL (ref 150–450)
PLATELET # BLD AUTO: 216 10E3/UL (ref 150–450)
PLATELET # BLD AUTO: 227 10E3/UL (ref 150–450)
PLATELET # BLD AUTO: 238 10E3/UL (ref 150–450)
PLATELET # BLD AUTO: 265 10E3/UL (ref 150–450)
PLATELET # BLD AUTO: 269 10E3/UL (ref 150–450)
PLATELET # BLD AUTO: 270 10E3/UL (ref 150–450)
PLATELET # BLD AUTO: 271 10E3/UL (ref 150–450)
PLATELET # BLD AUTO: 275 10E3/UL (ref 150–450)
PLATELET # BLD AUTO: 314 10E3/UL (ref 150–450)
PLATELET # BLD AUTO: 347 10E3/UL (ref 150–450)
PO2 BLDV: 41 MM HG (ref 25–47)
POTASSIUM BLD-SCNC: 3.4 MMOL/L (ref 3.4–5.3)
POTASSIUM BLD-SCNC: 4 MMOL/L (ref 3.5–5)
POTASSIUM BLD-SCNC: 4.1 MMOL/L (ref 3.5–5)
POTASSIUM BLD-SCNC: 4.3 MMOL/L (ref 3.5–5)
POTASSIUM SERPL-SCNC: 2.6 MMOL/L (ref 3.4–5.3)
POTASSIUM SERPL-SCNC: 2.9 MMOL/L (ref 3.4–5.3)
POTASSIUM SERPL-SCNC: 3 MMOL/L (ref 3.4–5.3)
POTASSIUM SERPL-SCNC: 3.1 MMOL/L (ref 3.4–5.3)
POTASSIUM SERPL-SCNC: 3.2 MMOL/L (ref 3.4–5.3)
POTASSIUM SERPL-SCNC: 3.3 MMOL/L (ref 3.4–5.3)
POTASSIUM SERPL-SCNC: 3.3 MMOL/L (ref 3.4–5.3)
POTASSIUM SERPL-SCNC: 3.5 MMOL/L (ref 3.4–5.3)
POTASSIUM SERPL-SCNC: 3.8 MMOL/L (ref 3.4–5.3)
POTASSIUM SERPL-SCNC: 3.9 MMOL/L (ref 3.4–5.3)
POTASSIUM SERPL-SCNC: 4.5 MMOL/L (ref 3.4–5.3)
POTASSIUM SERPL-SCNC: 4.8 MMOL/L (ref 3.4–5.3)
POTASSIUM SERPL-SCNC: 5 MMOL/L (ref 3.4–5.3)
POTASSIUM SERPL-SCNC: 5 MMOL/L (ref 3.4–5.3)
PR INTERVAL - MUSE: 194 MS
PR INTERVAL - MUSE: 196 MS
PR INTERVAL - MUSE: 210 MS
PR INTERVAL - MUSE: 220 MS
PROT SERPL-MCNC: 6.3 G/DL (ref 6.4–8.3)
PROT SERPL-MCNC: 6.7 G/DL (ref 6–8)
QRS DURATION - MUSE: 106 MS
QRS DURATION - MUSE: 106 MS
QRS DURATION - MUSE: 110 MS
QRS DURATION - MUSE: 90 MS
QT - MUSE: 366 MS
QT - MUSE: 420 MS
QT - MUSE: 448 MS
QT - MUSE: 454 MS
QTC - MUSE: 425 MS
QTC - MUSE: 443 MS
QTC - MUSE: 458 MS
QTC - MUSE: 486 MS
R AXIS - MUSE: 0 DEGREES
R AXIS - MUSE: 17 DEGREES
R AXIS - MUSE: 19 DEGREES
R AXIS - MUSE: 6 DEGREES
RATE PRESSURE PRODUCT: NORMAL
RBC # BLD AUTO: 2.77 10E6/UL (ref 3.8–5.2)
RBC # BLD AUTO: 2.92 10E6/UL (ref 3.8–5.2)
RBC # BLD AUTO: 3.72 10E6/UL (ref 3.8–5.2)
RBC # BLD AUTO: 4.07 10E6/UL (ref 3.8–5.2)
RBC # BLD AUTO: 4.12 10E6/UL (ref 3.8–5.2)
RBC # BLD AUTO: 4.22 10E6/UL (ref 3.8–5.2)
RBC # BLD AUTO: 4.23 10E6/UL (ref 3.8–5.2)
RBC # BLD AUTO: 4.32 10E6/UL (ref 3.8–5.2)
RBC # BLD AUTO: 4.57 10E6/UL (ref 3.8–5.2)
RBC # BLD AUTO: 4.72 10E6/UL (ref 3.8–5.2)
SARS-COV-2 RNA RESP QL NAA+PROBE: NEGATIVE
SARS-COV-2 RNA RESP QL NAA+PROBE: NEGATIVE
SODIUM BLD-SCNC: 133 MMOL/L (ref 133–144)
SODIUM SERPL-SCNC: 134 MMOL/L (ref 136–145)
SODIUM SERPL-SCNC: 135 MMOL/L (ref 136–145)
SODIUM SERPL-SCNC: 136 MMOL/L (ref 136–145)
SODIUM SERPL-SCNC: 136 MMOL/L (ref 136–145)
SODIUM SERPL-SCNC: 137 MMOL/L (ref 136–145)
SODIUM SERPL-SCNC: 137 MMOL/L (ref 136–145)
SODIUM SERPL-SCNC: 138 MMOL/L (ref 136–145)
SODIUM SERPL-SCNC: 139 MMOL/L (ref 136–145)
SODIUM SERPL-SCNC: 140 MMOL/L (ref 135–145)
SODIUM SERPL-SCNC: 140 MMOL/L (ref 136–145)
SODIUM SERPL-SCNC: 141 MMOL/L (ref 135–145)
STRESS ECHO BASELINE DIASTOLIC HE: 75
STRESS ECHO BASELINE HR: 83
STRESS ECHO BASELINE SYSTOLIC BP: 149
STRESS ECHO CALCULATED PERCENT HR: 60 %
STRESS ECHO LAST STRESS DIASTOLIC BP: 62
STRESS ECHO LAST STRESS HR: 92
STRESS ECHO LAST STRESS SYSTOLIC BP: 146
STRESS ECHO TARGET HR: 159
SYSTOLIC BLOOD PRESSURE - MUSE: 223 MMHG
SYSTOLIC BLOOD PRESSURE - MUSE: NORMAL MMHG
T AXIS - MUSE: 0 DEGREES
T AXIS - MUSE: 179 DEGREES
T AXIS - MUSE: 62 DEGREES
T AXIS - MUSE: 73 DEGREES
T4 FREE SERPL-MCNC: 0.85 NG/DL (ref 0.9–1.7)
TROPONIN I SERPL-MCNC: 0.11 NG/ML (ref 0–0.29)
TROPONIN I SERPL-MCNC: 0.11 NG/ML (ref 0–0.29)
TROPONIN I SERPL-MCNC: 0.12 NG/ML (ref 0–0.29)
TSH SERPL DL<=0.005 MIU/L-ACNC: 4.32 UIU/ML (ref 0.3–4.2)
VENTRICULAR RATE- MUSE: 63 BPM
VENTRICULAR RATE- MUSE: 67 BPM
VENTRICULAR RATE- MUSE: 69 BPM
VENTRICULAR RATE- MUSE: 81 BPM
WBC # BLD AUTO: 11.1 10E3/UL (ref 4–11)
WBC # BLD AUTO: 6.2 10E3/UL (ref 4–11)
WBC # BLD AUTO: 7.3 10E3/UL (ref 4–11)
WBC # BLD AUTO: 8.2 10E3/UL (ref 4–11)
WBC # BLD AUTO: 8.3 10E3/UL (ref 4–11)
WBC # BLD AUTO: 8.4 10E3/UL (ref 4–11)
WBC # BLD AUTO: 8.5 10E3/UL (ref 4–11)
WBC # BLD AUTO: 8.5 10E3/UL (ref 4–11)
WBC # BLD AUTO: 9.2 10E3/UL (ref 4–11)
WBC # BLD AUTO: 9.8 10E3/UL (ref 4–11)

## 2023-01-01 PROCEDURE — 80069 RENAL FUNCTION PANEL: CPT | Performed by: INTERNAL MEDICINE

## 2023-01-01 PROCEDURE — 250N000013 HC RX MED GY IP 250 OP 250 PS 637: Performed by: INTERNAL MEDICINE

## 2023-01-01 PROCEDURE — 71046 X-RAY EXAM CHEST 2 VIEWS: CPT

## 2023-01-01 PROCEDURE — 90945 DIALYSIS ONE EVALUATION: CPT

## 2023-01-01 PROCEDURE — 99232 SBSQ HOSP IP/OBS MODERATE 35: CPT | Performed by: NURSE PRACTITIONER

## 2023-01-01 PROCEDURE — G0378 HOSPITAL OBSERVATION PER HR: HCPCS

## 2023-01-01 PROCEDURE — 999N000026 HC STATISTIC CARDIOPULM RESUSCITATION

## 2023-01-01 PROCEDURE — 250N000013 HC RX MED GY IP 250 OP 250 PS 637: Performed by: STUDENT IN AN ORGANIZED HEALTH CARE EDUCATION/TRAINING PROGRAM

## 2023-01-01 PROCEDURE — 83690 ASSAY OF LIPASE: CPT | Performed by: EMERGENCY MEDICINE

## 2023-01-01 PROCEDURE — 250N000013 HC RX MED GY IP 250 OP 250 PS 637: Performed by: NURSE PRACTITIONER

## 2023-01-01 PROCEDURE — 83605 ASSAY OF LACTIC ACID: CPT | Performed by: HOSPITALIST

## 2023-01-01 PROCEDURE — 250N000011 HC RX IP 250 OP 636: Performed by: NURSE PRACTITIONER

## 2023-01-01 PROCEDURE — 343N000001 HC RX 343: Performed by: INTERNAL MEDICINE

## 2023-01-01 PROCEDURE — 99232 SBSQ HOSP IP/OBS MODERATE 35: CPT | Performed by: STUDENT IN AN ORGANIZED HEALTH CARE EDUCATION/TRAINING PROGRAM

## 2023-01-01 PROCEDURE — 99207 PR NO BILLABLE SERVICE THIS VISIT: CPT | Performed by: INTERNAL MEDICINE

## 2023-01-01 PROCEDURE — 250N000013 HC RX MED GY IP 250 OP 250 PS 637: Performed by: HOSPITALIST

## 2023-01-01 PROCEDURE — 86706 HEP B SURFACE ANTIBODY: CPT | Performed by: INTERNAL MEDICINE

## 2023-01-01 PROCEDURE — 85041 AUTOMATED RBC COUNT: CPT | Performed by: HOSPITALIST

## 2023-01-01 PROCEDURE — 80048 BASIC METABOLIC PNL TOTAL CA: CPT | Performed by: EMERGENCY MEDICINE

## 2023-01-01 PROCEDURE — 97530 THERAPEUTIC ACTIVITIES: CPT | Mod: GO

## 2023-01-01 PROCEDURE — 250N000011 HC RX IP 250 OP 636: Performed by: EMERGENCY MEDICINE

## 2023-01-01 PROCEDURE — 99232 SBSQ HOSP IP/OBS MODERATE 35: CPT | Performed by: HOSPITALIST

## 2023-01-01 PROCEDURE — 250N000013 HC RX MED GY IP 250 OP 250 PS 637

## 2023-01-01 PROCEDURE — 999N000157 HC STATISTIC RCP TIME EA 10 MIN

## 2023-01-01 PROCEDURE — 97161 PT EVAL LOW COMPLEX 20 MIN: CPT | Mod: GP

## 2023-01-01 PROCEDURE — 80048 BASIC METABOLIC PNL TOTAL CA: CPT | Performed by: HOSPITALIST

## 2023-01-01 PROCEDURE — 85025 COMPLETE CBC W/AUTO DIFF WBC: CPT | Performed by: EMERGENCY MEDICINE

## 2023-01-01 PROCEDURE — 93005 ELECTROCARDIOGRAM TRACING: CPT

## 2023-01-01 PROCEDURE — 99214 OFFICE O/P EST MOD 30 MIN: CPT | Performed by: STUDENT IN AN ORGANIZED HEALTH CARE EDUCATION/TRAINING PROGRAM

## 2023-01-01 PROCEDURE — 78452 HT MUSCLE IMAGE SPECT MULT: CPT | Mod: 26 | Performed by: INTERNAL MEDICINE

## 2023-01-01 PROCEDURE — 97161 PT EVAL LOW COMPLEX 20 MIN: CPT | Mod: GP | Performed by: PHYSICAL THERAPIST

## 2023-01-01 PROCEDURE — 80048 BASIC METABOLIC PNL TOTAL CA: CPT | Performed by: INTERNAL MEDICINE

## 2023-01-01 PROCEDURE — 99232 SBSQ HOSP IP/OBS MODERATE 35: CPT | Performed by: INTERNAL MEDICINE

## 2023-01-01 PROCEDURE — 999N000156 HC STATISTIC RCP CONSULT EA 30 MIN

## 2023-01-01 PROCEDURE — G0378 HOSPITAL OBSERVATION PER HR: HCPCS | Mod: CS

## 2023-01-01 PROCEDURE — 84100 ASSAY OF PHOSPHORUS: CPT | Performed by: STUDENT IN AN ORGANIZED HEALTH CARE EDUCATION/TRAINING PROGRAM

## 2023-01-01 PROCEDURE — 97535 SELF CARE MNGMENT TRAINING: CPT | Mod: GO

## 2023-01-01 PROCEDURE — 94799 UNLISTED PULMONARY SVC/PX: CPT

## 2023-01-01 PROCEDURE — 99232 SBSQ HOSP IP/OBS MODERATE 35: CPT | Performed by: PHYSICIAN ASSISTANT

## 2023-01-01 PROCEDURE — 97530 THERAPEUTIC ACTIVITIES: CPT | Mod: GP | Performed by: PHYSICAL THERAPIST

## 2023-01-01 PROCEDURE — U0003 INFECTIOUS AGENT DETECTION BY NUCLEIC ACID (DNA OR RNA); SEVERE ACUTE RESPIRATORY SYNDROME CORONAVIRUS 2 (SARS-COV-2) (CORONAVIRUS DISEASE [COVID-19]), AMPLIFIED PROBE TECHNIQUE, MAKING USE OF HIGH THROUGHPUT TECHNOLOGIES AS DESCRIBED BY CMS-2020-01-R: HCPCS | Performed by: INTERNAL MEDICINE

## 2023-01-01 PROCEDURE — 84484 ASSAY OF TROPONIN QUANT: CPT | Performed by: EMERGENCY MEDICINE

## 2023-01-01 PROCEDURE — 97530 THERAPEUTIC ACTIVITIES: CPT | Mod: GP

## 2023-01-01 PROCEDURE — 96372 THER/PROPH/DIAG INJ SC/IM: CPT | Mod: XU | Performed by: NURSE PRACTITIONER

## 2023-01-01 PROCEDURE — 36415 COLL VENOUS BLD VENIPUNCTURE: CPT | Performed by: NURSE PRACTITIONER

## 2023-01-01 PROCEDURE — 83880 ASSAY OF NATRIURETIC PEPTIDE: CPT | Mod: GZ | Performed by: EMERGENCY MEDICINE

## 2023-01-01 PROCEDURE — 97110 THERAPEUTIC EXERCISES: CPT | Mod: GP | Performed by: PHYSICAL THERAPIST

## 2023-01-01 PROCEDURE — 74174 CTA ABD&PLVS W/CONTRAST: CPT

## 2023-01-01 PROCEDURE — 85027 COMPLETE CBC AUTOMATED: CPT

## 2023-01-01 PROCEDURE — 82040 ASSAY OF SERUM ALBUMIN: CPT | Performed by: INTERNAL MEDICINE

## 2023-01-01 PROCEDURE — 90945 DIALYSIS ONE EVALUATION: CPT | Mod: 25

## 2023-01-01 PROCEDURE — 250N000011 HC RX IP 250 OP 636

## 2023-01-01 PROCEDURE — 250N000011 HC RX IP 250 OP 636: Mod: JZ | Performed by: INTERNAL MEDICINE

## 2023-01-01 PROCEDURE — 84443 ASSAY THYROID STIM HORMONE: CPT

## 2023-01-01 PROCEDURE — 85025 COMPLETE CBC W/AUTO DIFF WBC: CPT | Performed by: INTERNAL MEDICINE

## 2023-01-01 PROCEDURE — 99214 OFFICE O/P EST MOD 30 MIN: CPT | Performed by: INTERNAL MEDICINE

## 2023-01-01 PROCEDURE — 250N000011 HC RX IP 250 OP 636: Mod: JZ | Performed by: STUDENT IN AN ORGANIZED HEALTH CARE EDUCATION/TRAINING PROGRAM

## 2023-01-01 PROCEDURE — 87340 HEPATITIS B SURFACE AG IA: CPT | Performed by: INTERNAL MEDICINE

## 2023-01-01 PROCEDURE — 250N000011 HC RX IP 250 OP 636: Performed by: INTERNAL MEDICINE

## 2023-01-01 PROCEDURE — 87340 HEPATITIS B SURFACE AG IA: CPT | Performed by: STUDENT IN AN ORGANIZED HEALTH CARE EDUCATION/TRAINING PROGRAM

## 2023-01-01 PROCEDURE — 36415 COLL VENOUS BLD VENIPUNCTURE: CPT | Performed by: HOSPITALIST

## 2023-01-01 PROCEDURE — 99239 HOSP IP/OBS DSCHRG MGMT >30: CPT | Performed by: STUDENT IN AN ORGANIZED HEALTH CARE EDUCATION/TRAINING PROGRAM

## 2023-01-01 PROCEDURE — 84295 ASSAY OF SERUM SODIUM: CPT | Performed by: HOSPITALIST

## 2023-01-01 PROCEDURE — 96375 TX/PRO/DX INJ NEW DRUG ADDON: CPT | Mod: 59

## 2023-01-01 PROCEDURE — 99215 OFFICE O/P EST HI 40 MIN: CPT | Performed by: INTERNAL MEDICINE

## 2023-01-01 PROCEDURE — 96376 TX/PRO/DX INJ SAME DRUG ADON: CPT

## 2023-01-01 PROCEDURE — 99204 OFFICE O/P NEW MOD 45 MIN: CPT | Performed by: INTERNAL MEDICINE

## 2023-01-01 PROCEDURE — G1010 CDSM STANSON: HCPCS

## 2023-01-01 PROCEDURE — 99231 SBSQ HOSP IP/OBS SF/LOW 25: CPT | Performed by: STUDENT IN AN ORGANIZED HEALTH CARE EDUCATION/TRAINING PROGRAM

## 2023-01-01 PROCEDURE — 272N000004 HC RX 272: Performed by: INTERNAL MEDICINE

## 2023-01-01 PROCEDURE — 93306 TTE W/DOPPLER COMPLETE: CPT

## 2023-01-01 PROCEDURE — 71045 X-RAY EXAM CHEST 1 VIEW: CPT

## 2023-01-01 PROCEDURE — 36415 COLL VENOUS BLD VENIPUNCTURE: CPT | Performed by: INTERNAL MEDICINE

## 2023-01-01 PROCEDURE — A9500 TC99M SESTAMIBI: HCPCS | Performed by: INTERNAL MEDICINE

## 2023-01-01 PROCEDURE — 80048 BASIC METABOLIC PNL TOTAL CA: CPT

## 2023-01-01 PROCEDURE — 90999 UNLISTED DIALYSIS PROCEDURE: CPT

## 2023-01-01 PROCEDURE — 94640 AIRWAY INHALATION TREATMENT: CPT

## 2023-01-01 PROCEDURE — 250N000011 HC RX IP 250 OP 636: Mod: JZ

## 2023-01-01 PROCEDURE — 97535 SELF CARE MNGMENT TRAINING: CPT | Mod: GO | Performed by: OCCUPATIONAL THERAPIST

## 2023-01-01 PROCEDURE — 85049 AUTOMATED PLATELET COUNT: CPT | Performed by: NURSE PRACTITIONER

## 2023-01-01 PROCEDURE — 85018 HEMOGLOBIN: CPT | Performed by: INTERNAL MEDICINE

## 2023-01-01 PROCEDURE — 93005 ELECTROCARDIOGRAM TRACING: CPT | Performed by: EMERGENCY MEDICINE

## 2023-01-01 PROCEDURE — 83735 ASSAY OF MAGNESIUM: CPT | Performed by: EMERGENCY MEDICINE

## 2023-01-01 PROCEDURE — 99285 EMERGENCY DEPT VISIT HI MDM: CPT | Mod: 25

## 2023-01-01 PROCEDURE — 99233 SBSQ HOSP IP/OBS HIGH 50: CPT | Performed by: INTERNAL MEDICINE

## 2023-01-01 PROCEDURE — 250N000009 HC RX 250: Performed by: NURSE PRACTITIONER

## 2023-01-01 PROCEDURE — 96375 TX/PRO/DX INJ NEW DRUG ADDON: CPT | Mod: XU

## 2023-01-01 PROCEDURE — 96376 TX/PRO/DX INJ SAME DRUG ADON: CPT | Mod: XU

## 2023-01-01 PROCEDURE — 84100 ASSAY OF PHOSPHORUS: CPT | Performed by: INTERNAL MEDICINE

## 2023-01-01 PROCEDURE — G0463 HOSPITAL OUTPT CLINIC VISIT: HCPCS | Mod: 25

## 2023-01-01 PROCEDURE — 36415 COLL VENOUS BLD VENIPUNCTURE: CPT

## 2023-01-01 PROCEDURE — 80047 BASIC METABLC PNL IONIZED CA: CPT

## 2023-01-01 PROCEDURE — 85025 COMPLETE CBC W/AUTO DIFF WBC: CPT | Performed by: STUDENT IN AN ORGANIZED HEALTH CARE EDUCATION/TRAINING PROGRAM

## 2023-01-01 PROCEDURE — 85027 COMPLETE CBC AUTOMATED: CPT | Performed by: INTERNAL MEDICINE

## 2023-01-01 PROCEDURE — 93971 EXTREMITY STUDY: CPT | Mod: RT

## 2023-01-01 PROCEDURE — 80053 COMPREHEN METABOLIC PANEL: CPT | Performed by: EMERGENCY MEDICINE

## 2023-01-01 PROCEDURE — 93018 CV STRESS TEST I&R ONLY: CPT | Performed by: INTERNAL MEDICINE

## 2023-01-01 PROCEDURE — 250N000013 HC RX MED GY IP 250 OP 250 PS 637: Performed by: EMERGENCY MEDICINE

## 2023-01-01 PROCEDURE — 634N000001 HC RX 634: Performed by: INTERNAL MEDICINE

## 2023-01-01 PROCEDURE — 250N000011 HC RX IP 250 OP 636: Mod: JZ | Performed by: HOSPITALIST

## 2023-01-01 PROCEDURE — 96375 TX/PRO/DX INJ NEW DRUG ADDON: CPT

## 2023-01-01 PROCEDURE — 96372 THER/PROPH/DIAG INJ SC/IM: CPT | Performed by: NURSE PRACTITIONER

## 2023-01-01 PROCEDURE — 36415 COLL VENOUS BLD VENIPUNCTURE: CPT | Performed by: STUDENT IN AN ORGANIZED HEALTH CARE EDUCATION/TRAINING PROGRAM

## 2023-01-01 PROCEDURE — 250N000009 HC RX 250

## 2023-01-01 PROCEDURE — 93306 TTE W/DOPPLER COMPLETE: CPT | Mod: 26 | Performed by: INTERNAL MEDICINE

## 2023-01-01 PROCEDURE — 82248 BILIRUBIN DIRECT: CPT | Performed by: EMERGENCY MEDICINE

## 2023-01-01 PROCEDURE — 99223 1ST HOSP IP/OBS HIGH 75: CPT | Performed by: INTERNAL MEDICINE

## 2023-01-01 PROCEDURE — 250N000013 HC RX MED GY IP 250 OP 250 PS 637: Performed by: PHYSICIAN ASSISTANT

## 2023-01-01 PROCEDURE — 93016 CV STRESS TEST SUPVJ ONLY: CPT | Performed by: INTERNAL MEDICINE

## 2023-01-01 PROCEDURE — 78452 HT MUSCLE IMAGE SPECT MULT: CPT

## 2023-01-01 PROCEDURE — C9803 HOPD COVID-19 SPEC COLLECT: HCPCS

## 2023-01-01 PROCEDURE — 250N000009 HC RX 250: Performed by: INTERNAL MEDICINE

## 2023-01-01 PROCEDURE — 86706 HEP B SURFACE ANTIBODY: CPT | Performed by: STUDENT IN AN ORGANIZED HEALTH CARE EDUCATION/TRAINING PROGRAM

## 2023-01-01 PROCEDURE — 99223 1ST HOSP IP/OBS HIGH 75: CPT

## 2023-01-01 PROCEDURE — 87635 SARS-COV-2 COVID-19 AMP PRB: CPT | Performed by: INTERNAL MEDICINE

## 2023-01-01 PROCEDURE — 97165 OT EVAL LOW COMPLEX 30 MIN: CPT | Mod: GO

## 2023-01-01 PROCEDURE — 96374 THER/PROPH/DIAG INJ IV PUSH: CPT | Mod: 59

## 2023-01-01 PROCEDURE — 370N000003 HC ANESTHESIA WARD SERVICE: Performed by: NURSE ANESTHETIST, CERTIFIED REGISTERED

## 2023-01-01 PROCEDURE — 250N000011 HC RX IP 250 OP 636: Performed by: PHYSICIAN ASSISTANT

## 2023-01-01 PROCEDURE — 99233 SBSQ HOSP IP/OBS HIGH 50: CPT | Performed by: PHYSICIAN ASSISTANT

## 2023-01-01 PROCEDURE — 97110 THERAPEUTIC EXERCISES: CPT | Mod: GP

## 2023-01-01 PROCEDURE — 99231 SBSQ HOSP IP/OBS SF/LOW 25: CPT | Performed by: INTERNAL MEDICINE

## 2023-01-01 PROCEDURE — 99239 HOSP IP/OBS DSCHRG MGMT >30: CPT | Performed by: INTERNAL MEDICINE

## 2023-01-01 PROCEDURE — 99285 EMERGENCY DEPT VISIT HI MDM: CPT | Mod: 25,CS

## 2023-01-01 PROCEDURE — 84484 ASSAY OF TROPONIN QUANT: CPT | Performed by: INTERNAL MEDICINE

## 2023-01-01 PROCEDURE — 36415 COLL VENOUS BLD VENIPUNCTURE: CPT | Performed by: EMERGENCY MEDICINE

## 2023-01-01 PROCEDURE — 97110 THERAPEUTIC EXERCISES: CPT | Mod: GO

## 2023-01-01 PROCEDURE — 83735 ASSAY OF MAGNESIUM: CPT | Performed by: STUDENT IN AN ORGANIZED HEALTH CARE EDUCATION/TRAINING PROGRAM

## 2023-01-01 PROCEDURE — 80069 RENAL FUNCTION PANEL: CPT | Performed by: STUDENT IN AN ORGANIZED HEALTH CARE EDUCATION/TRAINING PROGRAM

## 2023-01-01 PROCEDURE — 73502 X-RAY EXAM HIP UNI 2-3 VIEWS: CPT

## 2023-01-01 PROCEDURE — 73560 X-RAY EXAM OF KNEE 1 OR 2: CPT | Mod: LT

## 2023-01-01 PROCEDURE — 96376 TX/PRO/DX INJ SAME DRUG ADON: CPT | Mod: 59

## 2023-01-01 PROCEDURE — 83735 ASSAY OF MAGNESIUM: CPT | Performed by: INTERNAL MEDICINE

## 2023-01-01 PROCEDURE — 99232 SBSQ HOSP IP/OBS MODERATE 35: CPT

## 2023-01-01 PROCEDURE — 85018 HEMOGLOBIN: CPT | Performed by: HOSPITALIST

## 2023-01-01 PROCEDURE — 83605 ASSAY OF LACTIC ACID: CPT | Performed by: INTERNAL MEDICINE

## 2023-01-01 PROCEDURE — 96374 THER/PROPH/DIAG INJ IV PUSH: CPT

## 2023-01-01 PROCEDURE — 97116 GAIT TRAINING THERAPY: CPT | Mod: GP

## 2023-01-01 PROCEDURE — 82803 BLOOD GASES ANY COMBINATION: CPT | Performed by: INTERNAL MEDICINE

## 2023-01-01 PROCEDURE — 36415 COLL VENOUS BLD VENIPUNCTURE: CPT | Performed by: PHYSICIAN ASSISTANT

## 2023-01-01 PROCEDURE — G0257 UNSCHED DIALYSIS ESRD PT HOS: HCPCS

## 2023-01-01 PROCEDURE — 96372 THER/PROPH/DIAG INJ SC/IM: CPT | Performed by: INTERNAL MEDICINE

## 2023-01-01 PROCEDURE — 80069 RENAL FUNCTION PANEL: CPT | Performed by: PHYSICIAN ASSISTANT

## 2023-01-01 PROCEDURE — 80053 COMPREHEN METABOLIC PANEL: CPT | Performed by: STUDENT IN AN ORGANIZED HEALTH CARE EDUCATION/TRAINING PROGRAM

## 2023-01-01 PROCEDURE — 82962 GLUCOSE BLOOD TEST: CPT

## 2023-01-01 PROCEDURE — 84439 ASSAY OF FREE THYROXINE: CPT

## 2023-01-01 PROCEDURE — 71250 CT THORAX DX C-: CPT

## 2023-01-01 PROCEDURE — 93017 CV STRESS TEST TRACING ONLY: CPT

## 2023-01-01 RX ORDER — GENTAMICIN SULFATE 1 MG/G
CREAM TOPICAL DAILY PRN
Status: DISCONTINUED | OUTPATIENT
Start: 2023-01-01 | End: 2023-01-01

## 2023-01-01 RX ORDER — CLONIDINE HYDROCHLORIDE 0.1 MG/1
0.1 TABLET ORAL 3 TIMES DAILY
Status: DISCONTINUED | OUTPATIENT
Start: 2023-01-01 | End: 2023-01-01

## 2023-01-01 RX ORDER — LIDOCAINE 4 G/G
2 PATCH TOPICAL
Status: DISCONTINUED | OUTPATIENT
Start: 2023-01-01 | End: 2023-01-01 | Stop reason: HOSPADM

## 2023-01-01 RX ORDER — MORPHINE SULFATE 4 MG/ML
4 INJECTION, SOLUTION INTRAMUSCULAR; INTRAVENOUS
Status: DISCONTINUED | OUTPATIENT
Start: 2023-01-01 | End: 2023-01-01

## 2023-01-01 RX ORDER — REGADENOSON 0.08 MG/ML
0.4 INJECTION, SOLUTION INTRAVENOUS ONCE
Status: COMPLETED | OUTPATIENT
Start: 2023-01-01 | End: 2023-01-01

## 2023-01-01 RX ORDER — CLONIDINE 0.2 MG/24H
1 PATCH, EXTENDED RELEASE TRANSDERMAL WEEKLY
Status: DISCONTINUED | OUTPATIENT
Start: 2023-01-01 | End: 2023-01-01

## 2023-01-01 RX ORDER — HYDROXYZINE HYDROCHLORIDE 25 MG/1
25 TABLET, FILM COATED ORAL
Status: COMPLETED | OUTPATIENT
Start: 2023-01-01 | End: 2023-01-01

## 2023-01-01 RX ORDER — CALCIUM CARBONATE 500 MG/1
1000 TABLET, CHEWABLE ORAL
Status: DISCONTINUED | OUTPATIENT
Start: 2023-01-01 | End: 2023-01-01 | Stop reason: HOSPADM

## 2023-01-01 RX ORDER — OXYCODONE HYDROCHLORIDE 5 MG/1
5 TABLET ORAL ONCE
Status: COMPLETED | OUTPATIENT
Start: 2023-01-01 | End: 2023-01-01

## 2023-01-01 RX ORDER — MAGNESIUM SULFATE HEPTAHYDRATE 40 MG/ML
2 INJECTION, SOLUTION INTRAVENOUS ONCE
Status: DISCONTINUED | OUTPATIENT
Start: 2023-01-01 | End: 2023-01-01 | Stop reason: HOSPADM

## 2023-01-01 RX ORDER — POLYETHYLENE GLYCOL 3350 17 G/17G
17 POWDER, FOR SOLUTION ORAL DAILY
Status: DISCONTINUED | OUTPATIENT
Start: 2023-01-01 | End: 2023-01-01 | Stop reason: HOSPADM

## 2023-01-01 RX ORDER — ERGOCALCIFEROL 1.25 MG/1
50000 CAPSULE ORAL WEEKLY
Status: DISCONTINUED | OUTPATIENT
Start: 2023-01-01 | End: 2023-01-01 | Stop reason: HOSPADM

## 2023-01-01 RX ORDER — POTASSIUM CHLORIDE 20MEQ/15ML
40 LIQUID (ML) ORAL ONCE
Status: DISCONTINUED | OUTPATIENT
Start: 2023-01-01 | End: 2023-01-01

## 2023-01-01 RX ORDER — NALOXONE HYDROCHLORIDE 0.4 MG/ML
0.4 INJECTION, SOLUTION INTRAMUSCULAR; INTRAVENOUS; SUBCUTANEOUS
Status: DISCONTINUED | OUTPATIENT
Start: 2023-01-01 | End: 2023-01-01 | Stop reason: HOSPADM

## 2023-01-01 RX ORDER — CLONIDINE HYDROCHLORIDE 0.1 MG/1
0.1 TABLET ORAL 2 TIMES DAILY
Status: ON HOLD | COMMUNITY
End: 2023-01-01

## 2023-01-01 RX ORDER — SEVELAMER CARBONATE 800 MG/1
1600 TABLET, FILM COATED ORAL
Status: DISCONTINUED | OUTPATIENT
Start: 2023-01-01 | End: 2023-01-01 | Stop reason: HOSPADM

## 2023-01-01 RX ORDER — CARVEDILOL 6.25 MG/1
6.25 TABLET ORAL 2 TIMES DAILY WITH MEALS
Status: DISCONTINUED | OUTPATIENT
Start: 2023-01-01 | End: 2023-01-01 | Stop reason: HOSPADM

## 2023-01-01 RX ORDER — BUMETANIDE 2 MG/1
2 TABLET ORAL DAILY
Status: DISCONTINUED | OUTPATIENT
Start: 2023-01-01 | End: 2023-01-01 | Stop reason: HOSPADM

## 2023-01-01 RX ORDER — ALBUTEROL SULFATE 0.83 MG/ML
2.5 SOLUTION RESPIRATORY (INHALATION)
Status: DISCONTINUED | OUTPATIENT
Start: 2023-01-01 | End: 2023-01-01

## 2023-01-01 RX ORDER — POTASSIUM CHLORIDE 1500 MG/1
40 TABLET, EXTENDED RELEASE ORAL 2 TIMES DAILY
Status: COMPLETED | OUTPATIENT
Start: 2023-01-01 | End: 2023-01-01

## 2023-01-01 RX ORDER — AMOXICILLIN AND CLAVULANATE POTASSIUM 500; 125 MG/1; MG/1
1 TABLET, FILM COATED ORAL EVERY 12 HOURS SCHEDULED
Status: COMPLETED | OUTPATIENT
Start: 2023-01-01 | End: 2023-01-01

## 2023-01-01 RX ORDER — METHOCARBAMOL 500 MG/1
500 TABLET, FILM COATED ORAL 4 TIMES DAILY PRN
DISCHARGE
Start: 2023-01-01

## 2023-01-01 RX ORDER — POTASSIUM CHLORIDE 20MEQ/15ML
20 LIQUID (ML) ORAL ONCE
Status: COMPLETED | OUTPATIENT
Start: 2023-01-01 | End: 2023-01-01

## 2023-01-01 RX ORDER — PANTOPRAZOLE SODIUM 40 MG/1
40 TABLET, DELAYED RELEASE ORAL
Status: DISCONTINUED | OUTPATIENT
Start: 2023-01-01 | End: 2023-01-01 | Stop reason: HOSPADM

## 2023-01-01 RX ORDER — ACETAMINOPHEN 500 MG
500-1000 TABLET ORAL EVERY 6 HOURS PRN
DISCHARGE
Start: 2023-01-01

## 2023-01-01 RX ORDER — CLONIDINE HYDROCHLORIDE 0.1 MG/1
0.1 TABLET ORAL 2 TIMES DAILY
Status: DISCONTINUED | OUTPATIENT
Start: 2023-01-01 | End: 2023-01-01

## 2023-01-01 RX ORDER — AMOXICILLIN 250 MG
1 CAPSULE ORAL 2 TIMES DAILY
Status: DISCONTINUED | OUTPATIENT
Start: 2023-01-01 | End: 2023-01-01 | Stop reason: HOSPADM

## 2023-01-01 RX ORDER — LOPERAMIDE HCL 2 MG
2 CAPSULE ORAL 3 TIMES DAILY PRN
Status: DISCONTINUED | OUTPATIENT
Start: 2023-01-01 | End: 2023-01-01 | Stop reason: HOSPADM

## 2023-01-01 RX ORDER — GENTAMICIN SULFATE 1 MG/G
CREAM TOPICAL DAILY PRN
Status: CANCELLED | OUTPATIENT
Start: 2023-01-01

## 2023-01-01 RX ORDER — CALCIUM GLUCONATE 94 MG/ML
1 INJECTION, SOLUTION INTRAVENOUS ONCE
Status: DISCONTINUED | OUTPATIENT
Start: 2023-01-01 | End: 2023-01-01 | Stop reason: HOSPADM

## 2023-01-01 RX ORDER — PANTOPRAZOLE SODIUM 40 MG/1
40 TABLET, DELAYED RELEASE ORAL 2 TIMES DAILY
Status: DISCONTINUED | OUTPATIENT
Start: 2023-01-01 | End: 2023-01-01 | Stop reason: HOSPADM

## 2023-01-01 RX ORDER — ALBUTEROL SULFATE 0.83 MG/ML
2.5 SOLUTION RESPIRATORY (INHALATION)
Status: DISCONTINUED | OUTPATIENT
Start: 2023-01-01 | End: 2023-01-01 | Stop reason: HOSPADM

## 2023-01-01 RX ORDER — MORPHINE SULFATE 2 MG/ML
2 INJECTION, SOLUTION INTRAMUSCULAR; INTRAVENOUS EVERY 4 HOURS PRN
Status: DISCONTINUED | OUTPATIENT
Start: 2023-01-01 | End: 2023-01-01

## 2023-01-01 RX ORDER — ACETAMINOPHEN 325 MG/1
975 TABLET ORAL EVERY 8 HOURS
Status: DISCONTINUED | OUTPATIENT
Start: 2023-01-01 | End: 2023-01-01

## 2023-01-01 RX ORDER — CLONIDINE HYDROCHLORIDE 0.1 MG/1
0.1 TABLET ORAL ONCE
Status: COMPLETED | OUTPATIENT
Start: 2023-01-01 | End: 2023-01-01

## 2023-01-01 RX ORDER — CLONIDINE HYDROCHLORIDE 0.1 MG/1
0.1 TABLET ORAL 3 TIMES DAILY PRN
Status: DISCONTINUED | OUTPATIENT
Start: 2023-01-01 | End: 2023-01-01 | Stop reason: HOSPADM

## 2023-01-01 RX ORDER — HYDROMORPHONE HYDROCHLORIDE 2 MG/1
4 TABLET ORAL EVERY 4 HOURS PRN
Status: DISCONTINUED | OUTPATIENT
Start: 2023-01-01 | End: 2023-01-01

## 2023-01-01 RX ORDER — ASPIRIN 325 MG
325 TABLET ORAL ONCE
Status: COMPLETED | OUTPATIENT
Start: 2023-01-01 | End: 2023-01-01

## 2023-01-01 RX ORDER — NALOXONE HYDROCHLORIDE 0.4 MG/ML
0.2 INJECTION, SOLUTION INTRAMUSCULAR; INTRAVENOUS; SUBCUTANEOUS
Status: DISCONTINUED | OUTPATIENT
Start: 2023-01-01 | End: 2023-01-01 | Stop reason: HOSPADM

## 2023-01-01 RX ORDER — SENNOSIDES 8.6 MG
8.6 TABLET ORAL 2 TIMES DAILY PRN
Status: DISCONTINUED | OUTPATIENT
Start: 2023-01-01 | End: 2023-01-01 | Stop reason: HOSPADM

## 2023-01-01 RX ORDER — LISINOPRIL 10 MG/1
40 TABLET ORAL DAILY
Status: DISCONTINUED | OUTPATIENT
Start: 2023-01-01 | End: 2023-01-01

## 2023-01-01 RX ORDER — FAMOTIDINE 20 MG/1
20 TABLET, FILM COATED ORAL DAILY PRN
Status: DISCONTINUED | OUTPATIENT
Start: 2023-01-01 | End: 2023-01-01

## 2023-01-01 RX ORDER — CLONIDINE HYDROCHLORIDE 0.1 MG/1
0.1 TABLET ORAL 3 TIMES DAILY
Qty: 90 TABLET | Refills: 1 | Status: ON HOLD | OUTPATIENT
Start: 2023-01-01 | End: 2023-01-01

## 2023-01-01 RX ORDER — ONDANSETRON 4 MG/1
4 TABLET, ORALLY DISINTEGRATING ORAL EVERY 6 HOURS PRN
Status: DISCONTINUED | OUTPATIENT
Start: 2023-01-01 | End: 2023-01-01 | Stop reason: HOSPADM

## 2023-01-01 RX ORDER — HYDROMORPHONE HYDROCHLORIDE 1 MG/ML
0.5 INJECTION, SOLUTION INTRAMUSCULAR; INTRAVENOUS; SUBCUTANEOUS ONCE
Status: COMPLETED | OUTPATIENT
Start: 2023-01-01 | End: 2023-01-01

## 2023-01-01 RX ORDER — CALCIUM CARBONATE 500 MG/1
500 TABLET, CHEWABLE ORAL 3 TIMES DAILY PRN
Status: DISCONTINUED | OUTPATIENT
Start: 2023-01-01 | End: 2023-01-01 | Stop reason: HOSPADM

## 2023-01-01 RX ORDER — ONDANSETRON 4 MG/1
4 TABLET, ORALLY DISINTEGRATING ORAL EVERY 6 HOURS PRN
DISCHARGE
Start: 2023-01-01

## 2023-01-01 RX ORDER — CEPHALEXIN 500 MG/1
500 CAPSULE ORAL 2 TIMES DAILY
Status: COMPLETED | OUTPATIENT
Start: 2023-01-01 | End: 2023-01-01

## 2023-01-01 RX ORDER — ACETAMINOPHEN 650 MG/1
650 SUPPOSITORY RECTAL EVERY 6 HOURS PRN
Status: DISCONTINUED | OUTPATIENT
Start: 2023-01-01 | End: 2023-01-01 | Stop reason: HOSPADM

## 2023-01-01 RX ORDER — POTASSIUM CHLORIDE 1.5 G/1.58G
40 POWDER, FOR SOLUTION ORAL 2 TIMES DAILY
Status: DISCONTINUED | OUTPATIENT
Start: 2023-01-01 | End: 2023-01-01

## 2023-01-01 RX ORDER — GENTAMICIN SULFATE 1 MG/G
CREAM TOPICAL DAILY PRN
DISCHARGE
Start: 2023-01-01

## 2023-01-01 RX ORDER — NITROGLYCERIN 0.4 MG/1
0.4 TABLET SUBLINGUAL EVERY 5 MIN PRN
Status: COMPLETED | OUTPATIENT
Start: 2023-01-01 | End: 2023-01-01

## 2023-01-01 RX ORDER — FENTANYL CITRATE 50 UG/ML
50 INJECTION, SOLUTION INTRAMUSCULAR; INTRAVENOUS ONCE
Status: COMPLETED | OUTPATIENT
Start: 2023-01-01 | End: 2023-01-01

## 2023-01-01 RX ORDER — SEVELAMER CARBONATE 800 MG/1
1600 TABLET, FILM COATED ORAL
Qty: 90 TABLET | Refills: 0 | Status: SHIPPED | OUTPATIENT
Start: 2023-01-01 | End: 2023-01-01

## 2023-01-01 RX ORDER — IOPAMIDOL 755 MG/ML
100 INJECTION, SOLUTION INTRAVASCULAR ONCE
Status: COMPLETED | OUTPATIENT
Start: 2023-01-01 | End: 2023-01-01

## 2023-01-01 RX ORDER — ACETAMINOPHEN 325 MG/1
650 TABLET ORAL EVERY 6 HOURS PRN
Status: DISCONTINUED | OUTPATIENT
Start: 2023-01-01 | End: 2023-01-01

## 2023-01-01 RX ORDER — HYDROXYZINE HYDROCHLORIDE 50 MG/1
50 TABLET, FILM COATED ORAL 4 TIMES DAILY PRN
Status: DISCONTINUED | OUTPATIENT
Start: 2023-01-01 | End: 2023-01-01

## 2023-01-01 RX ORDER — POLYETHYLENE GLYCOL 3350 17 G/17G
17 POWDER, FOR SOLUTION ORAL 2 TIMES DAILY
Status: DISCONTINUED | OUTPATIENT
Start: 2023-01-01 | End: 2023-01-01 | Stop reason: HOSPADM

## 2023-01-01 RX ORDER — PROCHLORPERAZINE MALEATE 5 MG
10 TABLET ORAL EVERY 6 HOURS PRN
Status: DISCONTINUED | OUTPATIENT
Start: 2023-01-01 | End: 2023-01-01 | Stop reason: HOSPADM

## 2023-01-01 RX ORDER — GABAPENTIN 100 MG/1
100 CAPSULE ORAL 2 TIMES DAILY PRN
COMMUNITY
Start: 2023-01-01

## 2023-01-01 RX ORDER — DIPHENHYDRAMINE HYDROCHLORIDE, ZINC ACETATE 2; .1 G/100G; G/100G
CREAM TOPICAL 3 TIMES DAILY PRN
DISCHARGE
Start: 2023-01-01

## 2023-01-01 RX ORDER — CALCIUM CARBONATE 500 MG/1
1 TABLET, CHEWABLE ORAL 3 TIMES DAILY PRN
DISCHARGE
Start: 2023-01-01

## 2023-01-01 RX ORDER — BUMETANIDE 2 MG/1
2 TABLET ORAL DAILY
Qty: 90 TABLET | Refills: 1 | Status: ON HOLD | OUTPATIENT
Start: 2023-01-01 | End: 2023-01-01

## 2023-01-01 RX ORDER — ONDANSETRON 2 MG/ML
4 INJECTION INTRAMUSCULAR; INTRAVENOUS EVERY 6 HOURS PRN
Status: DISCONTINUED | OUTPATIENT
Start: 2023-01-01 | End: 2023-01-01 | Stop reason: HOSPADM

## 2023-01-01 RX ORDER — ACETAMINOPHEN 325 MG/1
975 TABLET ORAL EVERY 6 HOURS PRN
Status: DISCONTINUED | OUTPATIENT
Start: 2023-01-01 | End: 2023-01-01 | Stop reason: HOSPADM

## 2023-01-01 RX ORDER — HYDRALAZINE HYDROCHLORIDE 20 MG/ML
5-10 INJECTION INTRAMUSCULAR; INTRAVENOUS EVERY 4 HOURS PRN
Status: DISCONTINUED | OUTPATIENT
Start: 2023-01-01 | End: 2023-01-01

## 2023-01-01 RX ORDER — POLYETHYLENE GLYCOL 3350 17 G/17G
17 POWDER, FOR SOLUTION ORAL DAILY
Status: DISCONTINUED | OUTPATIENT
Start: 2023-01-01 | End: 2023-01-01

## 2023-01-01 RX ORDER — PROCHLORPERAZINE 25 MG
25 SUPPOSITORY, RECTAL RECTAL EVERY 12 HOURS PRN
Status: DISCONTINUED | OUTPATIENT
Start: 2023-01-01 | End: 2023-01-01 | Stop reason: HOSPADM

## 2023-01-01 RX ORDER — GABAPENTIN 100 MG/1
100 CAPSULE ORAL AT BEDTIME
Status: DISCONTINUED | OUTPATIENT
Start: 2023-01-01 | End: 2023-01-01

## 2023-01-01 RX ORDER — METHOCARBAMOL 500 MG/1
500 TABLET, FILM COATED ORAL 4 TIMES DAILY PRN
Status: DISCONTINUED | OUTPATIENT
Start: 2023-01-01 | End: 2023-01-01 | Stop reason: HOSPADM

## 2023-01-01 RX ORDER — HYDRALAZINE HYDROCHLORIDE 20 MG/ML
10 INJECTION INTRAMUSCULAR; INTRAVENOUS EVERY 6 HOURS PRN
Status: DISCONTINUED | OUTPATIENT
Start: 2023-01-01 | End: 2023-01-01 | Stop reason: HOSPADM

## 2023-01-01 RX ORDER — POTASSIUM CHLORIDE 20MEQ/15ML
40 LIQUID (ML) ORAL ONCE
Status: COMPLETED | OUTPATIENT
Start: 2023-01-01 | End: 2023-01-01

## 2023-01-01 RX ORDER — LACTULOSE 10 G/15ML
20 SOLUTION ORAL ONCE
Status: COMPLETED | OUTPATIENT
Start: 2023-01-01 | End: 2023-01-01

## 2023-01-01 RX ORDER — ALBUTEROL SULFATE 5 MG/ML
2.5 SOLUTION RESPIRATORY (INHALATION)
Status: DISCONTINUED | OUTPATIENT
Start: 2023-01-01 | End: 2023-01-01

## 2023-01-01 RX ORDER — HYDROMORPHONE HYDROCHLORIDE 2 MG/1
2 TABLET ORAL EVERY 4 HOURS PRN
Status: DISCONTINUED | OUTPATIENT
Start: 2023-01-01 | End: 2023-01-01

## 2023-01-01 RX ORDER — LANOLIN ALCOHOL/MO/W.PET/CERES
1000 CREAM (GRAM) TOPICAL DAILY
COMMUNITY

## 2023-01-01 RX ORDER — ISOSORBIDE MONONITRATE 30 MG/1
30 TABLET, EXTENDED RELEASE ORAL DAILY
Status: DISCONTINUED | OUTPATIENT
Start: 2023-01-01 | End: 2023-01-01

## 2023-01-01 RX ORDER — POTASSIUM CHLORIDE 1500 MG/1
40 TABLET, EXTENDED RELEASE ORAL ONCE
Status: COMPLETED | OUTPATIENT
Start: 2023-01-01 | End: 2023-01-01

## 2023-01-01 RX ORDER — CLONIDINE HYDROCHLORIDE 0.1 MG/1
0.1 TABLET ORAL 2 TIMES DAILY
Qty: 90 TABLET | Refills: 1 | DISCHARGE
Start: 2023-01-01

## 2023-01-01 RX ORDER — CLONIDINE HYDROCHLORIDE 0.1 MG/1
0.1 TABLET ORAL 2 TIMES DAILY
Status: DISCONTINUED | OUTPATIENT
Start: 2023-01-01 | End: 2023-01-01 | Stop reason: HOSPADM

## 2023-01-01 RX ORDER — CEFAZOLIN SODIUM 2 G/100ML
2 INJECTION, SOLUTION INTRAVENOUS ONCE
Status: COMPLETED | OUTPATIENT
Start: 2023-01-01 | End: 2023-01-01

## 2023-01-01 RX ORDER — CARVEDILOL 6.25 MG/1
6.25 TABLET ORAL 2 TIMES DAILY WITH MEALS
Status: DISCONTINUED | OUTPATIENT
Start: 2023-01-01 | End: 2023-01-01

## 2023-01-01 RX ORDER — ACETAMINOPHEN 325 MG/1
650 TABLET ORAL EVERY 6 HOURS PRN
Status: DISCONTINUED | OUTPATIENT
Start: 2023-01-01 | End: 2023-01-01 | Stop reason: HOSPADM

## 2023-01-01 RX ORDER — LIDOCAINE 40 MG/G
CREAM TOPICAL
Status: DISCONTINUED | OUTPATIENT
Start: 2023-01-01 | End: 2023-01-01 | Stop reason: HOSPADM

## 2023-01-01 RX ORDER — DOCUSATE SODIUM 100 MG/1
100 CAPSULE, LIQUID FILLED ORAL 2 TIMES DAILY
Status: DISCONTINUED | OUTPATIENT
Start: 2023-01-01 | End: 2023-01-01 | Stop reason: HOSPADM

## 2023-01-01 RX ORDER — DIPHENHYDRAMINE HCL 50 MG
50 CAPSULE ORAL EVERY 6 HOURS PRN
Status: DISCONTINUED | OUTPATIENT
Start: 2023-01-01 | End: 2023-01-01 | Stop reason: HOSPADM

## 2023-01-01 RX ORDER — LIDOCAINE 4 G/G
2 PATCH TOPICAL EVERY 24 HOURS
DISCHARGE
Start: 2023-01-01

## 2023-01-01 RX ORDER — TRAZODONE HYDROCHLORIDE 50 MG/1
50 TABLET, FILM COATED ORAL
COMMUNITY
Start: 2023-01-01

## 2023-01-01 RX ORDER — POLYETHYLENE GLYCOL 3350 17 G/17G
17 POWDER, FOR SOLUTION ORAL DAILY
Qty: 510 G | DISCHARGE
Start: 2023-01-01

## 2023-01-01 RX ORDER — METOPROLOL SUCCINATE 25 MG/1
12.5 TABLET, EXTENDED RELEASE ORAL DAILY
DISCHARGE
Start: 2023-01-01

## 2023-01-01 RX ORDER — GENTAMICIN SULFATE 1 MG/G
CREAM TOPICAL DAILY PRN
Status: DISCONTINUED | OUTPATIENT
Start: 2023-01-01 | End: 2023-01-01 | Stop reason: HOSPADM

## 2023-01-01 RX ORDER — FAMOTIDINE 20 MG/1
20 TABLET, FILM COATED ORAL
Status: DISCONTINUED | OUTPATIENT
Start: 2023-01-01 | End: 2023-01-01 | Stop reason: HOSPADM

## 2023-01-01 RX ORDER — TROLAMINE SALICYLATE 10 G/100G
CREAM TOPICAL 4 TIMES DAILY PRN
Status: DISCONTINUED | OUTPATIENT
Start: 2023-01-01 | End: 2023-01-01 | Stop reason: HOSPADM

## 2023-01-01 RX ORDER — AMOXICILLIN AND CLAVULANATE POTASSIUM 500; 125 MG/1; MG/1
1 TABLET, FILM COATED ORAL EVERY 24 HOURS
Status: DISCONTINUED | OUTPATIENT
Start: 2023-01-01 | End: 2023-01-01

## 2023-01-01 RX ORDER — FAMOTIDINE 20 MG/1
20 TABLET, FILM COATED ORAL DAILY PRN
COMMUNITY

## 2023-01-01 RX ORDER — HEPARIN SODIUM 5000 [USP'U]/.5ML
5000 INJECTION, SOLUTION INTRAVENOUS; SUBCUTANEOUS EVERY 12 HOURS
Status: DISCONTINUED | OUTPATIENT
Start: 2023-01-01 | End: 2023-01-01 | Stop reason: HOSPADM

## 2023-01-01 RX ORDER — GABAPENTIN 100 MG/1
100 CAPSULE ORAL 2 TIMES DAILY
Status: DISCONTINUED | OUTPATIENT
Start: 2023-01-01 | End: 2023-01-01 | Stop reason: HOSPADM

## 2023-01-01 RX ORDER — HYDROMORPHONE HYDROCHLORIDE 2 MG/1
4 TABLET ORAL EVERY 4 HOURS PRN
Status: DISCONTINUED | OUTPATIENT
Start: 2023-01-01 | End: 2023-01-01 | Stop reason: HOSPADM

## 2023-01-01 RX ORDER — HYDRALAZINE HYDROCHLORIDE 20 MG/ML
10 INJECTION INTRAMUSCULAR; INTRAVENOUS EVERY 4 HOURS PRN
Status: DISCONTINUED | OUTPATIENT
Start: 2023-01-01 | End: 2023-01-01

## 2023-01-01 RX ORDER — ISOSORBIDE MONONITRATE 30 MG/1
30 TABLET, EXTENDED RELEASE ORAL DAILY
Qty: 90 TABLET | Refills: 0 | Status: ON HOLD | OUTPATIENT
Start: 2023-01-01 | End: 2023-01-01

## 2023-01-01 RX ORDER — HYDROXYZINE HYDROCHLORIDE 25 MG/1
25 TABLET, FILM COATED ORAL 4 TIMES DAILY PRN
Status: DISCONTINUED | OUTPATIENT
Start: 2023-01-01 | End: 2023-01-01

## 2023-01-01 RX ORDER — HYDROXYZINE PAMOATE 50 MG/1
50 CAPSULE ORAL 4 TIMES DAILY PRN
COMMUNITY
Start: 2023-01-01

## 2023-01-01 RX ORDER — CEPHALEXIN 500 MG/1
500 CAPSULE ORAL 2 TIMES DAILY
Status: DISCONTINUED | OUTPATIENT
Start: 2023-01-01 | End: 2023-01-01

## 2023-01-01 RX ORDER — ISOSORBIDE MONONITRATE 30 MG/1
15 TABLET, EXTENDED RELEASE ORAL DAILY
Qty: 90 TABLET | Refills: 0 | DISCHARGE
Start: 2023-01-01

## 2023-01-01 RX ORDER — CLONIDINE HYDROCHLORIDE 0.1 MG/1
0.1 TABLET ORAL 3 TIMES DAILY
Status: DISCONTINUED | OUTPATIENT
Start: 2023-01-01 | End: 2023-01-01 | Stop reason: HOSPADM

## 2023-01-01 RX ORDER — HYDROXYZINE HYDROCHLORIDE 25 MG/1
25 TABLET, FILM COATED ORAL EVERY 6 HOURS PRN
Status: DISCONTINUED | OUTPATIENT
Start: 2023-01-01 | End: 2023-01-01 | Stop reason: HOSPADM

## 2023-01-01 RX ORDER — LIDOCAINE 4 G/G
1-3 PATCH TOPICAL
Status: DISCONTINUED | OUTPATIENT
Start: 2023-01-01 | End: 2023-01-01 | Stop reason: HOSPADM

## 2023-01-01 RX ORDER — DIPHENHYDRAMINE HYDROCHLORIDE, ZINC ACETATE 2; .1 G/100G; G/100G
CREAM TOPICAL 3 TIMES DAILY PRN
Status: DISCONTINUED | OUTPATIENT
Start: 2023-01-01 | End: 2023-01-01 | Stop reason: HOSPADM

## 2023-01-01 RX ORDER — GABAPENTIN 250 MG/5ML
50 SOLUTION ORAL EVERY MORNING
Status: DISCONTINUED | OUTPATIENT
Start: 2023-01-01 | End: 2023-01-01

## 2023-01-01 RX ORDER — CYCLOBENZAPRINE HCL 5 MG
5 TABLET ORAL EVERY 8 HOURS PRN
Status: DISCONTINUED | OUTPATIENT
Start: 2023-01-01 | End: 2023-01-01

## 2023-01-01 RX ORDER — ACETAMINOPHEN 650 MG/1
650 SUPPOSITORY RECTAL EVERY 6 HOURS PRN
Status: DISCONTINUED | OUTPATIENT
Start: 2023-01-01 | End: 2023-01-01

## 2023-01-01 RX ORDER — OMEPRAZOLE 40 MG/1
40 CAPSULE, DELAYED RELEASE ORAL DAILY
COMMUNITY

## 2023-01-01 RX ORDER — TROLAMINE SALICYLATE 10 G/100G
CREAM TOPICAL 4 TIMES DAILY PRN
DISCHARGE
Start: 2023-01-01

## 2023-01-01 RX ORDER — LOPERAMIDE HCL 2 MG
2 CAPSULE ORAL 3 TIMES DAILY PRN
DISCHARGE
Start: 2023-01-01

## 2023-01-01 RX ORDER — BUMETANIDE 0.25 MG/ML
2 INJECTION INTRAMUSCULAR; INTRAVENOUS EVERY 12 HOURS
Status: DISCONTINUED | OUTPATIENT
Start: 2023-01-01 | End: 2023-01-01

## 2023-01-01 RX ORDER — GABAPENTIN 100 MG/1
100 CAPSULE ORAL 2 TIMES DAILY PRN
Status: DISCONTINUED | OUTPATIENT
Start: 2023-01-01 | End: 2023-01-01

## 2023-01-01 RX ORDER — HYDROMORPHONE HYDROCHLORIDE 2 MG/1
2 TABLET ORAL
Status: COMPLETED | OUTPATIENT
Start: 2023-01-01 | End: 2023-01-01

## 2023-01-01 RX ORDER — DOCUSATE SODIUM 100 MG/1
100 CAPSULE, LIQUID FILLED ORAL 2 TIMES DAILY
DISCHARGE
Start: 2023-01-01

## 2023-01-01 RX ORDER — ALBUTEROL SULFATE 0.83 MG/ML
2.5 SOLUTION RESPIRATORY (INHALATION) EVERY 6 HOURS PRN
Status: DISCONTINUED | OUTPATIENT
Start: 2023-01-01 | End: 2023-01-01

## 2023-01-01 RX ORDER — AMLODIPINE BESYLATE 5 MG/1
5 TABLET ORAL DAILY
Status: DISCONTINUED | OUTPATIENT
Start: 2023-01-01 | End: 2023-01-01

## 2023-01-01 RX ORDER — HYDROMORPHONE HCL IN WATER/PF 6 MG/30 ML
0.2 PATIENT CONTROLLED ANALGESIA SYRINGE INTRAVENOUS EVERY 4 HOURS PRN
Status: DISCONTINUED | OUTPATIENT
Start: 2023-01-01 | End: 2023-01-01

## 2023-01-01 RX ORDER — GABAPENTIN 100 MG/1
100 CAPSULE ORAL 2 TIMES DAILY
Status: DISCONTINUED | OUTPATIENT
Start: 2023-01-01 | End: 2023-01-01

## 2023-01-01 RX ORDER — METHOCARBAMOL 500 MG/1
500 TABLET, FILM COATED ORAL 4 TIMES DAILY
Status: DISCONTINUED | OUTPATIENT
Start: 2023-01-01 | End: 2023-01-01

## 2023-01-01 RX ORDER — CEPHALEXIN 500 MG/1
1000 CAPSULE ORAL ONCE
Status: COMPLETED | OUTPATIENT
Start: 2023-01-01 | End: 2023-01-01

## 2023-01-01 RX ORDER — HYDROXYZINE HYDROCHLORIDE 25 MG/1
25 TABLET, FILM COATED ORAL 3 TIMES DAILY PRN
Status: DISCONTINUED | OUTPATIENT
Start: 2023-01-01 | End: 2023-01-01 | Stop reason: HOSPADM

## 2023-01-01 RX ORDER — HYDROMORPHONE HYDROCHLORIDE 1 MG/ML
0.3 INJECTION, SOLUTION INTRAMUSCULAR; INTRAVENOUS; SUBCUTANEOUS
Status: DISCONTINUED | OUTPATIENT
Start: 2023-01-01 | End: 2023-01-01 | Stop reason: HOSPADM

## 2023-01-01 RX ORDER — ISOSORBIDE MONONITRATE 30 MG/1
30 TABLET, EXTENDED RELEASE ORAL DAILY
Status: DISCONTINUED | OUTPATIENT
Start: 2023-01-01 | End: 2023-01-01 | Stop reason: HOSPADM

## 2023-01-01 RX ORDER — CARVEDILOL 6.25 MG/1
6.25 TABLET ORAL ONCE
Status: COMPLETED | OUTPATIENT
Start: 2023-01-01 | End: 2023-01-01

## 2023-01-01 RX ORDER — CALCIUM CARBONATE 500 MG/1
2 TABLET, CHEWABLE ORAL
DISCHARGE
Start: 2023-01-01

## 2023-01-01 RX ORDER — AMINOPHYLLINE 25 MG/ML
50 INJECTION, SOLUTION INTRAVENOUS
Status: ACTIVE | OUTPATIENT
Start: 2023-01-01 | End: 2023-01-01

## 2023-01-01 RX ORDER — PANTOPRAZOLE SODIUM 40 MG/1
40 TABLET, DELAYED RELEASE ORAL DAILY
Status: DISCONTINUED | OUTPATIENT
Start: 2023-01-01 | End: 2023-01-01 | Stop reason: HOSPADM

## 2023-01-01 RX ORDER — HYDRALAZINE HYDROCHLORIDE 20 MG/ML
5 INJECTION INTRAMUSCULAR; INTRAVENOUS EVERY 4 HOURS PRN
Status: DISCONTINUED | OUTPATIENT
Start: 2023-01-01 | End: 2023-01-01

## 2023-01-01 RX ADMIN — PANTOPRAZOLE SODIUM 40 MG: 40 TABLET, DELAYED RELEASE ORAL at 09:40

## 2023-01-01 RX ADMIN — ACETAMINOPHEN 975 MG: 325 TABLET, FILM COATED ORAL at 08:15

## 2023-01-01 RX ADMIN — GABAPENTIN 100 MG: 100 CAPSULE ORAL at 21:40

## 2023-01-01 RX ADMIN — SENNOSIDES 8.6 MG: 8.6 TABLET, FILM COATED ORAL at 09:38

## 2023-01-01 RX ADMIN — PANTOPRAZOLE SODIUM 40 MG: 40 TABLET, DELAYED RELEASE ORAL at 08:11

## 2023-01-01 RX ADMIN — MICONAZOLE NITRATE: 20 POWDER TOPICAL at 20:17

## 2023-01-01 RX ADMIN — CLONIDINE HYDROCHLORIDE 0.1 MG: 0.1 TABLET ORAL at 21:29

## 2023-01-01 RX ADMIN — AMOXICILLIN AND CLAVULANATE POTASSIUM 1 TABLET: 500; 125 TABLET, FILM COATED ORAL at 20:23

## 2023-01-01 RX ADMIN — GENTAMICIN SULFATE: 1 CREAM TOPICAL at 19:01

## 2023-01-01 RX ADMIN — MICONAZOLE NITRATE: 20 POWDER TOPICAL at 11:27

## 2023-01-01 RX ADMIN — CLONIDINE HYDROCHLORIDE 0.1 MG: 0.1 TABLET ORAL at 08:18

## 2023-01-01 RX ADMIN — OXYCODONE HYDROCHLORIDE 2.5 MG: 5 TABLET ORAL at 15:33

## 2023-01-01 RX ADMIN — CALCIUM CARBONATE (ANTACID) CHEW TAB 500 MG 1000 MG: 500 CHEW TAB at 10:10

## 2023-01-01 RX ADMIN — METHOCARBAMOL 500 MG: 500 TABLET ORAL at 06:16

## 2023-01-01 RX ADMIN — METOPROLOL SUCCINATE 12.5 MG: 25 TABLET, EXTENDED RELEASE ORAL at 10:14

## 2023-01-01 RX ADMIN — SENNOSIDES AND DOCUSATE SODIUM 1 TABLET: 50; 8.6 TABLET ORAL at 08:47

## 2023-01-01 RX ADMIN — REGADENOSON 0.4 MG: 0.08 INJECTION, SOLUTION INTRAVENOUS at 11:16

## 2023-01-01 RX ADMIN — IOPAMIDOL 100 ML: 755 INJECTION, SOLUTION INTRAVENOUS at 07:44

## 2023-01-01 RX ADMIN — CYCLOBENZAPRINE HYDROCHLORIDE 5 MG: 5 TABLET, FILM COATED ORAL at 05:15

## 2023-01-01 RX ADMIN — CALCIUM CARBONATE (ANTACID) CHEW TAB 500 MG 1000 MG: 500 CHEW TAB at 09:39

## 2023-01-01 RX ADMIN — GENTAMICIN SULFATE: 1 CREAM TOPICAL at 09:11

## 2023-01-01 RX ADMIN — CALCIUM CARBONATE (ANTACID) CHEW TAB 500 MG 1000 MG: 500 CHEW TAB at 09:03

## 2023-01-01 RX ADMIN — GABAPENTIN 100 MG: 100 CAPSULE ORAL at 19:51

## 2023-01-01 RX ADMIN — DOCUSATE SODIUM 100 MG: 100 CAPSULE, LIQUID FILLED ORAL at 08:57

## 2023-01-01 RX ADMIN — DOCUSATE SODIUM 100 MG: 100 CAPSULE, LIQUID FILLED ORAL at 08:45

## 2023-01-01 RX ADMIN — GABAPENTIN 100 MG: 100 CAPSULE ORAL at 10:11

## 2023-01-01 RX ADMIN — DOCUSATE SODIUM 100 MG: 100 CAPSULE, LIQUID FILLED ORAL at 20:02

## 2023-01-01 RX ADMIN — DOCUSATE SODIUM 100 MG: 100 CAPSULE, LIQUID FILLED ORAL at 09:04

## 2023-01-01 RX ADMIN — CYCLOBENZAPRINE HYDROCHLORIDE 5 MG: 5 TABLET, FILM COATED ORAL at 19:30

## 2023-01-01 RX ADMIN — ISOSORBIDE MONONITRATE 15 MG: 30 TABLET, EXTENDED RELEASE ORAL at 08:59

## 2023-01-01 RX ADMIN — CLONIDINE HYDROCHLORIDE 0.1 MG: 0.1 TABLET ORAL at 15:48

## 2023-01-01 RX ADMIN — BUMETANIDE 2 MG: 0.25 INJECTION INTRAMUSCULAR; INTRAVENOUS at 00:59

## 2023-01-01 RX ADMIN — CLONIDINE HYDROCHLORIDE 0.1 MG: 0.1 TABLET ORAL at 14:17

## 2023-01-01 RX ADMIN — ACETAMINOPHEN 975 MG: 325 TABLET, FILM COATED ORAL at 15:48

## 2023-01-01 RX ADMIN — METOPROLOL SUCCINATE 12.5 MG: 25 TABLET, EXTENDED RELEASE ORAL at 09:30

## 2023-01-01 RX ADMIN — HYDROMORPHONE HYDROCHLORIDE 1 MG: 2 TABLET ORAL at 16:43

## 2023-01-01 RX ADMIN — CEPHALEXIN 500 MG: 500 CAPSULE ORAL at 19:51

## 2023-01-01 RX ADMIN — METHOCARBAMOL 500 MG: 500 TABLET ORAL at 08:39

## 2023-01-01 RX ADMIN — CLONIDINE HYDROCHLORIDE 0.1 MG: 0.1 TABLET ORAL at 13:05

## 2023-01-01 RX ADMIN — PANTOPRAZOLE SODIUM 40 MG: 40 TABLET, DELAYED RELEASE ORAL at 08:43

## 2023-01-01 RX ADMIN — AMOXICILLIN AND CLAVULANATE POTASSIUM 1 TABLET: 500; 125 TABLET, FILM COATED ORAL at 08:46

## 2023-01-01 RX ADMIN — GABAPENTIN 100 MG: 100 CAPSULE ORAL at 20:10

## 2023-01-01 RX ADMIN — METHOCARBAMOL 500 MG: 500 TABLET ORAL at 09:14

## 2023-01-01 RX ADMIN — OXYCODONE HYDROCHLORIDE 5 MG: 5 TABLET ORAL at 07:29

## 2023-01-01 RX ADMIN — METOPROLOL SUCCINATE 12.5 MG: 25 TABLET, EXTENDED RELEASE ORAL at 10:11

## 2023-01-01 RX ADMIN — METOPROLOL SUCCINATE 12.5 MG: 25 TABLET, EXTENDED RELEASE ORAL at 08:00

## 2023-01-01 RX ADMIN — CALCIUM CARBONATE (ANTACID) CHEW TAB 500 MG 1000 MG: 500 CHEW TAB at 08:45

## 2023-01-01 RX ADMIN — HYDROMORPHONE HYDROCHLORIDE 1 MG: 2 TABLET ORAL at 01:40

## 2023-01-01 RX ADMIN — GABAPENTIN 100 MG: 100 CAPSULE ORAL at 09:00

## 2023-01-01 RX ADMIN — AMOXICILLIN AND CLAVULANATE POTASSIUM 1 TABLET: 500; 125 TABLET, FILM COATED ORAL at 20:58

## 2023-01-01 RX ADMIN — METHOCARBAMOL 500 MG: 500 TABLET ORAL at 08:15

## 2023-01-01 RX ADMIN — GENTAMICIN SULFATE: 1 CREAM TOPICAL at 09:34

## 2023-01-01 RX ADMIN — GABAPENTIN 100 MG: 100 CAPSULE ORAL at 08:20

## 2023-01-01 RX ADMIN — PANTOPRAZOLE SODIUM 40 MG: 40 TABLET, DELAYED RELEASE ORAL at 08:19

## 2023-01-01 RX ADMIN — GENTAMICIN SULFATE: 1 CREAM TOPICAL at 08:53

## 2023-01-01 RX ADMIN — CLONIDINE HYDROCHLORIDE 0.1 MG: 0.1 TABLET ORAL at 20:43

## 2023-01-01 RX ADMIN — CALCIUM CARBONATE (ANTACID) CHEW TAB 500 MG 1000 MG: 500 CHEW TAB at 12:30

## 2023-01-01 RX ADMIN — DOCUSATE SODIUM 100 MG: 100 CAPSULE, LIQUID FILLED ORAL at 08:18

## 2023-01-01 RX ADMIN — CALCIUM CARBONATE (ANTACID) CHEW TAB 500 MG 1000 MG: 500 CHEW TAB at 16:01

## 2023-01-01 RX ADMIN — HYDRALAZINE HYDROCHLORIDE 5 MG: 20 INJECTION, SOLUTION INTRAMUSCULAR; INTRAVENOUS at 14:09

## 2023-01-01 RX ADMIN — CLONIDINE HYDROCHLORIDE 0.1 MG: 0.1 TABLET ORAL at 14:12

## 2023-01-01 RX ADMIN — LIDOCAINE PATCH 4% 2 PATCH: 40 PATCH TOPICAL at 10:23

## 2023-01-01 RX ADMIN — HYDROMORPHONE HYDROCHLORIDE 2 MG: 2 TABLET ORAL at 14:07

## 2023-01-01 RX ADMIN — CALCIUM CARBONATE 500 MG: 500 TABLET, CHEWABLE ORAL at 15:34

## 2023-01-01 RX ADMIN — DOCUSATE SODIUM 100 MG: 100 CAPSULE, LIQUID FILLED ORAL at 08:59

## 2023-01-01 RX ADMIN — ACETAMINOPHEN 650 MG: 325 TABLET, FILM COATED ORAL at 11:47

## 2023-01-01 RX ADMIN — HYDROMORPHONE HYDROCHLORIDE 4 MG: 2 TABLET ORAL at 03:13

## 2023-01-01 RX ADMIN — LIDOCAINE PATCH 4% 2 PATCH: 40 PATCH TOPICAL at 09:39

## 2023-01-01 RX ADMIN — LIDOCAINE PATCH 4% 2 PATCH: 40 PATCH TOPICAL at 09:00

## 2023-01-01 RX ADMIN — AMOXICILLIN AND CLAVULANATE POTASSIUM 1 TABLET: 500; 125 TABLET, FILM COATED ORAL at 08:57

## 2023-01-01 RX ADMIN — CALCIUM CARBONATE (ANTACID) CHEW TAB 500 MG 1000 MG: 500 CHEW TAB at 12:36

## 2023-01-01 RX ADMIN — GABAPENTIN 100 MG: 100 CAPSULE ORAL at 21:06

## 2023-01-01 RX ADMIN — PANTOPRAZOLE SODIUM 40 MG: 40 TABLET, DELAYED RELEASE ORAL at 10:30

## 2023-01-01 RX ADMIN — GABAPENTIN 100 MG: 100 CAPSULE ORAL at 09:16

## 2023-01-01 RX ADMIN — ISOSORBIDE MONONITRATE 30 MG: 30 TABLET, EXTENDED RELEASE ORAL at 08:59

## 2023-01-01 RX ADMIN — CLONIDINE HYDROCHLORIDE 0.1 MG: 0.1 TABLET ORAL at 08:57

## 2023-01-01 RX ADMIN — CLONIDINE HYDROCHLORIDE 0.1 MG: 0.1 TABLET ORAL at 08:59

## 2023-01-01 RX ADMIN — ACETAMINOPHEN 975 MG: 325 TABLET, FILM COATED ORAL at 08:16

## 2023-01-01 RX ADMIN — CEPHALEXIN 500 MG: 500 CAPSULE ORAL at 20:43

## 2023-01-01 RX ADMIN — CLONIDINE HYDROCHLORIDE 0.1 MG: 0.1 TABLET ORAL at 08:00

## 2023-01-01 RX ADMIN — CARVEDILOL 6.25 MG: 6.25 TABLET, FILM COATED ORAL at 18:06

## 2023-01-01 RX ADMIN — GABAPENTIN 100 MG: 100 CAPSULE ORAL at 08:47

## 2023-01-01 RX ADMIN — METHOCARBAMOL 500 MG: 500 TABLET ORAL at 00:14

## 2023-01-01 RX ADMIN — PANTOPRAZOLE SODIUM 40 MG: 40 TABLET, DELAYED RELEASE ORAL at 09:13

## 2023-01-01 RX ADMIN — HYDROMORPHONE HYDROCHLORIDE 1 MG: 2 TABLET ORAL at 14:12

## 2023-01-01 RX ADMIN — ACETAMINOPHEN 975 MG: 325 TABLET, FILM COATED ORAL at 06:43

## 2023-01-01 RX ADMIN — LACTULOSE 20 G: 20 SOLUTION ORAL at 09:38

## 2023-01-01 RX ADMIN — CALCIUM CARBONATE (ANTACID) CHEW TAB 500 MG 1000 MG: 500 CHEW TAB at 13:05

## 2023-01-01 RX ADMIN — DIPHENHYDRAMINE HYDROCHLORIDE, ZINC ACETATE: 2; .1 CREAM TOPICAL at 09:39

## 2023-01-01 RX ADMIN — PANTOPRAZOLE SODIUM 40 MG: 40 TABLET, DELAYED RELEASE ORAL at 08:45

## 2023-01-01 RX ADMIN — PANTOPRAZOLE SODIUM 40 MG: 40 TABLET, DELAYED RELEASE ORAL at 09:08

## 2023-01-01 RX ADMIN — DOCUSATE SODIUM 100 MG: 100 CAPSULE, LIQUID FILLED ORAL at 21:22

## 2023-01-01 RX ADMIN — CARVEDILOL 6.25 MG: 6.25 TABLET, FILM COATED ORAL at 07:28

## 2023-01-01 RX ADMIN — OXYCODONE HYDROCHLORIDE 2.5 MG: 5 TABLET ORAL at 09:15

## 2023-01-01 RX ADMIN — METOPROLOL SUCCINATE 12.5 MG: 25 TABLET, EXTENDED RELEASE ORAL at 10:26

## 2023-01-01 RX ADMIN — METHOCARBAMOL 500 MG: 500 TABLET ORAL at 20:19

## 2023-01-01 RX ADMIN — DOCUSATE SODIUM 100 MG: 100 CAPSULE, LIQUID FILLED ORAL at 20:41

## 2023-01-01 RX ADMIN — METHOCARBAMOL 500 MG: 500 TABLET ORAL at 23:05

## 2023-01-01 RX ADMIN — METHOCARBAMOL 500 MG: 500 TABLET ORAL at 10:13

## 2023-01-01 RX ADMIN — ISOSORBIDE MONONITRATE 30 MG: 30 TABLET, EXTENDED RELEASE ORAL at 08:15

## 2023-01-01 RX ADMIN — ONDANSETRON 4 MG: 4 TABLET, ORALLY DISINTEGRATING ORAL at 15:43

## 2023-01-01 RX ADMIN — ACETAMINOPHEN 975 MG: 325 TABLET, FILM COATED ORAL at 17:13

## 2023-01-01 RX ADMIN — PANTOPRAZOLE SODIUM 40 MG: 40 TABLET, DELAYED RELEASE ORAL at 20:45

## 2023-01-01 RX ADMIN — CLONIDINE HYDROCHLORIDE 0.1 MG: 0.1 TABLET ORAL at 09:39

## 2023-01-01 RX ADMIN — METOPROLOL SUCCINATE 12.5 MG: 25 TABLET, EXTENDED RELEASE ORAL at 08:59

## 2023-01-01 RX ADMIN — HYDROXYZINE HYDROCHLORIDE 25 MG: 25 TABLET ORAL at 17:25

## 2023-01-01 RX ADMIN — GENTAMICIN SULFATE: 1 CREAM TOPICAL at 09:36

## 2023-01-01 RX ADMIN — LIDOCAINE PATCH 4% 2 PATCH: 40 PATCH TOPICAL at 08:14

## 2023-01-01 RX ADMIN — CALCIUM CARBONATE (ANTACID) CHEW TAB 500 MG 1000 MG: 500 CHEW TAB at 08:20

## 2023-01-01 RX ADMIN — CEPHALEXIN 500 MG: 500 CAPSULE ORAL at 09:01

## 2023-01-01 RX ADMIN — METHOCARBAMOL 500 MG: 500 TABLET ORAL at 12:32

## 2023-01-01 RX ADMIN — OXYCODONE HYDROCHLORIDE 2.5 MG: 5 TABLET ORAL at 13:29

## 2023-01-01 RX ADMIN — CLONIDINE HYDROCHLORIDE 0.1 MG: 0.1 TABLET ORAL at 19:51

## 2023-01-01 RX ADMIN — ISOSORBIDE MONONITRATE 30 MG: 30 TABLET, EXTENDED RELEASE ORAL at 08:00

## 2023-01-01 RX ADMIN — PANTOPRAZOLE SODIUM 40 MG: 40 TABLET, DELAYED RELEASE ORAL at 08:09

## 2023-01-01 RX ADMIN — CARVEDILOL 6.25 MG: 6.25 TABLET, FILM COATED ORAL at 17:25

## 2023-01-01 RX ADMIN — DOCUSATE SODIUM 100 MG: 100 CAPSULE, LIQUID FILLED ORAL at 09:30

## 2023-01-01 RX ADMIN — DOCUSATE SODIUM 100 MG: 100 CAPSULE, LIQUID FILLED ORAL at 20:27

## 2023-01-01 RX ADMIN — POTASSIUM CHLORIDE 40 MEQ: 20 SOLUTION ORAL at 15:59

## 2023-01-01 RX ADMIN — ISOSORBIDE MONONITRATE 30 MG: 30 TABLET, EXTENDED RELEASE ORAL at 09:13

## 2023-01-01 RX ADMIN — GABAPENTIN 100 MG: 100 CAPSULE ORAL at 20:37

## 2023-01-01 RX ADMIN — ACETAMINOPHEN 975 MG: 325 TABLET, FILM COATED ORAL at 16:02

## 2023-01-01 RX ADMIN — HYDROMORPHONE HYDROCHLORIDE 1 MG: 2 TABLET ORAL at 00:43

## 2023-01-01 RX ADMIN — LIDOCAINE PATCH 4% 2 PATCH: 40 PATCH TOPICAL at 10:13

## 2023-01-01 RX ADMIN — ONDANSETRON 4 MG: 2 INJECTION INTRAMUSCULAR; INTRAVENOUS at 01:13

## 2023-01-01 RX ADMIN — GABAPENTIN 100 MG: 100 CAPSULE ORAL at 21:39

## 2023-01-01 RX ADMIN — DOCUSATE SODIUM 100 MG: 100 CAPSULE, LIQUID FILLED ORAL at 08:00

## 2023-01-01 RX ADMIN — METOPROLOL SUCCINATE 12.5 MG: 25 TABLET, EXTENDED RELEASE ORAL at 08:24

## 2023-01-01 RX ADMIN — POLYETHYLENE GLYCOL 3350 17 G: 17 POWDER, FOR SOLUTION ORAL at 09:45

## 2023-01-01 RX ADMIN — HYDROXYZINE HYDROCHLORIDE 25 MG: 25 TABLET ORAL at 11:47

## 2023-01-01 RX ADMIN — DOCUSATE SODIUM 100 MG: 100 CAPSULE, LIQUID FILLED ORAL at 20:10

## 2023-01-01 RX ADMIN — DOCUSATE SODIUM 100 MG: 100 CAPSULE, LIQUID FILLED ORAL at 20:44

## 2023-01-01 RX ADMIN — GENTAMICIN SULFATE: 1 CREAM TOPICAL at 08:21

## 2023-01-01 RX ADMIN — CLONIDINE HYDROCHLORIDE 0.1 MG: 0.1 TABLET ORAL at 10:13

## 2023-01-01 RX ADMIN — HYDROMORPHONE HYDROCHLORIDE 1 MG: 2 TABLET ORAL at 02:39

## 2023-01-01 RX ADMIN — MICONAZOLE NITRATE: 20 POWDER TOPICAL at 08:01

## 2023-01-01 RX ADMIN — HEPARIN SODIUM 5000 UNITS: 5000 INJECTION, SOLUTION INTRAVENOUS; SUBCUTANEOUS at 04:12

## 2023-01-01 RX ADMIN — SENNOSIDES AND DOCUSATE SODIUM 1 TABLET: 50; 8.6 TABLET ORAL at 08:22

## 2023-01-01 RX ADMIN — MORPHINE SULFATE 4 MG: 4 INJECTION, SOLUTION INTRAMUSCULAR; INTRAVENOUS at 00:29

## 2023-01-01 RX ADMIN — DOCUSATE SODIUM 100 MG: 100 CAPSULE, LIQUID FILLED ORAL at 10:30

## 2023-01-01 RX ADMIN — METOPROLOL SUCCINATE 12.5 MG: 25 TABLET, EXTENDED RELEASE ORAL at 17:51

## 2023-01-01 RX ADMIN — GENTAMICIN SULFATE: 1 CREAM TOPICAL at 08:42

## 2023-01-01 RX ADMIN — METHOCARBAMOL 500 MG: 500 TABLET ORAL at 09:49

## 2023-01-01 RX ADMIN — CLONIDINE HYDROCHLORIDE 0.1 MG: 0.1 TABLET ORAL at 09:08

## 2023-01-01 RX ADMIN — HYDROMORPHONE HYDROCHLORIDE 1 MG: 2 TABLET ORAL at 20:46

## 2023-01-01 RX ADMIN — CALCIUM CARBONATE (ANTACID) CHEW TAB 500 MG 1000 MG: 500 CHEW TAB at 09:43

## 2023-01-01 RX ADMIN — SENNOSIDES 8.6 MG: 8.6 TABLET, FILM COATED ORAL at 08:37

## 2023-01-01 RX ADMIN — CLONIDINE HYDROCHLORIDE 0.1 MG: 0.1 TABLET ORAL at 21:32

## 2023-01-01 RX ADMIN — GABAPENTIN 100 MG: 100 CAPSULE ORAL at 08:22

## 2023-01-01 RX ADMIN — METHOCARBAMOL 500 MG: 500 TABLET ORAL at 20:33

## 2023-01-01 RX ADMIN — LIDOCAINE PATCH 4% 2 PATCH: 40 PATCH TOPICAL at 10:11

## 2023-01-01 RX ADMIN — AMOXICILLIN AND CLAVULANATE POTASSIUM 1 TABLET: 500; 125 TABLET, FILM COATED ORAL at 20:15

## 2023-01-01 RX ADMIN — DOCUSATE SODIUM 100 MG: 100 CAPSULE, LIQUID FILLED ORAL at 09:38

## 2023-01-01 RX ADMIN — GABAPENTIN 100 MG: 100 CAPSULE ORAL at 09:15

## 2023-01-01 RX ADMIN — POLYETHYLENE GLYCOL 3350 17 G: 17 POWDER, FOR SOLUTION ORAL at 08:16

## 2023-01-01 RX ADMIN — SENNOSIDES 8.6 MG: 8.6 TABLET, FILM COATED ORAL at 08:57

## 2023-01-01 RX ADMIN — GABAPENTIN 100 MG: 100 CAPSULE ORAL at 20:25

## 2023-01-01 RX ADMIN — METOPROLOL SUCCINATE 12.5 MG: 25 TABLET, EXTENDED RELEASE ORAL at 08:57

## 2023-01-01 RX ADMIN — ACETAMINOPHEN 975 MG: 325 TABLET, FILM COATED ORAL at 22:23

## 2023-01-01 RX ADMIN — CALCIUM CARBONATE (ANTACID) CHEW TAB 500 MG 1000 MG: 500 CHEW TAB at 13:24

## 2023-01-01 RX ADMIN — CLONIDINE HYDROCHLORIDE 0.1 MG: 0.1 TABLET ORAL at 08:48

## 2023-01-01 RX ADMIN — DOCUSATE SODIUM 100 MG: 100 CAPSULE, LIQUID FILLED ORAL at 20:33

## 2023-01-01 RX ADMIN — ALBUTEROL SULFATE 2.5 MG: 2.5 SOLUTION RESPIRATORY (INHALATION) at 13:23

## 2023-01-01 RX ADMIN — PANTOPRAZOLE SODIUM 40 MG: 40 TABLET, DELAYED RELEASE ORAL at 08:47

## 2023-01-01 RX ADMIN — OXYCODONE HYDROCHLORIDE 2.5 MG: 5 TABLET ORAL at 09:10

## 2023-01-01 RX ADMIN — MICONAZOLE NITRATE: 20 POWDER TOPICAL at 06:39

## 2023-01-01 RX ADMIN — GABAPENTIN 100 MG: 100 CAPSULE ORAL at 22:53

## 2023-01-01 RX ADMIN — HEPARIN SODIUM 5000 UNITS: 5000 INJECTION, SOLUTION INTRAVENOUS; SUBCUTANEOUS at 16:38

## 2023-01-01 RX ADMIN — CLONIDINE HYDROCHLORIDE 0.1 MG: 0.1 TABLET ORAL at 19:58

## 2023-01-01 RX ADMIN — ONDANSETRON 4 MG: 2 INJECTION INTRAMUSCULAR; INTRAVENOUS at 09:55

## 2023-01-01 RX ADMIN — PANTOPRAZOLE SODIUM 40 MG: 40 TABLET, DELAYED RELEASE ORAL at 21:07

## 2023-01-01 RX ADMIN — ACETAMINOPHEN 975 MG: 325 TABLET, FILM COATED ORAL at 08:00

## 2023-01-01 RX ADMIN — ISOSORBIDE MONONITRATE 30 MG: 30 TABLET, EXTENDED RELEASE ORAL at 09:08

## 2023-01-01 RX ADMIN — CALCIUM CARBONATE (ANTACID) CHEW TAB 500 MG 1000 MG: 500 CHEW TAB at 12:03

## 2023-01-01 RX ADMIN — METOPROLOL SUCCINATE 12.5 MG: 25 TABLET, EXTENDED RELEASE ORAL at 09:40

## 2023-01-01 RX ADMIN — HEPARIN SODIUM 5000 UNITS: 5000 INJECTION, SOLUTION INTRAVENOUS; SUBCUTANEOUS at 08:47

## 2023-01-01 RX ADMIN — HYDROMORPHONE HYDROCHLORIDE 1 MG: 2 TABLET ORAL at 08:15

## 2023-01-01 RX ADMIN — SENNOSIDES AND DOCUSATE SODIUM 1 TABLET: 50; 8.6 TABLET ORAL at 10:14

## 2023-01-01 RX ADMIN — ACETAMINOPHEN 975 MG: 325 TABLET, FILM COATED ORAL at 16:32

## 2023-01-01 RX ADMIN — SENNOSIDES AND DOCUSATE SODIUM 1 TABLET: 50; 8.6 TABLET ORAL at 08:17

## 2023-01-01 RX ADMIN — DOCUSATE SODIUM 100 MG: 100 CAPSULE, LIQUID FILLED ORAL at 08:38

## 2023-01-01 RX ADMIN — MORPHINE SULFATE 4 MG: 4 INJECTION, SOLUTION INTRAMUSCULAR; INTRAVENOUS at 01:30

## 2023-01-01 RX ADMIN — GABAPENTIN 100 MG: 100 CAPSULE ORAL at 20:02

## 2023-01-01 RX ADMIN — PANTOPRAZOLE SODIUM 40 MG: 40 TABLET, DELAYED RELEASE ORAL at 08:57

## 2023-01-01 RX ADMIN — METOPROLOL SUCCINATE 12.5 MG: 25 TABLET, EXTENDED RELEASE ORAL at 08:40

## 2023-01-01 RX ADMIN — DOCUSATE SODIUM 100 MG: 100 CAPSULE, LIQUID FILLED ORAL at 09:27

## 2023-01-01 RX ADMIN — METOPROLOL SUCCINATE 12.5 MG: 25 TABLET, EXTENDED RELEASE ORAL at 09:08

## 2023-01-01 RX ADMIN — METOPROLOL SUCCINATE 12.5 MG: 25 TABLET, EXTENDED RELEASE ORAL at 08:11

## 2023-01-01 RX ADMIN — HYDROMORPHONE HYDROCHLORIDE 1 MG: 2 TABLET ORAL at 13:31

## 2023-01-01 RX ADMIN — METOPROLOL SUCCINATE 12.5 MG: 25 TABLET, EXTENDED RELEASE ORAL at 09:26

## 2023-01-01 RX ADMIN — ACETAMINOPHEN 975 MG: 325 TABLET, FILM COATED ORAL at 00:39

## 2023-01-01 RX ADMIN — ACETAMINOPHEN 975 MG: 325 TABLET, FILM COATED ORAL at 23:48

## 2023-01-01 RX ADMIN — CLONIDINE HYDROCHLORIDE 0.1 MG: 0.1 TABLET ORAL at 08:21

## 2023-01-01 RX ADMIN — GABAPENTIN 100 MG: 100 CAPSULE ORAL at 08:48

## 2023-01-01 RX ADMIN — METOPROLOL SUCCINATE 12.5 MG: 25 TABLET, EXTENDED RELEASE ORAL at 10:30

## 2023-01-01 RX ADMIN — GABAPENTIN 100 MG: 100 CAPSULE ORAL at 10:14

## 2023-01-01 RX ADMIN — CALCIUM CARBONATE (ANTACID) CHEW TAB 500 MG 1000 MG: 500 CHEW TAB at 08:58

## 2023-01-01 RX ADMIN — ISOSORBIDE MONONITRATE 30 MG: 30 TABLET, EXTENDED RELEASE ORAL at 08:47

## 2023-01-01 RX ADMIN — DOCUSATE SODIUM 100 MG: 100 CAPSULE, LIQUID FILLED ORAL at 09:39

## 2023-01-01 RX ADMIN — METOPROLOL SUCCINATE 12.5 MG: 25 TABLET, EXTENDED RELEASE ORAL at 14:44

## 2023-01-01 RX ADMIN — GABAPENTIN 100 MG: 100 CAPSULE ORAL at 12:45

## 2023-01-01 RX ADMIN — HEPARIN SODIUM 5000 UNITS: 5000 INJECTION, SOLUTION INTRAVENOUS; SUBCUTANEOUS at 08:18

## 2023-01-01 RX ADMIN — CALCIUM CARBONATE (ANTACID) CHEW TAB 500 MG 1000 MG: 500 CHEW TAB at 08:11

## 2023-01-01 RX ADMIN — CLONIDINE HYDROCHLORIDE 0.1 MG: 0.1 TABLET ORAL at 15:41

## 2023-01-01 RX ADMIN — METHOCARBAMOL 500 MG: 500 TABLET ORAL at 08:57

## 2023-01-01 RX ADMIN — CLONIDINE HYDROCHLORIDE 0.1 MG: 0.1 TABLET ORAL at 08:45

## 2023-01-01 RX ADMIN — ALUMINUM HYDROXIDE, MAGNESIUM HYDROXIDE, AND DIMETHICONE 30 ML: 200; 20; 200 SUSPENSION ORAL at 08:58

## 2023-01-01 RX ADMIN — DOCUSATE SODIUM 100 MG: 100 CAPSULE, LIQUID FILLED ORAL at 09:20

## 2023-01-01 RX ADMIN — DOCUSATE SODIUM 100 MG: 100 CAPSULE, LIQUID FILLED ORAL at 09:09

## 2023-01-01 RX ADMIN — GABAPENTIN 100 MG: 100 CAPSULE ORAL at 08:16

## 2023-01-01 RX ADMIN — METHOCARBAMOL 500 MG: 500 TABLET ORAL at 15:55

## 2023-01-01 RX ADMIN — GABAPENTIN 100 MG: 100 CAPSULE ORAL at 21:59

## 2023-01-01 RX ADMIN — HEPARIN SODIUM 5000 UNITS: 5000 INJECTION, SOLUTION INTRAVENOUS; SUBCUTANEOUS at 08:21

## 2023-01-01 RX ADMIN — MICONAZOLE NITRATE: 20 POWDER TOPICAL at 08:34

## 2023-01-01 RX ADMIN — ISOSORBIDE MONONITRATE 30 MG: 30 TABLET, EXTENDED RELEASE ORAL at 08:24

## 2023-01-01 RX ADMIN — CLONIDINE HYDROCHLORIDE 0.1 MG: 0.1 TABLET ORAL at 20:39

## 2023-01-01 RX ADMIN — GABAPENTIN 100 MG: 100 CAPSULE ORAL at 09:13

## 2023-01-01 RX ADMIN — METHOCARBAMOL 500 MG: 500 TABLET ORAL at 17:54

## 2023-01-01 RX ADMIN — CEFAZOLIN SODIUM 2 G: 2 INJECTION, SOLUTION INTRAVENOUS at 11:38

## 2023-01-01 RX ADMIN — CLONIDINE HYDROCHLORIDE 0.1 MG: 0.1 TABLET ORAL at 14:35

## 2023-01-01 RX ADMIN — CALCIUM CARBONATE (ANTACID) CHEW TAB 500 MG 1000 MG: 500 CHEW TAB at 18:59

## 2023-01-01 RX ADMIN — POLYETHYLENE GLYCOL 3350 17 G: 17 POWDER, FOR SOLUTION ORAL at 09:08

## 2023-01-01 RX ADMIN — HYDRALAZINE HYDROCHLORIDE 10 MG: 20 INJECTION INTRAMUSCULAR; INTRAVENOUS at 09:17

## 2023-01-01 RX ADMIN — ISOSORBIDE MONONITRATE 30 MG: 30 TABLET, EXTENDED RELEASE ORAL at 12:45

## 2023-01-01 RX ADMIN — GABAPENTIN 100 MG: 100 CAPSULE ORAL at 19:55

## 2023-01-01 RX ADMIN — CLONIDINE HYDROCHLORIDE 0.1 MG: 0.1 TABLET ORAL at 20:15

## 2023-01-01 RX ADMIN — PANTOPRAZOLE SODIUM 40 MG: 40 TABLET, DELAYED RELEASE ORAL at 09:21

## 2023-01-01 RX ADMIN — GABAPENTIN 100 MG: 100 CAPSULE ORAL at 20:43

## 2023-01-01 RX ADMIN — CALCIUM CARBONATE (ANTACID) CHEW TAB 500 MG 1000 MG: 500 CHEW TAB at 20:10

## 2023-01-01 RX ADMIN — CARVEDILOL 6.25 MG: 6.25 TABLET, FILM COATED ORAL at 20:44

## 2023-01-01 RX ADMIN — AMOXICILLIN AND CLAVULANATE POTASSIUM 1 TABLET: 500; 125 TABLET, FILM COATED ORAL at 20:01

## 2023-01-01 RX ADMIN — CARVEDILOL 6.25 MG: 6.25 TABLET, FILM COATED ORAL at 09:15

## 2023-01-01 RX ADMIN — ACETAMINOPHEN 975 MG: 325 TABLET, FILM COATED ORAL at 22:57

## 2023-01-01 RX ADMIN — METOPROLOL SUCCINATE 12.5 MG: 25 TABLET, EXTENDED RELEASE ORAL at 09:27

## 2023-01-01 RX ADMIN — METHOCARBAMOL 500 MG: 500 TABLET ORAL at 12:07

## 2023-01-01 RX ADMIN — ISOSORBIDE MONONITRATE 30 MG: 30 TABLET, EXTENDED RELEASE ORAL at 15:02

## 2023-01-01 RX ADMIN — CALCIUM CARBONATE (ANTACID) CHEW TAB 500 MG 1000 MG: 500 CHEW TAB at 18:06

## 2023-01-01 RX ADMIN — CEPHALEXIN 500 MG: 500 CAPSULE ORAL at 09:14

## 2023-01-01 RX ADMIN — ISOSORBIDE MONONITRATE 15 MG: 30 TABLET, EXTENDED RELEASE ORAL at 08:48

## 2023-01-01 RX ADMIN — ONDANSETRON 4 MG: 2 INJECTION INTRAMUSCULAR; INTRAVENOUS at 17:33

## 2023-01-01 RX ADMIN — ALBUTEROL SULFATE 2.5 MG: 2.5 SOLUTION RESPIRATORY (INHALATION) at 15:56

## 2023-01-01 RX ADMIN — PANTOPRAZOLE SODIUM 40 MG: 40 TABLET, DELAYED RELEASE ORAL at 20:27

## 2023-01-01 RX ADMIN — DOCUSATE SODIUM 100 MG: 100 CAPSULE, LIQUID FILLED ORAL at 08:47

## 2023-01-01 RX ADMIN — LIDOCAINE PATCH 4% 2 PATCH: 40 PATCH TOPICAL at 09:38

## 2023-01-01 RX ADMIN — METHOCARBAMOL 500 MG: 500 TABLET ORAL at 10:21

## 2023-01-01 RX ADMIN — METHOCARBAMOL 500 MG: 500 TABLET ORAL at 20:49

## 2023-01-01 RX ADMIN — DOCUSATE SODIUM 100 MG: 100 CAPSULE, LIQUID FILLED ORAL at 20:38

## 2023-01-01 RX ADMIN — POLYETHYLENE GLYCOL 3350 17 G: 17 POWDER, FOR SOLUTION ORAL at 08:38

## 2023-01-01 RX ADMIN — DOCUSATE SODIUM 100 MG: 100 CAPSULE, LIQUID FILLED ORAL at 21:29

## 2023-01-01 RX ADMIN — GABAPENTIN 100 MG: 100 CAPSULE ORAL at 20:40

## 2023-01-01 RX ADMIN — CARVEDILOL 6.25 MG: 6.25 TABLET, FILM COATED ORAL at 18:59

## 2023-01-01 RX ADMIN — METHOCARBAMOL 500 MG: 500 TABLET ORAL at 02:21

## 2023-01-01 RX ADMIN — PROCHLORPERAZINE EDISYLATE 10 MG: 5 INJECTION INTRAMUSCULAR; INTRAVENOUS at 17:37

## 2023-01-01 RX ADMIN — CLONIDINE HYDROCHLORIDE 0.1 MG: 0.1 TABLET ORAL at 20:11

## 2023-01-01 RX ADMIN — HYDROXYZINE HYDROCHLORIDE 25 MG: 25 TABLET ORAL at 21:29

## 2023-01-01 RX ADMIN — CALCIUM CARBONATE (ANTACID) CHEW TAB 500 MG 1000 MG: 500 CHEW TAB at 17:51

## 2023-01-01 RX ADMIN — NITROGLYCERIN 0.4 MG: 0.4 TABLET SUBLINGUAL at 05:58

## 2023-01-01 RX ADMIN — METHOCARBAMOL 500 MG: 500 TABLET ORAL at 14:20

## 2023-01-01 RX ADMIN — GENTAMICIN SULFATE: 1 CREAM TOPICAL at 20:01

## 2023-01-01 RX ADMIN — MICONAZOLE NITRATE: 20 POWDER TOPICAL at 09:12

## 2023-01-01 RX ADMIN — AMOXICILLIN AND CLAVULANATE POTASSIUM 1 TABLET: 500; 125 TABLET, FILM COATED ORAL at 10:23

## 2023-01-01 RX ADMIN — HEPARIN SODIUM 5000 UNITS: 5000 INJECTION, SOLUTION INTRAVENOUS; SUBCUTANEOUS at 15:41

## 2023-01-01 RX ADMIN — CALCIUM CARBONATE (ANTACID) CHEW TAB 500 MG 1000 MG: 500 CHEW TAB at 08:00

## 2023-01-01 RX ADMIN — GENTAMICIN SULFATE: 1 CREAM TOPICAL at 09:53

## 2023-01-01 RX ADMIN — DOCUSATE SODIUM 100 MG: 100 CAPSULE, LIQUID FILLED ORAL at 10:11

## 2023-01-01 RX ADMIN — METHOCARBAMOL 500 MG: 500 TABLET ORAL at 19:51

## 2023-01-01 RX ADMIN — METHOCARBAMOL 500 MG: 500 TABLET ORAL at 09:39

## 2023-01-01 RX ADMIN — ACETAMINOPHEN 975 MG: 325 TABLET, FILM COATED ORAL at 18:43

## 2023-01-01 RX ADMIN — PANTOPRAZOLE SODIUM 40 MG: 40 TABLET, DELAYED RELEASE ORAL at 08:59

## 2023-01-01 RX ADMIN — CLONIDINE HYDROCHLORIDE 0.1 MG: 0.1 TABLET ORAL at 20:00

## 2023-01-01 RX ADMIN — CLONIDINE HYDROCHLORIDE 0.1 MG: 0.1 TABLET ORAL at 15:26

## 2023-01-01 RX ADMIN — CLONIDINE HYDROCHLORIDE 0.1 MG: 0.1 TABLET ORAL at 16:01

## 2023-01-01 RX ADMIN — DOCUSATE SODIUM 100 MG: 100 CAPSULE, LIQUID FILLED ORAL at 10:25

## 2023-01-01 RX ADMIN — METHOCARBAMOL 500 MG: 500 TABLET ORAL at 21:48

## 2023-01-01 RX ADMIN — DOCUSATE SODIUM 100 MG: 100 CAPSULE, LIQUID FILLED ORAL at 20:18

## 2023-01-01 RX ADMIN — PANTOPRAZOLE SODIUM 40 MG: 40 TABLET, DELAYED RELEASE ORAL at 09:14

## 2023-01-01 RX ADMIN — HYDROXYZINE HYDROCHLORIDE 25 MG: 25 TABLET, FILM COATED ORAL at 21:48

## 2023-01-01 RX ADMIN — DOCUSATE SODIUM 100 MG: 100 CAPSULE, LIQUID FILLED ORAL at 08:39

## 2023-01-01 RX ADMIN — CLONIDINE HYDROCHLORIDE 0.1 MG: 0.1 TABLET ORAL at 09:13

## 2023-01-01 RX ADMIN — CLONIDINE HYDROCHLORIDE 0.1 MG: 0.1 TABLET ORAL at 13:29

## 2023-01-01 RX ADMIN — GABAPENTIN 100 MG: 100 CAPSULE ORAL at 19:42

## 2023-01-01 RX ADMIN — CYCLOBENZAPRINE HYDROCHLORIDE 5 MG: 5 TABLET, FILM COATED ORAL at 11:48

## 2023-01-01 RX ADMIN — ACETAMINOPHEN 975 MG: 325 TABLET, FILM COATED ORAL at 13:03

## 2023-01-01 RX ADMIN — CLONIDINE HYDROCHLORIDE 0.1 MG: 0.1 TABLET ORAL at 15:33

## 2023-01-01 RX ADMIN — DOCUSATE SODIUM 100 MG: 100 CAPSULE, LIQUID FILLED ORAL at 21:32

## 2023-01-01 RX ADMIN — PANTOPRAZOLE SODIUM 40 MG: 40 TABLET, DELAYED RELEASE ORAL at 12:45

## 2023-01-01 RX ADMIN — GENTAMICIN SULFATE: 1 CREAM TOPICAL at 06:01

## 2023-01-01 RX ADMIN — OXYCODONE HYDROCHLORIDE 2.5 MG: 5 TABLET ORAL at 22:22

## 2023-01-01 RX ADMIN — METHOCARBAMOL 500 MG: 500 TABLET ORAL at 00:11

## 2023-01-01 RX ADMIN — HEPARIN SODIUM 5000 UNITS: 5000 INJECTION, SOLUTION INTRAVENOUS; SUBCUTANEOUS at 20:45

## 2023-01-01 RX ADMIN — METHOCARBAMOL 500 MG: 500 TABLET ORAL at 22:03

## 2023-01-01 RX ADMIN — HYDRALAZINE HYDROCHLORIDE 5 MG: 20 INJECTION, SOLUTION INTRAMUSCULAR; INTRAVENOUS at 08:37

## 2023-01-01 RX ADMIN — GENTAMICIN SULFATE: 1 CREAM TOPICAL at 09:09

## 2023-01-01 RX ADMIN — PANTOPRAZOLE SODIUM 40 MG: 40 TABLET, DELAYED RELEASE ORAL at 10:13

## 2023-01-01 RX ADMIN — ACETAMINOPHEN 650 MG: 325 TABLET, FILM COATED ORAL at 16:43

## 2023-01-01 RX ADMIN — CLONIDINE HYDROCHLORIDE 0.1 MG: 0.1 TABLET ORAL at 14:20

## 2023-01-01 RX ADMIN — METHOCARBAMOL 500 MG: 500 TABLET ORAL at 08:08

## 2023-01-01 RX ADMIN — PANTOPRAZOLE SODIUM 40 MG: 40 TABLET, DELAYED RELEASE ORAL at 21:59

## 2023-01-01 RX ADMIN — POLYETHYLENE GLYCOL 3350 17 G: 17 POWDER, FOR SOLUTION ORAL at 09:15

## 2023-01-01 RX ADMIN — NITROGLYCERIN 0.4 MG: 0.4 TABLET SUBLINGUAL at 05:41

## 2023-01-01 RX ADMIN — POTASSIUM CHLORIDE 40 MEQ: 1500 TABLET, EXTENDED RELEASE ORAL at 11:51

## 2023-01-01 RX ADMIN — ISOSORBIDE MONONITRATE 30 MG: 30 TABLET, EXTENDED RELEASE ORAL at 08:57

## 2023-01-01 RX ADMIN — HYDROXYZINE HYDROCHLORIDE 25 MG: 25 TABLET ORAL at 09:32

## 2023-01-01 RX ADMIN — HYDROMORPHONE HYDROCHLORIDE 1 MG: 2 TABLET ORAL at 11:25

## 2023-01-01 RX ADMIN — CLONIDINE HYDROCHLORIDE 0.1 MG: 0.1 TABLET ORAL at 20:28

## 2023-01-01 RX ADMIN — CALCIUM CARBONATE (ANTACID) CHEW TAB 500 MG 1000 MG: 500 CHEW TAB at 13:45

## 2023-01-01 RX ADMIN — HYDROXYZINE HYDROCHLORIDE 25 MG: 25 TABLET, FILM COATED ORAL at 16:51

## 2023-01-01 RX ADMIN — CARVEDILOL 6.25 MG: 6.25 TABLET, FILM COATED ORAL at 09:09

## 2023-01-01 RX ADMIN — BUMETANIDE 2 MG: 0.25 INJECTION INTRAMUSCULAR; INTRAVENOUS at 14:17

## 2023-01-01 RX ADMIN — GABAPENTIN 100 MG: 100 CAPSULE ORAL at 20:41

## 2023-01-01 RX ADMIN — HEPARIN SODIUM 5000 UNITS: 5000 INJECTION, SOLUTION INTRAVENOUS; SUBCUTANEOUS at 03:26

## 2023-01-01 RX ADMIN — AMOXICILLIN AND CLAVULANATE POTASSIUM 1 TABLET: 500; 125 TABLET, FILM COATED ORAL at 08:08

## 2023-01-01 RX ADMIN — METHOCARBAMOL 500 MG: 500 TABLET ORAL at 20:44

## 2023-01-01 RX ADMIN — ISOSORBIDE MONONITRATE 30 MG: 30 TABLET, EXTENDED RELEASE ORAL at 09:38

## 2023-01-01 RX ADMIN — CLONIDINE HYDROCHLORIDE 0.1 MG: 0.1 TABLET ORAL at 20:44

## 2023-01-01 RX ADMIN — HYDROXYZINE HYDROCHLORIDE 25 MG: 25 TABLET, FILM COATED ORAL at 02:01

## 2023-01-01 RX ADMIN — ISOSORBIDE MONONITRATE 30 MG: 30 TABLET, EXTENDED RELEASE ORAL at 10:11

## 2023-01-01 RX ADMIN — METOPROLOL SUCCINATE 12.5 MG: 25 TABLET, EXTENDED RELEASE ORAL at 08:47

## 2023-01-01 RX ADMIN — CALCIUM CARBONATE (ANTACID) CHEW TAB 500 MG 1000 MG: 500 CHEW TAB at 09:20

## 2023-01-01 RX ADMIN — CLONIDINE HYDROCHLORIDE 0.1 MG: 0.1 TABLET ORAL at 08:38

## 2023-01-01 RX ADMIN — OXYCODONE HYDROCHLORIDE 2.5 MG: 5 TABLET ORAL at 18:06

## 2023-01-01 RX ADMIN — CLONIDINE HYDROCHLORIDE 0.1 MG: 0.1 TABLET ORAL at 20:27

## 2023-01-01 RX ADMIN — LIDOCAINE PATCH 4% 2 PATCH: 40 PATCH TOPICAL at 08:19

## 2023-01-01 RX ADMIN — HYDROMORPHONE HYDROCHLORIDE 1 MG: 2 TABLET ORAL at 15:48

## 2023-01-01 RX ADMIN — CYCLOBENZAPRINE HYDROCHLORIDE 5 MG: 5 TABLET, FILM COATED ORAL at 13:49

## 2023-01-01 RX ADMIN — CEPHALEXIN 500 MG: 500 CAPSULE ORAL at 09:13

## 2023-01-01 RX ADMIN — METHOCARBAMOL 500 MG: 500 TABLET ORAL at 21:00

## 2023-01-01 RX ADMIN — CARVEDILOL 6.25 MG: 6.25 TABLET, FILM COATED ORAL at 19:01

## 2023-01-01 RX ADMIN — SENNOSIDES AND DOCUSATE SODIUM 1 TABLET: 50; 8.6 TABLET ORAL at 20:59

## 2023-01-01 RX ADMIN — AMOXICILLIN AND CLAVULANATE POTASSIUM 1 TABLET: 500; 125 TABLET, FILM COATED ORAL at 20:39

## 2023-01-01 RX ADMIN — MICONAZOLE NITRATE: 20 POWDER TOPICAL at 20:12

## 2023-01-01 RX ADMIN — METHOCARBAMOL 500 MG: 500 TABLET ORAL at 17:40

## 2023-01-01 RX ADMIN — SODIUM CHLORIDE, SODIUM LACTATE, CALCIUM CHLORIDE, MAGNESIUM CHLORIDE AND DEXTROSE: 4.25; 538; 448; 18.3; 5.08 INJECTION, SOLUTION INTRAPERITONEAL at 20:19

## 2023-01-01 RX ADMIN — CALCIUM CARBONATE (ANTACID) CHEW TAB 500 MG 1000 MG: 500 CHEW TAB at 11:51

## 2023-01-01 RX ADMIN — HYDROMORPHONE HYDROCHLORIDE 1 MG: 2 TABLET ORAL at 20:59

## 2023-01-01 RX ADMIN — HYDROMORPHONE HYDROCHLORIDE 4 MG: 2 TABLET ORAL at 17:00

## 2023-01-01 RX ADMIN — CALCIUM CARBONATE (ANTACID) CHEW TAB 500 MG 1000 MG: 500 CHEW TAB at 08:46

## 2023-01-01 RX ADMIN — DOCUSATE SODIUM 100 MG: 100 CAPSULE, LIQUID FILLED ORAL at 20:15

## 2023-01-01 RX ADMIN — ACETAMINOPHEN 650 MG: 325 TABLET, FILM COATED ORAL at 21:39

## 2023-01-01 RX ADMIN — CALCIUM CARBONATE (ANTACID) CHEW TAB 500 MG 1000 MG: 500 CHEW TAB at 09:26

## 2023-01-01 RX ADMIN — CLONIDINE HYDROCHLORIDE 0.1 MG: 0.1 TABLET ORAL at 13:08

## 2023-01-01 RX ADMIN — METHOCARBAMOL 500 MG: 500 TABLET ORAL at 16:34

## 2023-01-01 RX ADMIN — ACETAMINOPHEN 650 MG: 325 TABLET, FILM COATED ORAL at 09:32

## 2023-01-01 RX ADMIN — DOCUSATE SODIUM 100 MG: 100 CAPSULE, LIQUID FILLED ORAL at 08:49

## 2023-01-01 RX ADMIN — HEPARIN SODIUM 5000 UNITS: 5000 INJECTION, SOLUTION INTRAVENOUS; SUBCUTANEOUS at 03:35

## 2023-01-01 RX ADMIN — METOPROLOL SUCCINATE 12.5 MG: 25 TABLET, EXTENDED RELEASE ORAL at 09:04

## 2023-01-01 RX ADMIN — CALCIUM CARBONATE (ANTACID) CHEW TAB 500 MG 1000 MG: 500 CHEW TAB at 17:52

## 2023-01-01 RX ADMIN — METHOCARBAMOL 500 MG: 500 TABLET ORAL at 08:00

## 2023-01-01 RX ADMIN — GABAPENTIN 100 MG: 100 CAPSULE ORAL at 20:15

## 2023-01-01 RX ADMIN — AMOXICILLIN AND CLAVULANATE POTASSIUM 1 TABLET: 500; 125 TABLET, FILM COATED ORAL at 20:33

## 2023-01-01 RX ADMIN — LIDOCAINE PATCH 4% 2 PATCH: 40 PATCH TOPICAL at 08:20

## 2023-01-01 RX ADMIN — HYDROMORPHONE HYDROCHLORIDE 4 MG: 2 TABLET ORAL at 12:03

## 2023-01-01 RX ADMIN — DOCUSATE SODIUM 100 MG: 100 CAPSULE, LIQUID FILLED ORAL at 08:16

## 2023-01-01 RX ADMIN — CALCIUM CARBONATE (ANTACID) CHEW TAB 500 MG 1000 MG: 500 CHEW TAB at 13:50

## 2023-01-01 RX ADMIN — CALCIUM CARBONATE (ANTACID) CHEW TAB 500 MG 1000 MG: 500 CHEW TAB at 12:46

## 2023-01-01 RX ADMIN — DOCUSATE SODIUM 100 MG: 100 CAPSULE, LIQUID FILLED ORAL at 20:40

## 2023-01-01 RX ADMIN — CARVEDILOL 6.25 MG: 6.25 TABLET, FILM COATED ORAL at 09:14

## 2023-01-01 RX ADMIN — METOPROLOL SUCCINATE 12.5 MG: 25 TABLET, EXTENDED RELEASE ORAL at 08:22

## 2023-01-01 RX ADMIN — ACETAMINOPHEN 975 MG: 325 TABLET, FILM COATED ORAL at 08:38

## 2023-01-01 RX ADMIN — ISOSORBIDE MONONITRATE 30 MG: 30 TABLET, EXTENDED RELEASE ORAL at 09:40

## 2023-01-01 RX ADMIN — DOCUSATE SODIUM 100 MG: 100 CAPSULE, LIQUID FILLED ORAL at 21:07

## 2023-01-01 RX ADMIN — DOCUSATE SODIUM 100 MG: 100 CAPSULE, LIQUID FILLED ORAL at 09:00

## 2023-01-01 RX ADMIN — ACETAMINOPHEN 975 MG: 325 TABLET, FILM COATED ORAL at 08:57

## 2023-01-01 RX ADMIN — DOCUSATE SODIUM 100 MG: 100 CAPSULE, LIQUID FILLED ORAL at 09:43

## 2023-01-01 RX ADMIN — HYDROMORPHONE HYDROCHLORIDE 1 MG: 2 TABLET ORAL at 13:23

## 2023-01-01 RX ADMIN — ACETAMINOPHEN 975 MG: 325 TABLET, FILM COATED ORAL at 09:14

## 2023-01-01 RX ADMIN — DOCUSATE SODIUM 100 MG: 100 CAPSULE, LIQUID FILLED ORAL at 21:59

## 2023-01-01 RX ADMIN — PANTOPRAZOLE SODIUM 40 MG: 40 TABLET, DELAYED RELEASE ORAL at 08:00

## 2023-01-01 RX ADMIN — NITROGLYCERIN 0.4 MG: 0.4 TABLET SUBLINGUAL at 05:50

## 2023-01-01 RX ADMIN — DOCUSATE SODIUM 100 MG: 100 CAPSULE, LIQUID FILLED ORAL at 08:52

## 2023-01-01 RX ADMIN — HYDROXYZINE HYDROCHLORIDE 25 MG: 25 TABLET ORAL at 08:19

## 2023-01-01 RX ADMIN — ACETAMINOPHEN 975 MG: 325 TABLET, FILM COATED ORAL at 03:36

## 2023-01-01 RX ADMIN — CLONIDINE HYDROCHLORIDE 0.1 MG: 0.1 TABLET ORAL at 08:09

## 2023-01-01 RX ADMIN — GABAPENTIN 50 MG: 250 SUSPENSION ORAL at 11:00

## 2023-01-01 RX ADMIN — ACETAMINOPHEN 650 MG: 325 TABLET, FILM COATED ORAL at 19:29

## 2023-01-01 RX ADMIN — METHOCARBAMOL 500 MG: 500 TABLET ORAL at 12:54

## 2023-01-01 RX ADMIN — GABAPENTIN 100 MG: 100 CAPSULE ORAL at 10:23

## 2023-01-01 RX ADMIN — DOCUSATE SODIUM 100 MG: 100 CAPSULE, LIQUID FILLED ORAL at 19:51

## 2023-01-01 RX ADMIN — ONDANSETRON 4 MG: 2 INJECTION INTRAMUSCULAR; INTRAVENOUS at 20:29

## 2023-01-01 RX ADMIN — HYDROMORPHONE HYDROCHLORIDE 0.2 MG: 0.2 INJECTION, SOLUTION INTRAMUSCULAR; INTRAVENOUS; SUBCUTANEOUS at 05:54

## 2023-01-01 RX ADMIN — FENTANYL CITRATE 50 MCG: 50 INJECTION INTRAMUSCULAR; INTRAVENOUS at 07:33

## 2023-01-01 RX ADMIN — GENTAMICIN SULFATE: 1 CREAM TOPICAL at 09:14

## 2023-01-01 RX ADMIN — GENTAMICIN SULFATE: 1 CREAM TOPICAL at 20:09

## 2023-01-01 RX ADMIN — CALCIUM CARBONATE (ANTACID) CHEW TAB 500 MG 1000 MG: 500 CHEW TAB at 12:41

## 2023-01-01 RX ADMIN — METHOCARBAMOL 500 MG: 500 TABLET ORAL at 16:33

## 2023-01-01 RX ADMIN — SENNOSIDES AND DOCUSATE SODIUM 1 TABLET: 50; 8.6 TABLET ORAL at 20:44

## 2023-01-01 RX ADMIN — HYDROMORPHONE HYDROCHLORIDE 1 MG: 2 TABLET ORAL at 20:19

## 2023-01-01 RX ADMIN — CLONIDINE HYDROCHLORIDE 0.1 MG: 0.1 TABLET ORAL at 20:02

## 2023-01-01 RX ADMIN — GABAPENTIN 50 MG: 250 SUSPENSION ORAL at 08:42

## 2023-01-01 RX ADMIN — CALCIUM CARBONATE (ANTACID) CHEW TAB 500 MG 1000 MG: 500 CHEW TAB at 08:56

## 2023-01-01 RX ADMIN — DOCUSATE SODIUM 100 MG: 100 CAPSULE, LIQUID FILLED ORAL at 08:21

## 2023-01-01 RX ADMIN — METHOCARBAMOL 500 MG: 500 TABLET ORAL at 20:21

## 2023-01-01 RX ADMIN — PANTOPRAZOLE SODIUM 40 MG: 40 TABLET, DELAYED RELEASE ORAL at 09:43

## 2023-01-01 RX ADMIN — ACETAMINOPHEN 975 MG: 325 TABLET, FILM COATED ORAL at 16:34

## 2023-01-01 RX ADMIN — METHOCARBAMOL 500 MG: 500 TABLET ORAL at 11:10

## 2023-01-01 RX ADMIN — METOPROLOL SUCCINATE 12.5 MG: 25 TABLET, EXTENDED RELEASE ORAL at 09:21

## 2023-01-01 RX ADMIN — CLONIDINE HYDROCHLORIDE 0.1 MG: 0.1 TABLET ORAL at 08:47

## 2023-01-01 RX ADMIN — DOCUSATE SODIUM 100 MG: 100 CAPSULE, LIQUID FILLED ORAL at 08:09

## 2023-01-01 RX ADMIN — PANTOPRAZOLE SODIUM 40 MG: 40 TABLET, DELAYED RELEASE ORAL at 09:09

## 2023-01-01 RX ADMIN — PANTOPRAZOLE SODIUM 40 MG: 40 TABLET, DELAYED RELEASE ORAL at 20:33

## 2023-01-01 RX ADMIN — CALCIUM CARBONATE (ANTACID) CHEW TAB 500 MG 1000 MG: 500 CHEW TAB at 12:51

## 2023-01-01 RX ADMIN — PANTOPRAZOLE SODIUM 40 MG: 40 TABLET, DELAYED RELEASE ORAL at 09:04

## 2023-01-01 RX ADMIN — MORPHINE SULFATE 2 MG: 2 INJECTION, SOLUTION INTRAMUSCULAR; INTRAVENOUS at 14:13

## 2023-01-01 RX ADMIN — HYDROXYZINE HYDROCHLORIDE 25 MG: 25 TABLET, FILM COATED ORAL at 00:10

## 2023-01-01 RX ADMIN — HYDROXYZINE HYDROCHLORIDE 25 MG: 25 TABLET ORAL at 22:53

## 2023-01-01 RX ADMIN — CLONIDINE HYDROCHLORIDE 0.1 MG: 0.1 TABLET ORAL at 10:11

## 2023-01-01 RX ADMIN — DOCUSATE SODIUM 100 MG: 100 CAPSULE, LIQUID FILLED ORAL at 19:42

## 2023-01-01 RX ADMIN — AMOXICILLIN AND CLAVULANATE POTASSIUM 1 TABLET: 500; 125 TABLET, FILM COATED ORAL at 20:48

## 2023-01-01 RX ADMIN — Medication 1 MG: at 21:22

## 2023-01-01 RX ADMIN — POLYETHYLENE GLYCOL 3350 17 G: 17 POWDER, FOR SOLUTION ORAL at 09:39

## 2023-01-01 RX ADMIN — METOPROLOL SUCCINATE 12.5 MG: 25 TABLET, EXTENDED RELEASE ORAL at 09:43

## 2023-01-01 RX ADMIN — ACETAMINOPHEN 975 MG: 325 TABLET, FILM COATED ORAL at 23:00

## 2023-01-01 RX ADMIN — GABAPENTIN 100 MG: 100 CAPSULE ORAL at 21:32

## 2023-01-01 RX ADMIN — METHOCARBAMOL 500 MG: 500 TABLET ORAL at 18:22

## 2023-01-01 RX ADMIN — DOCUSATE SODIUM 100 MG: 100 CAPSULE, LIQUID FILLED ORAL at 08:11

## 2023-01-01 RX ADMIN — CLONIDINE HYDROCHLORIDE 0.1 MG: 0.1 TABLET ORAL at 20:10

## 2023-01-01 RX ADMIN — HYDROMORPHONE HYDROCHLORIDE 1 MG: 2 TABLET ORAL at 05:23

## 2023-01-01 RX ADMIN — PANTOPRAZOLE SODIUM 40 MG: 40 TABLET, DELAYED RELEASE ORAL at 09:27

## 2023-01-01 RX ADMIN — PANTOPRAZOLE SODIUM 40 MG: 40 TABLET, DELAYED RELEASE ORAL at 10:27

## 2023-01-01 RX ADMIN — AMOXICILLIN AND CLAVULANATE POTASSIUM 1 TABLET: 500; 125 TABLET, FILM COATED ORAL at 08:36

## 2023-01-01 RX ADMIN — DOCUSATE SODIUM 100 MG: 100 CAPSULE, LIQUID FILLED ORAL at 09:14

## 2023-01-01 RX ADMIN — GABAPENTIN 100 MG: 100 CAPSULE ORAL at 20:33

## 2023-01-01 RX ADMIN — CLONIDINE HYDROCHLORIDE 0.1 MG: 0.1 TABLET ORAL at 20:33

## 2023-01-01 RX ADMIN — ACETAMINOPHEN 650 MG: 325 TABLET, FILM COATED ORAL at 15:33

## 2023-01-01 RX ADMIN — GABAPENTIN 100 MG: 100 CAPSULE ORAL at 21:52

## 2023-01-01 RX ADMIN — GABAPENTIN 100 MG: 100 CAPSULE ORAL at 21:29

## 2023-01-01 RX ADMIN — PANTOPRAZOLE SODIUM 40 MG: 40 TABLET, DELAYED RELEASE ORAL at 09:38

## 2023-01-01 RX ADMIN — PANTOPRAZOLE SODIUM 40 MG: 40 TABLET, DELAYED RELEASE ORAL at 09:22

## 2023-01-01 RX ADMIN — HEPARIN SODIUM 5000 UNITS: 5000 INJECTION, SOLUTION INTRAVENOUS; SUBCUTANEOUS at 15:48

## 2023-01-01 RX ADMIN — HYDROMORPHONE HYDROCHLORIDE 1 MG: 2 TABLET ORAL at 20:10

## 2023-01-01 RX ADMIN — Medication 30.1 MILLICURIE: at 11:30

## 2023-01-01 RX ADMIN — DOCUSATE SODIUM 100 MG: 100 CAPSULE, LIQUID FILLED ORAL at 10:13

## 2023-01-01 RX ADMIN — Medication 8.55 MILLICURIE: at 10:20

## 2023-01-01 RX ADMIN — MICONAZOLE NITRATE: 20 POWDER TOPICAL at 12:34

## 2023-01-01 RX ADMIN — CALCIUM CARBONATE (ANTACID) CHEW TAB 500 MG 1000 MG: 500 CHEW TAB at 10:23

## 2023-01-01 RX ADMIN — GENTAMICIN SULFATE: 1 CREAM TOPICAL at 09:54

## 2023-01-01 RX ADMIN — ISOSORBIDE MONONITRATE 30 MG: 30 TABLET, EXTENDED RELEASE ORAL at 08:18

## 2023-01-01 RX ADMIN — ACETAMINOPHEN 975 MG: 325 TABLET, FILM COATED ORAL at 10:14

## 2023-01-01 RX ADMIN — DOCUSATE SODIUM 100 MG: 100 CAPSULE, LIQUID FILLED ORAL at 08:22

## 2023-01-01 RX ADMIN — METHOCARBAMOL 500 MG: 500 TABLET ORAL at 08:34

## 2023-01-01 RX ADMIN — GABAPENTIN 100 MG: 100 CAPSULE ORAL at 09:38

## 2023-01-01 RX ADMIN — LIDOCAINE PATCH 4% 2 PATCH: 40 PATCH TOPICAL at 08:01

## 2023-01-01 RX ADMIN — DICLOFENAC SODIUM 2 G: 10 GEL TOPICAL at 02:02

## 2023-01-01 RX ADMIN — ACETAMINOPHEN 975 MG: 325 TABLET, FILM COATED ORAL at 15:41

## 2023-01-01 RX ADMIN — GABAPENTIN 100 MG: 100 CAPSULE ORAL at 08:10

## 2023-01-01 RX ADMIN — PANTOPRAZOLE SODIUM 40 MG: 40 TABLET, DELAYED RELEASE ORAL at 10:11

## 2023-01-01 RX ADMIN — CLONIDINE HYDROCHLORIDE 0.1 MG: 0.1 TABLET ORAL at 14:21

## 2023-01-01 RX ADMIN — POLYETHYLENE GLYCOL 3350 17 G: 17 POWDER, FOR SOLUTION ORAL at 08:00

## 2023-01-01 RX ADMIN — GABAPENTIN 100 MG: 100 CAPSULE ORAL at 20:38

## 2023-01-01 RX ADMIN — PANTOPRAZOLE SODIUM 40 MG: 40 TABLET, DELAYED RELEASE ORAL at 08:22

## 2023-01-01 RX ADMIN — GABAPENTIN 100 MG: 100 CAPSULE ORAL at 20:09

## 2023-01-01 RX ADMIN — PROCHLORPERAZINE EDISYLATE 10 MG: 5 INJECTION INTRAMUSCULAR; INTRAVENOUS at 04:54

## 2023-01-01 RX ADMIN — CLONIDINE HYDROCHLORIDE 0.1 MG: 0.1 TABLET ORAL at 09:01

## 2023-01-01 RX ADMIN — CARVEDILOL 6.25 MG: 6.25 TABLET, FILM COATED ORAL at 08:38

## 2023-01-01 RX ADMIN — METOPROLOL SUCCINATE 12.5 MG: 25 TABLET, EXTENDED RELEASE ORAL at 09:38

## 2023-01-01 RX ADMIN — ISOSORBIDE MONONITRATE 30 MG: 30 TABLET, EXTENDED RELEASE ORAL at 08:38

## 2023-01-01 RX ADMIN — ISOSORBIDE MONONITRATE 30 MG: 30 TABLET, EXTENDED RELEASE ORAL at 08:10

## 2023-01-01 RX ADMIN — CLONIDINE HYDROCHLORIDE 0.1 MG: 0.1 TABLET ORAL at 09:38

## 2023-01-01 RX ADMIN — CALCIUM CARBONATE (ANTACID) CHEW TAB 500 MG 1000 MG: 500 CHEW TAB at 09:14

## 2023-01-01 RX ADMIN — PANTOPRAZOLE SODIUM 40 MG: 40 TABLET, DELAYED RELEASE ORAL at 09:26

## 2023-01-01 RX ADMIN — CALCIUM CARBONATE (ANTACID) CHEW TAB 500 MG 1000 MG: 500 CHEW TAB at 18:40

## 2023-01-01 RX ADMIN — POTASSIUM CHLORIDE 40 MEQ: 20 SOLUTION ORAL at 09:05

## 2023-01-01 RX ADMIN — CLONIDINE HYDROCHLORIDE 0.1 MG: 0.1 TABLET ORAL at 09:15

## 2023-01-01 RX ADMIN — METHOCARBAMOL 500 MG: 500 TABLET ORAL at 10:17

## 2023-01-01 RX ADMIN — CLONIDINE HYDROCHLORIDE 0.1 MG: 0.1 TABLET ORAL at 13:23

## 2023-01-01 RX ADMIN — PANTOPRAZOLE SODIUM 40 MG: 40 TABLET, DELAYED RELEASE ORAL at 20:54

## 2023-01-01 RX ADMIN — HYDROXYZINE HYDROCHLORIDE 25 MG: 25 TABLET, FILM COATED ORAL at 08:48

## 2023-01-01 RX ADMIN — AMOXICILLIN AND CLAVULANATE POTASSIUM 1 TABLET: 500; 125 TABLET, FILM COATED ORAL at 09:08

## 2023-01-01 RX ADMIN — DOCUSATE SODIUM 100 MG: 100 CAPSULE, LIQUID FILLED ORAL at 09:26

## 2023-01-01 RX ADMIN — GABAPENTIN 100 MG: 100 CAPSULE ORAL at 08:57

## 2023-01-01 RX ADMIN — DOCUSATE SODIUM 100 MG: 100 CAPSULE, LIQUID FILLED ORAL at 20:59

## 2023-01-01 RX ADMIN — HYDROMORPHONE HYDROCHLORIDE 1 MG: 2 TABLET ORAL at 09:19

## 2023-01-01 RX ADMIN — HYDROMORPHONE HYDROCHLORIDE 4 MG: 2 TABLET ORAL at 08:19

## 2023-01-01 RX ADMIN — DOCUSATE SODIUM 100 MG: 100 CAPSULE, LIQUID FILLED ORAL at 20:54

## 2023-01-01 RX ADMIN — ISOSORBIDE MONONITRATE 30 MG: 30 TABLET, EXTENDED RELEASE ORAL at 08:45

## 2023-01-01 RX ADMIN — HYDROMORPHONE HYDROCHLORIDE 4 MG: 2 TABLET ORAL at 20:01

## 2023-01-01 RX ADMIN — METOPROLOL SUCCINATE 12.5 MG: 25 TABLET, EXTENDED RELEASE ORAL at 08:16

## 2023-01-01 RX ADMIN — HYDROMORPHONE HYDROCHLORIDE 1 MG: 2 TABLET ORAL at 00:52

## 2023-01-01 RX ADMIN — METOPROLOL SUCCINATE 12.5 MG: 25 TABLET, EXTENDED RELEASE ORAL at 08:09

## 2023-01-01 RX ADMIN — MICONAZOLE NITRATE: 20 POWDER TOPICAL at 21:47

## 2023-01-01 RX ADMIN — METHOCARBAMOL 500 MG: 500 TABLET ORAL at 16:51

## 2023-01-01 RX ADMIN — PANTOPRAZOLE SODIUM 40 MG: 40 TABLET, DELAYED RELEASE ORAL at 08:52

## 2023-01-01 RX ADMIN — CLONIDINE HYDROCHLORIDE 0.1 MG: 0.1 TABLET ORAL at 10:26

## 2023-01-01 RX ADMIN — METHOCARBAMOL 500 MG: 500 TABLET ORAL at 21:36

## 2023-01-01 RX ADMIN — HYDROMORPHONE HYDROCHLORIDE 1 MG: 2 TABLET ORAL at 06:04

## 2023-01-01 RX ADMIN — SENNOSIDES 8.6 MG: 8.6 TABLET, FILM COATED ORAL at 20:25

## 2023-01-01 RX ADMIN — HYDROMORPHONE HYDROCHLORIDE 1 MG: 2 TABLET ORAL at 21:34

## 2023-01-01 RX ADMIN — METHOCARBAMOL 500 MG: 500 TABLET ORAL at 01:20

## 2023-01-01 RX ADMIN — OXYCODONE HYDROCHLORIDE 2.5 MG: 5 TABLET ORAL at 20:49

## 2023-01-01 RX ADMIN — METHOCARBAMOL 500 MG: 500 TABLET ORAL at 13:08

## 2023-01-01 RX ADMIN — CLONIDINE HYDROCHLORIDE 0.1 MG: 0.1 TABLET ORAL at 09:09

## 2023-01-01 RX ADMIN — LIDOCAINE PATCH 4% 2 PATCH: 40 PATCH TOPICAL at 08:15

## 2023-01-01 RX ADMIN — METHOCARBAMOL 500 MG: 500 TABLET ORAL at 01:59

## 2023-01-01 RX ADMIN — GABAPENTIN 100 MG: 100 CAPSULE ORAL at 20:28

## 2023-01-01 RX ADMIN — MICONAZOLE NITRATE: 20 POWDER TOPICAL at 09:09

## 2023-01-01 RX ADMIN — ACETAMINOPHEN 975 MG: 325 TABLET, FILM COATED ORAL at 14:44

## 2023-01-01 RX ADMIN — GABAPENTIN 100 MG: 100 CAPSULE ORAL at 09:40

## 2023-01-01 RX ADMIN — CALCIUM CARBONATE (ANTACID) CHEW TAB 500 MG 1000 MG: 500 CHEW TAB at 08:52

## 2023-01-01 RX ADMIN — HYDROMORPHONE HYDROCHLORIDE 2 MG: 2 TABLET ORAL at 00:16

## 2023-01-01 RX ADMIN — GABAPENTIN 100 MG: 100 CAPSULE ORAL at 19:58

## 2023-01-01 RX ADMIN — HYDROXYZINE HYDROCHLORIDE 25 MG: 25 TABLET, FILM COATED ORAL at 15:26

## 2023-01-01 RX ADMIN — DOCUSATE SODIUM 100 MG: 100 CAPSULE, LIQUID FILLED ORAL at 20:09

## 2023-01-01 RX ADMIN — ACETAMINOPHEN 975 MG: 325 TABLET, FILM COATED ORAL at 20:58

## 2023-01-01 RX ADMIN — HEPARIN SODIUM 5000 UNITS: 5000 INJECTION, SOLUTION INTRAVENOUS; SUBCUTANEOUS at 03:51

## 2023-01-01 RX ADMIN — CALCIUM CARBONATE (ANTACID) CHEW TAB 500 MG 1000 MG: 500 CHEW TAB at 10:29

## 2023-01-01 RX ADMIN — POTASSIUM CHLORIDE 40 MEQ: 1500 TABLET, EXTENDED RELEASE ORAL at 09:42

## 2023-01-01 RX ADMIN — GENTAMICIN SULFATE: 1 CREAM TOPICAL at 10:32

## 2023-01-01 RX ADMIN — DOCUSATE SODIUM 100 MG: 100 CAPSULE, LIQUID FILLED ORAL at 20:01

## 2023-01-01 RX ADMIN — METHOCARBAMOL 500 MG: 500 TABLET ORAL at 15:46

## 2023-01-01 RX ADMIN — SENNOSIDES AND DOCUSATE SODIUM 1 TABLET: 50; 8.6 TABLET ORAL at 20:39

## 2023-01-01 RX ADMIN — PANTOPRAZOLE SODIUM 40 MG: 40 TABLET, DELAYED RELEASE ORAL at 08:16

## 2023-01-01 RX ADMIN — HYDROXYZINE HYDROCHLORIDE 25 MG: 25 TABLET ORAL at 20:37

## 2023-01-01 RX ADMIN — MICONAZOLE NITRATE: 20 POWDER TOPICAL at 13:05

## 2023-01-01 RX ADMIN — CLONIDINE HYDROCHLORIDE 0.1 MG: 0.1 TABLET ORAL at 07:29

## 2023-01-01 RX ADMIN — METHOCARBAMOL 500 MG: 500 TABLET ORAL at 08:24

## 2023-01-01 RX ADMIN — CLONIDINE HYDROCHLORIDE 0.1 MG: 0.1 TABLET ORAL at 21:40

## 2023-01-01 RX ADMIN — METHOCARBAMOL 500 MG: 500 TABLET ORAL at 20:47

## 2023-01-01 RX ADMIN — MICONAZOLE NITRATE: 20 POWDER TOPICAL at 12:52

## 2023-01-01 RX ADMIN — DOCUSATE SODIUM 100 MG: 100 CAPSULE, LIQUID FILLED ORAL at 20:36

## 2023-01-01 RX ADMIN — CLONIDINE HYDROCHLORIDE 0.1 MG: 0.1 TABLET ORAL at 13:33

## 2023-01-01 RX ADMIN — SEVELAMER CARBONATE 1600 MG: 800 TABLET, FILM COATED ORAL at 08:45

## 2023-01-01 RX ADMIN — MICONAZOLE NITRATE: 20 POWDER TOPICAL at 08:35

## 2023-01-01 RX ADMIN — CLONIDINE HYDROCHLORIDE 0.1 MG: 0.1 TABLET ORAL at 12:53

## 2023-01-01 RX ADMIN — SENNOSIDES AND DOCUSATE SODIUM 1 TABLET: 50; 8.6 TABLET ORAL at 20:45

## 2023-01-01 RX ADMIN — HYDROMORPHONE HYDROCHLORIDE 1 MG: 2 TABLET ORAL at 14:52

## 2023-01-01 RX ADMIN — MICONAZOLE NITRATE: 20 POWDER TOPICAL at 13:26

## 2023-01-01 RX ADMIN — CEPHALEXIN 500 MG: 500 CAPSULE ORAL at 20:41

## 2023-01-01 RX ADMIN — OXYCODONE HYDROCHLORIDE 2.5 MG: 5 TABLET ORAL at 15:26

## 2023-01-01 RX ADMIN — METHOCARBAMOL 500 MG: 500 TABLET ORAL at 23:48

## 2023-01-01 RX ADMIN — MICONAZOLE NITRATE: 20 POWDER TOPICAL at 20:02

## 2023-01-01 RX ADMIN — ACETAMINOPHEN 650 MG: 325 TABLET, FILM COATED ORAL at 20:27

## 2023-01-01 RX ADMIN — Medication 1 MG: at 20:29

## 2023-01-01 RX ADMIN — PANTOPRAZOLE SODIUM 40 MG: 40 TABLET, DELAYED RELEASE ORAL at 09:30

## 2023-01-01 RX ADMIN — GABAPENTIN 100 MG: 100 CAPSULE ORAL at 20:44

## 2023-01-01 RX ADMIN — GABAPENTIN 100 MG: 100 CAPSULE ORAL at 08:15

## 2023-01-01 RX ADMIN — MICONAZOLE NITRATE: 20 POWDER TOPICAL at 21:00

## 2023-01-01 RX ADMIN — ACETAMINOPHEN 650 MG: 325 TABLET, FILM COATED ORAL at 19:02

## 2023-01-01 RX ADMIN — CALCIUM CARBONATE (ANTACID) CHEW TAB 500 MG 1000 MG: 500 CHEW TAB at 08:59

## 2023-01-01 RX ADMIN — HYDROMORPHONE HYDROCHLORIDE 1 MG: 2 TABLET ORAL at 09:14

## 2023-01-01 RX ADMIN — PROCHLORPERAZINE EDISYLATE 10 MG: 5 INJECTION INTRAMUSCULAR; INTRAVENOUS at 11:08

## 2023-01-01 RX ADMIN — DOCUSATE SODIUM 100 MG: 100 CAPSULE, LIQUID FILLED ORAL at 21:40

## 2023-01-01 RX ADMIN — GABAPENTIN 100 MG: 100 CAPSULE ORAL at 08:00

## 2023-01-01 RX ADMIN — MICONAZOLE NITRATE: 20 POWDER TOPICAL at 08:24

## 2023-01-01 RX ADMIN — CALCIUM CARBONATE (ANTACID) CHEW TAB 500 MG 1000 MG: 500 CHEW TAB at 17:25

## 2023-01-01 RX ADMIN — ACETAMINOPHEN 650 MG: 325 TABLET, FILM COATED ORAL at 10:38

## 2023-01-01 RX ADMIN — CARVEDILOL 6.25 MG: 6.25 TABLET, FILM COATED ORAL at 12:45

## 2023-01-01 RX ADMIN — METHOCARBAMOL 500 MG: 500 TABLET ORAL at 20:09

## 2023-01-01 RX ADMIN — CARVEDILOL 6.25 MG: 6.25 TABLET, FILM COATED ORAL at 17:00

## 2023-01-01 RX ADMIN — ACETAMINOPHEN 650 MG: 325 TABLET, FILM COATED ORAL at 20:17

## 2023-01-01 RX ADMIN — DOCUSATE SODIUM 100 MG: 100 CAPSULE, LIQUID FILLED ORAL at 09:40

## 2023-01-01 RX ADMIN — ISOSORBIDE MONONITRATE 30 MG: 30 TABLET, EXTENDED RELEASE ORAL at 08:16

## 2023-01-01 RX ADMIN — HYDROMORPHONE HYDROCHLORIDE 1 MG: 2 TABLET ORAL at 19:53

## 2023-01-01 RX ADMIN — HYDROMORPHONE HYDROCHLORIDE 0.5 MG: 1 INJECTION, SOLUTION INTRAMUSCULAR; INTRAVENOUS; SUBCUTANEOUS at 06:05

## 2023-01-01 RX ADMIN — HYDROMORPHONE HYDROCHLORIDE 0.3 MG: 1 INJECTION, SOLUTION INTRAMUSCULAR; INTRAVENOUS; SUBCUTANEOUS at 21:33

## 2023-01-01 RX ADMIN — METOPROLOL SUCCINATE 12.5 MG: 25 TABLET, EXTENDED RELEASE ORAL at 08:52

## 2023-01-01 RX ADMIN — GABAPENTIN 100 MG: 100 CAPSULE ORAL at 09:08

## 2023-01-01 RX ADMIN — CLONIDINE HYDROCHLORIDE 0.1 MG: 0.1 TABLET ORAL at 13:48

## 2023-01-01 RX ADMIN — DOCUSATE SODIUM 100 MG: 100 CAPSULE, LIQUID FILLED ORAL at 08:17

## 2023-01-01 RX ADMIN — MICONAZOLE NITRATE: 20 POWDER TOPICAL at 18:07

## 2023-01-01 RX ADMIN — GABAPENTIN 100 MG: 100 CAPSULE ORAL at 08:38

## 2023-01-01 RX ADMIN — DOCUSATE SODIUM 100 MG: 100 CAPSULE, LIQUID FILLED ORAL at 09:13

## 2023-01-01 RX ADMIN — CALCIUM CARBONATE (ANTACID) CHEW TAB 500 MG 1000 MG: 500 CHEW TAB at 17:00

## 2023-01-01 RX ADMIN — CLONIDINE HYDROCHLORIDE 0.1 MG: 0.1 TABLET ORAL at 08:16

## 2023-01-01 RX ADMIN — CLONIDINE HYDROCHLORIDE 0.1 MG: 0.1 TABLET ORAL at 15:02

## 2023-01-01 RX ADMIN — CLONIDINE HYDROCHLORIDE 0.1 MG: 0.1 TABLET ORAL at 20:23

## 2023-01-01 RX ADMIN — CEPHALEXIN 1000 MG: 500 CAPSULE ORAL at 12:32

## 2023-01-01 RX ADMIN — ACETAMINOPHEN 975 MG: 325 TABLET, FILM COATED ORAL at 15:34

## 2023-01-01 RX ADMIN — CALCIUM CARBONATE (ANTACID) CHEW TAB 500 MG 1000 MG: 500 CHEW TAB at 09:29

## 2023-01-01 RX ADMIN — ISOSORBIDE MONONITRATE 30 MG: 30 TABLET, EXTENDED RELEASE ORAL at 10:27

## 2023-01-01 RX ADMIN — CALCIUM CARBONATE (ANTACID) CHEW TAB 500 MG 1000 MG: 500 CHEW TAB at 13:08

## 2023-01-01 RX ADMIN — ISOSORBIDE MONONITRATE 15 MG: 30 TABLET, EXTENDED RELEASE ORAL at 08:22

## 2023-01-01 RX ADMIN — GABAPENTIN 100 MG: 100 CAPSULE ORAL at 16:43

## 2023-01-01 RX ADMIN — METHOCARBAMOL 500 MG: 500 TABLET ORAL at 04:53

## 2023-01-01 RX ADMIN — METOPROLOL SUCCINATE 12.5 MG: 25 TABLET, EXTENDED RELEASE ORAL at 08:17

## 2023-01-01 RX ADMIN — CARVEDILOL 6.25 MG: 6.25 TABLET, FILM COATED ORAL at 08:18

## 2023-01-01 RX ADMIN — SENNOSIDES AND DOCUSATE SODIUM 1 TABLET: 50; 8.6 TABLET ORAL at 08:59

## 2023-01-01 RX ADMIN — HYDROMORPHONE HYDROCHLORIDE 1 MG: 2 TABLET ORAL at 03:33

## 2023-01-01 RX ADMIN — CALCIUM CARBONATE (ANTACID) CHEW TAB 500 MG 1000 MG: 500 CHEW TAB at 08:35

## 2023-01-01 RX ADMIN — HEPARIN SODIUM 5000 UNITS: 5000 INJECTION, SOLUTION INTRAVENOUS; SUBCUTANEOUS at 20:43

## 2023-01-01 RX ADMIN — SODIUM CHLORIDE, SODIUM LACTATE, CALCIUM CHLORIDE, MAGNESIUM CHLORIDE AND DEXTROSE: 4.25; 538; 448; 18.3; 5.08 INJECTION, SOLUTION INTRAPERITONEAL at 20:13

## 2023-01-01 RX ADMIN — PANTOPRAZOLE SODIUM 40 MG: 40 TABLET, DELAYED RELEASE ORAL at 08:18

## 2023-01-01 RX ADMIN — SEVELAMER CARBONATE 1600 MG: 800 TABLET, FILM COATED ORAL at 17:25

## 2023-01-01 RX ADMIN — HYDROMORPHONE HYDROCHLORIDE 1 MG: 2 TABLET ORAL at 12:16

## 2023-01-01 RX ADMIN — HEPARIN SODIUM 5000 UNITS: 5000 INJECTION, SOLUTION INTRAVENOUS; SUBCUTANEOUS at 16:40

## 2023-01-01 RX ADMIN — MICONAZOLE NITRATE: 20 POWDER TOPICAL at 19:47

## 2023-01-01 RX ADMIN — OXYCODONE HYDROCHLORIDE 2.5 MG: 5 TABLET ORAL at 03:41

## 2023-01-01 RX ADMIN — CALCIUM CARBONATE (ANTACID) CHEW TAB 500 MG 1000 MG: 500 CHEW TAB at 18:30

## 2023-01-01 RX ADMIN — HYDROMORPHONE HYDROCHLORIDE 1 MG: 1 INJECTION, SOLUTION INTRAMUSCULAR; INTRAVENOUS; SUBCUTANEOUS at 20:54

## 2023-01-01 RX ADMIN — CLONIDINE HYDROCHLORIDE 0.1 MG: 0.1 TABLET ORAL at 20:14

## 2023-01-01 RX ADMIN — HYDROXYZINE HYDROCHLORIDE 25 MG: 25 TABLET, FILM COATED ORAL at 02:21

## 2023-01-01 RX ADMIN — HYDROXYZINE HYDROCHLORIDE 25 MG: 25 TABLET, FILM COATED ORAL at 20:20

## 2023-01-01 RX ADMIN — PANTOPRAZOLE SODIUM 40 MG: 40 TABLET, DELAYED RELEASE ORAL at 08:20

## 2023-01-01 RX ADMIN — ACETAMINOPHEN 975 MG: 325 TABLET, FILM COATED ORAL at 09:40

## 2023-01-01 RX ADMIN — METHOCARBAMOL 500 MG: 500 TABLET ORAL at 06:00

## 2023-01-01 RX ADMIN — CALCIUM CARBONATE (ANTACID) CHEW TAB 500 MG 1000 MG: 500 CHEW TAB at 10:14

## 2023-01-01 RX ADMIN — CLONIDINE HYDROCHLORIDE 0.1 MG: 0.1 TABLET ORAL at 19:42

## 2023-01-01 RX ADMIN — GENTAMICIN SULFATE: 1 CREAM TOPICAL at 09:16

## 2023-01-01 RX ADMIN — CLONIDINE HYDROCHLORIDE 0.1 MG: 0.1 TABLET ORAL at 20:58

## 2023-01-01 RX ADMIN — CLONIDINE HYDROCHLORIDE 0.1 MG: 0.1 TABLET ORAL at 20:41

## 2023-01-01 RX ADMIN — ISOSORBIDE MONONITRATE 30 MG: 30 TABLET, EXTENDED RELEASE ORAL at 09:09

## 2023-01-01 RX ADMIN — METHOCARBAMOL 500 MG: 500 TABLET ORAL at 22:58

## 2023-01-01 RX ADMIN — HYDROMORPHONE HYDROCHLORIDE 1 MG: 2 TABLET ORAL at 01:58

## 2023-01-01 RX ADMIN — METHOCARBAMOL 500 MG: 500 TABLET ORAL at 18:43

## 2023-01-01 RX ADMIN — ISOSORBIDE MONONITRATE 15 MG: 30 TABLET, EXTENDED RELEASE ORAL at 10:13

## 2023-01-01 RX ADMIN — DOCUSATE SODIUM 100 MG: 100 CAPSULE, LIQUID FILLED ORAL at 20:23

## 2023-01-01 RX ADMIN — CLONIDINE HYDROCHLORIDE 0.1 MG: 0.1 TABLET ORAL at 08:22

## 2023-01-01 RX ADMIN — CARVEDILOL 6.25 MG: 6.25 TABLET, FILM COATED ORAL at 08:45

## 2023-01-01 RX ADMIN — EPOETIN ALFA-EPBX 20000 UNITS: 20000 INJECTION, SOLUTION INTRAVENOUS; SUBCUTANEOUS at 17:00

## 2023-01-01 RX ADMIN — ACETAMINOPHEN 975 MG: 325 TABLET, FILM COATED ORAL at 09:38

## 2023-01-01 RX ADMIN — METHOCARBAMOL 500 MG: 500 TABLET ORAL at 08:47

## 2023-01-01 RX ADMIN — PANTOPRAZOLE SODIUM 40 MG: 40 TABLET, DELAYED RELEASE ORAL at 09:00

## 2023-01-01 RX ADMIN — ISOSORBIDE MONONITRATE 30 MG: 30 TABLET, EXTENDED RELEASE ORAL at 09:15

## 2023-01-01 RX ADMIN — HYDROMORPHONE HYDROCHLORIDE 1 MG: 2 TABLET ORAL at 23:11

## 2023-01-01 RX ADMIN — DOCUSATE SODIUM 100 MG: 100 CAPSULE, LIQUID FILLED ORAL at 20:45

## 2023-01-01 RX ADMIN — MICONAZOLE NITRATE: 20 POWDER TOPICAL at 20:19

## 2023-01-01 RX ADMIN — PANTOPRAZOLE SODIUM 40 MG: 40 TABLET, DELAYED RELEASE ORAL at 08:38

## 2023-01-01 ASSESSMENT — ACTIVITIES OF DAILY LIVING (ADL)
ADLS_ACUITY_SCORE: 41
ADLS_ACUITY_SCORE: 39
ADLS_ACUITY_SCORE: 38
ADLS_ACUITY_SCORE: 42
ADLS_ACUITY_SCORE: 41
ADLS_ACUITY_SCORE: 40
ADLS_ACUITY_SCORE: 35
ADLS_ACUITY_SCORE: 33
ADLS_ACUITY_SCORE: 42
ADLS_ACUITY_SCORE: 40
ADLS_ACUITY_SCORE: 45
ADLS_ACUITY_SCORE: 42
ADLS_ACUITY_SCORE: 35
ADLS_ACUITY_SCORE: 41
ADLS_ACUITY_SCORE: 40
ADLS_ACUITY_SCORE: 45
ADLS_ACUITY_SCORE: 42
ADLS_ACUITY_SCORE: 39
ADLS_ACUITY_SCORE: 36
ADLS_ACUITY_SCORE: 39
ADLS_ACUITY_SCORE: 39
ADLS_ACUITY_SCORE: 40
ADLS_ACUITY_SCORE: 28
ADLS_ACUITY_SCORE: 44
ADLS_ACUITY_SCORE: 36
ADLS_ACUITY_SCORE: 45
ADLS_ACUITY_SCORE: 40
ADLS_ACUITY_SCORE: 43
ADLS_ACUITY_SCORE: 45
ADLS_ACUITY_SCORE: 45
ADLS_ACUITY_SCORE: 39
ADLS_ACUITY_SCORE: 45
ADLS_ACUITY_SCORE: 45
ADLS_ACUITY_SCORE: 43
ADLS_ACUITY_SCORE: 39
ADLS_ACUITY_SCORE: 41
ADLS_ACUITY_SCORE: 41
ADLS_ACUITY_SCORE: 39
ADLS_ACUITY_SCORE: 41
ADLS_ACUITY_SCORE: 35
DIFFICULTY_EATING/SWALLOWING: NO
ADLS_ACUITY_SCORE: 39
ADLS_ACUITY_SCORE: 35
ADLS_ACUITY_SCORE: 33
ADLS_ACUITY_SCORE: 35
ADLS_ACUITY_SCORE: 43
ADLS_ACUITY_SCORE: 43
ADLS_ACUITY_SCORE: 41
ADLS_ACUITY_SCORE: 42
ADLS_ACUITY_SCORE: 38
ADLS_ACUITY_SCORE: 38
ADLS_ACUITY_SCORE: 33
ADLS_ACUITY_SCORE: 38
ADLS_ACUITY_SCORE: 40
ADLS_ACUITY_SCORE: 41
ADLS_ACUITY_SCORE: 41
ADLS_ACUITY_SCORE: 45
ADLS_ACUITY_SCORE: 38
ADLS_ACUITY_SCORE: 39
ADLS_ACUITY_SCORE: 42
ADLS_ACUITY_SCORE: 45
ADLS_ACUITY_SCORE: 39
ADLS_ACUITY_SCORE: 45
ADLS_ACUITY_SCORE: 22
ADLS_ACUITY_SCORE: 40
ADLS_ACUITY_SCORE: 39
ADLS_ACUITY_SCORE: 36
ADLS_ACUITY_SCORE: 39
ADLS_ACUITY_SCORE: 45
ADLS_ACUITY_SCORE: 39
ADLS_ACUITY_SCORE: 35
ADLS_ACUITY_SCORE: 39
ADLS_ACUITY_SCORE: 35
ADLS_ACUITY_SCORE: 40
ADLS_ACUITY_SCORE: 44
ADLS_ACUITY_SCORE: 41
ADLS_ACUITY_SCORE: 45
ADLS_ACUITY_SCORE: 35
CONCENTRATING,_REMEMBERING_OR_MAKING_DECISIONS_DIFFICULTY: NO
ADLS_ACUITY_SCORE: 45
ADLS_ACUITY_SCORE: 39
ADLS_ACUITY_SCORE: 40
ADLS_ACUITY_SCORE: 43
ADLS_ACUITY_SCORE: 41
ADLS_ACUITY_SCORE: 43
ADLS_ACUITY_SCORE: 39
ADLS_ACUITY_SCORE: 43
ADLS_ACUITY_SCORE: 22
ADLS_ACUITY_SCORE: 39
ADLS_ACUITY_SCORE: 45
ADLS_ACUITY_SCORE: 39
ADLS_ACUITY_SCORE: 45
ADLS_ACUITY_SCORE: 45
ADLS_ACUITY_SCORE: 39
ADLS_ACUITY_SCORE: 43
ADLS_ACUITY_SCORE: 40
ADLS_ACUITY_SCORE: 41
ADLS_ACUITY_SCORE: 40
ADLS_ACUITY_SCORE: 42
ADLS_ACUITY_SCORE: 40
ADLS_ACUITY_SCORE: 41
ADLS_ACUITY_SCORE: 43
ADLS_ACUITY_SCORE: 22
ADLS_ACUITY_SCORE: 45
ADLS_ACUITY_SCORE: 38
ADLS_ACUITY_SCORE: 40
ADLS_ACUITY_SCORE: 35
ADLS_ACUITY_SCORE: 45
ADLS_ACUITY_SCORE: 41
ADLS_ACUITY_SCORE: 22
ADLS_ACUITY_SCORE: 39
ADLS_ACUITY_SCORE: 35
ADLS_ACUITY_SCORE: 38
ADLS_ACUITY_SCORE: 45
ADLS_ACUITY_SCORE: 35
ADLS_ACUITY_SCORE: 41
ADLS_ACUITY_SCORE: 42
ADLS_ACUITY_SCORE: 40
ADLS_ACUITY_SCORE: 45
ADLS_ACUITY_SCORE: 39
ADLS_ACUITY_SCORE: 41
ADLS_ACUITY_SCORE: 39
ADLS_ACUITY_SCORE: 35
ADLS_ACUITY_SCORE: 45
ADLS_ACUITY_SCORE: 40
ADLS_ACUITY_SCORE: 22
ADLS_ACUITY_SCORE: 42
ADLS_ACUITY_SCORE: 45
ADLS_ACUITY_SCORE: 40
ADLS_ACUITY_SCORE: 41
ADLS_ACUITY_SCORE: 22
ADLS_ACUITY_SCORE: 45
ADLS_ACUITY_SCORE: 38
ADLS_ACUITY_SCORE: 22
ADLS_ACUITY_SCORE: 35
ADLS_ACUITY_SCORE: 41
ADLS_ACUITY_SCORE: 22
ADLS_ACUITY_SCORE: 42
ADLS_ACUITY_SCORE: 40
ADLS_ACUITY_SCORE: 39
ADLS_ACUITY_SCORE: 41
ADLS_ACUITY_SCORE: 39
ADLS_ACUITY_SCORE: 43
ADLS_ACUITY_SCORE: 42
ADLS_ACUITY_SCORE: 42
ADLS_ACUITY_SCORE: 38
ADLS_ACUITY_SCORE: 40
ADLS_ACUITY_SCORE: 42
ADLS_ACUITY_SCORE: 39
ADLS_ACUITY_SCORE: 45
ADLS_ACUITY_SCORE: 36
ADLS_ACUITY_SCORE: 41
ADLS_ACUITY_SCORE: 22
ADLS_ACUITY_SCORE: 45
TOILETING_ISSUES: NO
ADLS_ACUITY_SCORE: 44
ADLS_ACUITY_SCORE: 43
ADLS_ACUITY_SCORE: 45
ADLS_ACUITY_SCORE: 39
ADLS_ACUITY_SCORE: 45
ADLS_ACUITY_SCORE: 40
ADLS_ACUITY_SCORE: 45
ADLS_ACUITY_SCORE: 39
ADLS_ACUITY_SCORE: 38
ADLS_ACUITY_SCORE: 43
ADLS_ACUITY_SCORE: 22
ADLS_ACUITY_SCORE: 41
ADLS_ACUITY_SCORE: 39
ADLS_ACUITY_SCORE: 40
ADLS_ACUITY_SCORE: 41
ADLS_ACUITY_SCORE: 41
ADLS_ACUITY_SCORE: 35
ADLS_ACUITY_SCORE: 41
ADLS_ACUITY_SCORE: 31
ADLS_ACUITY_SCORE: 30
ADLS_ACUITY_SCORE: 42
ADLS_ACUITY_SCORE: 43
ADLS_ACUITY_SCORE: 42
ADLS_ACUITY_SCORE: 33
ADLS_ACUITY_SCORE: 41
ADLS_ACUITY_SCORE: 39
DEPENDENT_IADLS:: CLEANING;COOKING;LAUNDRY;SHOPPING;MEAL PREPARATION;MEDICATION MANAGEMENT;MONEY MANAGEMENT;TRANSPORTATION
ADLS_ACUITY_SCORE: 42
ADLS_ACUITY_SCORE: 22
ADLS_ACUITY_SCORE: 45
ADLS_ACUITY_SCORE: 39
ADLS_ACUITY_SCORE: 40
ADLS_ACUITY_SCORE: 36
ADLS_ACUITY_SCORE: 42
ADLS_ACUITY_SCORE: 40
DOING_ERRANDS_INDEPENDENTLY_DIFFICULTY: NO
ADLS_ACUITY_SCORE: 43
ADLS_ACUITY_SCORE: 43
ADLS_ACUITY_SCORE: 35
ADLS_ACUITY_SCORE: 30
ADLS_ACUITY_SCORE: 39
ADLS_ACUITY_SCORE: 35
ADLS_ACUITY_SCORE: 22
ADLS_ACUITY_SCORE: 41
ADLS_ACUITY_SCORE: 38
ADLS_ACUITY_SCORE: 36
ADLS_ACUITY_SCORE: 35
ADLS_ACUITY_SCORE: 35
ADLS_ACUITY_SCORE: 45
ADLS_ACUITY_SCORE: 40
ADLS_ACUITY_SCORE: 39
ADLS_ACUITY_SCORE: 38
ADLS_ACUITY_SCORE: 39
ADLS_ACUITY_SCORE: 45
ADLS_ACUITY_SCORE: 35
ADLS_ACUITY_SCORE: 43
ADLS_ACUITY_SCORE: 45
ADLS_ACUITY_SCORE: 40
ADLS_ACUITY_SCORE: 40
ADLS_ACUITY_SCORE: 39
ADLS_ACUITY_SCORE: 43
ADLS_ACUITY_SCORE: 37
ADLS_ACUITY_SCORE: 45
ADLS_ACUITY_SCORE: 45
ADLS_ACUITY_SCORE: 43
ADLS_ACUITY_SCORE: 22
ADLS_ACUITY_SCORE: 45
ADLS_ACUITY_SCORE: 39
ADLS_ACUITY_SCORE: 44
ADLS_ACUITY_SCORE: 43
ADLS_ACUITY_SCORE: 38
ADLS_ACUITY_SCORE: 40
ADLS_ACUITY_SCORE: 39
ADLS_ACUITY_SCORE: 43
ADLS_ACUITY_SCORE: 45
ADLS_ACUITY_SCORE: 35
ADLS_ACUITY_SCORE: 43
ADLS_ACUITY_SCORE: 45
ADLS_ACUITY_SCORE: 35
DEPENDENT_IADLS:: INDEPENDENT
ADLS_ACUITY_SCORE: 40
ADLS_ACUITY_SCORE: 41
ADLS_ACUITY_SCORE: 42
ADLS_ACUITY_SCORE: 30
ADLS_ACUITY_SCORE: 38
ADLS_ACUITY_SCORE: 35
ADLS_ACUITY_SCORE: 45
ADLS_ACUITY_SCORE: 41
ADLS_ACUITY_SCORE: 38
ADLS_ACUITY_SCORE: 39
ADLS_ACUITY_SCORE: 45
ADLS_ACUITY_SCORE: 39
ADLS_ACUITY_SCORE: 45
ADLS_ACUITY_SCORE: 45
ADLS_ACUITY_SCORE: 35
ADLS_ACUITY_SCORE: 40
ADLS_ACUITY_SCORE: 22
ADLS_ACUITY_SCORE: 40
ADLS_ACUITY_SCORE: 22
ADLS_ACUITY_SCORE: 45
ADLS_ACUITY_SCORE: 45
DIFFICULTY_COMMUNICATING: NO
ADLS_ACUITY_SCORE: 40
ADLS_ACUITY_SCORE: 45
ADLS_ACUITY_SCORE: 45
FALL_HISTORY_WITHIN_LAST_SIX_MONTHS: NO
ADLS_ACUITY_SCORE: 38
ADLS_ACUITY_SCORE: 45
WEAR_GLASSES_OR_BLIND: NO
ADLS_ACUITY_SCORE: 39
ADLS_ACUITY_SCORE: 42
ADLS_ACUITY_SCORE: 45
ADLS_ACUITY_SCORE: 38
ADLS_ACUITY_SCORE: 40
ADLS_ACUITY_SCORE: 41
ADLS_ACUITY_SCORE: 41
ADLS_ACUITY_SCORE: 35
ADLS_ACUITY_SCORE: 45
ADLS_ACUITY_SCORE: 41
ADLS_ACUITY_SCORE: 41
ADLS_ACUITY_SCORE: 42
ADLS_ACUITY_SCORE: 45
ADLS_ACUITY_SCORE: 44
ADLS_ACUITY_SCORE: 45
ADLS_ACUITY_SCORE: 40
ADLS_ACUITY_SCORE: 33
ADLS_ACUITY_SCORE: 40
ADLS_ACUITY_SCORE: 40
ADLS_ACUITY_SCORE: 45
ADLS_ACUITY_SCORE: 45
ADLS_ACUITY_SCORE: 40
ADLS_ACUITY_SCORE: 42
ADLS_ACUITY_SCORE: 33
ADLS_ACUITY_SCORE: 40
ADLS_ACUITY_SCORE: 45
ADLS_ACUITY_SCORE: 20
ADLS_ACUITY_SCORE: 22
ADLS_ACUITY_SCORE: 41
ADLS_ACUITY_SCORE: 39
ADLS_ACUITY_SCORE: 38
ADLS_ACUITY_SCORE: 39
WALKING_OR_CLIMBING_STAIRS_DIFFICULTY: NO
ADLS_ACUITY_SCORE: 45
ADLS_ACUITY_SCORE: 30
ADLS_ACUITY_SCORE: 45
ADLS_ACUITY_SCORE: 40
ADLS_ACUITY_SCORE: 33
ADLS_ACUITY_SCORE: 42
ADLS_ACUITY_SCORE: 40
ADLS_ACUITY_SCORE: 45
ADLS_ACUITY_SCORE: 39
ADLS_ACUITY_SCORE: 38
ADLS_ACUITY_SCORE: 40
ADLS_ACUITY_SCORE: 35
ADLS_ACUITY_SCORE: 45
ADLS_ACUITY_SCORE: 39
ADLS_ACUITY_SCORE: 43
ADLS_ACUITY_SCORE: 30
ADLS_ACUITY_SCORE: 39
ADLS_ACUITY_SCORE: 41
ADLS_ACUITY_SCORE: 38
ADLS_ACUITY_SCORE: 40
ADLS_ACUITY_SCORE: 30
ADLS_ACUITY_SCORE: 42
ADLS_ACUITY_SCORE: 39
ADLS_ACUITY_SCORE: 40
ADLS_ACUITY_SCORE: 45
ADLS_ACUITY_SCORE: 40
ADLS_ACUITY_SCORE: 38
ADLS_ACUITY_SCORE: 41
ADLS_ACUITY_SCORE: 39
ADLS_ACUITY_SCORE: 35
ADLS_ACUITY_SCORE: 43
ADLS_ACUITY_SCORE: 40
ADLS_ACUITY_SCORE: 41
ADLS_ACUITY_SCORE: 41
ADLS_ACUITY_SCORE: 43
ADLS_ACUITY_SCORE: 40
ADLS_ACUITY_SCORE: 45
ADLS_ACUITY_SCORE: 45
ADLS_ACUITY_SCORE: 41
ADLS_ACUITY_SCORE: 40
ADLS_ACUITY_SCORE: 45
ADLS_ACUITY_SCORE: 39
ADLS_ACUITY_SCORE: 35
ADLS_ACUITY_SCORE: 45
ADLS_ACUITY_SCORE: 35
ADLS_ACUITY_SCORE: 41
ADLS_ACUITY_SCORE: 45
ADLS_ACUITY_SCORE: 39
ADLS_ACUITY_SCORE: 41
ADLS_ACUITY_SCORE: 45
ADLS_ACUITY_SCORE: 22
ADLS_ACUITY_SCORE: 41
ADLS_ACUITY_SCORE: 43
ADLS_ACUITY_SCORE: 40
ADLS_ACUITY_SCORE: 39
ADLS_ACUITY_SCORE: 42
ADLS_ACUITY_SCORE: 39
ADLS_ACUITY_SCORE: 39
ADLS_ACUITY_SCORE: 35
ADLS_ACUITY_SCORE: 42
ADLS_ACUITY_SCORE: 41
ADLS_ACUITY_SCORE: 22
ADLS_ACUITY_SCORE: 43
ADLS_ACUITY_SCORE: 45
ADLS_ACUITY_SCORE: 35
ADLS_ACUITY_SCORE: 22
ADLS_ACUITY_SCORE: 40
ADLS_ACUITY_SCORE: 45
ADLS_ACUITY_SCORE: 39
DRESSING/BATHING_DIFFICULTY: NO
ADLS_ACUITY_SCORE: 39
ADLS_ACUITY_SCORE: 45
ADLS_ACUITY_SCORE: 45
ADLS_ACUITY_SCORE: 41
ADLS_ACUITY_SCORE: 42
ADLS_ACUITY_SCORE: 40
ADLS_ACUITY_SCORE: 22
ADLS_ACUITY_SCORE: 45
ADLS_ACUITY_SCORE: 33
HEARING_DIFFICULTY_OR_DEAF: NO
ADLS_ACUITY_SCORE: 41
ADLS_ACUITY_SCORE: 38
ADLS_ACUITY_SCORE: 39
ADLS_ACUITY_SCORE: 45
ADLS_ACUITY_SCORE: 39
ADLS_ACUITY_SCORE: 35
ADLS_ACUITY_SCORE: 39
ADLS_ACUITY_SCORE: 41
ADLS_ACUITY_SCORE: 41
ADLS_ACUITY_SCORE: 31
ADLS_ACUITY_SCORE: 45
ADLS_ACUITY_SCORE: 40
ADLS_ACUITY_SCORE: 41
ADLS_ACUITY_SCORE: 40
ADLS_ACUITY_SCORE: 38
ADLS_ACUITY_SCORE: 45
ADLS_ACUITY_SCORE: 40
ADLS_ACUITY_SCORE: 41
ADLS_ACUITY_SCORE: 39
ADLS_ACUITY_SCORE: 39
ADLS_ACUITY_SCORE: 45
ADLS_ACUITY_SCORE: 22
ADLS_ACUITY_SCORE: 41
ADLS_ACUITY_SCORE: 45
CHANGE_IN_FUNCTIONAL_STATUS_SINCE_ONSET_OF_CURRENT_ILLNESS/INJURY: NO
ADLS_ACUITY_SCORE: 39
ADLS_ACUITY_SCORE: 45
ADLS_ACUITY_SCORE: 38
ADLS_ACUITY_SCORE: 40
ADLS_ACUITY_SCORE: 45
ADLS_ACUITY_SCORE: 45
ADLS_ACUITY_SCORE: 35
ADLS_ACUITY_SCORE: 45
ADLS_ACUITY_SCORE: 45
ADLS_ACUITY_SCORE: 22
ADLS_ACUITY_SCORE: 40
ADLS_ACUITY_SCORE: 35
ADLS_ACUITY_SCORE: 45
ADLS_ACUITY_SCORE: 43
ADLS_ACUITY_SCORE: 45
ADLS_ACUITY_SCORE: 45
ADLS_ACUITY_SCORE: 35
ADLS_ACUITY_SCORE: 22
ADLS_ACUITY_SCORE: 22
ADLS_ACUITY_SCORE: 43
ADLS_ACUITY_SCORE: 39
ADLS_ACUITY_SCORE: 39
ADLS_ACUITY_SCORE: 45
ADLS_ACUITY_SCORE: 45
ADLS_ACUITY_SCORE: 35
ADLS_ACUITY_SCORE: 45
ADLS_ACUITY_SCORE: 40
ADLS_ACUITY_SCORE: 40
ADLS_ACUITY_SCORE: 39
ADLS_ACUITY_SCORE: 45
ADLS_ACUITY_SCORE: 41
ADLS_ACUITY_SCORE: 40
ADLS_ACUITY_SCORE: 45
ADLS_ACUITY_SCORE: 38

## 2023-01-01 ASSESSMENT — ENCOUNTER SYMPTOMS
SHORTNESS OF BREATH: 1
ABDOMINAL PAIN: 0
FEVER: 0
VOMITING: 0
DIARRHEA: 0
DYSURIA: 0
COUGH: 0
NAUSEA: 0

## 2023-05-10 PROBLEM — I10 HYPERTENSION, UNSPECIFIED TYPE: Status: ACTIVE | Noted: 2023-01-01

## 2023-05-10 PROBLEM — N18.9 CHRONIC RENAL FAILURE, UNSPECIFIED CKD STAGE: Status: ACTIVE | Noted: 2023-01-01

## 2023-05-10 PROBLEM — R07.9 ACUTE CHEST PAIN: Status: ACTIVE | Noted: 2023-01-01

## 2023-05-10 PROBLEM — R06.02 SOB (SHORTNESS OF BREATH): Status: ACTIVE | Noted: 2023-01-01

## 2023-05-10 PROBLEM — D64.9 ANEMIA, UNSPECIFIED TYPE: Status: ACTIVE | Noted: 2023-01-01

## 2023-05-10 NOTE — PROGRESS NOTES
Patient admitted to unit ~1400.  A&O. NSR.  On 2L O2.  BP remains elevated.  PRN meds given with no results.  MD aware and ordered additional meds.  C/o CP.  PRN meds given with relief.  ECHO being obtained.  Stress test tomorrow.  Spouse at bedside. Continue to monitor.

## 2023-05-10 NOTE — PHARMACY-ADMISSION MEDICATION HISTORY
Pharmacist Admission Medication History    Admission medication history is complete. The information provided in this note is only as accurate as the sources available at the time of the update.    Medication reconciliation/reorder completed by provider prior to medication history? No    Information Source(s): Patient, Clinic records and CareEverywhere/SureScripts via in-person    Pertinent Information:   Patient fill history suggests non compliance  Information obtained largely through patient report  Patient reports recently restarting bumex but no recent fill record  Patient reports she does NOT take insulin nor other antidiabetic  meds      Changes made to PTA medication list:    Added: clonidine tabs    Deleted: lisinopril, amlodipine, insulin, aalopurinol    Changed: None    Medication Affordability:       Allergies reviewed with patient and updates made in EHR: yes    Medication History Completed By: Vasyl Chisholm RPH 5/10/2023 8:35 AM    Prior to Admission medications    Medication Sig Last Dose Taking? Auth Provider Long Term End Date   carvedilol (COREG) 6.25 MG tablet TAKE 1 TABLET BY MOUTH TWICE DAILY WITH MEALS 5/9/2023 Yes Reported, Patient Yes    cloNIDine (CATAPRES) 0.1 MG tablet Take 0.1 mg by mouth 2 times daily 5/10/2023 Yes Unknown, Entered By History     omeprazole (PRILOSEC) 40 MG DR capsule Take 40 mg by mouth daily 5/9/2023 Yes Unknown, Entered By History     ergocalciferol (ERGOCALCIFEROL) 1.25 MG (42300 UT) capsule Take 50,000 Units by mouth once a week monday 5/8/2023  Reported, Patient

## 2023-05-10 NOTE — PROGRESS NOTES
PRIMARY DIAGNOSIS: CHEST PAIN  OUTPATIENT/OBSERVATION GOALS TO BE MET BEFORE DISCHARGE:  1. Ruled out acute coronary syndrome (negative or stable Troponin):  No - Stress test tomorrow  2. Pain Status: Improved but still requiring IV narcotics.  3. Appropriate provocative testing performed: No  - Stress Test Procedure: Nuclear  - Interpretation of cardiac rhythm per telemetry tech: NSR    4. Cleared by Consultants (if applicable):No  5. Return to near baseline physical activity: Yes  Discharge Planner Nurse   Safe discharge environment identified: Yes  Barriers to discharge: Yes       Entered by: Padma Waters RN 05/10/2023 2:34 PM     Please review provider order for any additional goals.   Nurse to notify provider when observation goals have been met and patient is ready for discharge.

## 2023-05-10 NOTE — PLAN OF CARE
Goal Outcome Evaluation:  PRIMARY DIAGNOSIS: CHEST PAIN  OUTPATIENT/OBSERVATION GOALS TO BE MET BEFORE DISCHARGE:  1. Ruled out acute coronary syndrome (negative or stable Troponin):   trops neg so far, serial trops still ordered  2. Pain Status: Pain free.  3. Appropriate provocative testing performed: Not yet completed, plan for stress test tomorrow morning  - Stress Test Procedure: Nuclear  - Interpretation of cardiac rhythm per telemetry tech: NSR    4. Cleared by Consultants (if applicable):No  5. Return to near baseline physical activity:  pt very tired  Discharge Planner Nurse   Safe discharge environment identified: Yes  Barriers to discharge: Yes       Entered by: Sayda Rosado RN 05/10/2023 4:51 PM     Please review provider order for any additional goals.   Nurse to notify provider when observation goals have been met and patient is ready for discharge.

## 2023-05-10 NOTE — H&P
"[facility]    History and Physical - Hospitalist Service       Date of Admission:  5/10/2023    Assessment & Plan      Devora Garcia is a 62 year old female admitted on 5/10/2023.  She has a PMH of chronic renal failure (on peritoneal dialsyis), HTN, DM and GERD who presents to the ED with left shoulder pain that radiated to the mid-chest early this am.        1.  Left shoulder and chest pain    EKG reviewed in the ED x 2 without clear ST wave changes or T wave abnormalities  CT of the chest negative for acute PE, PNA or dissection  COVID testing negative    Will continue with tele, serial troponins, ECHO    D/w NM department - unable to do stress tests today; reviewed that Lexiscan NM stress test ok with her elevated creat. Plan for cardiac stress test 5/11/23 if r/o for ACS with serial troponins and no concerning arrythmias on tele    If cardiac etiology ruled out - could be GI and/or musculoskeletal    Will have prn GI cocktail available    2.  CKD on peritoneal dialysis    Nephrology consult requested and is pending.  Noted to have received CT with control for PE r/o in the ED.    3.  GERD    Reports that she does have a PMH of GERD.  Takes PPI \"prn\".  Does not think that she has ever had an EGD.  As above, GI cocktail ordered prn.  Will continue with daily PPI, tums prn.    4.  HTN    sbp has been variable and quite elevated at times  Home med list now completed and reviewed - continue PT clonidine bid, coreg bid  Continue with IV prn hydralazine, as needed.    5.  Acute on chronic anemia     no s/s of active GI bleeding; will monitor for any change in clinical symptoms.  hgb in the am.    6.  Pruritis       Rash on back    Will ask Nephrology to assess when they see in consultation later today    Medical Decision Making     75 MINUTES SPENT BY ME on the date of service doing chart review, history, exam, documentation & further activities per the note.        Additional " "Labs/Imaging Reviewed:  See Data section below    Labs/Imaging Ordered:   Will order serial troponins q6 h x 2 more lab       Cardiac stress test 5/11/23    Time SPENT BY ME on the date of service doing chart review, history, exam, documentation & further activities per the note:  75 MINUTES    Diet: Combination Diet Renal Diet (dialysis); No Caffeine Dietrenal diet; no caffeine after 7pm  DVT Prophylaxis: ambulate  Taylor Catheter: Not present  Lines: None     Cardiac Monitoring: yes  Code Status:   FULL    Clinically Significant Risk Factors Present on Admission              # Hypoalbuminemia: Lowest albumin = 2.5 g/dL at 5/10/2023  5:22 AM, will monitor as appropriate     # Hypertension: home medication list includes antihypertensive(s)              Disposition Plan      Expected Discharge Date: 05/11/2023                  Daksha Courtney DO  Hospitalist Service  [facility]  Securely message with [employer] (more info)  Text page via [employer] Paging/Directory   ____________________________________________________________________    Chief Complaint     Left shoulder and chest pain    History is obtained from the patient,  and ED physician    History of Present Illness   Devora Garcia is a 62 year old female who states that she was awakened by \"itching\" of her back around 3am.  She got up out of bed and then developed acute left shoulder pain that radiated to her mid-chest.  Mild nausea but no emesis.  No diaphoresis.  Presented to the ED and was given NTG SL x 4 doses with no improvement.  Was given doses of IV fentanyl and IV dialudid; also had one dose of po oxycodone with some relief.  Pain is still present but \"minimal\".    States that she has been feeling \"more SOB\" at times with prolonged ambulation.  Has been able to do steps without difficulty.  No F/C, cough or regular chest pain.  Does report regular heartburn symptoms - but this pain was different.      No " "previous pain; no recent cardiac tests (?last one around 2007).  No prior cardiac h/o. No palpitations.  Unsure is her pain is pleuritic or not.    C/o itchy rash on her back for the last week.  Had a virtual visit and was given some \"white cream for it\".      Past Medical History    Past Medical History:   Diagnosis Date     Allergic rhinitis      Cancer (H)     left breast     Chronic kidney disease      Chronic kidney disease, stage 3 (H) 2010     Cyst of left ovary      Diabetes mellitus (H)     12/2020 states no longer takin insulin     GERD (gastroesophageal reflux disease)      Gout      Hypertension      Intraductal carcinoma 2015     Obstructive sleep apnea on CPAP     uses CPAP     Psoriasis     chronic renal insufficiency - on peritoneal dialysis    Past Surgical History   Past Surgical History:   Procedure Laterality Date     BARIATRIC SURGERY  2014     HC CYSTOSCOPY,INSERT URETERAL STENT Right 11/7/2018    Procedure: CYSTOSCOPY, WITH RIGHT URETERAL STENT INSERTION;  Surgeon: Dino Reynoso MD;  Location: [facility];  Service: Urology     HC CYSTOSCOPY,INSERT URETERAL STENT Right 8/31/2020    Procedure: CYSTOSCOPY, WITH URETERAL STENT INSERTION;  Surgeon: Dino Reynoso MD;  Location: [facility] OR;  Service: Urology     IR CVC TUNNEL PLACEMENT > 5 YRS OF AGE  9/2/2020     IR CVC TUNNEL REVISION RIGHT  10/22/2020     IR TUNNELED CATHETER COMPLETE REPLACEMENT  10/22/2020     IR TUNNELED CATHETER INSERT  9/2/2020     LUMPECTOMY BREAST Left 2015     MAMMOPLASTY REDUCTION  2015     OTHER SURGICAL HISTORY  12/02/2020    peritoneal dialsis catheter placement     SC CYSTO/URETERO W/LITHOTRIPSY &INDWELL STENT INSRT Right 11/30/2018    Procedure: CYSTOSCOPY, RIGHT URETEROSCOPY, LASER LITHOTRIPSY STENT REMOVAL STENT INSERTION;  Surgeon: Dino Reynoso MD;  Location: [facility] OR;  Service: Urology     SC LAP INSERTION TUNNELED INTRAPERITONEAL CATHETER N/A 12/23/2020    Procedure: " LAPAROSCOPY , Enterolysis;  Surgeon: Tigre Rivas MD;  Location: [facility];  Service: General     NV LAP,DIAGNOSTIC ABDOMEN N/A 12/29/2020    Procedure: LAPAROSCOPY, De-Clot of Peritoneal Dialysis Catheter;  Surgeon: Tigre Rivas MD;  Location: [facility];  Service: General       Prior to Admission Medications  -   Prior to Admission Medications   Prescriptions Last Dose Informant Patient Reported? Taking?   carvedilol (COREG) 6.25 MG tablet 5/9/2023  Yes Yes   Sig: TAKE 1 TABLET BY MOUTH TWICE DAILY WITH MEALS   cloNIDine (CATAPRES) 0.1 MG tablet 5/10/2023  Yes Yes   Sig: Take 0.1 mg by mouth 2 times daily   ergocalciferol (ERGOCALCIFEROL) 1.25 MG (75453 UT) capsule 5/8/2023  Yes No   Sig: Take 50,000 Units by mouth once a week monday   omeprazole (PRILOSEC) 40 MG DR capsule 5/9/2023  Yes Yes   Sig: Take 40 mg by mouth daily      Facility-Administered Medications: None      Allergies   Allergies   Allergen Reactions     Aspirin Nausea     Statins-Hmg-Coa Reductase Inhibitors [Statins] Other (See Comments)     Other reaction(s): Renal Failure, Other reaction(s): Renal Failure         Review of Systems    The 10 point Review of Systems is negative other than noted in the HPI or here.     Social History   I have reviewed this patient's social history and updated it with pertinent information if needed.  Social History     Tobacco Use     Smoking status: Never     Smokeless tobacco: Never   Substance Use Topics     Alcohol use: Yes     Comment: Alcoholic Drinks/day: Rare, 2/month     Drug use: No    , presents with her     Family History   I have reviewed this patient's family history and updated it with pertinent information if needed.  Family History   Problem Relation Age of Onset     Kidney failure Father      Hypertension Father      Hypertension Brother      Diabetes Brother      Kidney failure Brother      Clotting Disorder No family hx of      Anesthesia Reaction No  "family hx of      States brother had h/o \"heart failure\"    Allergies   Allergies   Allergen Reactions     Aspirin Nausea     Statins-Hmg-Coa Reductase Inhibitors [Statins] Other (See Comments)     Other reaction(s): Renal Failure, Other reaction(s): Renal Failure    denies abx allergies known    Physical Exam   Vital Signs: Temp: 97.2  F (36.2  C) Temp src: Temporal BP: (!) 174/80 Pulse: 69   Resp: 17 SpO2: 100 % O2 Device: Nasal cannula Oxygen Delivery: 2 LPM  Weight: 220 lbs 0 oz    GEN:  Alert, oriented x 3, appears comfortable now, NAD, falling asleep easily  HEENT:  Normocephalic/atraumatic, no scleral icterus, no nasal discharge, mouth and membranes moist.  CV:  Regular rate and rhythm, no murmur or JVD.  S1 + S2 noted, no S3 or S4.  LUNGS:  Clear to auscultation ant/lat bilaterally without rales/rhonchi/wheezing/retractions.  Symmetric chest rise on inhalation noted.  ABD:  Active bowel sounds, soft, non-tender/not really distended.  No rebound/guarding/rigidity.  No obvious masses palpated  EXT: still fully dressed with pants and shoes on in the ED hallway; 1+ pretibial edema.  No cyanosis.   SKIN:  Dry to touch, scattered darker moles to the back - no warmth, clear vesicles or erythema to visualization of her back.   NEURO:  Symmetric muscle strength, sensation to touch grossly intact.  Coordination symmetric on general exam.  No new focal deficits appreciated.  Speech clear and appropriate when she is awake.    Data   Labs and Imaging results below reviewed today.    Recent Labs   Lab 05/10/23  0522   WBC 8.5   HGB 7.7*   HCT 25.1*   MCV 91        Recent Labs   Lab 05/10/23  0522      POTASSIUM 4.0   CHLORIDE 100   CO2 22   ANIONGAP 16   GLC 85   BUN 78*   CR 15.72*   GFRESTIMATED 2*   RON 8.9     Recent Labs   Lab 05/10/23  0522      POTASSIUM 4.0   CHLORIDE 100   CO2 22   ANIONGAP 16   GLC 85   BUN 78*   CR 15.72*   GFRESTIMATED 2*   RON 8.9   MAG 3.1*   PROTTOTAL 6.7   ALBUMIN 2.5* "   BILITOTAL 0.5   ALKPHOS 61   AST 9   ALT <9     No results for input(s): NTBNPI, NTBNP in the last 168 hours.  No results for input(s): TROPONIN, TROPI, TROPR, TROPONINIS in the last 168 hours.    Invalid input(s): TROPT, TROP, TROPONINIES, TNIH  No results for input(s): COLOR, APPEARANCE, URINEGLC, URINEBILI, URINEKETONE, SG, UBLD, URINEPH, PROTEIN, UROBILINOGEN, NITRITE, LEUKEST, RBCU, WBCU in the last 168 hours.  Recent Results (from the past 24 hour(s))   CT Chest Abdomen Pelvis w/o Contrast    Narrative    EXAM: CT CHEST ABDOMEN PELVIS W/O CONTRAST  LOCATION: [facility]  DATE/TIME: 5/10/2023 6:30 AM CDT    INDICATION: Chest and abdomen pain. Difficulty breathing. Peritoneal dialysis patient.  COMPARISON: CT abdomen and pelvis 06/07/2022. CTA chest and CT abdomen and pelvis 05/29/2021.  TECHNIQUE: CT scan of the chest, abdomen, and pelvis was performed without IV contrast. Multiplanar reformats were obtained. Dose reduction techniques were used.   CONTRAST: None.    FINDINGS:   LUNGS AND PLEURA: Mild streaky and dependent atelectasis bilaterally.    MEDIASTINUM/AXILLAE: Normal.    CORONARY ARTERY CALCIFICATION: Mild.    HEPATOBILIARY: Cholelithiasis. Normal liver and bile ducts.    PANCREAS: Normal.    SPLEEN: Normal.    ADRENAL GLANDS: Normal.    KIDNEYS/BLADDER: Chronic atrophy of the native kidneys. Normal bladder.    BOWEL: Post sleeve gastrectomy. Colonic diverticulosis. No bowel obstruction or inflammation. Normal appendix.    LYMPH NODES: Normal.    VASCULATURE: Unremarkable.    PELVIC ORGANS: Normal.    MUSCULOSKELETAL: Percutaneous peritoneal dialysis catheter terminates in the right abdomen. Generalized body wall edema most notable of the ventral pelvic wall where there is associated skin thickening. Chronic postoperative changes of the left breast.   Hypertrophic degenerative changes of the spine.      Impression    IMPRESSION:  1.  Percutaneous peritoneal dialysis catheter  terminates in the right abdomen.  2.  Generalized body wall edema most notable of the ventral pelvic wall where there is associated skin thickening suggesting possible cellulitis.  3.  Chronic atrophy of the kidneys.  4.  Sleeve gastrectomy.  5.  Colonic diverticulosis.  6.  Cholelithiasis.   CTA Chest Abdomen Pelvis w Contrast    Narrative    EXAM: CTA CHEST ABDOMEN PELVIS W CONTRAST  LOCATION: [facility]  DATE/TIME: 5/10/2023 8:03 AM CDT    INDICATION: severe chest pain, uncontrolled HTN, pt with chronic renal failure (dialysis) getting admitted  COMPARISON: None.  TECHNIQUE: CT angiogram chest abdomen pelvis during arterial phase of injection of IV contrast. 2D and 3D MIP reconstructions were performed by the CT technologist. Dose reduction techniques were used.   CONTRAST: isovue 370 100 mls    FINDINGS:   CT ANGIOGRAM CHEST, ABDOMEN, AND PELVIS: The pulmonary arteries enlarged measuring 3.9 cm suggesting pulmonary arterial hypertension. No filling defect to suggest a pulmonary embolism. The abdominal aorta is normal in caliber with no evidence of aortic   dissection. The celiac axis, SMA, bilateral renal arteries, AARON, and iliac arteries are patent.    LUNGS AND PLEURA: Trace left effusion. Linear bandlike areas of atelectasis bilaterally. Diffuse groundglass opacity with mosaic attenuation of the lungs suggesting small airway disease.    MEDIASTINUM/AXILLAE: Heart is normal in size. No adenopathy.    CORONARY ARTERY CALCIFICATION: None.    HEPATOBILIARY: Liver and gallbladder within normal limits.    PANCREAS: Normal.    SPLEEN: Normal.    ADRENAL GLANDS: Normal.    KIDNEYS/BLADDER: Atrophy of both kidneys with no evidence of hydronephrosis.    BOWEL: Diverticulosis of the colon. No acute inflammatory change. No obstruction. Postsurgical changes are noted to the stomach.    LYMPH NODES: Normal.    PELVIC ORGANS: Bladder within normal limits. Uterus within normal limits.  shunt  catheter is noted. Small fat and fluid containing hernia at the umbilicus. Soft tissue edema is noted along the anterior subcutaneous soft tissues.    MUSCULOSKELETAL: Degenerative change of bilateral hips and spine.      Impression    IMPRESSION:  1.  No evidence of aortic dissection or pulmonary embolism. No acute findings in the chest, abdomen, and pelvis.  2.  The pulmonary arteries are enlarged suggesting possible pulmonary arterial hypertension.

## 2023-05-10 NOTE — ED TRIAGE NOTES
Reports pain started in left shoulder and wrapped around to chest that started approx 30 mins PTA while sitting in a chair. Reports feeling short of breath. Denies nausea, dizziness. Hx renal failure on dialysis, htn.

## 2023-05-10 NOTE — ED PROVIDER NOTES
EMERGENCY DEPARTMENT ENCOUNTER      NAME: Devora Garcia  AGE: 62 year old female  YOB: 1960  MRN: [id number]  EVALUATION DATE & TIME: 5/10/2023  5:08 AM    PCP: Josesito Evans    ED PROVIDER: Candice Denson M.D.        Chief Complaint   Patient presents with     Chest Pain         FINAL IMPRESSION:    1. Acute chest pain    2. SOB (shortness of breath)    3. Anemia, unspecified type    4. Hypertension, unspecified type    5. Chronic renal failure, unspecified CKD stage            MEDICAL DECISION MAKIN year old female with history of peritoneal dialysis for chronic renal failure, hypertension, diabetes, obesity, who presents emergency department with chest pain.  Mainly in the left chest.  Worsens with deep breathing and that there is some component to this reproducible with palpation as well.  She denies any recent activity or trauma.  Laboratories consistent with known renal failure.  Troponin mid range, though patient is a dialysis patient.  Unclear the significance of this at this point.  EKG x2 without ST elevations or significant depressions.  Initial CT scan to the chest was done without contrast given her chronic renal failure and was unremarkable.  Patient continues to complain of severe pain despite narcotics and multiple doses of nitroglycerin.  She is also persistently severely hypertensive.  I discussed the risks and benefits of CT contrast in a patient with renal failure such as herself with still urinates and she agrees with proceeding with CT scan with contrast knowing that this could worsen her kidney disease.  At this time given her ongoing chest pain and severe hypertension I do think we need to rule out dissection and even PE as best we can.  Ultimately patient has been accepted to the hospitalist service and will go to their service on cardiac telemetry once the CT scan does not show a life-threatening process.  Her home blood pressure medications have been  initiated.  Pharmacy to review the rest of her medications.     Patient aware of current results and plans and agrees with the plan for admission.   present at bedside.      ED COURSE:  5:23 AM  I met with the patient to gather history and perform my exam. ED course and treatment discussed.  Patient states that she is peritoneal dialysis but still urinates several times a day.  Last creatinine the computer system was 10.  Overall I think that dissection or PE are less likely in this patient, so we will do a CT scan without contrast.  If nothing else comes back positive for etiology for her symptoms and would consider doing like a VQ scan or further discussion with the patient about CTA with contrast though at this time I do not think she requires this procedure with contrast quite yet.    5:42 AM  Pt declined the aspirin as she reports it causes GI upset.    7:07 AM  Patient continues to complain of presignificant chest pain.  She is requesting more pain medication.  She was placed on a little supplemental nasal cannula oxygen due to her prior Dilaudid doses. We will do repeat EKG.  While she does still urinate despite her chronic renal failure.  She has had occasional CT scans with contrast.  I discussed the risk and benefits of contrast in this case and since she is still persistently severely hypertensive and complains of severe pain requiring multiple doses of narcotics the plan is to go ahead with CT scan with contrast to rule out dissection.  Patient aware of this plan and agrees.  She understands the risk of worsening kidney function is certainly possible after contrast.  Ultimately she will be admitted to the hospital.  Patient states that she did take her evening blood pressure medications but obviously has not taken her morning medications since she has been here in the ER.  Review shows that patient had a blood pressure of 110/70 back on June 8, 2022 at primary care office.    7:34 AM  CT scan  questions possible cellulitis in the ventral pelvic wall area.  On exam she has no tenderness here.  Unable to do a detailed skin exam as the patient is in the hallway currently but she denies any concerns for infection or skin irritation.    7:38 AM  Patient has been accepted to the hospital by the hospitalist and will go to cardiac telemetry pending a unremarkable CTA chest abdomen pelvis.  Hospitalist is aware that pharmacy will be going over her home medications as well.  I have ordered some of her home medications and I am confident of the pharmacist to review the rest of her medications.     I do not think that this represents rib fractures, myocarditis, pericarditis, endocarditis, PE, ruptured AAA, pneumothorax, aortic dissection, bowel obstruction, bowel ischemia, cholecystitis, kidney stone, pyelonephritis, or other such etiologies at this time.      COVID-19 PPE worn during patient evaluation:  Mask: surgical  Eye Protection: none   Gown: none   Hair cover: yes  Face shield: none  Patient wearing a mask: none    At the conclusion of the encounter I discussed the results of all of the tests and the disposition. Their questions were answered. The patient (and any family present) acknowledged understanding and were agreeable with the care plan.    CONSULTANTS:  none        MEDICATIONS GIVEN IN THE EMERGENCY:  Medications   nitroGLYcerin (NITROSTAT) sublingual tablet 0.4 mg (0.4 mg Sublingual $Given 5/10/23 0558)   aspirin (ASA) tablet 325 mg (325 mg Oral Not Given 5/10/23 0541)   HYDROmorphone (PF) (DILAUDID) injection 0.5 mg (0.5 mg Intravenous $Given 5/10/23 0605)   cloNIDine (CATAPRES) tablet 0.1 mg (0.1 mg Oral $Given 5/10/23 0729)   carvedilol (COREG) tablet 6.25 mg (6.25 mg Oral $Given 5/10/23 0728)   oxyCODONE (ROXICODONE) tablet 5 mg (5 mg Oral $Given 5/10/23 0729)   fentaNYL (PF) (SUBLIMAZE) injection 50 mcg (50 mcg Intravenous $Given 5/10/23 0748)           NEW PRESCRIPTIONS STARTED AT TODAY'S ER  VISIT     Medication List      There are no discharge medications for this visit.             CONDITION:  stable        DISPOSITION:  Card tele obs as accepted by Dr. Kraus, hospitalist         =================================================================  =================================================================  TRIAGE ASSESSMENT:  Reports pain started in left shoulder and wrapped around to chest that started approx 30 mins PTA while sitting in a chair. Reports feeling short of breath. Denies nausea, dizziness. Hx renal failure on dialysis, htn.       ED Triage Vitals [05/10/23 0505]   Enc Vitals Group      BP (!) 219/96      Pulse 71      Resp 22      Temp 97.2  F (36.2  C)      Temp src Temporal      SpO2 94 %      Weight 99.8 kg (220 lb)      ================================================================  ================================================================    HPI    Patient information was obtained from: patient    Use of Intrepreter: N/A     Devora Garcia is a 62 year old female with history of chronic renal failure on peritoneal dialysis, HTN who presents to the ER with complaints of left-sided chest pain.  Began about 30 minutes prior to arrival.  Located in the left chest and worse with deep breathing.  She denies any radiation of the pain.  She has not taken anything for the pain.  She otherwise denies any fevers, cough, abdominal pain, vomiting or diarrhea.      REVIEW OF SYSTEMS  Review of Systems   Constitutional: Negative for fever.   Respiratory: Positive for shortness of breath (due to pain). Negative for cough.    Cardiovascular: Positive for chest pain.   Gastrointestinal: Negative for abdominal pain, diarrhea, nausea and vomiting.   Genitourinary: Negative for dysuria.   Allergic/Immunologic: Negative for immunocompromised state.   All other systems reviewed and are negative.        PAST MEDICAL HISTORY:  Past Medical History:   Diagnosis Date     Allergic  rhinitis      Cancer (H)     left breast     Chronic kidney disease      Chronic kidney disease, stage 3 (H) 2010     Cyst of left ovary      Diabetes mellitus (H)     12/2020 states no longer takin insulin     GERD (gastroesophageal reflux disease)      Gout      Hypertension      Intraductal carcinoma 2015     Obstructive sleep apnea on CPAP     uses CPAP     Psoriasis          PAST SURGICAL HISTORY:  Past Surgical History:   Procedure Laterality Date     BARIATRIC SURGERY  2014     HC CYSTOSCOPY,INSERT URETERAL STENT Right 11/7/2018    Procedure: CYSTOSCOPY, WITH RIGHT URETERAL STENT INSERTION;  Surgeon: Dino Reynoso MD;  Location: [facility] Main OR;  Service: Urology     HC CYSTOSCOPY,INSERT URETERAL STENT Right 8/31/2020    Procedure: CYSTOSCOPY, WITH URETERAL STENT INSERTION;  Surgeon: Dino Reynoso MD;  Location: [facility] Main OR;  Service: Urology     IR CVC TUNNEL PLACEMENT > 5 YRS OF AGE  9/2/2020     IR CVC TUNNEL REVISION RIGHT  10/22/2020     IR TUNNELED CATHETER COMPLETE REPLACEMENT  10/22/2020     IR TUNNELED CATHETER INSERT  9/2/2020     LUMPECTOMY BREAST Left 2015     MAMMOPLASTY REDUCTION  2015     OTHER SURGICAL HISTORY  12/02/2020    peritoneal dialsis catheter placement     CT CYSTO/URETERO W/LITHOTRIPSY &INDWELL STENT INSRT Right 11/30/2018    Procedure: CYSTOSCOPY, RIGHT URETEROSCOPY, LASER LITHOTRIPSY STENT REMOVAL STENT INSERTION;  Surgeon: Dino Reynoso MD;  Location: [facility] Main OR;  Service: Urology     CT LAP INSERTION TUNNELED INTRAPERITONEAL CATHETER N/A 12/23/2020    Procedure: LAPAROSCOPY , Enterolysis;  Surgeon: Tigre Rivas MD;  Location: [facility] Main OR;  Service: General     CT LAP,DIAGNOSTIC ABDOMEN N/A 12/29/2020    Procedure: LAPAROSCOPY, De-Clot of Peritoneal Dialysis Catheter;  Surgeon: Tigre Rivas MD;  Location: [facility] Main OR;  Service: General         CURRENT MEDICATIONS:    Prior to Admission medications    Medication Sig Start Date  End Date Taking? Authorizing Provider   allopurinol (ZYLOPRIM) 100 MG tablet Take 100 mg by mouth 9/6/20   Reported, Patient   amLODIPine (NORVASC) 5 MG tablet Take 5 mg by mouth 9/6/20   Reported, Patient   bumetanide (BUMEX) 2 MG tablet  1/22/21   Reported, Patient   carvedilol (COREG) 6.25 MG tablet TAKE 1 TABLET BY MOUTH TWICE DAILY WITH MEALS 9/25/21   Reported, Patient   cloNIDine (CATAPRES-TTS2) 0.2 MG/24HR WK patch Place 1 patch onto the skin 1/14/21   Reported, Patient   ergocalciferol (ERGOCALCIFEROL) 1.25 MG (23184 UT) capsule  9/8/20   Reported, Patient   eszopiclone (LUNESTA) 2 MG tablet TAKE 1 TABLET BY MOUTH AT BEDTIME AS NEEDED FOR SLEEP 1/22/21   Reported, Patient   flurbiprofen (ANSAID) 100 MG tablet TAKE 1 TABLET BY MOUTH EVERY 8 HOURS AS NEEDED FOR PAIN 2/23/21   Reported, Patient   insulin aspart (NOVOLOG PEN) 100 UNIT/ML pen Inject 6 Units Subcutaneous 2/4/20   Reported, Patient   lisinopril (ZESTRIL) 40 MG tablet Take 40 mg by mouth daily 2/10/21   Reported, Patient   omeprazole (PRILOSEC) 10 MG DR capsule TAKE 1 CAPSULE BY MOUTH EVERY DAY BEFORE A MEAL 1/22/21   Reported, Patient   vitamin B-12 (CYANOCOBALAMIN) 1000 MCG tablet Take 1,000 mcg by mouth    Reported, Patient         ALLERGIES:  Allergies   Allergen Reactions     Aspirin Nausea     Statins-Hmg-Coa Reductase Inhibitors [Statins] Other (See Comments)     Other reaction(s): Renal Failure, Other reaction(s): Renal Failure         FAMILY HISTORY:  Family History   Problem Relation Age of Onset     Kidney failure Father      Hypertension Father      Hypertension Brother      Diabetes Brother      Kidney failure Brother      Clotting Disorder No family hx of      Anesthesia Reaction No family hx of          SOCIAL HISTORY:  Social History     Socioeconomic History     Marital status:    Tobacco Use     Smoking status: Never     Smokeless tobacco: Never   Substance and Sexual Activity     Alcohol use: Yes     Comment: Alcoholic  Drinks/day: Rare, 2/month     Drug use: No         VITALS:  Patient Vitals for the past 24 hrs:   BP Temp Temp src Pulse Resp SpO2 Weight   05/10/23 0728 (!) 232/92 -- -- 68 -- -- --   05/10/23 0645 (!) 195/88 -- -- 66 20 97 % --   05/10/23 0630 (!) 178/89 -- -- 65 21 96 % --   05/10/23 0615 (!) 187/83 -- -- 65 20 99 % --   05/10/23 0611 -- -- -- -- -- 95 % --   05/10/23 0605 -- -- -- -- -- (!) 86 % --   05/10/23 0600 (!) 203/81 -- -- 62 13 93 % --   05/10/23 0559 (!) 199/91 -- -- 62 11 92 % --   05/10/23 0545 (!) 199/86 -- -- 63 23 95 % --   05/10/23 0530 (!) 199/87 -- -- 63 22 97 % --   05/10/23 0524 (!) 201/84 -- -- 62 24 98 % --   05/10/23 0505 (!) 219/96 97.2  F (36.2  C) Temporal 71 22 94 % 99.8 kg (220 lb)       Wt Readings from Last 3 Encounters:   05/10/23 99.8 kg (220 lb)   06/07/22 85.7 kg (189 lb)   11/15/21 93.4 kg (206 lb)       Estimated Creatinine Clearance: 4.1 mL/min (A) (based on SCr of 15.72 mg/dL (HH)).    PHYSICAL EXAM    Constitutional:  Well developed, Well nourished, NAD, GCS 15  HENT:  Normocephalic, Atraumatic, Bilateral external ears normal, Nose normal. Neck- Supple, No stridor.   Eyes:  PERRL, EOMI, Conjunctiva normal, No discharge.  Respiratory:  Normal breath sounds, No respiratory distress, No wheezing, Speaks full sentences easily. No cough.   Cardiovascular:  Normal heart rate, Regular rhythm, No murmurs, No rubs, No gallops. Chest wall nontender.   GI:  + obesity.  Bowel sounds normal, Soft, No tenderness, No masses, No flank tenderness. No rebound or guarding.  : deferred  Musculoskeletal:  No cyanosis, No clubbing. Good range of motion in all major joints. No major deformities noted.   Integument:  Warm, Dry, No erythema, No rash.  No petechiae.  Neurologic:  Alert & oriented x 3  Psychiatric:  Affect normal, Cooperative         LAB:  All pertinent labs reviewed and interpreted.  Recent Results (from the past 24 hour(s))   Extra Red Top Tube    Collection Time: 05/10/23  5:22  AM   Result Value Ref Range    Hold Specimen JIC    Extra Green Top (Lithium Heparin) Tube    Collection Time: 05/10/23  5:22 AM   Result Value Ref Range    Hold Specimen JIC    Extra Purple Top Tube    Collection Time: 05/10/23  5:22 AM   Result Value Ref Range    Hold Specimen JIC    Extra Blue Top Tube    Collection Time: 05/10/23  5:22 AM   Result Value Ref Range    Hold Specimen JIC    Basic metabolic panel    Collection Time: 05/10/23  5:22 AM   Result Value Ref Range    Sodium 138 136 - 145 mmol/L    Potassium 4.0 3.5 - 5.0 mmol/L    Chloride 100 98 - 107 mmol/L    Carbon Dioxide (CO2) 22 22 - 31 mmol/L    Anion Gap 16 5 - 18 mmol/L    Urea Nitrogen 78 (H) 8 - 22 mg/dL    Creatinine 15.72 (HH) 0.60 - 1.10 mg/dL    Calcium 8.9 8.5 - 10.5 mg/dL    Glucose 85 70 - 125 mg/dL    GFR Estimate 2 (L) >60 mL/min/1.73m2   Troponin I (now)    Collection Time: 05/10/23  5:22 AM   Result Value Ref Range    Troponin I 0.11 0.00 - 0.29 ng/mL   Hepatic function panel    Collection Time: 05/10/23  5:22 AM   Result Value Ref Range    Bilirubin Total 0.5 0.0 - 1.0 mg/dL    Bilirubin Direct 0.2 <=0.5 mg/dL    Protein Total 6.7 6.0 - 8.0 g/dL    Albumin 2.5 (L) 3.5 - 5.0 g/dL    Alkaline Phosphatase 61 45 - 120 U/L    AST 9 0 - 40 U/L    ALT <9 0 - 45 U/L   Lipase    Collection Time: 05/10/23  5:22 AM   Result Value Ref Range    Lipase 42 0 - 52 U/L   Magnesium    Collection Time: 05/10/23  5:22 AM   Result Value Ref Range    Magnesium 3.1 (H) 1.8 - 2.6 mg/dL   CBC with platelets and differential    Collection Time: 05/10/23  5:22 AM   Result Value Ref Range    WBC Count 8.5 4.0 - 11.0 10e3/uL    RBC Count 2.77 (L) 3.80 - 5.20 10e6/uL    Hemoglobin 7.7 (L) 11.7 - 15.7 g/dL    Hematocrit 25.1 (L) 35.0 - 47.0 %    MCV 91 78 - 100 fL    MCH 27.8 26.5 - 33.0 pg    MCHC 30.7 (L) 31.5 - 36.5 g/dL    RDW 13.0 10.0 - 15.0 %    Platelet Count 275 150 - 450 10e3/uL    % Neutrophils 62 %    % Lymphocytes 23 %    % Monocytes 8 %    %  Eosinophils 5 %    % Basophils 1 %    % Immature Granulocytes 1 %    NRBCs per 100 WBC 0 <1 /100    Absolute Neutrophils 5.3 1.6 - 8.3 10e3/uL    Absolute Lymphocytes 2.0 0.8 - 5.3 10e3/uL    Absolute Monocytes 0.7 0.0 - 1.3 10e3/uL    Absolute Eosinophils 0.4 0.0 - 0.7 10e3/uL    Absolute Basophils 0.1 0.0 - 0.2 10e3/uL    Absolute Immature Granulocytes 0.0 <=0.4 10e3/uL    Absolute NRBCs 0.0 10e3/uL       No results found for: ABORH        RADIOLOGY:  Reviewed all pertinent imaging. Please see official radiology report.    CT Chest Abdomen Pelvis w/o Contrast   Final Result   IMPRESSION:   1.  Percutaneous peritoneal dialysis catheter terminates in the right abdomen.   2.  Generalized body wall edema most notable of the ventral pelvic wall where there is associated skin thickening suggesting possible cellulitis.   3.  Chronic atrophy of the kidneys.   4.  Sleeve gastrectomy.   5.  Colonic diverticulosis.   6.  Cholelithiasis.      CTA Chest Abdomen Pelvis w Contrast    (Results Pending)         EKG:    Indication: Chest pain  Performed at: 05:07am  Impression: Sinus rhythm at 67 bpm.  Flipped T waves in lead aVR and aVL.  VT interval 196 ms,  ms, QTc 443 ms.  There is a lot of motion artifact.  Otherwise nonspecific ST changes compared to May 29, 2021.    EKG:    Indication: Chest pain  Performed at: 07:21am  Impression: Sinus rhythm at 69 bpm.  Flipped T waves in lead aVR.  No ST elevations appreciated.  No significant ST depressions appreciated.  VT interval 220 ms consistent with first-degree AV block.   ms, QTc 4 and 86 ms.  Overall nonspecific ST changes compared to the EKG done earlier this morning.    I have independently reviewed and interpreted the EKG(s) documented above.        PROCEDURES:  none      Medical Decision Making    History:    Supplemental history from: Documented in chart, if applicable    External Record(s) reviewed: Documented in chart, if applicable.    Work Up:    Chart  documentation includes differential considered and any EKGs or imaging independently interpreted by provider, where specified.    In additional to work up documented, I considered the following work up: Documented in chart, if applicable.    External consultation:    Discussion of management with another provider: Documented in chart, if applicable    Complicating factors:    Care impacted by chronic illness: Chronic Kidney Disease and Hypertension    Care affected by social determinants of health: N/A    Disposition considerations: Admit.      Candice Denson M.D. [facility]  Emergency Medicine and Medical Toxicology  [facility] EMERGENCY ROOM  [address] [place] [id number]  [phone]  Dept: [phone]           Candice Denson MD  05/10/23 0741

## 2023-05-10 NOTE — CONSULTS
"    RENAL CONSULT NOTE    REQUESTING PHYSICIAN: Amanda    REASON FOR CONSULT: ESRD    Consults      ASSESSMENT/PLAN:  Pt with CP / shoulder pain. Seems atypical for cardiac source, trop normal, EKG no changes and echo pending.     ESRD on CAPD, 4 X 3000ml but appears poor volume control, severe HTN.   Reports compliant with CAPD, resistant to resuming CCPD.   Will have her do 2 manual exchanges tonight 4.25% dextrose for both.   Then if poor UF tomorrow, reconsider placing back on CCPD.   Also discussed possibly retrying HD in future.    Anemia suboptimal control on 100 mcg mircera q2wks, dose inc this week. Will give one time extra epo dose due to declining hgb    Severe hyperPTH and poor phos control. Suspect poor diet/binder compliance. On sevelamer. ?sensipar n future.      DM diet control    Obesity    Severe HTN exacerbated by volume overload    Pruritis with no primary rash, excoriations on back. Suspect hyperPTH/Phos contributing. Try hydroxyzine and better phos control    Araseli Wheeler MD  [facility]  [phone]    Addendum: echo normal LVF, +diastolic dysfxn and no effusion.         HPI:  Pt woke with itching overnoc, then onset of \"severe\" L shoulder and CP, no relief in ER with NTG, some improvement after variety of narcotics.   Now still has \"slight\" pain, aware it's there, worse with deep inspiration. No cough.   Chronic LOPEZ maybe worse lately.   Trops normal   No EKG changes   Echo pending.   CTA neg PE or dissection.     Pt has ESRD on dialysis since 2014, currently doing CAPD 4 x 3000 ml under care Dr Haley. Last KT/V 1.8 after inc in [facility] to 3 liters. Chronic probs with volume overload, pulls about 1700ml /day with 3x 3000 ml 2.5% and 1x 3000ml 4.25% dextrose she pulls on average 1700ml a noc. Admits she has been eating a lot of ice and drinking more pop.   Also c/o severe itching, rash on back, so far no relief with triamcinalone cream.   Knows wt up, thinks she's " "gained weight, \"eating more\"   No cloudy fluid or abd pain.   Bowels regular.  Very little urine output, last Cr Cl 2 %      REVIEW OF SYSTEMS:  COMPLETE REVIEW OF SYSTEMS: as above o/w is negative.       Past Medical History:   Diagnosis Date     Allergic rhinitis      Cancer (H)     left breast     Chronic kidney disease      Chronic kidney disease, stage 3 (H) 2010     Cyst of left ovary      Diabetes mellitus (H)     12/2020 states no longer takin insulin     GERD (gastroesophageal reflux disease)      Gout      Hypertension      Intraductal carcinoma 2015     Obstructive sleep apnea on CPAP     uses CPAP     Psoriasis        [unfilled]      ALLERGIES/SENSITIVITIES:  Allergies   Allergen Reactions     Aspirin Nausea     Statins-Hmg-Coa Reductase Inhibitors [Statins] Other (See Comments)     Other reaction(s): Renal Failure, Other reaction(s): Renal Failure     @SOCR(SUB)@  [unfilled]     PHYSICAL EXAM:  Physical Exam   Temp: 98.7  F (37.1  C) Temp src: Oral BP: (!) 195/87 Pulse: 67   Resp: 20 SpO2: 97 % O2 Device: Nasal cannula Oxygen Delivery: 2 LPM  Vitals:    05/10/23 0505 05/10/23 1400   Weight: 99.8 kg (220 lb) 103 kg (227 lb 1.6 oz)     Vital Signs with Ranges  Temp:  [97.2  F (36.2  C)-98.7  F (37.1  C)] 98.7  F (37.1  C)  Pulse:  [62-73] 67  Resp:  [11-24] 20  BP: (171-232)/(71-99) 195/87  SpO2:  [86 %-100 %] 97 %  No intake/output data recorded.         Patient Vitals for the past 72 hrs:   Weight   05/10/23 1400 103 kg (227 lb 1.6 oz)   05/10/23 0505 99.8 kg (220 lb)        General - A & O x 3, NAD, appears uncomfortable, 2 liters O2  HEENT - PERRLA, no scleral icterus  Neck -?JVD thick neck   Respiratory - Lungs CTA bilat without wheeze, rhonchi, rales, decreased at bases no crackles   Cardiovascular - AP RRR no rub noted no murmur  Abdomen - soft, NT no masses no fluid wave  Extremities - well perfused, 2 +pitting to upper thighs with stasis changes no presacral edema   Integumentary - intact, good " turgor, no rash/lesions  Neurologic - grossly intact  Psych:  Judgement intact, affect WNL    Laboratory:     Recent Labs   Lab 05/10/23  0522   WBC 8.5   RBC 2.77*   HGB 7.7*   HCT 25.1*          Basic Metabolic Panel:  Recent Labs   Lab 05/10/23  0522      POTASSIUM 4.0   CHLORIDE 100   CO2 22   BUN 78*   CR 15.72*   GLC 85   RON 8.9       INRNo lab results found in last 7 days.    Recent Labs   Lab Test 05/10/23  0522 06/08/22  0021   POTASSIUM 4.0 3.8   CHLORIDE 100 101   BUN 78* 45*      Recent Labs   Lab Test 05/10/23  0522 06/08/22  0021 05/29/21  1832 08/31/20  0607 08/30/20  1455   ALBUMIN 2.5* 2.1*  --    < >  --    BILITOTAL 0.5 0.3  --    < >  --    ALT <9 <9  --    < >  --    AST 9 8  --    < >  --    PROTEIN  --   --  100*  --  >=500 mg/dL*    < > = values in this interval not displayed.       Personally reviewed today's laboratory studies  Hgb in CDU 8.1 PTH 1600 Phos 11.9  Target weight 97kg!    Thank you for involving us in the care of this patient. We will continue to follow along with you.      Araseli Wheeler MD   [facility]  [phone]

## 2023-05-11 NOTE — PLAN OF CARE
Goal Outcome Evaluation:  PRIMARY DIAGNOSIS: CHEST PAIN  OUTPATIENT/OBSERVATION GOALS TO BE MET BEFORE DISCHARGE:  1. Ruled out acute coronary syndrome (negative or stable Troponin): trops neg  2. Pain Status: chest pain controlled with dilaudid  3. Appropriate provocative testing performed: Not yet completed, plan for stress test tomorrow morning  - Stress Test Procedure: Nuclear  - Interpretation of cardiac rhythm per telemetry tech: NSR    4. Cleared by Consultants (if applicable):No  5. Return to near baseline physical activity: yes  Discharge Planner Nurse   Safe discharge environment identified: Yes  Barriers to discharge: Yes       Entered by: Sayda Rosado RN 05/10/2023 9:12 PM     Please review provider order for any additional goals.   Nurse to notify provider when observation goals have been met and patient is ready for discharge.

## 2023-05-11 NOTE — PROGRESS NOTES
PRIMARY DIAGNOSIS: CHEST PAIN  OUTPATIENT/OBSERVATION GOALS TO BE MET BEFORE DISCHARGE:  1. Ruled out acute coronary syndrome (negative or stable Troponin):  Yes  2. Pain Status: Pain free.  3. Appropriate provocative testing performed: No, Stress test @0900  - Stress Test Procedure: Nuclear  - Interpretation of cardiac rhythm per telemetry tech: NSR    4. Cleared by Consultants (if applicable):No  5. Return to near baseline physical activity: Yes  Discharge Planner Nurse   Safe discharge environment identified: Yes  Barriers to discharge: Yes, Stress test @0900       Entered by: En Alvarado RN 05/11/2023 6:33 AM     Please review provider order for any additional goals.   Nurse to notify provider when observation goals have been met and patient is ready for discharge.

## 2023-05-11 NOTE — PROGRESS NOTES
PRIMARY DIAGNOSIS: CHEST PAIN  OUTPATIENT/OBSERVATION GOALS TO BE MET BEFORE DISCHARGE:  1. Ruled out acute coronary syndrome (negative or stable Troponin):  Yes  2. Pain Status: Pain free.  3. Appropriate provocative testing performed: No, stress test @0900  - Stress Test Procedure: Nuclear  - Interpretation of cardiac rhythm per telemetry tech: NSR    4. Cleared by Consultants (if applicable):No  5. Return to near baseline physical activity: Yes  Discharge Planner Nurse   Safe discharge environment identified: Yes  Barriers to discharge: Yes, stress test @0900       Entered by: En Alvarado RN 05/11/2023 4:23 AM     Please review provider order for any additional goals.   Nurse to notify provider when observation goals have been met and patient is ready for discharge.

## 2023-05-11 NOTE — PROGRESS NOTES
Care Management Follow Up    Length of Stay (days): 0    Expected Discharge Date: 05/12/2023     Concerns to be Addressed:    Discharge planning.     Additional Information:  CM reviewed chart. Patient independent at baseline. Lives with spouse. Patient has been doing peritoneal dialysis. Anticipate she will go home with spouse, and spouse support.   Cm avail if needs arise.      Candice Lamb RN

## 2023-05-11 NOTE — PROGRESS NOTES
"    RENAL PROGRESS NOTE    CC: chest pain     ASSESSMENT & PLAN:     ESRD - on CAPD 4 fills x 3000 mL FV but poor volume control, severe hypertension, and decreased adequacy. Ongoing discussions as outpatient regarding her poor tolerability with CAPD and at minimum switching to CCPD or ultimately back to iHD. She is opposed to both these options. 2 manual exchanges overnight 5/10 with 4.25% dextrose and weight up further today. Marcia adamant about continuing CAPD, will try 4 exchanges, 90 min dwell time, 2500 ml FV and 6 hour overnight dwell.     Chest/shoulder pain - Atypical for cardiac source, seems to be MSK in nature. TTE with LVEF 65-70%, no WMA. Nuclear stress test is pending.     Severe hypertension/hypervolemia - poor UF with CAPD as above. Last 24 hour urine with [facility] clinic with 2% CrCl, 750 ml total UO. She was started back on Bumex recently but has not yet picked this up from the pharmacy. Attempt to diurese with IV Bumex 2 mg Q12 hours and follow response. Strict I&O and 1200 ml fluid restriction.     Anemia - Hgb 7's this admit, on DARRIN and parenteral iron with [facility]. Dosing EPO 20K today.      Severe hyperPTH  Poorly controlled hyperphosphatemia   Suspect poor diet/binder compliance. On sevelamer and TUMS. ?sensipar in future.       DM diet control     Obesity     Pruritis with no primary rash -  excoriations on back. Suspect hyperPTH/Phos contributing. Try hydroxyzine and better phos control    SUBJECTIVE:  Seen at bedside, says chest pain is back today, points to center of her chest and + pain with palpation and deep inspiration. Unsure if she feels any better after exchanges overnight. Weight is up further today, she thinks we \"can't go by that\" because she only had two exchanges total yesterday. Strongly opposed to CCPD. Discussed plan with PD RN today.     OBJECTIVE:  Physical Exam   Temp: 98.8  F (37.1  C) Temp src: Oral BP: (!) 165/73 (Nurse notified) Pulse: 80   Resp: 18 SpO2: 91 % O2 Device: None " (Room air) Oxygen Delivery: 2 LPM  Vitals:    05/10/23 0505 05/10/23 1400 05/11/23 0015   Weight: 99.8 kg (220 lb) 103 kg (227 lb 1.6 oz) 104.6 kg (230 lb 8 oz)     Vital Signs with Ranges  Temp:  [97.6  F (36.4  C)-98.8  F (37.1  C)] 98.8  F (37.1  C)  Pulse:  [66-86] 80  Resp:  [18-20] 18  BP: (153-207)/(69-95) 165/73  SpO2:  [91 %-99 %] 91 %  I/O last 3 completed shifts:  In: 360 [P.O.:360]  Out: 300 [Urine:300]    @TMAXR(24)@    Patient Vitals for the past 72 hrs:   Weight   05/11/23 0015 104.6 kg (230 lb 8 oz)   05/10/23 1400 103 kg (227 lb 1.6 oz)   05/10/23 0505 99.8 kg (220 lb)       Intake/Output Summary (Last 24 hours) at 5/11/2023 1253  Last data filed at 5/11/2023 1154  Gross per 24 hour   Intake 360 ml   Output 300 ml   Net 60 ml       PHYSICAL EXAM:  General - Alert and oriented x3, appears somewhat uncomfortable   Cardiovascular - Regular rate and rhythm, no rub, pain with palpation of presternal area   Respiratory - Lung sounds diminished   Abd: BS present, no guarding or pain with palpation, no ascites  Extremities - Tense, woody edema in bilateral LE   Skin: Skin dry   Neuro:  Grossly intact, no focal deficits  MSK:  Grossly intact  Psych:  Irritable, flat affect     LABORATORY STUDIES:     Recent Labs   Lab 05/11/23  0450 05/10/23  0522   WBC  --  8.5   RBC  --  2.77*   HGB 7.6* 7.7*   HCT  --  25.1*   PLT  --  275       Basic Metabolic Panel:  Recent Labs   Lab 05/11/23  0450 05/10/23  0522    138   POTASSIUM 4.3 4.0   CHLORIDE 101 100   CO2 21* 22   BUN 77* 78*   CR 15.60* 15.72*   GLC 87 85   RON 9.3 8.9       INRNo lab results found in last 7 days.     Recent Labs   Lab Test 05/11/23 0450 05/10/23  0522 11/10/22  0715   WBC  --  8.5 7.3   HGB 7.6* 7.7* 10.8*   PLT  --  275 272       Personally reviewed current labs      Jolene Lerner PA-C   [facility]  [phone]

## 2023-05-11 NOTE — PROGRESS NOTES
PRIMARY DIAGNOSIS: CHEST PAIN  OUTPATIENT/OBSERVATION GOALS TO BE MET BEFORE DISCHARGE:  1. Ruled out acute coronary syndrome (negative or stable Troponin):  Yes  2. Pain Status: Improved but still requiring IV narcotics.  3. Appropriate provocative testing performed: No, stress test being performed at time of note.  - Stress Test Procedure: Nuclear  - Interpretation of cardiac rhythm per telemetry tech: NSR    4. Cleared by Consultants (if applicable):No  5. Return to near baseline physical activity: Yes  Discharge Planner Nurse   Safe discharge environment identified: Yes  Barriers to discharge: Yes       Entered by: Bhavana Paul RN 05/11/2023 11:53 AM     Please review provider order for any additional goals.   Nurse to notify provider when observation goals have been met and patient is ready for discharge.

## 2023-05-11 NOTE — PROGRESS NOTES
[facility]    Medicine Progress Note - Hospitalist Service    Date of Admission:  5/10/2023    Assessment & Plan   62-year-old female patient with history of end-stage renal failure on peritoneal dialysis admitted with chest pain    Chest pain.   Patient with multiple CAD risk factors.  Nuclear stress test completed pending cardiology review.  Patient continues to have moderate to severe chest pain complaints, uncontrolled on Dilaudid 4 mg p.o. as needed.  Started on morphine 2 mg IV every 4 hours as needed for dyspnea or chest pain complaints.  I suspect that her chest pain is musculoskeletal in nature.  On telemetry  Uncontrolled hypertension.  On peritoneal dialysis.  PTA blood pressure medications continued.  Better control of her volume status with CCPD.  Nephrology consulted and recommendations noted.  Patient is currently on CAPD  End-stage renal disease on CAPD.  No hyperkalemia  Acute on chronic anemia due to chronic renal insufficiency.  Hemoglobin 7.6.  Received Retacrit 20,000 units subcu once.  Secondary hyperparathyroid condition.  Poor phosphorus control.  Nephrology assessment and recommendations noted  Left shoulder pain  Pruritus rash on the back.  Antihistamine as needed.     Diet: Combination Diet Renal Diet (dialysis); No Caffeine Diet  Fluid restriction 1200 ML FLUID    DVT Prophylaxis: Ambulate every shift  Taylor Catheter: Not present  Lines: None     Cardiac Monitoring: ACTIVE order. Indication: Chest pain/ ACS rule out (24 hours)  Code Status: Full Code      Clinically Significant Risk Factors Present on Admission           # Hypercalcemia: corrected calcium is >10.1, will monitor as appropriate    # Hypoalbuminemia: Lowest albumin = 2.3 g/dL at 5/11/2023  4:50 AM, will monitor as appropriate     # Hypertension: Noted on problem list      # Severe Obesity: Estimated body mass index is 43.55 kg/m  as calculated from the following:    Height as of this encounter:  "1.549 m (5' 1\").    Weight as of this encounter: 104.6 kg (230 lb 8 oz).            Disposition Plan  TBD     Expected Discharge Date: 05/12/2023        Discharge Comments: iv antib          Krystyna Baez MD  Hospitalist Service  [facility]  Securely message with [employer] (more info)  Text page via [employer] Paging/Directory   ______________________________________________________________________    Interval History   Patient awake alert oriented.  In no apparent distress.  Up in chair.  On room air.  Has significant chest pain complaints which is tender to palpation.  Status post Lexiscan stress test, result pending.  Started on morphine IV for better pain control.    Physical Exam   Vital Signs: Temp: 98.8  F (37.1  C) Temp src: Oral BP: (!) 165/73 (Nurse notified) Pulse: 80   Resp: 18 SpO2: 91 % O2 Device: None (Room air) Oxygen Delivery: 2 LPM  Weight: 230 lbs 8 oz      GEN:  Alert, oriented x 3, appears comfortable now, NAD, falling asleep easily  HEENT:  Normocephalic/atraumatic, no scleral icterus, no nasal discharge, mouth and membranes moist.  CV:  Regular rate and rhythm, no murmur or JVD.  S1 + S2 noted, no S3 or S4.  2+ bilateral lower extremity  LUNGS:  Clear to auscultation ant/lat bilaterally without rales/rhonchi/wheezing/retractions.  Symmetric chest rise on inhalation noted.  ABD:  Active bowel sounds, soft, non-tender/not really distended.  No rebound/guarding/rigidity.  No obvious masses palpated  EXT:  2+ bilateral lower extremity edema  SKIN:  Dry to touch, scattered darker moles to the back - no warmth, clear vesicles or erythema to visualization of her back.   NEURO: Awake alert oriented x3.  Neuro exam is nonfocal   Medical Decision Making       55 MINUTES SPENT BY ME on the date of service doing chart review, history, exam, documentation & further activities per the note.  MANAGEMENT DISCUSSED with the following over the past 24 hours: Patient and patient's RN   "     Data     I have personally reviewed the following data over the past 24 hrs:    N/A  \   7.6 (L)   / N/A     138 101 77 (H) /  87   4.3 21 (L) 15.60 (HH) \       ALT: N/A AST: N/A AP: N/A TBILI: N/A   ALB: 2.3 (L) TOT PROTEIN: N/A LIPASE: N/A       Imaging results reviewed over the past 24 hrs:   Recent Results (from the past 24 hour(s))   Echocardiogram Complete   Result Value    LVEF  65-70%    Narrative    [id number]  [id number]  [id number]  [id number]^TANYA^[place] [address]     Name: MAURICIO FULLER  MRN: [id number]  : 1960  Study Date: 05/10/2023 03:00 PM  Age: 62 yrs  Gender: Female  Patient Location: [facility]  Reason For Study: Chest Pain  Ordering Physician: ED MARTÍNEZ  Performed By: MAMTA     BSA: 2.0 m2  Height: 61 in  Weight: 227 lb  HR: 77  BP: 204/86 mmHg  ______________________________________________________________________________  Procedure  Complete Portable Echo Adult. Adequate quality two-dimensional was performed  and interpreted. Adequate quality color and spectral Doppler were performed  and interpreted.  ______________________________________________________________________________  Interpretation Summary     Normal left ventricular size. Moderate concentric left ventricular  hypertrophy. Normal left-ventricular systolic function. Left ventricular  ejection fraction estimated at 65 to 70%. No regional wall motion abnormality  noted.Diastolic Doppler findings (E/E' ratio and/or other parameters) suggest  left ventricular filling pressures are increased  Normal right ventricular size and systolic function.  Moderate to severe mitral annular calcification suggested. Mild mitral  regurgitation. Mild mitral stenosis. Mean gradient of 4.6 mmHg at a heart rate  of 74 bpm  ______________________________________________________________________________  I      WMSI = 1.00     % Normal = 100     X - Cannot   0 -                       (2) - Mildly 2 -          Segments  Size  Interpret    Hyperkinetic 1 - Normal  Hypokinetic  Hypokinetic  1-2     small                                                     7 -          3-5      moderate  3 - Akinetic 4 -          5 -         6 - Akinetic Dyskinetic   6-14    large               Dyskinetic   Aneurysmal  w/scar       w/scar       15-16   diffuse     Left Ventricle  The left ventricle is normal in size. There is moderate concentric left  ventricular hypertrophy. Left ventricular systolic function is normal. The  visual ejection fraction is 65-70%. Diastolic Doppler findings (E/E' ratio  and/or other parameters) suggest left ventricular filling pressures are  increased. Left ventricular diastolic function is abnormal. No regional wall  motion abnormalities noted.     Right Ventricle  Normal right ventricle size and systolic function. TAPSE is normal, which is  consistent with normal right ventricular systolic function.     Atria  The left atrium is mildly dilated. Right atrial size is normal.     Mitral Valve  The mitral valve leaflets appear thickened, but open well. There is moderate  to severe mitral annular calcification. There is mild (1+) mitral  regurgitation. Calcified mitral apparatus causing mitral stenosis. There is  mild mitral stenosis.     Tricuspid Valve  The tricuspid valve is not well visualized. There is trace tricuspid  regurgitation. Right ventricular systolic pressure could not be approximated  due to inadequate tricuspid regurgitation.     Aortic Valve  The aortic valve is trileaflet with aortic valve sclerosis. No hemodynamically  significant valvular aortic stenosis.     Pulmonic Valve  The pulmonic valve is not well seen, but is grossly normal. There is trace  pulmonic valvular regurgitation.     Vessels  The aorta root is normal. Normal size ascending aorta. IVC diameter and  respiratory changes fall into an intermediate range suggesting an RA pressure  of 8 mmHg.      Pericardium  There is no pericardial effusion.     ______________________________________________________________________________  MMode/2D Measurements & Calculations  IVSd: 1.3 cm  LVIDd: 5.5 cm  LVIDs: 3.8 cm  LVPWd: 1.6 cm  FS: 30.0 %  LV mass(C)d: 348.1 grams  LV mass(C)dI: 174.6 grams/m2  Ao root diam: 3.1 cm  LA dimension: 3.2 cm  asc Aorta Diam: 3.1 cm     LA/Ao: 1.0  LVOT diam: 2.2 cm  LVOT area: 3.6 cm2  LA Volume (BP): 62.2 ml  LA Volume Index (BP): 31.3 ml/m2     LA Volume Indexed (AL/bp): 34.7 ml/m2  RWT: 0.58  TAPSE: 2.2 cm     Doppler Measurements & Calculations  MV E max jonnathan: 100.0 cm/sec  MV A max jonnathan: 137.7 cm/sec  MV E/A: 0.73  MV max PG: 10.8 mmHg  MV mean P.6 mmHg  MV V2 VTI: 28.4 cm  MVA(VTI): 2.9 cm2  MV dec slope: 509.1 cm/sec2  MV dec time: 0.20 sec  Ao V2 max: 160.6 cm/sec  Ao max PG: 10.0 mmHg  Ao V2 mean: 108.3 cm/sec  Ao mean P.3 mmHg  Ao V2 VTI: 32.4 cm  GINA(I,D): 2.5 cm2  GINA(V,D): 2.5 cm2  LV V1 max P.9 mmHg  LV V1 max: 110.8 cm/sec  LV V1 VTI: 22.7 cm  SV(LVOT): 82.6 ml  SI(LVOT): 41.4 ml/m2  PA V2 max: 97.7 cm/sec  PA max PG: 3.8 mmHg  AV Jonnathan Ratio (DI): 0.69  GINA Index (cm2/m2): 1.3  E/E' av.6  Lateral E/e': 21.9     Medial E/e': 21.4  RV S Jonnathan: 13.3 cm/sec     ______________________________________________________________________________  Report approved by: Melanie Ventura 05/10/2023 03:57 PM         NM Lexiscan stress test   Result Value    Pharmacologic Protocol Lexiscan    Test Type Pharmacological    Baseline Systolic     Baseline Diastolic BP 75    Last Stress HR 92    Last Stress Systolic     Last Stress Diastolic BP 62    Target     PERCENT HR 85%    ST Deviation Elevation III 0.1mm    Deviation Time V6 -0.2mm    ST Elevation Amount V2 0.8mm    ST Deviation Amount he III -0.6mm    Final Resting /66    Final Resting HR 89    Max Treadmill Speed 0.0    Max Treadmill Grade 0.0    Peak Systolic /62    Peak Diastolic /66     Max HR  96    Stress Phase Resting    Stress Resting Pt Position MANUAL EVENT    Current HR 82    Current /75    Stress Phase Stress    Stage Minute EXE 00:00    Exercise Stage STAGE 2    Current HR 82    Current /75    Stress Phase Stress    Stage Minute EXE 01:00    Exercise Stage STAGE 3    Current HR 81    Current /75    Stress Phase Stress    Stage Minute EXE 01:47    Exercise Stage STAGE 3    Current HR 91    Current /56    Stress Phase Stress    Stage Minute EXE 02:00    Exercise Stage STAGE 4    Current HR 95    Current /56    Stress Phase Stress    Stage Minute EXE 02:40    Exercise Stage STAGE 4    Current HR 95    Current /65    Stress Phase Stress    Stage Minute EXE 03:00    Exercise Stage STAGE 5    Current HR 95    Current /65    Stress Phase Stress    Stage Minute EXE 03:48    Exercise Stage STAGE 5    Current HR 93    Current /62    Stress Phase Stress    Stage Minute EXE 04:00    Exercise Stage STAGE 6    Current HR 92    Current /62    Stress Phase Stress    Stage Minute EXE 04:01    Exercise Stage STAGE 6    Current HR 92    Current /62    Stress Phase Recovery    Stage Minute REC 00:58    Exercise Stage Recovery    Current HR 91    Current /62    Stress Phase Recovery    Stage Minute REC 01:05    Exercise Stage Recovery    Current HR 92    Current /61    Stress Phase Recovery    Stage Minute REC 01:58    Exercise Stage Recovery    Current HR 90    Current /61    Stress Phase Recovery    Stage Minute REC 02:19    Exercise Stage Recovery    Current HR 91    Current /66    Stress Phase Recovery    Stage Minute REC 02:58    Exercise Stage Recovery    Current HR 90    Current /66    Stress Phase Recovery    Stage Minute REC 03:58    Exercise Stage Recovery    Current HR 89    Current /66    Stress Phase Recovery    Stage Minute REC 04:03    Exercise Stage Recovery    Current HR 89    Current /66     Max Predicted HR  60    Rate Pressure Product 14,016.0    Baseline HR 83

## 2023-05-11 NOTE — PROGRESS NOTES
Nuclear Pharmacological Stress Test:  Sitting Protocol. Lexiscan 0.4mg IV administered over 15sec. Peak VS: HR= 96, BP= 131/65. Prior to beginning test, patient c/o 7/10 CP-- No change in symptoms of CP throughout exam. Patient felt typical vasodilator side effects from Lexiscan. No further restrictions according to stress test. Results pending cardiology interpretation.  Nelida Tyson RN  [facility]

## 2023-05-11 NOTE — PROGRESS NOTES
PRIMARY DIAGNOSIS: CHEST PAIN  OUTPATIENT/OBSERVATION GOALS TO BE MET BEFORE DISCHARGE:  1. Ruled out acute coronary syndrome (negative or stable Troponin):  Yes  2. Pain Status: Pain free.  3. Appropriate provocative testing performed: No, stress test scheduled 5/11 at 0900  - Stress Test Procedure: Nuclear  - Interpretation of cardiac rhythm per telemetry tech: NSR    4. Cleared by Consultants (if applicable):No  5. Return to near baseline physical activity: Yes  Discharge Planner Nurse   Safe discharge environment identified: Yes  Barriers to discharge: Yes       Entered by: Bhavana Paul RN 05/11/2023 7:32 AM     Please review provider order for any additional goals.   Nurse to notify provider when observation goals have been met and patient is ready for discharge.

## 2023-05-11 NOTE — PROGRESS NOTES
PRIMARY DIAGNOSIS: CHEST PAIN  OUTPATIENT/OBSERVATION GOALS TO BE MET BEFORE DISCHARGE:  1. Ruled out acute coronary syndrome (negative or stable Troponin):  Yes  2. Pain Status: Improved but still requiring IV narcotics.  3. Appropriate provocative testing performed: Yes  - Stress Test Procedure: Nuclear  - Interpretation of cardiac rhythm per telemetry tech: NSR    4. Cleared by Consultants (if applicable):No  5. Return to near baseline physical activity: Yes  Discharge Planner Nurse   Safe discharge environment identified: Yes  Barriers to discharge: Yes       Entered by: Bhavana Paul RN 05/11/2023 5:45 PM     Please review provider order for any additional goals.   Nurse to notify provider when observation goals have been met and patient is ready for discharge.

## 2023-05-12 NOTE — PROGRESS NOTES
RENAL PROGRESS NOTE    CC: chest pain     ASSESSMENT & PLAN:     ESRD - on CAPD 4 fills x 3000 mL FV but poor volume control, severe hypertension, and decreased adequacy. Ongoing discussions as outpatient regarding her poor tolerability with CAPD and at minimum switching to CCPD or ultimately back to iHD. She is opposed to both these options. 2 manual exchanges overnight 5/10 with 4.25% dextrose and 3L UF. CAPD yesterday though some confusion as to how many exchanges she actually did; she has been drowsy with the pain meds she's been given. Today she wants to go home and feels she can manage PD at home with her 's help.     Recs:   - Ok to discharge from renal standpoint, discussed with Dr. Silver today   - I contacted her PD RN and asked them to f/u with her early next week   - Again, suspect that there is a component of non-adherence driving issues with hypervolemia and poor clearance. Confirmed with PD RN that patient at times has difficulty following instructions and is not adequately tracking her PD. She would likely benefit from transition to CCPD or HD but has been extremely resistant to this.   - Will have ongoing discussions re change in modality as outpatient   - Updated primary nephrologist Dr. Haley and discussed patient with Dr. Wheeler today     Chest/shoulder pain - Atypical for cardiac source, seems to be MSK in nature. TTE with LVEF 65-70%, no WMA. Nuclear stress test negative.     Severe hypertension/hypervolemia - poor UF with CAPD as above. Last 24 hour urine with PD clinic with 2% CrCl, 750 ml total UO. She was started back on Bumex recently but has not yet picked this up from the pharmacy. IV Bumex 2 mg Q12 hours with minimal response. She had an excellent UF response with only two exchanges overnoc 5/10 so strongly suspect a degree of non-adherence to PD contributing.     Anemia - Hgb 7's this admit, on DARRIN and parenteral iron with PD. Dosing EPO 20K today.      Severe  hyperPTH  Poorly controlled hyperphosphatemia   Suspect poor diet/binder compliance. On sevelamer and TUMS. ?sensipar in future.       DM diet control     Obesity     Pruritis with no primary rash -  excoriations on back. Suspect hyperPTH/Phos contributing. Try hydroxyzine and better phos control    SUBJECTIVE: Discussed with patient's RN outside her room today re confusion as to # of exchanges done yesterday. Still has a full bag of PD fluid hanging in room, patient told RN that she instilled a new bag two hours ago and needs to drain but per patient's  has not instilled any PD fluid this morning. Also told the RN that she urinated once today but tells me she does not remember urinating. Feels she is drowsy and a little confused from pain meds today and wants to leave so she can do her PD at home; thinks she and  can manage. She became very upset and tearful when I suggested a trial of CCPD today.     OBJECTIVE:  Physical Exam   Temp: 97.8  F (36.6  C) Temp src: Oral BP: (!) 142/67 Pulse: 76   Resp: 20 SpO2: 95 % O2 Device: None (Room air)    Vitals:    05/10/23 0505 05/10/23 1400 05/11/23 0015   Weight: 99.8 kg (220 lb) 103 kg (227 lb 1.6 oz) 104.6 kg (230 lb 8 oz)     Vital Signs with Ranges  Temp:  [97.8  F (36.6  C)-98.5  F (36.9  C)] 97.8  F (36.6  C)  Pulse:  [72-88] 76  Resp:  [18-22] 20  BP: (129-155)/(58-71) 142/67  SpO2:  [91 %-97 %] 95 %  I/O last 3 completed shifts:  In: 720 [P.O.:720]  Out: 3200 [Urine:200; Other:3000]    @TMAXR(24)@    Patient Vitals for the past 72 hrs:   Weight   05/11/23 0015 104.6 kg (230 lb 8 oz)   05/10/23 1400 103 kg (227 lb 1.6 oz)   05/10/23 0505 99.8 kg (220 lb)       Intake/Output Summary (Last 24 hours) at 5/11/2023 1253  Last data filed at 5/11/2023 1154  Gross per 24 hour   Intake 360 ml   Output 300 ml   Net 60 ml       PHYSICAL EXAM:  General - Alert, confused at times    Cardiovascular - Regular rate and rhythm, no rub, pain with palpation of presternal  area   Respiratory - Lung sounds diminished   Abd: BS present, no guarding or pain with palpation, no ascites  Extremities - Tense, woody edema in bilateral LE   Skin: Skin dry   Neuro:  Grossly intact, no focal deficits  MSK:  Grossly intact  Psych:  Irritable, mood labile ranging from tearful to irascible     LABORATORY STUDIES:     Recent Labs   Lab 05/12/23  0930 05/11/23  0450 05/10/23  0522   WBC 11.1*  --  8.5   RBC 2.92*  --  2.77*   HGB 8.0* 7.6* 7.7*   HCT 25.4*  --  25.1*     --  275       Basic Metabolic Panel:  Recent Labs   Lab 05/11/23  0450 05/10/23  0522    138   POTASSIUM 4.3 4.0   CHLORIDE 101 100   CO2 21* 22   BUN 77* 78*   CR 15.60* 15.72*   GLC 87 85   RON 9.3 8.9       INRNo lab results found in last 7 days.     Recent Labs   Lab Test 05/12/23  0930 05/11/23  0450 05/10/23  0522   WBC 11.1*  --  8.5   HGB 8.0* 7.6* 7.7*     --  275       Personally reviewed current labs      Jolene Lerner PA-C   [facility]  [phone]

## 2023-05-12 NOTE — PROGRESS NOTES
PRIMARY DIAGNOSIS: CHEST PAIN  OUTPATIENT/OBSERVATION GOALS TO BE MET BEFORE DISCHARGE:  1. Ruled out acute coronary syndrome (negative or stable Troponin):  Yes  2. Pain Status: Improved but still requiring IV narcotics.  3. Appropriate provocative testing performed: Yes  - Stress Test Procedure: Regular  - Interpretation of cardiac rhythm per telemetry tech: NSR     4. Cleared by Consultants (if applicable):No  5. Return to near baseline physical activity: Yes     Discharge Planner Nurse   Safe discharge environment identified: Yes  Barriers to discharge: Yes       Entered by: En Alvarado RN 05/12/2023 4:00AM        Please review provider order for any additional goals.   Nurse to notify provider when observation goals have been met and patient is ready for discharge.

## 2023-05-12 NOTE — PROGRESS NOTES
Pt was having severe pain this shift, PRN Morphine order placed, discussed contraindications with administering Morphine with a pt receiving PD, pharmacist approved a one time dose to control pain. Medication later discontinued and other PRN medications ordered.    Bhavana Paul RN on 5/11/2023 at 7:18 PM

## 2023-05-12 NOTE — PLAN OF CARE
PRIMARY DIAGNOSIS: CHEST PAIN  OUTPATIENT/OBSERVATION GOALS TO BE MET BEFORE DISCHARGE:  1. Ruled out acute coronary syndrome (negative or stable Troponin):  Yes  2. Pain Status: Improved but still requiring IV narcotics.  3. Appropriate provocative testing performed: Yes  - Stress Test Procedure: Nuclear  - Interpretation of cardiac rhythm per telemetry tech: NSR    4. Cleared by Consultants (if applicable):No  5. Return to near baseline physical activity: Yes  Discharge Planner Nurse   Safe discharge environment identified: Yes  Barriers to discharge: Yes       Entered by: En Alvarado RN 05/12/2023 6:45 AM     Please review provider order for any additional goals.   Nurse to notify provider when observation goals have been met and patient is ready for discharge.  Goal Outcome Evaluation:

## 2023-05-12 NOTE — PROGRESS NOTES
Patient oriented but lethargic and forgetful this morning. Peritoneal dialysis patient managed overnight. A bag that was supposed to be instilled still on warmer this AM. Patient did not save drained effluent for measurement. Unclear how how much she instilled and drained last night.  at bedside. PRN dilaudid given for chest pain, pt reported good relief but made her increasingly drowsy and forgetful. Pt stated she urinated this am but not witnesses by staff. Tele NSR. 2+ edema in BLE.     AVS gone over with patient. Education provided.  at bedside to enforce understanding for peritoneal dialysis at home.  to transport home.

## 2023-05-12 NOTE — PROGRESS NOTES
PRIMARY DIAGNOSIS: CHEST PAIN  OUTPATIENT/OBSERVATION GOALS TO BE MET BEFORE DISCHARGE:  1. Ruled out acute coronary syndrome (negative or stable Troponin):  Yes  2. Pain Status: Improved but still requiring IV narcotics.  3. Appropriate provocative testing performed: Yes  - Stress Test Procedure: Regular  - Interpretation of cardiac rhythm per telemetry tech: NSR    4. Cleared by Consultants (if applicable):No  5. Return to near baseline physical activity: Yes  Discharge Planner Nurse   Safe discharge environment identified: Yes  Barriers to discharge: Yes       Entered by: En Alvarado RN 05/12/2023 12:00AM     Please review provider order for any additional goals.   Nurse to notify provider when observation goals have been met and patient is ready for discharge.

## 2023-05-12 NOTE — PROGRESS NOTES
PERITONEAL DIALYSIS TREATMENT NOTE      Date:  5/11/2023  Time:  8:16 PM    Data:   Treatment Ultrafiltrate Volume mL:  10,000   Total Duration of Therapy: 6   Fill Volume: 2500 mL  Heater being used on cycler; patient performing manual exchanges  Total Number of Cycles: 4   UF from last therapy was 3000ml    Assessment/Interventions:   PD Patient Response:  Tolerating well. Patient resting in chair watching television. No concerns with exit site cares. Fluid is currently on warmer; once warm the patient will begin her exchanges. This evening she will perform four 2hour cycles with a dwell of 90 minutes each. Drained effluent will be kept for measurement by RN staff.  Patient has all needed supplies and will call for assistance as needed from her primary RN.        Comments:   PD RN available by pager with any concerns or questions at [phone].     Plan:     Hoping to discharge home tomorrow; waiting on cardiac updates. Next tx per renal team.

## 2023-05-12 NOTE — DISCHARGE SUMMARY
"[facility]  Hospitalist Discharge Summary      Date of Admission:  5/10/2023  Date of Discharge:  5/12/2023  Discharging Provider: OLGA LOPEZ MD  Discharge Service: Hospitalist Service    Discharge Diagnoses   -Nonspecific chest pain most likely musculoskeletal in nature  -End-stage renal disease on peritoneal dialysis  -Peripheral edema      Clinically Significant Risk Factors     # Severe Obesity: Estimated body mass index is 41.87 kg/m  as calculated from the following:    Height as of this encounter: 1.549 m (5' 1\").    Weight as of this encounter: 100.5 kg (221 lb 9.6 oz).       Follow-ups Needed After Discharge   -Follow-up with primary care physician  -Follow-up with nephrology    Unresulted Labs Ordered in the Past 30 Days of this Admission     No orders found from 4/10/2023 to 5/11/2023.          Discharge Disposition   Discharged to home  Condition at discharge: [facility]   Ms. Garcia is a 62 years old woman with past medical history significant for hypertension, end-stage renal disease on peritoneal hemodialysis who presented to the hospital on 5/10/2023 with nonspecific chest pain. Cardiac etiology for chest pain is unlikely, troponin x3 is negative, nuclear stress test is negative. Transthoracic cardiogram on 5/10 showed moderate concentric left ventricular hypertrophy, normal ejection fraction, no wall motion abnormalities.  It appears that her chest pain is most likely musculoskeletal in nature.    During this hospitalization, nephrology were involved due to concerns of fluid overload.  Patient refused to do her peritoneal dialysis overnight or switch back to intermittent peritoneal dialysis she insisted on continuing her intermittent manual peritoneal dialysis (usually does it 4 times during the day) she was frustrated since she was unable to follow her home peritoneal dialysis regimen and therefore she decided to leave on 5/12.  Nephrology is agreeable on " discharging the patient.  She continues to have fluid overload and therefore she was started on Bumex during this hospitalization.      #Chest pain  -Appears musculoskeletal in nature.    Plan  [] Over-the-counter medication such as lidocaine patch and Tylenol as needed    #Hypertension.   -At home was on Coreg 6.25 mg twice daily and clonidine 0.1 mg twice daily.  -Blood pressure was not well controlled.  -Resumed on home Coreg, clonidine increased to 3 times daily and she was started on Imdur 30 mg daily.  -Blood pressure appears to be improving.    Plan:  [] Discharge on Coreg 6.25 mg twice daily, clonidine 0.4 mg 3 times daily and Imdur 30 mg daily  [] Continue to monitor blood pressure at home      #End-stage renal disease on peritoneal dialysis  -At home on CAPD--> Per nephrology there appears to be some component of nonadherence leading to hypervolemia  -Nephrology recommended switching to CCPD or intermittent hemodialysis, however, the patient was resistant to the idea.    Plan:  [] Discharged home on peritoneal hemodialysis  [] Follow-up with nephrology as an outpatient.      #Left shoulder pain.  -Appears musculoskeletal in nature or due to osteoarthritis  -Plans was for left shoulder x-ray, however, patient refused and stated that she will follow-up with her primary care physician for further evaluation.      #Mild leukocytosis  - unclear etiology  -No signs of active infection  -Initially recommended that the patient stays for another 24 hours for further management and monitoring of her leukocytosis.  Patient insistent on being discharged today.    Plan:  [] Follow-up with primary care physician in 1 week and repeat CBC            Consultations This Hospital Stay   NEPHROLOGY IP CONSULT    Code Status   Full Code    Time Spent on this Encounter   I, OLGA LOPEZ MD, personally saw the patient today and spent greater than 30 minutes discharging this patient.       OLGA LOPEZ MD  [facility]  [facility]  [address] [address]  Phone: [phone]  Fax: [phone]  ______________________________________________________________________    Physical Exam   Vital Signs: Temp: 97.8  F (36.6  C) Temp src: Oral BP: (!) 142/67 Pulse: 76   Resp: 20 SpO2: 95 % O2 Device: None (Room air)    Weight: 221 lbs 9.6 oz  Physical Exam  Constitutional:       General: She is not in acute distress.     Appearance: She is not toxic-appearing.      Comments: Appears intermittently lethargic   Cardiovascular:      Heart sounds: Murmur heard.      Comments: Systolic murmur best heard on the mitral valve area  Pulmonary:      Effort: No respiratory distress.      Breath sounds: No wheezing.   Musculoskeletal:      Right lower leg: Edema present.      Left lower leg: Edema present.      Comments: +1 pitting edema in bilateral lower extremities below knee level.   Skin:     General: Skin is warm and dry.            Primary Care Physician   Josesito Evans    Discharge Orders   No discharge procedures on file.    Significant Results and Procedures   Most Recent 3 CBC's:Recent Labs   Lab Test 05/12/23  0930 05/11/23  0450 05/10/23  0522 11/10/22  0715   WBC 11.1*  --  8.5 7.3   HGB 8.0* 7.6* 7.7* 10.8*   MCV 87  --  91 92     --  275 272     Most Recent 3 BMP's:Recent Labs   Lab Test 05/12/23  1218 05/11/23  0450 05/10/23  0522    138 138   POTASSIUM 4.1 4.3 4.0   CHLORIDE 98 101 100   CO2 22 21* 22   BUN 80* 77* 78*   CR 16.11* 15.60* 15.72*   ANIONGAP 17 16 16   RON 9.2 9.3 8.9    87 85   ,   Results for orders placed or performed during the hospital encounter of 05/10/23   CT Chest Abdomen Pelvis w/o Contrast    Narrative    EXAM: CT CHEST ABDOMEN PELVIS W/O CONTRAST  LOCATION: [facility]  DATE/TIME: 5/10/2023 6:30 AM CDT    INDICATION: Chest and abdomen pain. Difficulty breathing. Peritoneal dialysis patient.  COMPARISON: CT abdomen and  pelvis 06/07/2022. CTA chest and CT abdomen and pelvis 05/29/2021.  TECHNIQUE: CT scan of the chest, abdomen, and pelvis was performed without IV contrast. Multiplanar reformats were obtained. Dose reduction techniques were used.   CONTRAST: None.    FINDINGS:   LUNGS AND PLEURA: Mild streaky and dependent atelectasis bilaterally.    MEDIASTINUM/AXILLAE: Normal.    CORONARY ARTERY CALCIFICATION: Mild.    HEPATOBILIARY: Cholelithiasis. Normal liver and bile ducts.    PANCREAS: Normal.    SPLEEN: Normal.    ADRENAL GLANDS: Normal.    KIDNEYS/BLADDER: Chronic atrophy of the native kidneys. Normal bladder.    BOWEL: Post sleeve gastrectomy. Colonic diverticulosis. No bowel obstruction or inflammation. Normal appendix.    LYMPH NODES: Normal.    VASCULATURE: Unremarkable.    PELVIC ORGANS: Normal.    MUSCULOSKELETAL: Percutaneous peritoneal dialysis catheter terminates in the right abdomen. Generalized body wall edema most notable of the ventral pelvic wall where there is associated skin thickening. Chronic postoperative changes of the left breast.   Hypertrophic degenerative changes of the spine.      Impression    IMPRESSION:  1.  Percutaneous peritoneal dialysis catheter terminates in the right abdomen.  2.  Generalized body wall edema most notable of the ventral pelvic wall where there is associated skin thickening suggesting possible cellulitis.  3.  Chronic atrophy of the kidneys.  4.  Sleeve gastrectomy.  5.  Colonic diverticulosis.  6.  Cholelithiasis.   CTA Chest Abdomen Pelvis w Contrast    Narrative    EXAM: CTA CHEST ABDOMEN PELVIS W CONTRAST  LOCATION: [facility]  DATE/TIME: 5/10/2023 8:03 AM CDT    INDICATION: severe chest pain, uncontrolled HTN, pt with chronic renal failure (dialysis) getting admitted  COMPARISON: None.  TECHNIQUE: CT angiogram chest abdomen pelvis during arterial phase of injection of IV contrast. 2D and 3D MIP reconstructions were performed by the CT  technologist. Dose reduction techniques were used.   CONTRAST: isovue 370 100 mls    FINDINGS:   CT ANGIOGRAM CHEST, ABDOMEN, AND PELVIS: The pulmonary arteries enlarged measuring 3.9 cm suggesting pulmonary arterial hypertension. No filling defect to suggest a pulmonary embolism. The abdominal aorta is normal in caliber with no evidence of aortic   dissection. The celiac axis, SMA, bilateral renal arteries, AARON, and iliac arteries are patent.    LUNGS AND PLEURA: Trace left effusion. Linear bandlike areas of atelectasis bilaterally. Diffuse groundglass opacity with mosaic attenuation of the lungs suggesting small airway disease.    MEDIASTINUM/AXILLAE: Heart is normal in size. No adenopathy.    CORONARY ARTERY CALCIFICATION: None.    HEPATOBILIARY: Liver and gallbladder within normal limits.    PANCREAS: Normal.    SPLEEN: Normal.    ADRENAL GLANDS: Normal.    KIDNEYS/BLADDER: Atrophy of both kidneys with no evidence of hydronephrosis.    BOWEL: Diverticulosis of the colon. No acute inflammatory change. No obstruction. Postsurgical changes are noted to the stomach.    LYMPH NODES: Normal.    PELVIC ORGANS: Bladder within normal limits. Uterus within normal limits.  shunt catheter is noted. Small fat and fluid containing hernia at the umbilicus. Soft tissue edema is noted along the anterior subcutaneous soft tissues.    MUSCULOSKELETAL: Degenerative change of bilateral hips and spine.      Impression    IMPRESSION:  1.  No evidence of aortic dissection or pulmonary embolism. No acute findings in the chest, abdomen, and pelvis.  2.  The pulmonary arteries are enlarged suggesting possible pulmonary arterial hypertension.     Echocardiogram Complete     Value    LVEF  65-70%    Narrative    [id number]  [id number]  [id number]  [id number]^TANYA^ED^[place] [address]     Name: MAURICIO FULLER  MRN: [id number]  : 1960  Study Date: 05/10/2023 03:00 PM  Age: 62  yrs  Gender: Female  Patient Location: [facility]  Reason For Study: Chest Pain  Ordering Physician: ED MARTÍNEZ  Performed By: MAMTA     BSA: 2.0 m2  Height: 61 in  Weight: 227 lb  HR: 77  BP: 204/86 mmHg  ______________________________________________________________________________  Procedure  Complete Portable Echo Adult. Adequate quality two-dimensional was performed  and interpreted. Adequate quality color and spectral Doppler were performed  and interpreted.  ______________________________________________________________________________  Interpretation Summary     Normal left ventricular size. Moderate concentric left ventricular  hypertrophy. Normal left-ventricular systolic function. Left ventricular  ejection fraction estimated at 65 to 70%. No regional wall motion abnormality  noted.Diastolic Doppler findings (E/E' ratio and/or other parameters) suggest  left ventricular filling pressures are increased  Normal right ventricular size and systolic function.  Moderate to severe mitral annular calcification suggested. Mild mitral  regurgitation. Mild mitral stenosis. Mean gradient of 4.6 mmHg at a heart rate  of 74 bpm  ______________________________________________________________________________  I      WMSI = 1.00     % Normal = 100     X - Cannot   0 -                      (2) - Mildly 2 -          Segments  Size  Interpret    Hyperkinetic 1 - Normal  Hypokinetic  Hypokinetic  1-2     small                                                     7 -          3-5      moderate  3 - Akinetic 4 -          5 -         6 - Akinetic Dyskinetic   6-14    large               Dyskinetic   Aneurysmal  w/scar       w/scar       15-16   diffuse     Left Ventricle  The left ventricle is normal in size. There is moderate concentric left  ventricular hypertrophy. Left ventricular systolic function is normal. The  visual ejection fraction is 65-70%. Diastolic Doppler findings (E/E' ratio  and/or other parameters)  suggest left ventricular filling pressures are  increased. Left ventricular diastolic function is abnormal. No regional wall  motion abnormalities noted.     Right Ventricle  Normal right ventricle size and systolic function. TAPSE is normal, which is  consistent with normal right ventricular systolic function.     Atria  The left atrium is mildly dilated. Right atrial size is normal.     Mitral Valve  The mitral valve leaflets appear thickened, but open well. There is moderate  to severe mitral annular calcification. There is mild (1+) mitral  regurgitation. Calcified mitral apparatus causing mitral stenosis. There is  mild mitral stenosis.     Tricuspid Valve  The tricuspid valve is not well visualized. There is trace tricuspid  regurgitation. Right ventricular systolic pressure could not be approximated  due to inadequate tricuspid regurgitation.     Aortic Valve  The aortic valve is trileaflet with aortic valve sclerosis. No hemodynamically  significant valvular aortic stenosis.     Pulmonic Valve  The pulmonic valve is not well seen, but is grossly normal. There is trace  pulmonic valvular regurgitation.     Vessels  The aorta root is normal. Normal size ascending aorta. IVC diameter and  respiratory changes fall into an intermediate range suggesting an RA pressure  of 8 mmHg.     Pericardium  There is no pericardial effusion.     ______________________________________________________________________________  MMode/2D Measurements & Calculations  IVSd: 1.3 cm  LVIDd: 5.5 cm  LVIDs: 3.8 cm  LVPWd: 1.6 cm  FS: 30.0 %  LV mass(C)d: 348.1 grams  LV mass(C)dI: 174.6 grams/m2  Ao root diam: 3.1 cm  LA dimension: 3.2 cm  asc Aorta Diam: 3.1 cm     LA/Ao: 1.0  LVOT diam: 2.2 cm  LVOT area: 3.6 cm2  LA Volume (BP): 62.2 ml  LA Volume Index (BP): 31.3 ml/m2     LA Volume Indexed (AL/bp): 34.7 ml/m2  RWT: 0.58  TAPSE: 2.2 cm     Doppler Measurements & Calculations  MV E max ev: 100.0 cm/sec  MV A max ev: 137.7  cm/sec  MV E/A: 0.73  MV max PG: 10.8 mmHg  MV mean P.6 mmHg  MV V2 VTI: 28.4 cm  MVA(VTI): 2.9 cm2  MV dec slope: 509.1 cm/sec2  MV dec time: 0.20 sec  Ao V2 max: 160.6 cm/sec  Ao max PG: 10.0 mmHg  Ao V2 mean: 108.3 cm/sec  Ao mean P.3 mmHg  Ao V2 VTI: 32.4 cm  GINA(I,D): 2.5 cm2  GINA(V,D): 2.5 cm2  LV V1 max P.9 mmHg  LV V1 max: 110.8 cm/sec  LV V1 VTI: 22.7 cm  SV(LVOT): 82.6 ml  SI(LVOT): 41.4 ml/m2  PA V2 max: 97.7 cm/sec  PA max PG: 3.8 mmHg  AV Jonnathan Ratio (DI): 0.69  GINA Index (cm2/m2): 1.3  E/E' av.6  Lateral E/e': 21.9     Medial E/e': 21.4  RV S Jonnathan: 13.3 cm/sec     ______________________________________________________________________________  Report approved by: Melanie Ventura 05/10/2023 03:57 PM         NM Lexiscan stress test     Value    Pharmacologic Protocol Lexiscan    Test Type Pharmacological    Baseline Systolic     Baseline Diastolic BP 75    Last Stress HR 92    Last Stress Systolic     Last Stress Diastolic BP 62    Target     PERCENT HR 85%    ST Deviation Elevation III 0.1mm    Deviation Time V6 -0.2mm    ST Elevation Amount V2 0.8mm    ST Deviation Amount he III -0.6mm    Final Resting /66    Final Resting HR 89    Max Treadmill Speed 0.0    Max Treadmill Grade 0.0    Peak Systolic /62    Peak Diastolic /66    Max HR  96    Stress Phase Resting    Stress Resting Pt Position MANUAL EVENT    Current HR 82    Current /75    Stress Phase Stress    Stage Minute EXE 00:00    Exercise Stage STAGE 2    Current HR 82    Current /75    Stress Phase Stress    Stage Minute EXE 01:00    Exercise Stage STAGE 3    Current HR 81    Current /75    Stress Phase Stress    Stage Minute EXE 01:47    Exercise Stage STAGE 3    Current HR 91    Current /56    Stress Phase Stress    Stage Minute EXE 02:00    Exercise Stage STAGE 4    Current HR 95    Current /56    Stress Phase Stress    Stage Minute EXE 02:40    Exercise Stage  STAGE 4    Current HR 95    Current /65    Stress Phase Stress    Stage Minute EXE 03:00    Exercise Stage STAGE 5    Current HR 95    Current /65    Stress Phase Stress    Stage Minute EXE 03:48    Exercise Stage STAGE 5    Current HR 93    Current /62    Stress Phase Stress    Stage Minute EXE 04:00    Exercise Stage STAGE 6    Current HR 92    Current /62    Stress Phase Stress    Stage Minute EXE 04:01    Exercise Stage STAGE 6    Current HR 92    Current /62    Stress Phase Recovery    Stage Minute REC 00:58    Exercise Stage Recovery    Current HR 91    Current /62    Stress Phase Recovery    Stage Minute REC 01:05    Exercise Stage Recovery    Current HR 92    Current /61    Stress Phase Recovery    Stage Minute REC 01:58    Exercise Stage Recovery    Current HR 90    Current /61    Stress Phase Recovery    Stage Minute REC 02:19    Exercise Stage Recovery    Current HR 91    Current /66    Stress Phase Recovery    Stage Minute REC 02:58    Exercise Stage Recovery    Current HR 90    Current /66    Stress Phase Recovery    Stage Minute REC 03:58    Exercise Stage Recovery    Current HR 89    Current /66    Stress Phase Recovery    Stage Minute REC 04:03    Exercise Stage Recovery    Current HR 89    Current /66    Max Predicted HR  60    Rate Pressure Product 14,016.0    Baseline HR 83    Left Ventricular EF 70    Narrative       There is no prior study for comparison.     Pharmacological regadenoson stress ECG is negative for inducible   myocardial ischemia.     Pharmacological Regadenonson nuclear stress test is negative for   inducible myocardial ischemia or infarction.     Normal left ventricular size, wall motion and systolic function.  The   calculated left ventricle ejection fraction is 70%.     The patient is at a low risk of future cardiac ischemic events.           Discharge Medications   Current Discharge Medication List       CONTINUE these medications which have NOT CHANGED    Details   carvedilol (COREG) 6.25 MG tablet TAKE 1 TABLET BY MOUTH TWICE DAILY WITH MEALS      cloNIDine (CATAPRES) 0.1 MG tablet Take 0.1 mg by mouth 2 times daily      omeprazole (PRILOSEC) 40 MG DR capsule Take 40 mg by mouth daily      ergocalciferol (ERGOCALCIFEROL) 1.25 MG (20777 UT) capsule Take 50,000 Units by mouth once a week monday           Allergies   Allergies   Allergen Reactions     Aspirin Nausea     Statins-Hmg-Coa Reductase Inhibitors [Statins] Other (See Comments)     Other reaction(s): Renal Failure, Other reaction(s): Renal Failure
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
## 2023-08-24 PROBLEM — R62.7 FAILURE TO THRIVE IN ADULT: Status: ACTIVE | Noted: 2023-01-01

## 2023-08-24 PROBLEM — S70.02XA CONTUSION OF LEFT HIP, INITIAL ENCOUNTER: Status: ACTIVE | Noted: 2023-01-01

## 2023-08-24 NOTE — ED PROVIDER NOTES
History     Chief Complaint:  Fall    The history is provided by the patient.      Devora Garcia is a 62 year old female with history of ESRD on peritoneal dialysis, stage 5 CKD, type II diabetes mellitus, and hypertension who presents to the ED via EMS from home for evaluation of a mechanical fall yesterday. Marcia reports she tripped on her feet 2 days ago, landing on her left side. She has had worsening left hip and left knee pain since the fall. Ambulation is increasingly difficult as well. She denies head injury or loss of consciousness. Does not endorse any other pain or symptoms. No blood thinners use. Lives with her  at home.    Independent Historian:   None - Patient Only    Medications:    Bumex  Catapres  Imdur  Renvela  Coreg  Ergocalciferol  Prilosec  Insulin    Past Medical History:    ESRD  CKD, stage 5  Pulmonary hypertension  Kidney stones  Cyst left ovary  Ductal carcinoma of breast  Psoriasis  Type II diabetes mellitus  GUILLERMO on CPAP  Hypertension  GERD  Gout  Allergic rhinitis  Anemia    Past Surgical History:    Bariatric surgery  Cystoscopy with right ureteral stent insertion x2  CVC tunnel placement  CVC tunnel revision  Tunneled catheter insert  Tunneled catheter replacement  Mammoplasty reduction  Peritoneal catheter insert  Cystoscopy, right ureteroscopy, laser lithotripsy, stent removal, stent insertion, right  Laparoscopy x2    Physical Exam   Patient Vitals for the past 24 hrs:   BP Temp Temp src Pulse Resp SpO2   08/24/23 0151 -- -- -- -- -- 94 %   08/24/23 0150 -- -- -- -- -- 96 %   08/24/23 0148 -- -- -- -- -- 97 %   08/24/23 0145 -- -- -- -- -- 99 %   08/24/23 0043 -- -- -- -- -- 98 %   08/24/23 0042 -- -- -- -- -- 97 %   08/24/23 0041 -- -- -- -- -- 95 %   08/24/23 0014 -- -- -- -- -- 96 %   08/24/23 0013 -- -- -- -- -- 96 %   08/24/23 0012 -- -- -- -- -- 96 %   08/24/23 0011 -- -- -- -- -- 95 %   08/24/23 0010 -- -- -- -- -- 93 %   08/24/23 0009 -- -- -- -- -- 95 %  "  08/24/23 0008 -- -- -- -- -- 96 %   08/24/23 0004 -- -- -- -- -- 95 %   08/23/23 2328 -- -- -- -- -- 95 %   08/23/23 2320 (!) 146/105 98.2  F (36.8  C) Oral 80 16 100 %     Physical Exam  General: Patient is alert, awake and interactive when I enter the room  Head: The scalp, face, and head appear normal. Atraumatic.   Eyes: The pupils are equal, round, and reactive to light. Conjunctivae and sclerae are normal  ENT: No hemotympanum or signs basilar skull fracture. The oropharynx is normal without erythema. No tenderness to palpation of the face, nose or jaw.   Neck: Normal range of motion. No cervical midline tenderness.   CV: Regular rate. S1/S2. No murmurs.   Resp: Lungs are clear without wheezes or rales. No distress. No crepitance.   GI: Abdomen is soft, no rigidity, guarding, or rebound. No contusion. No distension. No tenderness to palpation in any quadrant.     MS: Tenderness to palpation of the left lateral hip.  There is no evidence of significant ecchymosis or hematoma.  No C/T/L tenderness in midline  Skin: No rash or lesions noted. Normal capillary refill noted  Neuro:  GCS 15.  CN\"s II-XII intact. Speech is normal and fluent. Face is symmetric. Strength is normal and symmetric.   Psych: Normal affect.  Appropriate interactions.     Emergency Department Course   ECG:  ECG taken at 0539, ECG read at 0548  Normal sinus rhythm  T wave abnormality, consider lateral ischemia  Abnormal ECG  Rate 81 bpm. MS interval 194. QRS duration 90. QT/QTc 366/425. P-R-T axes 58 6 179.     Imaging:  CT Abdomen Pelvis w/o Contrast   Final Result   IMPRESSION: Within the limitation of noncontrast exam, there is;   1. No evidence of acute traumatic injury in the abdomen and pelvis.   2. Cutaneous thickening and subcutaneous fat stranding most prominent in the anterior lower abdominal wall, can be seen with cellulitis.   3. Cholelithiasis without CT evidence of acute cholecystitis.         XR Knee Left 1/2 Views   Final " Result   IMPRESSION: No acute fracture or dislocation. Mild osteoarthritis. Multiple vascular calcifications.      XR Pelvis and Hip Left 1 View   Final Result   IMPRESSION: Bilateral sacroiliac and hip joint spaces appear symmetrically intact. No acute fracture or dislocation identified. Significant aortoiliac atherosclerotic calcification redemonstrated, unchanged.      XR Chest 2 Views    (Results Pending)      Report per radiology    Laboratory:  Labs Ordered and Resulted from Time of ED Arrival to Time of ED Departure   BASIC METABOLIC PANEL - Abnormal       Result Value    Sodium 135 (*)     Potassium 5.0      Chloride 96 (*)     Carbon Dioxide (CO2) 20 (*)     Anion Gap 19 (*)     Urea Nitrogen 66.6 (*)     Creatinine 15.50 (*)     Calcium 8.9      Glucose 83      GFR Estimate 2 (*)    CBC WITH PLATELETS AND DIFFERENTIAL - Abnormal    WBC Count 8.3      RBC Count 4.12      Hemoglobin 10.5 (*)     Hematocrit 34.6 (*)     MCV 84      MCH 25.5 (*)     MCHC 30.3 (*)     RDW 16.2 (*)     Platelet Count 179      % Neutrophils 70      % Lymphocytes 15      % Monocytes 10      % Eosinophils 4      % Basophils 1      % Immature Granulocytes 0      NRBCs per 100 WBC 0      Absolute Neutrophils 5.8      Absolute Lymphocytes 1.2      Absolute Monocytes 0.8      Absolute Eosinophils 0.4      Absolute Basophils 0.1      Absolute Immature Granulocytes 0.0      Absolute NRBCs 0.0     NT PROBNP INPATIENT   ROUTINE UA WITH MICROSCOPIC REFLEX TO CULTURE     Procedures   None    Emergency Department Course & Assessments:    Interventions:  Medications   morphine (PF) injection 4 mg (4 mg Intravenous $Given 8/24/23 0130)     Assessments:  0007 I obtained history and examined the patient as noted above.  0230 I rechecked the patient and explained findings.      Consultations/Discussion of Management or Tests:  0136 I spoke with CT regarding the patient's presentation in the emergency department today.  0455 I spoke with   MD Ralph of the hospitalist team regarding the patient, who accepted the patient for admission.     Social Determinants of Health affecting care:   None    Disposition:  The patient was admitted to the hospital for observation under the care of Dr. Ralph MD.     Impression & Plan    CMS Diagnoses: None    Medical Decision Making:  Patient is a 62-year-old woman who experienced a mechanical fall 2 days ago but then had worsening pain in her left hip inhibiting her ability to ambulate prompting her to come to the emergency department for further evaluation.  She denies any head trauma or loss of consciousness.  On physical exam she has some pain over her left lateral hip and flank.  But no significant additional traumatic injuries noted.  X-ray of the left pelvis and hip was negative for evidence of fracture or dislocation.  Given her ongoing pain a CT scan was performed which additionally shows no significant traumatic injuries.  Radiologist raised suspicion about possible cellulitis.  No evidence of erythema, warmth or induration on my physical exam.  Clinically low concern for cellulitis based on my findings.  Attempted to perform ambulation trial but patient was not able to ambulate with a walker.  On reevaluation she reports that she has been having difficulty ambulating for some time and given worsening edema in her lower extremity.  She equates this to her end-stage renal disease.  She notes that she has been doing peritoneal dialysis at home and has been compliant to doing this every day.  At this point I do not feel safe discharging the patient home as she is unable to ambulate even with a walker.  She may require higher level of care if she does not rehab well.    Critical Care time:  was 0 minutes for this patient excluding procedures.    Diagnosis:    ICD-10-CM    1. Failure to thrive in adult  R62.7       2. Fall, initial encounter  W19.XXXA       3. Contusion of left hip, initial encounter  S70.02XA             Scribe Disclosure:  I, Michelle Gross, am serving as a scribe at 12:17 AM on 8/24/2023 to document services personally performed by Jason Winkler MD based on my observations and the provider's statements to me.   8/23/2023   Jason Winkler MD Battista, Christopher Joseph, MD  08/24/23 0631

## 2023-08-24 NOTE — ED TRIAGE NOTES
Pt arrives via EMS from home. Pt had a fall yesterday and after fall she reports 10/10 hip pain. Initially did not want to be seen yesterday until hip pain had worsened today. Pt was walking and tripped over her own feet. No thinners. 50 mcg of fentanyl given en-route. 180 bg.      Triage Assessment       Row Name 08/23/23 2633       Triage Assessment (Adult)    Airway WDL WDL       Respiratory WDL    Respiratory WDL WDL       Skin Circulation/Temperature WDL    Skin Circulation/Temperature WDL WDL       Cardiac WDL    Cardiac WDL WDL       Peripheral/Neurovascular WDL    Peripheral Neurovascular WDL WDL       Cognitive/Neuro/Behavioral WDL    Cognitive/Neuro/Behavioral WDL WDL

## 2023-08-24 NOTE — H&P
[facility]    History and Physical - Hospitalist Service       Date of Admission:  8/23/2023    Assessment & Plan      Devora Garcia is a 62 year old female admitted on 8/23/2023. She has hx of HTN, ESRD on PD, GUILLERMO on CPAP, who presents with L hip and knee  pain s/p mechanical fall yesterday. No head trauma or LOC. She has been having difficulty walking. Pt is unable to extrapolate further regarding the fall , but states she fell in the bedroom and there was no head trauma or LOC and her  called EMS. She is on PD and has 4 runs during the day. At baseline she uses a walker to ambulate, however, has had increased difficulty due to swelling of LE's. No fevers/chills/chest pain/SOB.    Mechanical fall and L hip and knee pain.  - PT consult  - will get CT L hip to r/o occult fx.    2. ESRD on PD.  - nephrology consult.    3. HTN.  - resume home meds when reconciled.    4. Acute encephalopathy.  - baseline unknown.  - ? Related to morphine.  - check UA and CXR.    Will have PT, OT and SW eval. Likely needs higher level of care than presently getting at home.         Diet:  HD diet.  DVT Prophylaxis: Pneumatic Compression Devices  Taylor Catheter: Not present  Lines: None     Cardiac Monitoring: None  Code Status:  Full Code.    Clinically Significant Risk Factors Present on Admission             # Anion Gap Metabolic Acidosis: Highest Anion Gap = 19 mmol/L in last 2 days, will monitor and treat as appropriate      # Hypertension: Noted on problem list                 Disposition Plan           González Rosas MD  Hospitalist Service  [facility]  Securely message with Clint (more info)  Text page via [facility] Paging/Directory     ______________________________________________________________________    Chief Complaint     Fall and L hip/knee pain.    History is obtained from the patient, though patient a poor historian.    History of Present Illness   Devora FISHER  Jose is a 62 year old female who has hx of HTN, ESRD on PD, GUILLERMO on CPAP, who presents with L hip and knee  pain s/p mechanical fall yesterday. No head trauma or LOC. She has been having difficulty walking. Pt is unable to extrapolate further regarding the fall , but states she fell in the bedroom and there was no head trauma or LOC and her  called EMS. She is on PD and has 4 runs during the day. At baseline she uses a walker to ambulate, however, has had increased difficulty due to swelling of LE's. No fevers/chills/chest pain/SOB.        Past Medical History    Past Medical History:   Diagnosis Date    Allergic rhinitis     Cancer (H)     left breast    Chronic kidney disease     Chronic kidney disease, stage 3 (H) 2010    Cyst of left ovary     Diabetes mellitus (H)     12/2020 states no longer takin insulin    GERD (gastroesophageal reflux disease)     Gout     Hypertension     Intraductal carcinoma 2015    Obstructive sleep apnea on CPAP     uses CPAP    Psoriasis        Past Surgical History   Past Surgical History:   Procedure Laterality Date    BARIATRIC SURGERY  2014    HC CYSTOSCOPY,INSERT URETERAL STENT Right 11/7/2018    Procedure: CYSTOSCOPY, WITH RIGHT URETERAL STENT INSERTION;  Surgeon: Dino Reynoso MD;  Location: [facility] OR;  Service: Urology    HC CYSTOSCOPY,INSERT URETERAL STENT Right 8/31/2020    Procedure: CYSTOSCOPY, WITH URETERAL STENT INSERTION;  Surgeon: Dino Reynoso MD;  Location: [facility] OR;  Service: Urology    IR CVC TUNNEL PLACEMENT > 5 YRS OF AGE  9/2/2020    IR CVC TUNNEL REVISION RIGHT  10/22/2020    IR TUNNELED CATHETER COMPLETE REPLACEMENT  10/22/2020    IR TUNNELED CATHETER INSERT  9/2/2020    LUMPECTOMY BREAST Left 2015    MAMMOPLASTY REDUCTION  2015    OTHER SURGICAL HISTORY  12/02/2020    peritoneal dialsis catheter placement    FL CYSTO/URETERO W/LITHOTRIPSY &INDWELL STENT INSRT Right 11/30/2018    Procedure: CYSTOSCOPY, RIGHT URETEROSCOPY, LASER  LITHOTRIPSY STENT REMOVAL STENT INSERTION;  Surgeon: Dino Reynoso MD;  Location: [facility];  Service: Urology    NC LAP INSERTION TUNNELED INTRAPERITONEAL CATHETER N/A 12/23/2020    Procedure: LAPAROSCOPY , Enterolysis;  Surgeon: Tigre Rivas MD;  Location: [facility];  Service: General    NC LAP,DIAGNOSTIC ABDOMEN N/A 12/29/2020    Procedure: LAPAROSCOPY, De-Clot of Peritoneal Dialysis Catheter;  Surgeon: Tigre Rivas MD;  Location: [facility];  Service: General       Prior to Admission Medications   Prior to Admission Medications   Prescriptions Last Dose Informant Patient Reported? Taking?   bumetanide (BUMEX) 2 MG tablet   No No   Sig: Take 1 tablet (2 mg) by mouth daily   carvedilol (COREG) 6.25 MG tablet   Yes No   Sig: TAKE 1 TABLET BY MOUTH TWICE DAILY WITH MEALS   cloNIDine (CATAPRES) 0.1 MG tablet   No No   Sig: Take 1 tablet (0.1 mg) by mouth 3 times daily   ergocalciferol (ERGOCALCIFEROL) 1.25 MG (22548 UT) capsule   Yes No   Sig: Take 50,000 Units by mouth once a week monday   isosorbide mononitrate (IMDUR) 30 MG 24 hr tablet   No No   Sig: Take 1 tablet (30 mg) by mouth daily   omeprazole (PRILOSEC) 40 MG DR capsule   Yes No   Sig: Take 40 mg by mouth daily   sevelamer carbonate (RENVELA) 800 MG tablet   No No   Sig: Take 2 tablets (1,600 mg) by mouth 3 times daily (with meals)      Facility-Administered Medications: None        Review of Systems    The 10 point Review of Systems is negative other than noted in the HPI or here.     Social History   I have reviewed this patient's social history and updated it with pertinent information if needed.  Social History     Tobacco Use    Smoking status: Never    Smokeless tobacco: Never   Substance Use Topics    Alcohol use: Yes     Comment: Alcoholic Drinks/day: Rare, 2/month    Drug use: No         Family History   I have reviewed this patient's family history and updated it with pertinent information if  needed.  Family History   Problem Relation Age of Onset    Kidney failure Father     Hypertension Father     Hypertension Brother     Diabetes Brother     Kidney failure Brother     Clotting Disorder No family hx of     Anesthesia Reaction No family hx of          Allergies   Allergies   Allergen Reactions    Aspirin Nausea    Statins-Hmg-Coa Reductase Inhibitors [Statins] Other (See Comments)     Other reaction(s): Renal Failure, Other reaction(s): Renal Failure        Physical Exam   Vital Signs: Temp: 98.2  F (36.8  C) Temp src: Oral BP: (!) 146/105 Pulse: 80   Resp: 16 SpO2: 94 % O2 Device: Nasal cannula Oxygen Delivery: 2 LPM  Weight: 0 lbs 0 oz    Gen - alert, awake, oriented to self and knows its August but unable to state the day or date correctly. + answers questions appropriately.  HEENT - PERRLA.  Lungs - CTA B.  Heart - RR,S1+S2 nml, no m/g/r.  Abd - soft, NT, ND, + BS, + PD catheter site C/D/I.  Ext - 2+ edema.    Medical Decision Making       65  MINUTES SPENT BY ME on the date of service doing chart review, history, exam, documentation & further activities per the note.      Data     I have personally reviewed the following data over the past 24 hrs:    8.3  \   10.5 (L)   / 179     135 (L) 96 (L) 66.6 (H) /  83   5.0 20 (L) 15.50 (H) \       Imaging results reviewed over the past 24 hrs:   Recent Results (from the past 24 hour(s))   XR Pelvis and Hip Left 1 View    Narrative    EXAM: XR PELVIS AND HIP LEFT 1 VIEW  LOCATION: [facility]  DATE: 8/24/2023    INDICATION: Left hip pain  COMPARISON: CT on 05/10/2023      Impression    IMPRESSION: Bilateral sacroiliac and hip joint spaces appear symmetrically intact. No acute fracture or dislocation identified. Significant aortoiliac atherosclerotic calcification redemonstrated, unchanged.   XR Knee Left 1/2 Views    Narrative    EXAM: XR KNEE LEFT 1/2 VIEWS  LOCATION: [facility]  DATE: 8/24/2023    INDICATION:  fall, anterior knee pain  COMPARISON: None.      Impression    IMPRESSION: No acute fracture or dislocation. Mild osteoarthritis. Multiple vascular calcifications.   CT Abdomen Pelvis w/o Contrast    Narrative    EXAM: CT ABDOMEN PELVIS W/O CONTRAST  LOCATION: [facility]  DATE: 8/24/2023    INDICATION: Left hip pain, flank pain following a fall.  COMPARISON: CT abdomen and pelvis on 06/07/2022 and CTA of the chest, abdomen and pelvis on 05/10/2023.  TECHNIQUE: CT scan of the abdomen and pelvis was performed without intravenous contrast Multiplanar reformats were obtained. Dose reduction techniques were used.    FINDINGS:   LOWER CHEST: Multiple calcified foci in the left breast. Bibasilar pulmonary opacities, likely atelectasis.    ABDOMEN/PELVIS: Limited evaluation of the abdominal organs due to lack of intravenous contrast.    HEPATOBILIARY: The unenhanced liver is grossly unremarkable. Cholelithiasis without CT evidence of acute cholecystitis.    PANCREAS: The unenhanced pancreas is grossly unremarkable.    SPLEEN: No splenomegaly.    ADRENAL GLANDS: No adrenal nodules.    KIDNEYS/BLADDER: Atrophic native kidneys. No radiodense kidney/ureteral stones or hydronephrosis in either kidney.    BOWEL: No abnormally dilated bowel loops. The appendix is visualized and appears normal. Postsurgical changes of gastric sleeve surgery.    PERITONEUM: Small amount of free fluid in the abdomen and pelvis, could be related to the presence of the peritoneal dialysis catheter.    PELVIC ORGANS: Unremarkable.    VASCULATURE: Moderate atherosclerotic vascular calcification of the abdominal aorta and major vessels.    LYMPH NODES: Unremarkable.    MUSCULOSKELETAL: Cutaneous thickening and subcutaneous fat stranding most prominent in the anterior lower abdominal wall, can be seen with infection. Multilevel degenerative changes of the spine. No evidence of acute osseous pathology.      Impression    IMPRESSION:  Within the limitation of noncontrast exam, there is;  1. No evidence of acute traumatic injury in the abdomen and pelvis.  2. Cutaneous thickening and subcutaneous fat stranding most prominent in the anterior lower abdominal wall, can be seen with cellulitis.  3. Cholelithiasis without CT evidence of acute cholecystitis.

## 2023-08-24 NOTE — PLAN OF CARE
Patient seen and examined and H&P reviewed along with labs, vitals etc. Reports ongoing L leg, hip pain but seems somewhat sedated. Reports severe pain to just palpation of skin/fat pads over these areas which seems out of proportion to exam. Shortly after finishing exam quickly falls asleep. Reports she takes gabapentin scheduled so will resume but with hold parameters. Will add very low dose PRN oral oxy but also hold with sedation. She is also PD dependent which could affect duration of these meds so less is ideal as effects can linger. Await CT of L hip and PT eval, no overt evidence of fracture noted on CT abd/pelvis. Plan of care discussed with nurse at bedside. Will discontinue the UA as she's PD dependent, unclear if she makes urine.

## 2023-08-24 NOTE — PROGRESS NOTES
ROOM # [id number]    Living Situation (if not independent, order SW consult): Home  Facility name:  : [facility]    Activity level at baseline: Independent with walker  Activity level on admit: x 2 assist & Lift      Patient registered to observation; given Patient Bill of Rights; given the opportunity to ask questions about observation status and their plan of care.  Patient has been oriented to the observation room, bathroom and call light is in place.    Discussed discharge goals and expectations with patient/family.

## 2023-08-24 NOTE — ED NOTES
[facility]  ED Nurse Handoff Report    ED Chief complaint: Fall  . ED Diagnosis:   Final diagnoses:   Failure to thrive in adult   Fall, initial encounter   Contusion of left hip, initial encounter       Allergies:   Allergies   Allergen Reactions    Aspirin Nausea    Statins-Hmg-Coa Reductase Inhibitors [Statins] Other (See Comments)     Other reaction(s): Renal Failure, Other reaction(s): Renal Failure       Code Status: Full Code    Activity level - Baseline/Home:  independent.  Activity Level - Current:   assist of 2. Failed ambulating trial with walker  Lift room needed: No.   Bariatric: No   Needed: No   Isolation: No.   Infection: Not Applicable.     Respiratory status: Room air    Vital Signs (within 30 minutes):   Vitals:    08/24/23 0145 08/24/23 0148 08/24/23 0150 08/24/23 0151   BP:       Pulse:       Resp:       Temp:       TempSrc:       SpO2: 99% 97% 96% 94%       Cardiac Rhythm:  ,      Pain level:    Patient confused: No.   Patient Falls Risk: nonskid shoes/slippers when out of bed, arm band in place, patient and family education, and assistive device/personal items within reach.   Elimination Status: Has voided     Patient Report - Initial Complaint: Left hip pain since fall on 08/22. Didn't go to ER day of fall, but pain getting worse.   Focused Assessment:   The history is provided by the patient.      Devora Garcia is a 62 year old female with history of ESRD on peritoneal dialysis, stage 5 CKD, type II diabetes mellitus, and hypertension who presents to the ED via EMS from home for evaluation of a mechanical fall yesterday. Marcia reports she tripped on her feet 2 days ago, landing on her left side. She has had worsening left hip and left knee pain since the fall. Ambulation is increasingly difficult as well. She denies head injury or loss of consciousness. Does not endorse any other pain or symptoms. No blood thinners use. Lives with her  at  home.     Independent Historian:   None - Patient Only       Medications:    Bumex  Catapres  Imdur  Renvela  Coreg  Ergocalciferol  Prilosec  Insulin     Past Medical History:    ESRD  CKD, stage 5  Pulmonary hypertension  Kidney stones  Cyst left ovary  Ductal carcinoma of breast  Psoriasis  Type II diabetes mellitus  GUILLERMO on CPAP  Hypertension  GERD  Gout  Allergic rhinitis  Anemia     Past Surgical History:    Bariatric surgery  Cystoscopy with right ureteral stent insertion x2  CVC tunnel placement  CVC tunnel revision  Tunneled catheter insert  Tunneled catheter replacement  Mammoplasty reduction  Peritoneal catheter insert  Cystoscopy, right ureteroscopy, laser lithotripsy, stent removal, stent insertion, right  Laparoscopy x2     Abnormal Results:   Labs Ordered and Resulted from Time of ED Arrival to Time of ED Departure   BASIC METABOLIC PANEL - Abnormal       Result Value    Sodium 135 (*)     Potassium 5.0      Chloride 96 (*)     Carbon Dioxide (CO2) 20 (*)     Anion Gap 19 (*)     Urea Nitrogen 66.6 (*)     Creatinine 15.50 (*)     Calcium 8.9      Glucose 83      GFR Estimate 2 (*)    CBC WITH PLATELETS AND DIFFERENTIAL - Abnormal    WBC Count 8.3      RBC Count 4.12      Hemoglobin 10.5 (*)     Hematocrit 34.6 (*)     MCV 84      MCH 25.5 (*)     MCHC 30.3 (*)     RDW 16.2 (*)     Platelet Count 179      % Neutrophils 70      % Lymphocytes 15      % Monocytes 10      % Eosinophils 4      % Basophils 1      % Immature Granulocytes 0      NRBCs per 100 WBC 0      Absolute Neutrophils 5.8      Absolute Lymphocytes 1.2      Absolute Monocytes 0.8      Absolute Eosinophils 0.4      Absolute Basophils 0.1      Absolute Immature Granulocytes 0.0      Absolute NRBCs 0.0     NT PROBNP INPATIENT   ROUTINE UA WITH MICROSCOPIC REFLEX TO CULTURE        CT Abdomen Pelvis w/o Contrast   Final Result   IMPRESSION: Within the limitation of noncontrast exam, there is;   1. No evidence of acute traumatic injury in the  abdomen and pelvis.   2. Cutaneous thickening and subcutaneous fat stranding most prominent in the anterior lower abdominal wall, can be seen with cellulitis.   3. Cholelithiasis without CT evidence of acute cholecystitis.         XR Knee Left 1/2 Views   Final Result   IMPRESSION: No acute fracture or dislocation. Mild osteoarthritis. Multiple vascular calcifications.      XR Pelvis and Hip Left 1 View   Final Result   IMPRESSION: Bilateral sacroiliac and hip joint spaces appear symmetrically intact. No acute fracture or dislocation identified. Significant aortoiliac atherosclerotic calcification redemonstrated, unchanged.      XR Chest 2 Views    (Results Pending)       Treatments provided: See MAR, and O2 NC 2 L's  Family Comments: Alone  OBS brochure/video discussed/provided to patient:  Yes  ED Medications:   Medications   morphine (PF) injection 4 mg (4 mg Intravenous $Given 8/24/23 0130)       Drips infusing:  No  For the majority of the shift this patient was Green.   Interventions performed were .    Sepsis treatment initiated: No    Cares/treatment/interventions/medications to be completed following ED care:     ED Nurse Name: Alma Christiansen RN  5:24 AM     RECEIVING UNIT ED HANDOFF REVIEW    Above ED Nurse Handoff Report was reviewed: Yes  Reviewed by: Maritza Little RN on August 24, 2023 at 9:42 AM

## 2023-08-24 NOTE — PHARMACY-ADMISSION MEDICATION HISTORY
Pharmacist Admission Medication History    Admission medication history is complete. The information provided in this note is only as accurate as the sources available at the time of the update.    Medication reconciliation/reorder completed by provider prior to medication history? No    Information Source(s): Patient, Family member, and CareEverywhere/SureScripts via in-person and phone  Spoke with patient's , Tavares, over the phone. He read off the patient's medication bottles but couldn't provide any more information than that.   I did attempt to speak with patient but she was quite sleepy and was hard to get information out of her, which is why I called her .     Pertinent Information:     Bumetanide: pt states she stopped taking ~2 mos ago? Said her MD told hr she could stop taking the medication if she wanted to. Medication was still in with other medications at home per her . So unclear if pt is just confused d/t her sedation? I did leave on list as looks like pt is supposed to be taking but unclear if she has been.     Changes made to PTA medication list:  Added: trazodone, hydroxyzine, gabapentin, Vitamin B-12, MSM  Deleted: ergocalciferol, sevelamer  Changed: None    Medication Affordability:  Not including over the counter (OTC) medications, was there a time in the past 3 months when you did not take your medications as prescribed because of cost?: Unable to Assess    Allergies reviewed with patient and updates made in EHR: unable to assess    Medication History Completed By:   Hannah Carter, PharmD, BCPS    Prior to Admission medications    Medication Sig Last Dose Taking? Auth Provider Long Term End Date   carvedilol (COREG) 6.25 MG tablet TAKE 1 TABLET BY MOUTH TWICE DAILY WITH MEALS Past Week at - Yes Reported, Patient Yes    cloNIDine (CATAPRES) 0.1 MG tablet Take 1 tablet (0.1 mg) by mouth 3 times daily Past Week at - Yes Jacob Silver MD Yes    cyanocobalamin (VITAMIN  B-12) 1000 MCG tablet Take 1,000 mcg by mouth daily Past Week at - Yes Unknown, Entered By History     famotidine (PEPCID) 20 MG tablet Take 20 mg by mouth daily as needed (heartburn) prn at prn Yes Unknown, Entered By History     gabapentin (NEURONTIN) 100 MG capsule Take 100 mg by mouth 2 times daily as needed (itching) prn at prn Yes Unknown, Entered By History Yes    Glucos-MSM-C-Gy-Cglzwi-Svtdjr (GLUCOSAMINE MSM COMPLEX) TABS tablet Take 1 tablet by mouth daily Past Week at - Yes Unknown, Entered By History     hydrOXYzine (VISTARIL) 50 MG capsule Take 50 mg by mouth 4 times daily as needed for itching prn at prn Yes Unknown, Entered By History     isosorbide mononitrate (IMDUR) 30 MG 24 hr tablet Take 1 tablet (30 mg) by mouth daily Past Week at - Yes Jacob Silver MD Yes    omeprazole (PRILOSEC) 40 MG DR capsule Take 40 mg by mouth daily Past Week at - Yes Unknown, Entered By History     traZODone (DESYREL) 50 MG tablet Take 50 mg by mouth nightly as needed for sleep prn at prn Yes Unknown, Entered By History Yes    bumetanide (BUMEX) 2 MG tablet Take 1 tablet (2 mg) by mouth daily  Patient not taking: Reported on 8/24/2023 Not Taking at -  Jacob Silver MD Yes

## 2023-08-24 NOTE — PROGRESS NOTES
Cycler set up per protocol and per treatment orders       Cycler Serial Number: [id number]  Total Treatment Volume: [id number]  Total treatment time: 8 hrs  Fill Volume: 3000ml  Last Fill Volume:0ml  Heater ba.25% 6000mL;  Lot #: [id number];  Expiration Date: 10/23  Side Bag #1: 2.5% 6000mL;  Lot #: [id number];  Expiration Date:     Number of cycles including final fill: 4  Dwell Time: 1:22 minutes  Last Fill Volume: 0ml  Initial Drain Alarm: 10ml  PD orders reviewed with Patient procedure and ESRD teaching done and questions answered.  Cycler ready for hook-up.    Report given to: Ivana Pollard consent form signed 2023

## 2023-08-24 NOTE — ED NOTES
Pt failed ambulation trial. Pt mentioned she has not walked in a while because her legs does not work.

## 2023-08-24 NOTE — PLAN OF CARE
PRIMARY DIAGNOSIS: ACUTE PAIN  OUTPATIENT/OBSERVATION GOALS TO BE MET BEFORE DISCHARGE:  1. Pain Status: Improved-controlled with oral pain medications.    2. Return to near baseline physical activity: No    3. Cleared for discharge by consultants (if involved): No    Discharge Planner Nurse   Safe discharge environment identified: Yes  Barriers to discharge: Yes       Entered by: Ivana Ricardo RN 08/24/2023 6:54 PM   Pt. A&O, has some pain, PRN oxycodone was given, pt. Refused to get up, has redness and wound in abdominal folder, were clean, miconazole powder were applied.  /80   Pulse 81   Temp 98  F (36.7  C) (Oral)   Resp 17   Wt 113.9 kg (251 lb)   SpO2 99%   BMI 47.43 kg/m     Please review provider order for any additional goals.   Nurse to notify provider when observation goals have been met and patient is ready for discharge.Goal Outcome Evaluation:      Plan of Care Reviewed With: patient

## 2023-08-24 NOTE — PLAN OF CARE
"Goal Outcome Evaluation:      Plan of Care Reviewed With: patient    Overall Patient Progress: no changeOverall Patient Progress: no change     PRIMARY DIAGNOSIS: ACUTE PAIN  OUTPATIENT/OBSERVATION GOALS TO BE MET BEFORE DISCHARGE:  1. Pain Status: Improved-controlled with oral pain medications.    2. Return to near baseline physical activity:  No. Patient was independent at baseline. Currently feels like she \"cannot stand \" and that she is \"too weak\"    3. Cleared for discharge by consultants (if involved): No    Discharge Planner Nurse   Safe discharge environment identified: No  Barriers to discharge: Yes       Entered by: Maritza Little RN 08/24/2023 3:20 PM     Please review provider order for any additional goals.   Nurse to notify provider when observation goals have been met and patient is ready for discharge.    Patient complains of left hip and knee pain with movement; states \"no pain\" when she is not moving, however, continues to request pain medication. Pt lethargic on/off throughout the shift; arouses easily, however, drifts back to sleep. Discussed avoiding narcotics if able due to potential for sedation. Peritoneal dialysis ordered for overnight; nephrology following. CT of hip performed.    "

## 2023-08-24 NOTE — CONSULTS
RENAL CONSULTATION NOTE    REFERRING MD:  Ralph GHOTRA MD    REASON FOR CONSULTATION:  ESRD on PD    HPI:  62 y.o woman with ESRD on PD, hypertension and GUILLERMO/CPAP, who was admitted due to weakness after a fall.   She says she was walking in her bedroom when she turned around, tripped and fell onto her left side.   She has had left hip pain since.   Yesterday, she was in the chair all day due to left hip pain.    called EMS when she was unable to get out of the chair.   She denies orthostatic dizziness  No fever/chill/cough.  No other complaints from her.     ROS:  A complete review of systems was performed and is negative except as noted above.    PMH:    Past Medical History:   Diagnosis Date    Allergic rhinitis     Cancer (H)     left breast    Chronic kidney disease     Chronic kidney disease, stage 3 (H) 2010    Cyst of left ovary     Diabetes mellitus (H)     12/2020 states no longer takin insulin    GERD (gastroesophageal reflux disease)     Gout     Hypertension     Intraductal carcinoma 2015    Obstructive sleep apnea on CPAP     uses CPAP    Psoriasis        PSH:    Past Surgical History:   Procedure Laterality Date    BARIATRIC SURGERY  2014    HC CYSTOSCOPY,INSERT URETERAL STENT Right 11/7/2018    Procedure: CYSTOSCOPY, WITH RIGHT URETERAL STENT INSERTION;  Surgeon: Dino Reynoso MD;  Location: [facility] OR;  Service: Urology    HC CYSTOSCOPY,INSERT URETERAL STENT Right 8/31/2020    Procedure: CYSTOSCOPY, WITH URETERAL STENT INSERTION;  Surgeon: Dino Reynoso MD;  Location: [facility] OR;  Service: Urology    IR CVC TUNNEL PLACEMENT > 5 YRS OF AGE  9/2/2020    IR CVC TUNNEL REVISION RIGHT  10/22/2020    IR TUNNELED CATHETER COMPLETE REPLACEMENT  10/22/2020    IR TUNNELED CATHETER INSERT  9/2/2020    LUMPECTOMY BREAST Left 2015    MAMMOPLASTY REDUCTION  2015    OTHER SURGICAL HISTORY  12/02/2020    peritoneal dialsis catheter placement    NJ CYSTO/URETERO W/LITHOTRIPSY &INDWELL  STENT INSRT Right 11/30/2018    Procedure: CYSTOSCOPY, RIGHT URETEROSCOPY, LASER LITHOTRIPSY STENT REMOVAL STENT INSERTION;  Surgeon: Dino Reynoso MD;  Location: [facility] OR;  Service: Urology    GA LAP INSERTION TUNNELED INTRAPERITONEAL CATHETER N/A 12/23/2020    Procedure: LAPAROSCOPY , Enterolysis;  Surgeon: Tigre Rivas MD;  Location: [facility];  Service: General    GA LAP,DIAGNOSTIC ABDOMEN N/A 12/29/2020    Procedure: LAPAROSCOPY, De-Clot of Peritoneal Dialysis Catheter;  Surgeon: Tigre Rivas MD;  Location: [facility];  Service: General       MEDICATIONS:     - MEDICATION INSTRUCTIONS for Dialysis Patients -   Does not apply See Admin Instructions    carvedilol  6.25 mg Oral BID w/meals    cloNIDine  0.1 mg Oral TID    docusate sodium  100 mg Oral BID    gabapentin  100 mg Oral BID    isosorbide mononitrate  30 mg Oral Daily    miconazole   Topical BID    pantoprazole  40 mg Oral Daily    polyethylene glycol  17 g Oral Daily       ALLERGIES:    Allergies as of 08/23/2023 - Reviewed 08/23/2023   Allergen Reaction Noted    Aspirin Nausea 11/07/2018    Statins-hmg-coa reductase inhibitors [statins] Other (See Comments) 09/04/2014       FH:    Family History   Problem Relation Age of Onset    Kidney failure Father     Hypertension Father     Hypertension Brother     Diabetes Brother     Kidney failure Brother     Clotting Disorder No family hx of     Anesthesia Reaction No family hx of        SH:    Social History     Socioeconomic History    Marital status:      Spouse name: Not on file    Number of children: Not on file    Years of education: Not on file    Highest education level: Not on file   Occupational History    Not on file   Tobacco Use    Smoking status: Never    Smokeless tobacco: Never   Substance and Sexual Activity    Alcohol use: Yes     Comment: Alcoholic Drinks/day: Rare, 2/month    Drug use: No    Sexual activity: Not on file   Other Topics  Concern    Not on file   Social History Narrative    Not on file     Social Determinants of Health     Financial Resource Strain: Not on file   Food Insecurity: Not on file   Transportation Needs: Not on file   Physical Activity: Not on file   Stress: Not on file   Social Connections: Not on file   Intimate Partner Violence: Not on file   Housing Stability: Not on file       PHYSICAL EXAM:    BP (!) 169/87 (BP Location: Left arm)   Pulse 83   Temp 98.2  F (36.8  C) (Oral)   Resp 16   Wt 113.9 kg (251 lb)   SpO2 95%   BMI 47.43 kg/m    GENERAL: NAD.   HEENT:  Normocephalic. No gross abnormalities.  Pupils equal.  MMM.  Dentition is ok.  CV: RRR, + murmurs, no clicks, gallops, or rubs, + edema  RESP: Clear bilaterally with good efforts. No wheezes or crackles.   GI: Abdomen obese, soft, NT. PT catheter site is CDI  MUSCULOSKELETAL: extremities + edema  SKIN: no suspicious lesions or rashes, dry to touch  NEURO:  Strength normal and symmetric. Awake, alert and conversing normally.  PSYCH: mood good, affect appropriate  LYMPH: No palpable ant/post cervical     LABS:      CBC RESULTS:     Recent Labs   Lab 08/24/23  0026   WBC 8.3   RBC 4.12   HGB 10.5*   HCT 34.6*          BMP RESULTS:  Recent Labs   Lab 08/24/23  0026   *   POTASSIUM 5.0   CHLORIDE 96*   CO2 20*   BUN 66.6*   CR 15.50*   GLC 83   RON 8.9       INRNo lab results found in last 7 days.     DIAGNOSTICS:  Reviewed    CT hip  1.  No CT evidence of acute left hip fracture.  2.  Bone demineralization    CT AP  1. No evidence of acute traumatic injury in the abdomen and pelvis.  2. Cutaneous thickening and subcutaneous fat stranding most prominent in the anterior lower abdominal wall, can be seen with cellulitis.  3. Cholelithiasis without CT evidence of acute cholecystitis.    A/P:  62 y.o woman with ESRD on PD, admitted for weakness and left hip pain after a fall.     # ESRD: on PD ~ 2 years.    -4 manual exchanges (3 green and one red),  1.5 hrs dwell time   -Colver with Dr. Cole    # Mechanical fall: No fracture on CT.     # Hypertension: BP his high.   -Coreg 6.25 mg bid   -clonidine 0.1 mcg tid   -Imdur 30 mg daily    # Anemia of ESRD: Hgb is at target.     # FEN: Mild acidosis. Hypervolemia.     Plan:  # Will do 4 exchanges with 2.5% and 4.25% dextrose solution. Order placed.   # Fluid restriction to 1200 mg daily    Marques Morris MD  [facility] - Nephrology  Office Phone: [phone]  Pager: [phone]

## 2023-08-25 NOTE — PLAN OF CARE
PRIMARY DIAGNOSIS: FALL/ LEFT HIP PAIN   OUTPATIENT/OBSERVATION GOALS TO BE MET BEFORE DISCHARGE:  1. Pain Status: Pain free.    2. Return to near baseline physical activity: Yes    3. Cleared for discharge by consultants (if involved): No    Discharge Planner Nurse   Safe discharge environment identified: Yes  Barriers to discharge: No       Entered by: Luciano Hoyos RN 08/25/2023 12:14 AM     Please review provider order for any additional goals.   Nurse to notify provider when observation goals have been met and patient is ready for discharge.Goal Outcome Evaluation:

## 2023-08-25 NOTE — CONSULTS
Care Management Initial Consult    General Information  Assessment completed with: Patient,    Type of CM/SW Visit: Initial Assessment    Primary Care Provider verified and updated as needed: Yes   Readmission within the last 30 days: no previous admission in last 30 days         Communication Assessment  Patient's communication style: spoken language (English or Bilingual)    Hearing Difficulty or Deaf: no        Cognitive  Cognitive/Neuro/Behavioral: WDL  Level of Consciousness: alert  Arousal Level: opens eyes spontaneously  Orientation: oriented x 4     Best Language: 0 - No aphasia  Speech: clear    Living Environment:   People in home: spouse     Current living Arrangements: house      Able to return to prior arrangements: other (see comments) (Needs TCU before returning home)       Family/Social Support:  Care provided by: self  Provides care for: no one  Marital Status:             Description of Support System: Supportive, Involved    Support Assessment: Adequate family and caregiver support    Current Resources:   Patient receiving home care services: No     Community Resources: Transitional Care  Equipment currently used at home: walker, standard  Supplies currently used at home: Other (PD supplies)    Does the patient's insurance plan have a 3 day qualifying hospital stay waiver?  No    Lifestyle & Psychosocial Needs:  Social Determinants of Health     Tobacco Use: Low Risk  (11/15/2021)    Patient History     Smoking Tobacco Use: Never     Smokeless Tobacco Use: Never     Passive Exposure: Not on file   Alcohol Use: Not on file   Financial Resource Strain: Not on file   Food Insecurity: Not on file   Transportation Needs: Not on file   Physical Activity: Not on file   Stress: Not on file   Social Connections: Not on file   Intimate Partner Violence: Not on file   Depression: Not on file   Housing Stability: Not on file       Functional Status:  Prior to admission patient needed assistance:    Dependent ADLs:: Ambulation-walker  Dependent IADLs:: Independent  Assesssment of Functional Status: Not at baseline with ADL Functioning, Not at baseline with mobility    Additional Information:  CM consulted for discharge planning. Pt admitted after a fall resulting in left hip and knee pain. Met with pt at bedside. Pt lives in a single family home with spouse, stairs within the home. Pt uses a walker at home as needed. Prior to hospitalization, pt is mostly independent.   PT is recommending TCU and pt agrees. Reviewed list of TCUs that accept peritoneal dialysis. Explained to pt that PD may be a barrier as well as her [facility] insurance. Pt agreed to have staff begin to send referrals and will then see who is in-network and can accept PD.  At this time, pt states she would need transportation arranged, which could change if her mobility improves. Reviewed out of pocket cost for [facility] transport, $89.98 base and $5.79 per mile to the destination. Pt expressed understanding and is agreeable to this.    Referrals sent.  Will continue to follow along for discharge planning.    Jyoti Griffin RN   Inpatient Care Coordination  [facility]   Phone: [phone]

## 2023-08-25 NOTE — PLAN OF CARE
"PRIMARY DIAGNOSIS: GENERALIZED WEAKNESS    OUTPATIENT/OBSERVATION GOALS TO BE MET BEFORE DISCHARGE  1. Orthostatic performed: No    2. Tolerating PO medications: Yes    3. Return to near baseline physical activity: No    4. Cleared for discharge by consultants (if involved): No    Discharge Planner Nurse   Safe discharge environment identified: No  Barriers to discharge: Yes       Entered by: Nelida Lu RN 08/25/2023 6:08 PM    Pt A/O x4 and makes needs known. Ax2 with the Deanne Steady-- pt up in the chair, placed by NST's. C/O 8/10 pain in the lower back, radiating to the left leg. PRN oxycodone given. Per multiple calls, was ineffective, as pt calls frequently for oxycodone. Educated pt that it is every 6 hours, and is not due until 1515. Also educated pt that oxy is more effective with tylenol. Pt's PD completed at shift change-- NOC RN clamped and turned off machine. Renal diet, tolerating. PIV SL. Gabapentin held in the morning due to sedation. PT/OT/SW consults, nephrology following.  1200 addendum: PT saw the pt and recommended TCU. Pt still asking for oxycodone, again, educated pt that it is not due until 1515. Awaiting dialysis RN to start PD.  1600 addendum: PRN tylenol and oxycodone given for the pt. Pt is now sleeping; held Robaxin per parameters.  Vital signs:  Temp: 98.2  F (36.8  C) Temp src: Oral BP: (!) 142/55 Pulse: 102   Resp: 18 SpO2: 90 % O2 Device: None (Room air) Oxygen Delivery: 3 LPM   Weight: 113.9 kg (251 lb)  Estimated body mass index is 47.43 kg/m  as calculated from the following:    Height as of 5/10/23: 1.549 m (5' 1\").    Weight as of this encounter: 113.9 kg (251 lb).    Please review provider order for any additional goals.   Nurse to notify provider when observation goals have been met and patient is ready for discharge.  "

## 2023-08-25 NOTE — PLAN OF CARE
PRIMARY DIAGNOSIS: FALL/ LEFT HIP PAIN   OUTPATIENT/OBSERVATION GOALS TO BE MET BEFORE DISCHARGE:  1. Pain Status: Pain free.    2. Return to near baseline physical activity: No    3. Cleared for discharge by consultants (if involved): N/A    Discharge Planner Nurse   Safe discharge environment identified: Yes  Barriers to discharge: No       Entered by: Luciano Hoyos RN 08/25/2023 3:31 AM   Pt is A & O x 4, denies pain, PD ongoing, + 2-3 BLE edema, Pt admitted for left hip pain & weakness after fall, 500 ml output, inter dry placed on abd folds, CM/ SW/ PT consult.  Please review provider order for any additional goals.   Nurse to notify provider when observation goals have been met and patient is ready for discharge.Goal Outcome Evaluation:

## 2023-08-25 NOTE — PROGRESS NOTES
Cycler set up per protocol and per treatment orders     Results from previous treatment:  Effluent color and clarity: yellow clear and no firbrin noted  Total UF: 1253  Initial Drain: 5  Avg Dwell: 1:21  Lost Dwell:     Cycler Serial Number: [id number]  Total Treatment Volume: [id number]  Total treatment time: 8 hrs  Fill Volume: 2900ml  Last Fill Volume:0ml  Heater ba.25% 6000mL;  Lot #: [id number];  Expiration Date: 10/23  Side Bag #1: 4.25% 6000mL;  Lot #: [id number];  Expiration Date:     Number of cycles including final fill: 4  Dwell Time: 1:23 minutes  Last Fill Volume: 0ml  Initial Drain Alarm: 5ml  PD orders reviewed with Patient procedure and ESRD teaching done and questions answered.  Cycler ready for hook-up.    Report given to: Nelida Pollard consent form signed yes

## 2023-08-25 NOTE — PLAN OF CARE
"PRIMARY DIAGNOSIS: GENERALIZED WEAKNESS    OUTPATIENT/OBSERVATION GOALS TO BE MET BEFORE DISCHARGE  1. Orthostatic performed: No    2. Tolerating PO medications: Yes    3. Return to near baseline physical activity: No    4. Cleared for discharge by consultants (if involved): No    Discharge Planner Nurse   Safe discharge environment identified: No  Barriers to discharge: Yes       Entered by: Nelida Lu RN 08/25/2023 2:08 PM    Pt A/O x4 and makes needs known. Ax2 with the Deanne Steady-- pt up in the chair, placed by NST's. C/O 8/10 pain in the lower back, radiating to the left leg. PRN oxycodone given. Per multiple calls, was ineffective, as pt calls frequently for oxycodone. Educated pt that it is every 6 hours, and is not due until 1515. Also educated pt that oxy is more effective with tylenol. Pt's PD completed at shift change-- NOC RN clamped and turned off machine. Renal diet, tolerating. PIV SL. Gabapentin held in the morning due to sedation. PT/OT/SW consults, nephrology following.  Vital signs:  Temp: 98.7  F (37.1  C) Temp src: Oral BP: (!) 144/72 Pulse: 86   Resp: 16 SpO2: 91 % O2 Device: None (Room air) Oxygen Delivery: 3 LPM   Weight: 113.9 kg (251 lb)  Estimated body mass index is 47.43 kg/m  as calculated from the following:    Height as of 5/10/23: 1.549 m (5' 1\").    Weight as of this encounter: 113.9 kg (251 lb).    Please review provider order for any additional goals.   Nurse to notify provider when observation goals have been met and patient is ready for discharge.  "

## 2023-08-25 NOTE — PROGRESS NOTES
Renal Medicine Progress Note            Assessment/Plan:     62 y.o woman with ESRD on PD, admitted for weakness and left hip pain after a fall.      # ESRD: on PD ~ 2 years.                -4 manual exchanges (3 green and one red), 1.5 hrs dwell time               -Jay with Dr. Cole     # Mechanical fall: No fracture on CT.      # Hypertension: BP his high.               -Coreg 6.25 mg bid               -clonidine 0.1 mcg tid               -Imdur 30 mg daily     # Anemia of ESRD: Hgb is at target.      # FEN: Mild acidosis. Hypervolemia.     Plan:  # She agreed to using 2 bags of 4.25% dextrose for UF. Order placed. AM RN to pass message to PM RN not to turn off PD machine.        Interval History:     She still has a lot of pain in the left hip.   She denies SOB.  Watching T.V comfortably in bed.   PD machine was turned off and total UF was not recorded.          Medications and Allergies:      - MEDICATION INSTRUCTIONS for Dialysis Patients -   Does not apply See Admin Instructions    carvedilol  6.25 mg Oral BID w/meals    cloNIDine  0.1 mg Oral TID    docusate sodium  100 mg Oral BID    gabapentin  100 mg Oral BID    isosorbide mononitrate  30 mg Oral Daily    miconazole   Topical BID    pantoprazole  40 mg Oral Daily    polyethylene glycol  17 g Oral Daily        Allergies   Allergen Reactions    Aspirin Nausea    Statins-Hmg-Coa Reductase Inhibitors [Statins] Other (See Comments)     Other reaction(s): Renal Failure, Other reaction(s): Renal Failure            Physical Exam:   Vitals were reviewed   , Blood pressure (!) 144/72, pulse 86, temperature 98.7  F (37.1  C), temperature source Oral, resp. rate 16, weight 113.9 kg (251 lb), SpO2 91 %.    Wt Readings from Last 3 Encounters:   08/24/23 113.9 kg (251 lb)   05/12/23 100.5 kg (221 lb 9.6 oz)   06/07/22 85.7 kg (189 lb)     No intake or output data in the 24 hours ending 08/25/23 1252    GENERAL: NAD.   HEENT:  Normocephalic. No gross  abnormalities.  CV: RRR, + murmurs, no clicks, gallops, or rubs, + edema  RESP: Clear bilaterally with good efforts. No wheezes or crackles.   GI: Abdomen obese, soft, NT. PT catheter site is CDI  MUSCULOSKELETAL: extremities + edema  SKIN: no suspicious lesions or rashes, dry to touch  NEURO:  Strength normal and symmetric. Awake, alert and conversing normally.  PSYCH: mood good, affect appropriate           Data:     CBC RESULTS:     Recent Labs   Lab 08/25/23  0618 08/24/23  0026   WBC 8.2 8.3   RBC 4.07 4.12   HGB 10.5* 10.5*   HCT 34.8* 34.6*    179       Basic Metabolic Panel:  Recent Labs   Lab 08/25/23  0618 08/24/23  0026    135*   POTASSIUM 5.0 5.0   CHLORIDE 98 96*   CO2 20* 20*   BUN 66.2* 66.6*   CR 15.44* 15.50*   * 83   RON 9.0 8.9       INRNo lab results found in last 7 days.   Attestation:   I have reviewed today's relevant vital signs, notes, medications, labs and imaging.    Marques Morris MD  [facility] - Nephrology  Office phone :[phone]  Pager: [phone]

## 2023-08-25 NOTE — PROGRESS NOTES
08/25/23 1208   Appointment Info   Signing Clinician's Name / Credentials (PT) Patricia Mccurdy DPT   Living Environment   People in Home spouse   Current Living Arrangements house   Home Accessibility stairs within home   Number of Stairs, Within Home, Primary greater than 10 stairs   Stair Railings, Within Home, Primary railings safe and in good condition   Transportation Anticipated family or friend will provide   Self-Care   Usual Activity Tolerance fair   Current Activity Tolerance poor   Equipment Currently Used at Home walker, standard   Fall history within last six months yes   Number of times patient has fallen within last six months 1   General Information   Onset of Illness/Injury or Date of Surgery 08/23/23   Referring Physician González Rosas MD, MD   Patient/Family Therapy Goals Statement (PT) Return home   Pertinent History of Current Problem (include personal factors and/or comorbidities that impact the POC) Per chart: Devora Garcia is a 62 year old female admitted on 8/23/2023. She has hx of HTN, ESRD on PD, GUILLERMO on CPAP, who presents with L hip and knee  pain s/p mechanical fall yesterday. No head trauma or LOC. She has been having difficulty walking. Pt is unable to extrapolate further regarding the fall , but states she fell in the bedroom and there was no head trauma or LOC and her  called EMS. She is on PD and has 4 runs during the day. At baseline she uses a walker to ambulate, however, has had increased difficulty due to swelling of LE's. No fevers/chills/chest pain/SOB   Existing Precautions/Restrictions fall   General Observations Pt in chair upon therapist arrival, agreeable to PT   Cognition   Affect/Mental Status (Cognition) WFL   Orientation Status (Cognition) oriented x 4   Follows Commands (Cognition) WFL   Pain Assessment   Patient Currently in Pain   (7/10 in L hip)   Integumentary/Edema   Integumentary/Edema Comments Dry skin on UE and LE   Posture    Posture Forward  head position;Protracted shoulders   Range of Motion (ROM)   Range of Motion ROM deficits secondary to pain   Strength (Manual Muscle Testing)   Strength (Manual Muscle Testing) Deficits observed during functional mobility   Bed Mobility   Comment, (Bed Mobility) Sit to supine Mod A   Transfers   Comment, (Transfers) Sit to stand Mod A w/ Deanne Stedy   Gait/Stairs (Locomotion)   Comment, (Gait/Stairs) Pt unable to amb d/t pain and weakness   Balance   Balance Comments Fair seated and poor standing   Sensory Examination   Sensory Perception patient reports no sensory changes   Clinical Impression   Criteria for Skilled Therapeutic Intervention Yes, treatment indicated   PT Diagnosis (PT) Impaired functional mobility and gait   Influenced by the following impairments Pain, weakness, decreased activity tolerance, impaired balance   Functional limitations due to impairments Limited functional mobility requiring AD and assist   Clinical Presentation (PT Evaluation Complexity) Stable/Uncomplicated   Clinical Presentation Rationale Based on PMH, current status, and social support   Clinical Decision Making (Complexity) low complexity   Planned Therapy Interventions (PT) balance training;bed mobility training;gait training;stair training;strengthening;transfer training;progressive activity/exercise   Risk & Benefits of therapy have been explained evaluation/treatment results reviewed;care plan/treatment goals reviewed;risks/benefits reviewed;current/potential barriers reviewed;participants voiced agreement with care plan;participants included;patient   PT Total Evaluation Time   PT Eval, Low Complexity Minutes (85129) 10   Physical Therapy Goals   PT Frequency 5x/week   PT Predicted Duration/Target Date for Goal Attainment 09/01/23   PT Goals Bed Mobility;Transfers;Gait;Stairs   PT: Bed Mobility Supervision/stand-by assist;Supine to/from sit   PT: Transfers Supervision/stand-by assist;Sit to/from stand;Assistive device   PT:  Gait Supervision/stand-by assist;Assistive device;50 feet   PT: Stairs Minimal assist;Greater than 10 stairs   Interventions   Interventions Quick Adds Therapeutic Activity   Therapeutic Activity   Therapeutic Activities: dynamic activities to improve functional performance Minutes (09505) 12   Symptoms Noted During/After Treatment Fatigue;Increased pain   Treatment Detail/Skilled Intervention Pt required Mod A to lift feet onto Deanne Rick platform. Pt performed sit <> stand from SS paddles w/ Min A. Pt sat EOB, able to shift hips backward w/ Mod A. Pt declined further mobility, reported increased pain. Pt in bed, able to reposition w/ Min A. Bed alarm on, all needs w/in reach.   PT Discharge Planning   PT Plan Progress bed mob, transfers w/ SS   PT Discharge Recommendation (DC Rec) Transitional Care Facility   PT Rationale for DC Rec Pt below baseline, currently heavy assist w/ SS for transfers. Recommend TCU to increase strength and functional mobility.   PT Brief overview of current status Deanne Rick   Total Session Time   Timed Code Treatment Minutes 12   Total Session Time (sum of timed and untimed services) 22

## 2023-08-25 NOTE — PROGRESS NOTES
[facility]    Medicine Progress Note - Hospitalist Service    Date of Admission:  8/23/2023    Assessment & Plan   Devora Garcia is a 62 year old female admitted on 8/23/2023. She has hx of HTN, ESRD on PD, GUILLERMO on CPAP, who presents with L hip and knee  pain s/p mechanical fall yesterday. No head trauma or LOC. She has been having difficulty walking. Pt is unable to extrapolate further regarding the fall , but states she fell in the bedroom and there was no head trauma or LOC and her  called EMS. She is on PD and has 4 runs during the day. At baseline she uses a walker to ambulate, however, has had increased difficulty due to swelling of LE's. No fevers/chills/chest pain/SOB.     Mechanical fall and L hip and knee pain. L hip CT shows no occult fracture.  Add Robaxin and Lidocaine patches. Scheduled APAP. Oxycodone PRN.  PT consult recommending TCU.    2. ESRD on PD.  Continue PD. Renal consult appreciated.      3. HTN. Continue carvedilol, clonidine, imdur     4. Acute encephalopathy improved.  UA cancelled by prior provider.  Does make urine but not always consistently.  CXR without acute airspace disease.         Diet: Combination Diet Renal Diet (dialysis)    DVT Prophylaxis: mechanical  Taylor Catheter: Not present  Lines: None     Cardiac Monitoring: None  Code Status: Full Code      Clinically Significant Risk Factors Present on Admission             # Anion Gap Metabolic Acidosis: Highest Anion Gap = 21 mmol/L in last 2 days, will monitor and treat as appropriate      # Hypertension: Noted on problem list                 Disposition Plan      Expected Discharge Date: 08/28/2023    Discharge Delays: Placement - TCU  Destination: inpatient rehabilitation facility          The patient's care was discussed with the Bedside Nurse, Care Coordinator/, and Patient.    FAINA Cerna CNP  Hospitalist Service  [facility]  Securely message  with Vocera (more info)  Text page via [facility] Paging/Directory   ______________________________________________________________________    Interval History   Assumed care of patient.  VSS  Still with L hip pain.  No CP or SOB.    Physical Exam   Vital Signs: Temp: 98.2  F (36.8  C) Temp src: Oral BP: (!) 142/55 Pulse: 102   Resp: 18 SpO2: 90 % O2 Device: None (Room air) Oxygen Delivery: 3 LPM  Weight: 251 lbs 0 oz    GEN:   Alert, oriented x 3, appears comfortable, NAD.  NECK:   Supple ,no mass or thyromegaly   HEENT:  Normocephalic/atraumatic, no scleral icterus, no nasal discharge, mouth moist.  CV:   Regular rate and rhythm, no murmur or JVD.  S1 + S2 noted, no S3 or S4.  LUNGS:   Clear to auscultation bilaterally without rales/rhonchi/wheezing/retractions.  Symmetric chest rise on inhalation noted.  ABD:   Active bowel sounds, soft, non-tender/non-distended.  No rebound/guarding/rigidity.  EXT:   No edema.  No cyanosis.  No joint synovitis noted.  SKIN:   Dry to touch, no exanthems noted in the visualized areas.  Neurologic: Grossly intact,non focal.   Neuropsychiatric:  General: normal, calm and normal eye contact  Level of consciousness: alert / normal  Affect: normal  Orientation: oriented to self, place, time and situation     Medical Decision Making       45 MINUTES SPENT BY ME on the date of service doing chart review, history, exam, documentation & further activities per the note.      Data     I have personally reviewed the following data over the past 24 hrs:    8.2  \   10.5 (L)   / 202     139 98 66.2 (H) /  127 (H)   5.0 20 (L) 15.44 (H) \       Imaging results reviewed over the past 24 hrs:   No results found for this or any previous visit (from the past 24 hour(s)).

## 2023-08-25 NOTE — UTILIZATION REVIEW
Admission Status; Secondary Review Determination         Under the authority of the Utilization Management Committee, the utilization review process indicated a secondary review on the above patient.  The review outcome is based on review of the medical records, discussions with staff, and applying clinical experience noted on the date of the review.        (x)      Continued Observation Status Appropriate -     RATIONALE FOR DETERMINATION   The patient is a 62-year-old female admitted on 8/23/2023.  Patient presented with left hip and knee pain status post mechanical fall the day prior to admission.  Patient had abdominal and hip CT imaging which did not show any fractures.  Chest x-ray did not show any vascular congestion and there is no pleural effusion.  Patient does have end-stage renal disease and is on peritoneal dialysis.  Patient does have a very elevated creatinine of 15.44 and will have multiple hemodialysis runs prior to discharge.  At this time,  care coordinator is looking into TCU transfer.  Based on current diagnosis and treatment, agree with current observation status.  After stabilization of hemodialysis needs, recommendation may be to place on utilization review snf observation care.      The severity of illness, intensity of service provided, expected LOS and risk for adverse outcome make the care complex, high risk and appropriate for hospital admission.        The information on this document is developed by the utilization review team in order for the business office to ensure compliance.  This only denotes the appropriateness of proper admission status and does not reflect the quality of care rendered.         The definitions of Inpatient Status and Observation Status used in making the determination above are those provided in the CMS Coverage Manual, Chapter 1 and Chapter 6, section 70.4.      Sincerely,     Polo Bright MD  Physician Advisor  Utilization Review/ Case  Management  [facility].

## 2023-08-25 NOTE — PLAN OF CARE
"PRIMARY DIAGNOSIS: GENERALIZED WEAKNESS    OUTPATIENT/OBSERVATION GOALS TO BE MET BEFORE DISCHARGE  1. Orthostatic performed: No    2. Tolerating PO medications: Yes    3. Return to near baseline physical activity: No    4. Cleared for discharge by consultants (if involved): No    Discharge Planner Nurse   Safe discharge environment identified: No  Barriers to discharge: Yes       Entered by: Nelida Lu RN 08/25/2023 2:12 PM    Pt A/O x4 and makes needs known. Ax2 with the Deanne Steady-- pt up in the chair, placed by NST's. C/O 8/10 pain in the lower back, radiating to the left leg. PRN oxycodone given. Per multiple calls, was ineffective, as pt calls frequently for oxycodone. Educated pt that it is every 6 hours, and is not due until 1515. Also educated pt that oxy is more effective with tylenol. Pt's PD completed at shift change-- NOC RN clamped and turned off machine. Renal diet, tolerating. PIV SL. Gabapentin held in the morning due to sedation. PT/OT/SW consults, nephrology following.  1200 addendum: PT saw the pt and recommended TCU. Pt still asking for oxycodone, again, educated pt that it is not due until 1515. Awaiting dialysis RN to start PD.  Vital signs:  Temp: 98.7  F (37.1  C) Temp src: Oral BP: (!) 144/72 Pulse: 86   Resp: 16 SpO2: 91 % O2 Device: None (Room air) Oxygen Delivery: 3 LPM   Weight: 113.9 kg (251 lb)  Estimated body mass index is 47.43 kg/m  as calculated from the following:    Height as of 5/10/23: 1.549 m (5' 1\").    Weight as of this encounter: 113.9 kg (251 lb).    Please review provider order for any additional goals.   Nurse to notify provider when observation goals have been met and patient is ready for discharge.  "

## 2023-08-26 NOTE — PLAN OF CARE
Regarding Peritoneal Dialysis on NOC -    At the start of the 0700 shift, it was reported to this RN that the pt did not receive PD overnight. ( See previous note).  Upon exam and evaluation of PD machine, the pt is not currently hooked up to the machine, her dialysis port is secured to her abdomen and capped. The machine is currently turned off. This was also observed by the charge RN.

## 2023-08-26 NOTE — PROGRESS NOTES
Medicine Progress Note - Hospitalist Service    Date of Admission:  8/23/2023    Assessment & Plan   Devora Garcia is a 62 year old female admitted on 8/23/2023. She has hx of HTN, ESRD on PD, GUILLERMO on CPAP, who presents with L hip and knee  pain s/p mechanical fall yesterday. No head trauma or LOC. She has been having difficulty walking. Pt is unable to extrapolate further regarding the fall , but states she fell in the bedroom and there was no head trauma or LOC and her  called EMS. She is on PD and has 4 runs during the day. At baseline she uses a walker to ambulate, however, has had increased difficulty due to swelling of LE's. No fevers/chills/chest pain/SOB.     Interval events:  It appears that she did not receive PD overnight. Per report RN opened the cap and cleaned the inside of the connector.  Subsequently, she refused to allow the RN to connect her to the machine for concern of contamination.  No fever. No abdominal pain.     Mechanical fall and L hip and knee pain. L hip CT shows no occult fracture.  Added Robaxin and Lidocaine patches. Scheduled APAP. Oxycodone PRN.  PT consult recommending TCU. Seen by OT today with recommendation for TCU as well.     2. ESRD on PD.  AGMA secondary to ESRD on HD. Continue PD. Renal consult appreciated. Keflex 1000 mg x 1 then 500 mg q12hrs X 3 days for presumed contamination of PD catheter.  PD catheter to be exchanged.  Instructed RN staff to call PD RN on call at [phone] for any assistance.      3. HTN. Continue carvedilol, clonidine, imdur     4. Acute encephalopathy resolved.     Diet: Combination Diet Renal Diet (dialysis)    DVT Prophylaxis: mechanical  Taylor Catheter: Not present  Lines: None     Cardiac Monitoring: None  Code Status: Full Code      Disposition Plan per clinical course.     Expected Discharge Date: 08/28/2023    Discharge Delays: Placement - TCU  Destination: inpatient rehabilitation facility           The patient's care was discussed with the Bedside Nurse, Care Coordinator/, and Patient.    FAINA Cerna [facility]  Hospitalist Service  [facility]  Securely message with Maven[employer] (more info)  Text page via [email or web address] Paging/Directory   ______________________________________________________________________    Interval History   As above.   VS.  On O2 overnight but able to wean to RA this AM.  Denies any respiratory concerns or issues.  No CP or SOB.  No fever.  No abdominal pain.     Physical Exam   Vital Signs: Temp: 98.2  F (36.8  C) Temp src: Oral BP: 107/70 Pulse: 79   Resp: 18 SpO2: 97 % O2 Device: Nasal cannula Oxygen Delivery: 2 LPM  Weight: 251 lbs 0 oz    GEN:   Alert, oriented x 3, appears comfortable, NAD.  NECK:   Supple ,no mass or thyromegaly   HEENT:  Normocephalic/atraumatic, no scleral icterus, no nasal discharge, mouth moist.  CV:   Regular rate and rhythm, no murmur or JVD.  S1 + S2 noted, no S3 or S4.  LUNGS:   Clear to auscultation bilaterally without rales/rhonchi/wheezing/retractions.  Symmetric chest rise on inhalation noted.  ABD:   Active bowel sounds, soft, non-tender/non-distended.  No rebound/guarding/rigidity.  EXT:   No edema.  No cyanosis.  No joint synovitis noted.  SKIN:   Dry to touch, no exanthems noted in the visualized areas.  Neurologic: Grossly intact,non focal.   Neuropsychiatric:  General: normal, calm and normal eye contact  Level of consciousness: alert / normal  Affect: normal  Orientation: oriented to self, place, time and situation     Medical Decision Making       45 MINUTES SPENT BY ME on the date of service doing chart review, history, exam, documentation & further activities per the note.      Data         Imaging results reviewed over the past 24 hrs:   No results found for this or any previous visit (from the past 24 hour(s)).

## 2023-08-26 NOTE — PLAN OF CARE
PRIMARY DIAGNOSIS: GENERALIZED WEAKNESS    OUTPATIENT/OBSERVATION GOALS TO BE MET BEFORE DISCHARGE  1. Orthostatic performed: N/A    2. Tolerating PO medications: Yes    3. Return to near baseline physical activity: No    4. Cleared for discharge by consultants (if involved): No    Discharge Planner Nurse   Safe discharge environment identified: No  Barriers to discharge: Yes       Entered by: Jyoti Grant RN 08/26/2023 4:21 AM     Please review provider order for any additional goals.   Nurse to notify provider when observation goals have been met and patient is ready for discharge.    /75 (BP Location: Left arm)   Pulse 83   Temp 99  F (37.2  C) (Oral)   Resp 18   Wt 113.9 kg (251 lb)   SpO2 93%   BMI 47.43 kg/m       Vitals are Temp: 98.4  F (36.9  C) Temp src: Oral BP: 132/77 Pulse: 89   Resp: 18 SpO2: 95 %.  Patient is Alert and Oriented x4. They are 2 assist with Deanne Steady.  Pt is a Renal diet.  They are complaining of 6/10 pain in their left hip.  Oxycodone given for pain.  Patient is Saline locked. Pt complaining of dizziness, declined intervention. Pt has a purewick in place.

## 2023-08-26 NOTE — PLAN OF CARE
PRIMARY DIAGNOSIS: Fall/ left hip pain  OUTPATIENT/OBSERVATION GOALS TO BE MET BEFORE DISCHARGE:  ADLs back to baseline: No    Activity and level of assistance: Up with maximum assistance. Consider SW and/or PT evaluation. A2 vivian steady    Pain status: Improved-controlled with oral pain medications.    Return to near baseline physical activity: No     Discharge Planner Nurse   Safe discharge environment identified: No  Barriers to discharge: Yes       Entered by: Katarzyna Herrera RN 08/26/2023    Pt alert and oriented x4. RA, PIV SL, up A2 with vivian heaton, renal diet.   Plan for PT and OT this afternoon, dialysis run tonight - Dialysis RN to come change tubing on pt's catheter and set up run.   PT recommends TCU placement.  Please review provider order for any additional goals.   Nurse to notify provider when observation goals have been met and patient is ready for discharge.

## 2023-08-26 NOTE — PROGRESS NOTES
Cycler set up per protocol and per treatment orders     PD transfer set exchange done per MD verbal order.      Cycler Serial Number: [id number]  Total Treatment Volume: [id number]  Total treatment time: 8 hrs  Fill Volume: 2900ml  Last Fill Volume:0ml  Heater ba.25% 6000mL;  Lot #: [id number];  Expiration Date: 2024  Side Bag #1: 4.25% 6000mL;  Lot #: [id number];  Expiration Date: 2024  Number of cycles including final fill: 4  Dwell Time: 1:23 minutes  Last Fill Volume: 0ml  Initial Drain Alarm: 0ml  PD orders reviewed with Patient procedure and ESRD teaching done and questions answered.  Cycler ready for hook-up.    Report given to: MIKAYLA Herrera RN    3rd floor charge RN to hook patient up in the evening/disconnect in the AM.     Please do not unplug dialysis machine- it will lose its settings and will need to be torn down and set up again. Please do not turn off machine- it will lose treatment info from the previous night that needs to be observed/recorded by dialysis RN. Thanks.

## 2023-08-26 NOTE — PROGRESS NOTES
Called by charge nurse to hook pt up to peritoneal dialysis at 0340.  Charge stated that pt should have been hooked up at 2000 and run for 8 hours.  When writer went in to hook pt up, pt was very defensive.  Writer stated that we have been trained and that we hook them up, the machine is already set up by [facility] and we just turn it on.  After pt insisted on cleaning her tubing and stated that she was going to report to [facility] about this.  Pt was hooked up, drain started.  Pt wanted to know what the initial drain was and writer stated that all of that is already programmed in by [facility] and that I did not know.  Pt stated that she was not comfortable with this and asked to be disconnected from machine.  Writer got new cap and disconnected pt and taped up tubing per pt request.  Writer spoke to charge and pt nurse about events and they stated that they will take care of it.  Pt resting in bed, watching TV when writer left room.  Call light in reach, bed alarm on.

## 2023-08-26 NOTE — PLAN OF CARE
"PRIMARY DIAGNOSIS: Fall/ left hip pain  OUTPATIENT/OBSERVATION GOALS TO BE MET BEFORE DISCHARGE:  ADLs back to baseline: No    Activity and level of assistance: Up with maximum assistance. Consider SW and/or PT evaluation. A2 vivian steady    Pain status: Improved-controlled with oral pain medications.    Return to near baseline physical activity: No     Discharge Planner Nurse   Safe discharge environment identified: No  Barriers to discharge: Yes       Entered by: Katarzyna Herrera RN 08/26/2023    Pt alert and oriented x4. RA, PIV SL, up A2 with vivian heaton, renal diet. Has baseline dry, itchy skin with scabs on her back - she has a back scratcher at bedside from her home that she uses to scratch her back. Edema to lower legs and feet.   Pt had 2 scheduled doses of Robaxin and 1 prn dose of 2.5mg oxycodone today and as of 1530, was feeling \"loopy\", so she was not given the 4pm dose of robaxin. Pt is comfortable and sleeping at this time. Has continuous pulse ox on while sleeping to monitor O2. 90-95% on RA.   Dialysis run tonight - Dialysis RN changed tubing on pt's catheter due to contamination of sterile end piece. She confirmed that the adapter under the cap which attaches to the machine tubing IS STERILE and SHOULD NOT BE TOUCHED OR CLEANED at any time. She set up the dialysis machine to run tonight.   **Please do not turn off or unplug the machine from the wall or all programming for the run and data received during the run will be lost.**   PT recommends TCU placement.  Please review provider order for any additional goals.   Nurse to notify provider when observation goals have been met and patient is ready for discharge.  "

## 2023-08-26 NOTE — PLAN OF CARE
PRIMARY DIAGNOSIS: GENERALIZED WEAKNESS    OUTPATIENT/OBSERVATION GOALS TO BE MET BEFORE DISCHARGE  1. Orthostatic performed: N/A    2. Tolerating PO medications: Yes    3. Return to near baseline physical activity: No    4. Cleared for discharge by consultants (if involved): No    Discharge Planner Nurse   Safe discharge environment identified: No  Barriers to discharge: Yes       Entered by: Jyoti Grant RN 08/26/2023 4:25 AM     Please review provider order for any additional goals.   Nurse to notify provider when observation goals have been met and patient is ready for discharge.    /77 (BP Location: Left arm, Cuff Size: Adult Large)   Pulse 89   Temp 98.4  F (36.9  C) (Oral)   Resp 18   Wt 113.9 kg (251 lb)   SpO2 95%   BMI 47.43 kg/m       Vitals are Temp: 98.4  F (36.9  C) Temp src: Oral BP: 132/77 Pulse: 89   Resp: 18 SpO2: 95 %.  Patient is Alert and Oriented x4. They are 2 assist with Deanne Steady.  Pt is a Renal diet.  They are complaining of 6/10 pain in their left hip.  Oxycodone given for pain.  Patient is Saline locked. Pt complaining of dizziness, declined intervention. Pt has a purewick in place. Idalia cares provided and purewick replaced. Pt on 2 Liters NC, O2 sats at 88% increasing to 95%. Pt connected to peritoneal dialysate and requested to be disconnected.

## 2023-08-26 NOTE — PROGRESS NOTES
Renal Medicine Progress Note            Assessment/Plan:     62 y.o woman with ESRD on PD, admitted for weakness and left hip pain after a fall.      # ESRD: on PD ~ 2 years.                -4 manual exchanges (3 green and one red), 1.5 hrs dwell time               -Jasmyn Arreguin with Dr. Cole     # Mechanical fall: No fracture on CT.      # Hypertension: BP his high.               -Coreg 6.25 mg bid               -clonidine 0.1 mcg tid               -Imdur 30 mg daily     # Anemia of ESRD: Hgb is at target.      # FEN: Mild acidosis. Hypervolemia.      Plan:  # Keflex 1000 mg x 1 then 500 mg q12hrs x 3 days for touched contamination.   # Transfer set will need to be changed before PD can be resumed     Please call PD RN on-call, Myrna, at [phone] for any assistance. I discussed the plan with our floor staff and our dialysis RNs. I spent 60 minutes on this case this morning already.          Interval History:     Afebrile. VSS.   PD did not occur last night.   RN opened the cap and clean the inside of the connector.  As a result, patient did not let RN connect her to the machine for concern of contamination.  She denies abdominal pain.   She would like to use the cycler again at home.  She has a cycler at home, and last used was over a year ago.  I advised her to discuss this with Dr. Cole and her PD team outpatient.            Medications and Allergies:      - MEDICATION INSTRUCTIONS for Dialysis Patients -   Does not apply See Admin Instructions    acetaminophen  975 mg Oral Q8H    carvedilol  6.25 mg Oral BID w/meals    cloNIDine  0.1 mg Oral TID    docusate sodium  100 mg Oral BID    gabapentin  100 mg Oral BID    isosorbide mononitrate  30 mg Oral Daily    lidocaine  2 patch Transdermal Q24H    methocarbamol  500 mg Oral 4x Daily    miconazole   Topical BID    pantoprazole  40 mg Oral Daily    polyethylene glycol  17 g Oral Daily        Allergies   Allergen Reactions    Aspirin Nausea     Statins-Hmg-Coa Reductase Inhibitors [Statins] Other (See Comments)     Other reaction(s): Renal Failure, Other reaction(s): Renal Failure            Physical Exam:   Vitals were reviewed   , Blood pressure 107/70, pulse 79, temperature 98.2  F (36.8  C), temperature source Oral, resp. rate 18, weight 113.9 kg (251 lb), SpO2 97 %.    Wt Readings from Last 3 Encounters:   08/24/23 113.9 kg (251 lb)   05/12/23 100.5 kg (221 lb 9.6 oz)   06/07/22 85.7 kg (189 lb)       Intake/Output Summary (Last 24 hours) at 8/26/2023 1007  Last data filed at 8/26/2023 0500  Gross per 24 hour   Intake --   Output 500 ml   Net -500 ml     GENERAL: NAD.   HEENT:  Normocephalic. No gross abnormalities.  CV: RRR, + murmurs, no clicks, gallops, or rubs, + edema  RESP: Clear bilaterally with good efforts. No wheezes or crackles.   GI: Abdomen obese, soft, NT.   MUSCULOSKELETAL: extremities + edema  SKIN: no suspicious lesions or rashes, dry to touch  NEURO:  Strength normal and symmetric. Awake, alert and conversing normally.  PSYCH: mood good, affect appropriate    PD catheter exit site CDI.          Data:     CBC RESULTS:     Recent Labs   Lab 08/25/23  0618 08/24/23  0026   WBC 8.2 8.3   RBC 4.07 4.12   HGB 10.5* 10.5*   HCT 34.8* 34.6*    179       Basic Metabolic Panel:  Recent Labs   Lab 08/25/23  0618 08/24/23  0026    135*   POTASSIUM 5.0 5.0   CHLORIDE 98 96*   CO2 20* 20*   BUN 66.2* 66.6*   CR 15.44* 15.50*   * 83   RON 9.0 8.9       INRNo lab results found in last 7 days.   Attestation:   I have reviewed today's relevant vital signs, notes, medications, labs and imaging.    Marques Morris MD  [facility] - Nephrology  Office phone :[phone]  Pager: [phone]

## 2023-08-26 NOTE — PROGRESS NOTES
08/26/23 1408   Appointment Info   Signing Clinician's Name / Credentials (OT) Nino Tammie, OTR/L   Living Environment   People in Home spouse   Current Living Arrangements house   Home Accessibility stairs within home   Number of Stairs, Within Home, Primary greater than 10 stairs   Stair Railings, Within Home, Primary railings safe and in good condition   Transportation Anticipated agency   Living Environment Comments Pt lives in town home with 15 steps to reach main level. All needs met on main level. Tub shower and standard height toilet seat.   Self-Care   Usual Activity Tolerance fair   Current Activity Tolerance poor   Equipment Currently Used at Home walker, rolling;cane, straight   Fall history within last six months yes   Number of times patient has fallen within last six months 1   Activity/Exercise/Self-Care Comment Patient is independent with ADLs and mobility at baseline.   Instrumental Activities of Daily Living (IADL)   Previous Responsibilities medication management;meal prep;housekeeping;laundry;shopping;driving;finances   General Information   Onset of Illness/Injury or Date of Surgery 08/23/23   Referring Physician González Rosas MD, MD   Patient/Family Therapy Goal Statement (OT) To get stronger   Additional Occupational Profile Info/Pertinent History of Current Problem Devora Garcia is a 62 year old female admitted on 8/23/2023. She has hx of HTN, ESRD on PD, GUILLERMO on CPAP, who presents with L hip and knee  pain s/p mechanical fall yesterday. No head trauma or LOC. She has been having difficulty walking. Pt is unable to extrapolate further regarding the fall , but states she fell in the bedroom and there was no head trauma or LOC and her  called EMS. She is on PD and has 4 runs during the day. At baseline she uses a walker to ambulate, however, has had increased difficulty due to swelling of LE's. No fevers/chills/chest pain/SOB.   Existing Precautions/Restrictions fall    Cognitive Status Examination   Orientation Status orientation to person, place and time   Affect/Mental Status (Cognitive) low arousal/lethargic   Follows Commands follows one-step commands;75-90% accuracy   Attention Deficit minimal deficit   Visual Perception   Visual Impairment/Limitations WFL   Sensory   Sensory Quick Adds sensation intact   Pain Assessment   Patient Currently in Pain Yes, see Vital Sign flowsheet   Range of Motion Comprehensive   General Range of Motion bilateral upper extremity ROM WFL   Comment, General Range of Motion L LE Impaired s/p fall   Strength Comprehensive (MMT)   General Manual Muscle Testing (MMT) Assessment upper extremity strength deficits identified   Comment, General Manual Muscle Testing (MMT) Assessment L LE Impaired s/p fall   Coordination   Upper Extremity Coordination No deficits were identified   Bed Mobility   Bed Mobility supine-sit;sit-supine;scooting/bridging   Scooting/Bridging [place] (Bed Mobility) maximum assist (25% patient effort)   Supine-Sit [place] (Bed Mobility) supervision   Sit-Supine [place] (Bed Mobility) minimum assist (75% patient effort)   Transfers   Transfer Comments Dependent on Deanne Rick   Activities of Daily Living   BADL Assessment/Intervention lower body dressing;toileting   Lower Body Dressing Assessment/Training   [place] Level (Lower Body Dressing) maximum assist (25% patient effort)   Toileting   Comment, (Toileting) Per clinical judgement   [place] Level (Toileting) dependent (less than 25% patient effort)   Clinical Impression   Criteria for Skilled Therapeutic Interventions Met (OT) Yes, treatment indicated   OT Diagnosis Decreased independence with I/ADLs   OT Problem List-Impairments impacting ADL problems related to;activity tolerance impaired;mobility;range of motion (ROM);strength;pain   Assessment of Occupational Performance 5 or more Performance Deficits   Identified Performance Deficits Dressing,  bathing, toileting, driving, transfers, home mgmt, etc.   Planned Therapy Interventions (OT) ADL retraining;transfer training;progressive activity/exercise;strengthening;risk factor education   Clinical Decision Making Complexity (OT) low complexity   Risk & Benefits of therapy have been explained evaluation/treatment results reviewed;care plan/treatment goals reviewed;risks/benefits reviewed;current/potential barriers reviewed;participants voiced agreement with care plan;participants included;patient   OT Total Evaluation Time   OT Eval, Low Complexity Minutes (93856) 10   OT Goals   Therapy Frequency (OT) 4 times/wk   OT Predicted Duration/Target Date for Goal Attainment 09/03/23   OT Goals Upper Body Dressing;Lower Body Dressing;Toilet Transfer/Toileting;Hygiene/Grooming   OT: Hygiene/Grooming supervision/stand-by assist;while standing   OT: Upper Body Dressing Supervision/stand-by assist;including set-up/clothing retrieval   OT: Lower Body Dressing Supervision/stand-by assist;using adaptive equipment;including set-up/clothing retrieval   OT: Toilet Transfer/Toileting Supervision/stand-by assist;cleaning and garment management;toilet transfer;using adaptive equipment   Interventions   Interventions Quick Adds Therapeutic Activity   Therapeutic Activities   Therapeutic Activity Minutes (01606) 25   Symptoms noted during/after treatment increased pain;fatigue;dizziness   Treatment Detail/Skilled Intervention Pt semi-fowlers at encounter and agreeable to OT. transfering to EOB w/ SBA and verbal cueing to scoot fully to EOB / feet to floor. Preparing environment for use of SS for STS transfer. Pt requiring moderate verbal cueing to scoot along EOB and be placed w/ in SS. Cueing pt to place feet up on SS and pt requiring extended time, fatigued with setup. STS within SS with min A, standing 1min and pt w/ increased dizziness. sitting back onto SS pads and BP taken, 95/74. symptoms improve w/ rest. Standing again in SS  and then returning to EOB. Pt SOB and benefits from VC for PLB. spO2 stable on RA. Sitting EOB SBA and pt combing hair. Tranfer supine w/ min A, assist at B LEs. Total A x2 to scoot patient toward HOB. left w/ alarm on and nursing assistant present.   OT Discharge Planning   OT Plan Commode transfer w/ SS, Standing at sink in SS for g/h, LB dress w/ AE   OT Discharge Recommendation (DC Rec) Transitional Care Facility   OT Rationale for DC Rec Patient presenting well below baseline of modified independent with I/ADLs. Recommend continued skilled OT at TCU to progress safety and independence with I/ADLs   OT Brief overview of current status min A for STS within SS   Total Session Time   Timed Code Treatment Minutes 25   Total Session Time (sum of timed and untimed services) 35

## 2023-08-26 NOTE — PROGRESS NOTES
Met with patient about CPAP use. Patient declined hospital equipment at this time.    Myles Marquez, RT on 8/25/2023 at 7:47 PM

## 2023-08-26 NOTE — PLAN OF CARE
PRIMARY DIAGNOSIS: Fall/ left hip pain  OUTPATIENT/OBSERVATION GOALS TO BE MET BEFORE DISCHARGE:  ADLs back to baseline: No    Activity and level of assistance: Up with maximum assistance. Consider SW and/or PT evaluation. A2 vivian steady    Pain status: Improved-controlled with oral pain medications.    Return to near baseline physical activity: No     Discharge Planner Nurse   Safe discharge environment identified: No  Barriers to discharge: Yes       Entered by: Katarzyna Herrera RN 08/26/2023    Pt awake, alert and oriented x4. Currently on 2L O2 for sats 88% while sleeping. PIV SL, up A2 with vivian steady, renal diet, peritoneal dialysis - did not have NOC run as scheduled. Complains of 7/10 pain in left hip.   Plan for PT and OT this afternoon, dialysis run tonight - Dialysis RN to come change tubing on pt's catheter and set up run.   PT recommends TCU placement.  Please review provider order for any additional goals.   Nurse to notify provider when observation goals have been met and patient is ready for discharge.

## 2023-08-26 NOTE — PLAN OF CARE
PRIMARY DIAGNOSIS: GENERALIZED WEAKNESS    OUTPATIENT/OBSERVATION GOALS TO BE MET BEFORE DISCHARGE  1. Orthostatic performed: No    2. Tolerating PO medications: Yes    3. Return to near baseline physical activity: No    4. Cleared for discharge by consultants (if involved): No    Discharge Planner Nurse   Safe discharge environment identified: No  Barriers to discharge: Yes       Entered by: Asia Man RN 08/25/2023   A&O x4. VSS, RA. Up w/ A2 and vivian steady. L hip and back pain managed w/ Sched gabapentin, robaxin, tylenol and PRN oxy. Sched powder applied to abd folds along with interdry. Pt on PD overnight. Sleeping b/t cares and able to make needs known.   Please review provider order for any additional goals.   Nurse to notify provider when observation goals have been met and patient is ready for discharge.

## 2023-08-27 NOTE — PLAN OF CARE
PRIMARY DIAGNOSIS: fall/left hip pain  OUTPATIENT/OBSERVATION GOALS TO BE MET BEFORE DISCHARGE:  1. Pain Status: Improved-controlled with oral pain medications.    2. Return to near baseline physical activity: No    3. Cleared for discharge by consultants (if involved): No    Discharge Planner Nurse   Safe discharge environment identified: Yes  Barriers to discharge: Yes         /72 (BP Location: Right arm, Cuff Size: Adult Large)   Pulse 73   Temp 98.4  F (36.9  C) (Oral)   Resp 16   Wt 103.4 kg (227 lb 15.3 oz)   SpO2 93%   BMI 43.07 kg/m     Patient is Alert and Oriented x4. They are 2 assist with Deanne Steady.  Pt is a renal diet. Pt denies pain and resting in bed. Patient is Saline locked. Pt has PD, completed and clamped around 3 am. Pt has continues pulse ox on. PT/OT/SW following, TCU referrals sent.      Please review provider order for any additional goals.   Nurse to notify provider when observation goals have been met and patient is ready for discharge.

## 2023-08-27 NOTE — PROVIDER NOTIFICATION
"Writer went into room to assess patients pain. Per patient pain was 7/10, pt was a&o x4. Writer offered pt scheduled tylenol, pt declined and requested oxycodone. Writer gave pt oxycodone for pain. When writer went in to assess pt she was unable to answer questions like \" when is your birthday?\" Or \"what city are we in\" and would respond with \"800\". Provider was notified and oxycodone was discontinued and pt on continuous pulse ox.   "

## 2023-08-27 NOTE — PROGRESS NOTES
Cycler set up per protocol and per treatment orders     Results from previous treatment:  Effluent color and clarity: yellow, clear  Total UF: 1798ml  Initial Drain: 5ml  Avg Dwell: 1:25      Cycler Serial Number: [id number]  Total Treatment Volume: [id number]  Total treatment time: 8 hrs  Fill Volume: 2900ml  Last Fill Volume:0ml  Heater ba.25% 6000mL;  Lot #: [id number];  Expiration Date: 10/2023  Side Bag #1: 2.5% 6000mL;  Lot #: [id number];  Expiration Date: 2025  Number of cycles including final fill: 4  Dwell Time: 1:25 minutes  Last Fill Volume: 0ml  Initial Drain Alarm: 0ml  PD orders reviewed with Patient procedure and ESRD teaching done and questions answered.  Cycler ready for hook-up.    Report given to: CIRILO Laura RN

## 2023-08-27 NOTE — PLAN OF CARE
PRIMARY DIAGNOSIS: ACUTE PAIN  OUTPATIENT/OBSERVATION GOALS TO BE MET BEFORE DISCHARGE:  1. Pain Status: Improved-controlled with oral pain medications.    2. Return to near baseline physical activity: No    3. Cleared for discharge by consultants (if involved): No    Discharge Planner Nurse   Safe discharge environment identified: Yes  Barriers to discharge: Yes       Entered by: Christelle Laura RN 08/27/2023     Please review provider order for any additional goals.   Nurse to notify provider when observation goals have been met and patient is ready for discharge.    Pt A&O x4. IV SL. Assist x2 with vivian steady. Activity encouraged, repositioned as tolerated. On 1L of 02, on continuous pulse ox. Robaxin given for 6/10 pain. Ax2 with vivian steady. On renal diet. Swelling in lower extremities. Had PD during the night. Plan for TCU. Currently resting in bed with call light within reach. Pt able to make needs known.

## 2023-08-27 NOTE — PLAN OF CARE
PRIMARY DIAGNOSIS: fall/left hip pain  OUTPATIENT/OBSERVATION GOALS TO BE MET BEFORE DISCHARGE:  1. Pain Status: Improved-controlled with oral pain medications.    2. Return to near baseline physical activity: No    3. Cleared for discharge by consultants (if involved): No    Discharge Planner Nurse   Safe discharge environment identified: Yes  Barriers to discharge: Yes         /69 (BP Location: Left arm)   Pulse 75   Temp 98.5  F (36.9  C) (Oral)   Resp 16   Wt 113.9 kg (251 lb)   SpO2 92%   BMI 47.43 kg/m     Patient is Alert and Oriented x4. They are 2 assist with Deanne Steady.  Pt is a renal diet. Pt denies pain, refused scheduled tylenol and is resting in bed. Patient is Saline locked. Pt is on PD, started at 6pm and will go till 3am. Pt has continues pulse ox on.     Please review provider order for any additional goals.   Nurse to notify provider when observation goals have been met and patient is ready for discharge.

## 2023-08-27 NOTE — PLAN OF CARE
PRIMARY DIAGNOSIS: ACUTE PAIN  OUTPATIENT/OBSERVATION GOALS TO BE MET BEFORE DISCHARGE:  1. Pain Status: Improved-controlled with oral pain medications.    2. Return to near baseline physical activity: No    3. Cleared for discharge by consultants (if involved): No    Discharge Planner Nurse   Safe discharge environment identified: Yes  Barriers to discharge: Yes       Entered by: Christelle Laura RN 08/27/2023      Please review provider order for any additional goals.   Nurse to notify provider when observation goals have been met and patient is ready for discharge.    Pt A&O x4. IV SL. On 1L of room air, on continuous pulse ox. Oxycodone given for 6/10 pain. Pt declined scheduled Tylenol, lidocaine patches & requested miralax later in the day. Education was provided on medication pt declined. Ax2 with vivian heaton. On renal diet. Had PD during the night. Plan for TCU. Currently resting in bed with call light within reach. Pt able to make needs known.

## 2023-08-27 NOTE — PROGRESS NOTES
Renal Medicine Progress Note            Assessment/Plan:     62 y.o woman with ESRD on PD, admitted for weakness and left hip pain after a fall.      # ESRD: on PD ~ 2 years.                -4 manual exchanges (3 green and one red), 1.5 hrs dwell time               -Jasmyn Arreguin with Dr. Cole     # Mechanical fall: No fracture on CT.      # Hypertension: BP his high. BP is soft today.                -Coreg 6.25 mg bid               -clonidine 0.1 mcg tid               -Imdur 30 mg daily     # Anemia of ESRD: Hgb is at target.      # FEN: Mild acidosis. Hypervolemia.      Plan:  # Keflex 1000 mg x 1 then 500 mg q12hrs x 3 days for touched contamination.   # Continue PD. Will use one 6 liters bag of 2.5% dextrose and one 6 liters bag of 4.25% dextrose.   # Renal panel ordered for tomorrow    Please call PD RN on-callMyrna, at [phone] for any assistance.           Interval History:     Afebrile.   BP is soft.   She complains of pruritus.   She thinks that is due to her potassium.   PT recommends TCU.  Patient thinks if she could stand and walk, she would rather go home.   No abdominal pain.  She does not feel sick.    PD with total UF of 1789 ml with 4.25% dextrose concentration.          Medications and Allergies:      - MEDICATION INSTRUCTIONS for Dialysis Patients -   Does not apply See Admin Instructions    acetaminophen  975 mg Oral Q8H    carvedilol  6.25 mg Oral BID w/meals    cephALEXin  500 mg Oral BID    cloNIDine  0.1 mg Oral TID    docusate sodium  100 mg Oral BID    gabapentin  100 mg Oral BID    isosorbide mononitrate  30 mg Oral Daily    lidocaine  2 patch Transdermal Q24H    miconazole   Topical BID    pantoprazole  40 mg Oral Daily    polyethylene glycol  17 g Oral Daily        Allergies   Allergen Reactions    Aspirin Nausea    Statins-Hmg-Coa Reductase Inhibitors [Statins] Other (See Comments)     Other reaction(s): Renal Failure, Other reaction(s): Renal Failure            Physical  Exam:   Vitals were reviewed   , Blood pressure 91/63, pulse 82, temperature 98.4  F (36.9  C), temperature source Oral, resp. rate 16, weight 103.4 kg (227 lb 15.3 oz), SpO2 94 %.    Wt Readings from Last 3 Encounters:   08/27/23 103.4 kg (227 lb 15.3 oz)   05/12/23 100.5 kg (221 lb 9.6 oz)   06/07/22 85.7 kg (189 lb)       Intake/Output Summary (Last 24 hours) at 8/27/2023 1023  Last data filed at 8/26/2023 2030  Gross per 24 hour   Intake --   Output 0 ml   Net 0 ml     GENERAL: NAD.   HEENT:  Normocephalic. No gross abnormalities.  CV: RRR, + murmurs, no clicks, gallops, or rubs, + edema  RESP: Clear bilaterally with good efforts. No wheezes or crackles.   GI: Abdomen obese, soft, NT.   MUSCULOSKELETAL: extremities + edema  SKIN: no suspicious lesions or rashes, dry to touch  NEURO:  Strength normal and symmetric. Awake, alert and conversing normally.     PD catheter exit site CDI.          Data:     CBC RESULTS:     Recent Labs   Lab 08/25/23  0618 08/24/23  0026   WBC 8.2 8.3   RBC 4.07 4.12   HGB 10.5* 10.5*   HCT 34.8* 34.6*    179       Basic Metabolic Panel:  Recent Labs   Lab 08/25/23  0618 08/24/23  0026    135*   POTASSIUM 5.0 5.0   CHLORIDE 98 96*   CO2 20* 20*   BUN 66.2* 66.6*   CR 15.44* 15.50*   * 83   RON 9.0 8.9       INRNo lab results found in last 7 days.   Attestation:   I have reviewed today's relevant vital signs, notes, medications, labs and imaging.    Marques Morris MD  [facility] - Nephrology  Office phone :[phone]  Pager: [phone]

## 2023-08-27 NOTE — PROGRESS NOTES
Care Management Follow Up      Expected Discharge Date: 08/28/2023     Concerns to be Addressed: Discharge planning     Patient plan of care discussed at interdisciplinary rounds: Yes    Anticipated Discharge Disposition: Transitional Care     Anticipated Discharge Services:  PT/OT  Anticipated Discharge DME: None    Patient/family educated on Medicare website which has current facility and service quality ratings: No  Patient/Family in Agreement with the Plan:  Yes    Referrals Placed by CM/SW: Post Acute Facilities  Private pay costs discussed: private room/amenity fees and transportation costs    Additional Information:  RN CC/SW continuing to follow for TCU at discharge. Referrals sent for TCU to facilities for consideration with [employer] insurance and peritoneal dialysis. Post acute care team assisting with following up on pending referrals. Will continue to follow.     PEYTON Greer, [facility]   Inpatient Care Coordination  [facility]   [phone]

## 2023-08-27 NOTE — PLAN OF CARE
PRIMARY DIAGNOSIS: fall/left hip pain  OUTPATIENT/OBSERVATION GOALS TO BE MET BEFORE DISCHARGE:  1. Pain Status: Improved-controlled with oral pain medications.    2. Return to near baseline physical activity: No    3. Cleared for discharge by consultants (if involved): No    Discharge Planner Nurse   Safe discharge environment identified: Yes  Barriers to discharge: Yes         Vitals are Temp: 98.5  F (36.9  C) Temp src: Oral BP: 126/69 Pulse: 75   Resp: 16 SpO2: 92 %.  Patient is Alert and Oriented x4. They are 2 assist with Deanne Steady.  Pt is a renal diet.  They are complaining of 8/10 pain in their  left hip.  PRN Oxycodone and Robaxin  given for pain. Patient is Saline locked. Pt is on PD, started at 6pm and will go till 3am. Pt has continues pulse ox on.     Please review provider order for any additional goals.   Nurse to notify provider when observation goals have been met and patient is ready for discharge.

## 2023-08-27 NOTE — UTILIZATION REVIEW
"[facility]     Admission Status; Secondary Review Determination     Admission Date: 8/23/2023 11:13 PM      Under the authority of the Utilization Management Committee, the utilization review process indicated a secondary review on the above patient.  The review outcome is based on review of the medical records, discussions with staff, and applying clinical experience noted on the date of the review.          (x) Observation Status Appropriate - This patient does not meet hospital inpatient criteria and is placed in observation status. If this patient's primary payer is Medicare and was admitted as an inpatient, Condition Code 44 should be used and patient status changed to \"observation\".     RATIONALE FOR DETERMINATION   62-year-old female with a history of hypertension, ESRD on peritoneal dialysis, sleep apnea on CPAP was admitted on 8/23 with left hip pain following a mechanical fall.  No significant fracture or injury was noted.  Multiple analgesics have been ordered.  The patient is working with physical therapy and TCU is being recommended and pursued.  Blood pressure had 1 soft reading but now improved.  The patient is on 1 L of supplemental oxygen but is not being diuresed nor starting antibiotics for pneumonia.  Chest x-ray has been ordered and is pending.  At the time of this review there is no medical necessity for inpatient hospitalization and observation status remains appropriate.    The severity of illness, intensity of service provided, expected LOS and risk for adverse outcome make the care appropriate for further observation; however, doesn't meet criteria for hospital inpatient admission.        The information on this document is developed by the utilization review team in order for the business office to ensure compliance.  This only denotes the appropriateness of proper admission status and does not reflect the quality of care rendered.         The definitions of Inpatient Status " and Observation Status used in making the determination above are those provided in the CMS Coverage Manual, Chapter 1 and Chapter 6, section 70.4.      Sincerely,     Myles Avilez DO MPH   Physician Advisor  Utilization Review  [facility]

## 2023-08-27 NOTE — PROGRESS NOTES
[facility]    Medicine Progress Note - Hospitalist Service    Date of Admission:  8/23/2023    Assessment & Plan   Devora Garcia is a 62 year old female admitted on 8/23/2023. She has hx of HTN, ESRD on PD, GUILLERMO on CPAP, who presents with L hip and knee  pain s/p mechanical fall yesterday. No head trauma or LOC. She has been having difficulty walking. Pt is unable to extrapolate further regarding the fall , but states she fell in the bedroom and there was no head trauma or LOC and her  called EMS. She is on PD and has 4 runs during the day. At baseline she uses a walker to ambulate, however, has had increased difficulty due to swelling of LE's. No fevers/chills/chest pain/SOB.     Interval events:  No acute changes.  VSS.  BP mildly soft this AM     Mechanical fall and L hip and knee pain. L hip CT shows no occult fracture.  Added Robaxin and Lidocaine patches. Scheduled APAP. Oxycodone PRN.  PT consult recommending TCU. Seen by OT today with recommendation for TCU as well.     ESRD on PD.  AGMA secondary to ESRD on HD. Continue PD. Renal consult appreciated. Keflex 500 mg q12hrs X 3 days for presumed contamination of PD catheter.  PD catheter to be exchanged.  Instructed RN staff to call PD RN on call at [phone] for any assistance.      HTN. Continue carvedilol, clonidine, imdur     Mild hypoxia: Unclear if related to fluid status or evolving pna.  Likely body habitus and history of GUILLERMO contributing. Does not use CPAP but has in the past.  Afebrile.  Not significantly bronchospastic.  Needs aggressive pulmonary toilet.  Check pCXR.     Acute encephalopathy resolved.      Addendum: CXR without acute focal changes.  Noted by RN to be more lethargic with Oxycodone.  Will change Oxycodone to Hydromorphone 1 mg every 6 hours as needed for pain 6-10/10.  Hold for oversedation.  Has been refusing APAP per report.  Encourage non opiate focus.  Need to mobilize.       Diet: Combination  Diet Renal Diet (dialysis)    DVT Prophylaxis: mechanical  Taylor Catheter: Not present  Lines: None     Cardiac Monitoring: None  Code Status: Full Code      Disposition Plan per clinical course.     Expected Discharge Date: 09/01/2023    Discharge Delays: Placement - TCU  Destination: inpatient rehabilitation facility          The patient's care was discussed with the Bedside Nurse, Care Coordinator/, and Patient.    FAINA Cerna [facility]  Hospitalist Service  [facility]  Securely message with [employer] (more info)  Text page via [employer] Paging/Directory   ______________________________________________________________________    Interval History   As above.   VSS.  Mild nocuturnal hypoxia.   No CP or SOB.  No fever.  No abdominal pain.   Checking pCXR.    Physical Exam   Vital Signs: Temp: 98.4  F (36.9  C) Temp src: Oral BP: 91/63 Pulse: 82   Resp: 16 SpO2: 94 % O2 Device: Nasal cannula Oxygen Delivery: 1 LPM  Weight: 227 lbs 15.29 oz    GEN:   Alert, oriented x 3, appears comfortable, NAD.  NECK:   Supple ,no mass or thyromegaly   HEENT:  Normocephalic/atraumatic, no scleral icterus, no nasal discharge, mouth moist.  CV:   Regular rate and rhythm, no murmur or JVD.  S1 + S2 noted, no S3 or S4.  LUNGS:   Clear to auscultation bilaterally without rales/rhonchi/wheezing/retractions.  Diminished bases. Symmetric chest rise on inhalation noted.  ABD:   Active bowel sounds, soft, non-tender/non-distended.  No rebound/guarding/rigidity.  EXT:   No edema.  No cyanosis.  No joint synovitis noted.  SKIN:   Dry to touch, no exanthems noted in the visualized areas.  Neurologic: Grossly intact,non focal.   Neuropsychiatric:  General: normal, calm and normal eye contact  Level of consciousness: alert / normal  Affect: normal  Orientation: oriented to self, place, time and situation     Medical Decision Making       45 MINUTES SPENT BY ME on the date of service doing chart review, history,  exam, documentation & further activities per the note.      Data         Imaging results reviewed over the past 24 hrs:   No results found for this or any previous visit (from the past 24 hour(s)).

## 2023-08-28 NOTE — PROGRESS NOTES
"[facility]    Medicine Progress Note - Hospitalist Service    Date of Admission:  8/23/2023    Assessment & Plan   Devora Garcia is a 62 year old female admitted on 8/23/2023. She has hx of HTN, ESRD on PD, GUILLERMO on CPAP, who presents with L hip and knee  pain s/p mechanical fall yesterday. No head trauma or LOC. She has been having difficulty walking. Pt is unable to extrapolate further regarding the fall , but states she fell in the bedroom and there was no head trauma or LOC and her  called EMS. She is on PD and has 4 runs during the day. At baseline she uses a walker to ambulate, however, has had increased difficulty due to swelling of LE's. No fevers/chills/chest pain/SOB.     Daily update: Patient reports dizziness and speech changes that happened a few days ago while working with PT today. Otherwise no new concerns. Hopeful for TCU possibly tomorrow.      Mechanical fall and L hip and knee pain. L hip CT shows no occult fracture.    - Pain regimen: Robaxin and Lidocaine patches. Scheduled APAP. Oxycodone PRN.   - PT/OT consult recommending TCU     ESRD on PD.  AGMA secondary to ESRD on HD. Continue PD.   - Renal consult   - Keflex 500 mg q12hrs X 3 days for presumed contamination of PD catheter.  PD catheter to be exchanged.  Instructed RN staff to call PD RN on call at [phone] for any assistance.   - calcium carbonate 2 tabs TID with meals for elevated phosphorus      HTN  - Continue carvedilol, clonidine, imdur     Mild hypoxia  Likely body habitus and history of GUILLERMO contributing. Does not use CPAP but has in the past.  Could possibly be due to missing dialysis this weekend but less likely. Afebrile. No signs of PNA. Not significantly bronchospastic.    - encouraged aggressive incentive spirometry  - need to get out of bed     Acute encephalopathy resolved    Dizziness  Patient reports several days ago she had an episode of dizziness and is concerned she had a \"mini " "stroke\".  Her mom recently had a stroke.  She did not tell any providers until working with physical therapy today.  Neuro exam is normal.  Dizziness seems worse with standing per patient.  No one-sided weakness.  Also reports feeling more sleepy and difficulty with getting speech out due to sleepiness.  I suspect this was related to her acute encephalopathy due to her narcotics this weekend.  - check orthostatic vitals        Diet: Combination Diet Renal Diet (dialysis)    DVT Prophylaxis: Pneumatic Compression Devices  Taylor Catheter: Not present  Lines: None     Cardiac Monitoring: None  Code Status: Full Code      Clinically Significant Risk Factors Present on Admission           # Hypercalcemia: corrected calcium is >10.1, will monitor as appropriate   # Anion Gap Metabolic Acidosis: Highest Anion Gap = 19 mmol/L in last 2 days, will monitor and treat as appropriate  # Hypoalbuminemia: Lowest albumin = 2.3 g/dL at 8/28/2023  5:42 AM, will monitor as appropriate     # Hypertension: Noted on problem list                 Disposition Plan pending TCU placement     Expected Discharge Date: 09/01/2023,  9:00 AM  Discharge Delays: Placement - TCU  *Medically Ready for Discharge  Destination: inpatient rehabilitation facility  Discharge Comments: 8/27/23 medically ready when TCU bed found.        The patient's care was discussed with the Patient and Patient's Family.    BRIT West PA-C  Hospitalist Service  [facility]  Securely message with [employer] (more info)  Text page via [employer] Paging/Directory   ______________________________________________________________________    Interval History   Doing well today. Has some back itching but otherwise hip pain is well controlled.     Physical Exam   Vital Signs: Temp: 97.6  F (36.4  C) Temp src: Oral BP: 115/59 Pulse: 74   Resp: 12 SpO2: 95 % O2 Device: Nasal cannula Oxygen Delivery: 1 LPM  Weight: 233 lbs 7.47 oz    GENERAL:  Alert, Comfortable, No " acute distress. Laying in bed.   PSYCH: pleasant, oriented.  HEENT:  Normocephalic, PERRLA. No scleral icterus or conjunctival injection, normal hearing, oral mucosa moist  HEART:  Normal S1, S2 with no murmur, RRR  LUNGS:  Normal Respiratory effort. Clear to auscultation bilaterally with no wheezing, rales or ronchi.  ABDOMEN:  Soft, non-tender, non distended. No peritoneal signs.   EXTREMITIES:  No pedal edema, +2 pulses bilateral and equal. No cyanosis.   SKIN:  Warm, dry to touch. Dry skin on the middle back.   NEUROLOGIC:  CN 2-12 intact, BL 5/5 symmetric upper and lower extremity strength, sensation intact with no focal deficits. Speech clear, alert & orientated x 4.    Medical Decision Making       MANAGEMENT DISCUSSED with the following over the past 24 hours: patient, family, nurse, SW   NOTE(S)/MEDICAL RECORDS REVIEWED over the past 24 hours: labs, imaging, progress notes       Data     I have personally reviewed the following data over the past 24 hrs:    6.2  \   9.5 (L)   / 165     140 100 64.4 (H) /  96   4.8 22 15.17 (H) \     ALT: N/A AST: N/A AP: N/A TBILI: N/A   ALB: 2.3 (L) TOT PROTEIN: N/A LIPASE: N/A       Imaging results reviewed over the past 24 hrs:   No results found for this or any previous visit (from the past 24 hour(s)).

## 2023-08-28 NOTE — PLAN OF CARE
PRIMARY DIAGNOSIS: Fall/ left hip pain  OUTPATIENT/OBSERVATION GOALS TO BE MET BEFORE DISCHARGE:  ADLs back to baseline: No    Activity and level of assistance: Up with maximum assistance. Consider SW and/or PT evaluation. A2 vivian steady    Pain status: Improved-controlled with oral pain medications.    Return to near baseline physical activity: No     Discharge Planner Nurse   Safe discharge environment identified: No  Barriers to discharge: Yes       Entered by: Katarzyna Herrera RN 08/28/2023    Pt alert and oriented x4. RA, PIV SL, up A2 with vivian heaton, renal diet. Scheduled tylenol, PRN dilaudid for pain.  Plan for PT and OT this afternoon, dialysis run nightly, dialysis RN to set up machine. PT/ OT working with pt daily, currently recommends TCU - looking for placement.  Nephrology following.  Please review provider order for any additional goals.   Nurse to notify provider when observation goals have been met and patient is ready for discharge.

## 2023-08-28 NOTE — PROGRESS NOTES
Care Management Follow Up    Length of Stay (days): 0    Expected Discharge Date: 09/01/2023     Concerns to be Addressed:  discharge planning     Patient plan of care discussed at interdisciplinary rounds: Yes    Anticipated Discharge Disposition: Transitional Care     Anticipated Discharge Services: inpatient rehab, peritoneal dialysis  Anticipated Discharge DME: None    Patient/family educated on Medicare website which has current facility and service quality ratings: yes  Education Provided on the Discharge Plan: yes   Patient/Family in Agreement with the Plan:  yes    Referrals Placed by CM/SW: Post Acute Facilities    Transportation plan: Reviewed out of pocket cost for "[employer] transport, $89.98 base and $5.79 per mile to the destination. Spoke with patient, she expressed understanding and requested [email or web address] transportation.       Additional Information:  [facility], pending their staff receiving patient specific PD training and [facility] prior authorization. Spoke with [facility] krystian, [place], #[phone]. [facility] will need to arrange training with patient's [facility] dialysis center. Once [facility] receives insurance auth and staff completes peritoneal dialysis training, patient can [facility] to [facility].     CM met with patient and udpate given on [facility] acceptance. Patient states she prefers to return home. She would like to see how her next therapy session goes with PT OT before making a decision on [facility]. Patient scheduled for PT/OT later today. CM will follow up with patient.     CM returned to patient bedside during PT session. Patient state she is now agreeable to discharge to [facility]. Patient give CM the approval for [facility] to begin authorization process and staff training.     Called and spoke with Loc Pettit. [facility] will proceed with auth and arranging staff PD education. TCU  anticipates being ready to accept patient later this week.     CM will continue to follow for discharge planning to TCU with peritoneal dialysis.     Raya Lazcano RN Case Manager  Inpatient Care Coordination   [facility]   [phone]        Raya Lazcano, RN

## 2023-08-28 NOTE — PLAN OF CARE
PRIMARY DIAGNOSIS: Fall/ left hip pain  OUTPATIENT/OBSERVATION GOALS TO BE MET BEFORE DISCHARGE:  ADLs back to baseline: No    Activity and level of assistance: Up with maximum assistance.  A2 vivian steady    Pain status: Improved-controlled with oral pain medications.    Return to near baseline physical activity: No     Discharge Planner Nurse   Safe discharge environment identified: No  Barriers to discharge: Yes       Entered by: Katarzyna Herrera RN 08/28/2023    Pt alert and oriented x4. RA, PIV SL, up A2 with vivian steady, renal diet. Scheduled tylenol, PRN dilaudid for pain.1L O2 via NC - tried to wean off but sats immediately drop to 86%.  PT and OT worked with pt - pt able to make minimal movement. Complains of dizziness while working with PT.   Dialysis RN to set up machine for run NOC.   TCU placement - transfer date pending the training of TCU staff on dialysis.   Nephrology following.  Please review provider order for any additional goals.   Nurse to notify provider when observation goals have been met and patient is ready for discharge.

## 2023-08-28 NOTE — PLAN OF CARE
PRIMARY DIAGNOSIS: ACUTE PAIN/ hip  OUTPATIENT/OBSERVATION GOALS TO BE MET BEFORE DISCHARGE:  1. Pain Status: Improved-controlled with oral pain medications.    2. Return to near baseline physical activity: No    3. Cleared for discharge by consultants (if involved): No    Discharge Planner Nurse   Safe discharge environment identified: Yes  Barriers to discharge: Yes      /67 (BP Location: Right arm, Cuff Size: Adult Large)   Pulse 69   Temp 97.1  F (36.2  C) (Oral)   Resp 16   Wt 105.9 kg (233 lb 7.5 oz)   SpO2 97%   BMI 44.11 kg/m     Patient is Alert and Oriented x4. They are 2 assist with Deanne Steady.  Pt is on a renal  diet.  Pt complains of 7/10 pain, PRN dilaudid given. Patient is Saline locked. PD was disconnected at 3 am. SW/PT/nephrology following. Plans for TCU.     Please review provider order for any additional goals.   Nurse to notify provider when observation goals have been met and patient is ready for discharge.

## 2023-08-28 NOTE — PROGRESS NOTES
Renal Medicine Progress Note            Assessment/Plan:     Assessment: Devora Garcia is a 62-year-old female with ESRD on PD, GUILLERMO, HTN admitted with fall and weakness.    ESRD on PD  On dialysis for the last 2 years.  PTA does 4 manual exchanges (3 green, 1 red), 1.5-hour dwell time.  Primary nephrologist is Dr. Cole at [facility].  While inpatient we have been using the cycler.    Mechanical fall  No evidence of fractures.    HTN  PTA on carvedilol 6.25 mg twice daily, clonidine 0.1 mg 3 times daily, Imdur 30 mg daily.  Initially BP high, now mostly normal.    Presumed contamination  Keflex 1 g x1, then 500 mg twice daily x3d.  End date 8/29.    CKD-MBD  Phosphorus elevated at 11.5.  She reports taking Tums with meals, but often forgets.  We will schedule this for her.    Anemia of CKD  Hemoglobin 10.5 on 8/25.  Within goal.  No changes.      Plan/Recs:  1) will do 1 red and 1 green bag today  2) calcium carbonate 2 tabs TID WM  3) continue Keflex course, last dose tomorrow 8/29    Reviewed notes from hospitalist, RNs, dialysis nurse.    Maggie Alvarez MD   [facility] consultants  Office: [phone]          Interval History:      -Overnight used 1 green and 1 red bag.  -Some confusion noted overnight, likely related to delirium versus pain medication  -She reports feeling okay, denies any particular complaints  -Has some dyspnea, not bad  -Feels lower extremity edema has improved  -Appetite okay  -Has quite a bit of itching on her back  -Reports using Tums as a Phos binder, but notes she often forgets         Medications and Allergies:      - MEDICATION INSTRUCTIONS for Dialysis Patients -   Does not apply See Admin Instructions    acetaminophen  975 mg Oral Q8H    carvedilol  6.25 mg Oral BID w/meals    cephALEXin  500 mg Oral BID    cloNIDine  0.1 mg Oral TID    docusate sodium  100 mg Oral BID    gabapentin  100 mg Oral BID    isosorbide mononitrate  30 mg Oral Daily    lidocaine  2  patch Transdermal Q24H    miconazole   Topical BID    pantoprazole  40 mg Oral Daily    polyethylene glycol  17 g Oral Daily        Allergies   Allergen Reactions    Aspirin Nausea    Statins-Hmg-Coa Reductase Inhibitors [Statins] Other (See Comments)     Other reaction(s): Renal Failure, Other reaction(s): Renal Failure            Physical Exam:   Vitals were reviewed  /72 (BP Location: Right arm)   Pulse 69   Temp 98.9  F (37.2  C) (Axillary)   Resp 16   Wt 105.9 kg (233 lb 7.5 oz)   SpO2 97%   BMI 44.11 kg/m      Wt Readings from Last 3 Encounters:   08/28/23 105.9 kg (233 lb 7.5 oz)   05/12/23 100.5 kg (221 lb 9.6 oz)   06/07/22 85.7 kg (189 lb)       Intake/Output Summary (Last 24 hours) at 8/28/2023 0846  Last data filed at 8/27/2023 1951  Gross per 24 hour   Intake --   Output 0 ml   Net 0 ml       GENERAL APPEARANCE: NAD, w fatigued appearing  HEENT: normocephalic, MMM  RESP: clear  CV: Regular rate  ABDOMEN: Soft, nontender, nondistended.  PD catheter in place clean and intact  EXTREMITIES/SKIN: 1-2+ nonpitting edema  NEURO: Alert, oriented, normal speech           Data:     BMP  Recent Labs   Lab 08/28/23  0542 08/25/23  0618 08/24/23  0026    139 135*   POTASSIUM 4.5 5.0 5.0   CHLORIDE 97* 98 96*   RON 9.0 9.0 8.9   CO2 22 20* 20*   BUN 64.5* 66.2* 66.6*   CR 15.57* 15.44* 15.50*   GLC 95 127* 83     CBC  Recent Labs   Lab 08/25/23  0618 08/24/23  0026   WBC 8.2 8.3   HGB 10.5* 10.5*   HCT 34.8* 34.6*   MCV 86 84    179     Lab Results   Component Value Date    AST 9 05/10/2023    ALT <9 05/10/2023    ALKPHOS 61 05/10/2023    BILITOTAL 0.5 05/10/2023     No results found for: INR  Color Urine (no units)   Date Value   05/29/2021 Light Yellow     Appearance Urine (no units)   Date Value   05/29/2021 Clear     Glucose Urine (mg/dL)   Date Value   05/29/2021 Negative     Bilirubin Urine (no units)   Date Value   05/29/2021 Negative     Ketones Urine (mg/dL)   Date Value   05/29/2021  Negative     Specific Gravity Urine (no units)   Date Value   05/29/2021 1.013     pH Urine (pH)   Date Value   05/29/2021 7.0     Protein Albumin Urine (mg/dL)   Date Value   05/29/2021 100 (A)     Urobilinogen Urine (no units)   Date Value   08/30/2020 <2.0 E.U./dL     Nitrite Urine (no units)   Date Value   05/29/2021 Negative     Leukocyte Esterase Urine (no units)   Date Value   05/29/2021 Trace (A)         Attestation:  I have reviewed today's vital signs, notes, medications, labs and imaging.    Maggie Alvarez MD  [facility] - Nephrology  Office: [phone]

## 2023-08-28 NOTE — PLAN OF CARE
PRIMARY DIAGNOSIS: ACUTE PAIN/ hip  OUTPATIENT/OBSERVATION GOALS TO BE MET BEFORE DISCHARGE:  1. Pain Status: Improved-controlled with oral pain medications.    2. Return to near baseline physical activity: No    3. Cleared for discharge by consultants (if involved): No    Discharge Planner Nurse   Safe discharge environment identified: Yes  Barriers to discharge: Yes       /72 (BP Location: Right arm)   Pulse 77   Temp 98.7  F (37.1  C) (Oral)   Resp 16   Wt 103.4 kg (227 lb 15.3 oz)   SpO2 93%   BMI 43.07 kg/m     Patient is Alert and Oriented x4. They are 2 assist with Deanne Steady.  Pt is om a renal  diet.  Pt denies pain, refused scheduled tylenol.  Patient is Saline locked. PD started at 19:30, will run for 8 hours. SW/PT/nephrology following. Plans for TCU.     Please review provider order for any additional goals.   Nurse to notify provider when observation goals have been met and patient is ready for discharge.

## 2023-08-28 NOTE — PLAN OF CARE
PRIMARY DIAGNOSIS: Fall/ left hip pain  OUTPATIENT/OBSERVATION GOALS TO BE MET BEFORE DISCHARGE:  ADLs back to baseline: No    Activity and level of assistance: Up with maximum assistance. Consider SW and/or PT evaluation. A2 vivian steady    Pain status: Improved-controlled with oral pain medications.    Return to near baseline physical activity: No     Discharge Planner Nurse   Safe discharge environment identified: No  Barriers to discharge: Yes       Entered by: Katarzyna Herrera RN 08/28/2023    Pt alert and oriented x4. RA, PIV SL, up A2 with vivian steady, renal diet. Scheduled tylenol, PRN dilaudid for pain.1L O2 via NC - tried to wean off but sats immediately drop to 86%.  Plan for PT and OT this afternoon, dialysis run nightly, dialysis RN to set up machine. PT/ OT working with pt daily, currently recommends TCU - looking for placement.  Nephrology following.  Please review provider order for any additional goals.   Nurse to notify provider when observation goals have been met and patient is ready for discharge.

## 2023-08-28 NOTE — PLAN OF CARE
PRIMARY DIAGNOSIS: ACUTE PAIN/ hip  OUTPATIENT/OBSERVATION GOALS TO BE MET BEFORE DISCHARGE:  1. Pain Status: Improved-controlled with oral pain medications.    2. Return to near baseline physical activity: No    3. Cleared for discharge by consultants (if involved): No    Discharge Planner Nurse   Safe discharge environment identified: Yes  Barriers to discharge: Yes         Vitals are Temp: 98.7  F (37.1  C) Temp src: Oral BP: 134/72 Pulse: 77   Resp: 16 SpO2: 93 %.  Patient is Alert and Oriented x4. They are 2 assist with Deanne Steady.  Pt is om a renal  diet.  They are complaining of 3/10 pain in their hip.  PRN Robaxin given for pain.  Patient is Saline locked. PD started at 19:30, will run for 8 hours. SW/PT/nephrology following. Plans for TCU.     Please review provider order for any additional goals.   Nurse to notify provider when observation goals have been met and patient is ready for discharge.

## 2023-08-29 NOTE — PLAN OF CARE
PRIMARY DIAGNOSIS: Fall, left hip and knee pain  OUTPATIENT/OBSERVATION GOALS TO BE MET BEFORE DISCHARGE:  1. Pain Status: Improved-controlled with oral pain medications.    2. Return to near baseline physical activity: No AX2 WITH VIVIAN DARCIE    3. Cleared for discharge by consultants (if involved): No    Discharge Planner Nurse   Safe discharge environment identified: No  Barriers to discharge: Yes       Entered by: America Phelps RN 08/29/2023 5:12 AM  Pt A&Ox4, rates pain 3/10. Requested prn dilaudid for pain management, Ax2 with vivian darcie, denies N/V/D, PIV flushed/SL,1L NC, tolerating renal diet per pt, dialysis  room by dialysis RN, anticipating discharge to TCU pending placement, will continue to follow poc  /51 (BP Location: Left arm)   Pulse 69   Temp 98.5  F (36.9  C) (Oral)   Resp 16   Wt 105.9 kg (233 lb 7.5 oz)   SpO2 96%   BMI 44.11 kg/m     Please review provider order for any additional goals.   Nurse to notify provider when observation goals have been met and patient is ready for discharge.

## 2023-08-29 NOTE — PLAN OF CARE
"PRIMARY DIAGNOSIS: GENERALIZED WEAKNESS/Fall    OUTPATIENT/OBSERVATION GOALS TO BE MET BEFORE DISCHARGE  1. Orthostatic performed: Yes:          Lying Orthostatic BP: 121/66         Sitting Orthostatic BP: 149/70         Standing Orthostatic BP: 73/58     2. Tolerating PO medications: Yes    3. Return to near baseline physical activity: No    4. Cleared for discharge by consultants (if involved): Yes    Discharge Planner Nurse   Safe discharge environment identified: Yes  Barriers to discharge: Yes       Entered by: Nelida Lu RN 08/29/2023 3:55 PM    Pt A/O x4 and makes needs known to staff. Ax2 with the Deanne Martinez, offered to get pt OOB, but pt refused stating \"only physical therapy does that.\" Educated the pt that Nursing staff can do this as well, yet continues to refuse. Pt on 2L of oxygen, attempted to wean to room air from 1L, but dipped into the mid 80's. 2L placed by colleague, able to wean to 1L again. IS encouraged 10x hourly. Pt was agitated, verbally abusive this morning, demanding dilaudid and Robaxin, refusing tylenol and lidocaine patches, stating that \"you need to give me what is prescribed to me.\" Educated pt that we need to try non-narcotic interventions such as tylenol and lidocaine patches, still refused. Also educated pt that dilaudid causes respiratory depression, then stated to the writer \"you need to stop playing doctor, and give me what is prescribed to me, now.\" Gave dilaudid and Robaxin, 1 hr later pt states was ineffective, but was able to get OOB with therapy. PIV SL. PD site C/D/I. Orthostatics completed.  Vital signs:  Temp: 97.4  F (36.3  C) Temp src: Oral BP: 137/60 Pulse: 70   Resp: 18 SpO2: 97 % O2 Device: Nasal cannula Oxygen Delivery: 1 LPM   Weight: 105.9 kg (233 lb 7.5 oz)  Estimated body mass index is 44.11 kg/m  as calculated from the following:    Height as of 5/10/23: 1.549 m (5' 1\").    Weight as of this encounter: 105.9 kg (233 lb 7.5 oz).    Please review " provider order for any additional goals.   Nurse to notify provider when observation goals have been met and patient is ready for discharge.

## 2023-08-29 NOTE — PROGRESS NOTES
Cycler set up per protocol and per treatment orders     Results from previous treatment:  Effluent color and clarity: yellow, clear, no fibrin noted  Total UF: 1173    Avg Dwell: 1:29      Cycler Serial Number: [id number]  Total Treatment Volume: [id number]  Total treatment time: 8 hrs  Fill Volume: 2900ml  Last Fill Volume:0ml  Heater ba.5% 6000mL;  Lot #: [id number];  Expiration Date:   Side Bag #1: 2.5% 6000mL;  Lot #: [id number];  Expiration Date:   Number of cycles including final fill: 4  Dwell Time: 1:23 minutes  Last Fill Volume: 0ml  Initial Drain Alarm: 10ml  PD orders reviewed with Patient procedure and ESRD teaching done and questions answered.  Cycler ready for hook-up.    Report given to: Nelida Pollard consent form signed yes

## 2023-08-29 NOTE — CARE PLAN
PRIMARY DIAGNOSIS: Fall/left hip/knee pain /peritoneal dialysis   OUTPATIENT/OBSERVATION GOALS TO BE MET BEFORE DISCHARGE:  ADLs back to baseline: No    Activity and level of assistance: Sera steady AX2.    Pain status: Improved-controlled with oral pain medications.    Return to near baseline physical activity: No     Discharge Planner Nurse   Safe discharge environment identified: Yes  Barriers to discharge: Yes       Entered by: Ricardo Isbell RN 08/29/2023      Please review provider order for any additional goals.   Nurse to notify provider when observation goals have been met and patient is ready for discharge.      Pt alert and oriented x 4.VSS 1 LPM. Ax2 with the Deanne Martinez.PIV SL.PD site C/D/I.

## 2023-08-29 NOTE — PLAN OF CARE
Orthostatics completed - pt was dizzy upon transition from laying to sitting which resolved in time, but pressure dropped to 80/64 from 121/66, so this RN did not stand the pt to get a reading.

## 2023-08-29 NOTE — UTILIZATION REVIEW
Concurrent stay review; Secondary Review Determination - [facility]        Under the authority of the Utilization Management Committee, the utilization review process indicated a secondary review on the above patient.  The review outcome is based on review of the medical records, discussions with staff, and applying clinical experience noted on the date of the review.        (x) Observation/outpatient Status Appropriate - Concurrent stay review       RATIONALE FOR DETERMINATION:   62-year-old female with a history of hypertension, end-stage renal disease (ESRD) on peritoneal dialysis (PD), and obstructive sleep apnea (GUILLERMO) presents following a mechanical fall resulting in left hip and knee pain. No head trauma or loss of consciousness reported. She uses a walker for ambulation, but recent leg swelling has caused increased difficulty. A CT scan ruled out occult fractures. Pain management includes Robaxin, Lidocaine patches, and oxycodone as needed. ESRD is managed with PD, complicated by anion gap metabolic acidosis, requiring PD catheter exchange and Keflex for suspected catheter contamination. Hypertension is treated with carvedilol, clonidine, and imdur. Mild hypoxia attributed to GUILLERMO and body habitus; aggressive incentive spirometry and mobility are encouraged. Acute encephalopathy resolved, while episodic dizziness and speech changes, potentially linked to acute encephalopathy due to narcotics, are being monitored.  Pending placement in a transitional care unit (TCU), the expected discharge date is 09/01/2023 to an inpatient rehabilitation facility.  Patient delayed discharge is related to disposition, there is no medical necessity for inpatient admission at the time of this review. If there is a change in patient status, please resend for review.    The information on this document is developed by the utilization review team in order for the business office to ensure compliance.   This only denotes the appropriateness of proper admission status and does not reflect the quality of care rendered.       The definitions of Inpatient Status and Observation Status used in making the determination above are those provided in the CMS Coverage Manual, Chapter 1 and Chapter 6, section 70.4.       Sincerely,    Bran Rolle MD

## 2023-08-29 NOTE — PROGRESS NOTES
Renal Medicine Progress Note            Assessment/Plan:     Assessment: Devora Garcia is a 62-year-old female with ESRD on PD, GUILLERMO, HTN admitted with fall and weakness.    ESRD on PD  On dialysis for the last 2 years.  PTA does 4 manual exchanges (3 green, 1 red), 1.5-hour dwell time.  Primary nephrologist is Dr. Cole at [facility] [place].  While inpatient we have been using the cycler.    Mechanical fall  No evidence of fractures.    HTN  PTA on carvedilol 6.25 mg twice daily, clonidine 0.1 mg 3 times daily, Imdur 30 mg daily.  Initially BP high, now mostly normal. Having some orthostatic hypotension 8/29. Will go to all green bags and change carvedilol to low dose metoprolol instead.     Presumed contamination  S/p keflex course.     CKD-MBD  Phosphorus elevated at 11.5.  She reports taking Tums with meals, but often forgets.  We will schedule this for her.    Anemia of CKD  Hemoglobin 10.5 on 8/25.  Within goal.  No changes.      Plan/Recs:  1) due to orthostasis this AM, will do all green bags tonight   2) calcium carbonate 2 tabs TID WM  3) holding carvedilol, switching to metoprolol 12.5mg daily     Reviewed notes from hospitalist, RNs, dialysis nurse.    Maggie Alvaerz MD   [facility] consultants  Office: [phone]          Interval History:      -Overnight used 1 green and 1 red bag. UF 1173 ml.  - somnolent this AM, jerking motions   - breathing and legs about the same   - nodding off during our visit   - nursing notes indicated symptomatic orthostatic hypotension this AM          Medications and Allergies:      - MEDICATION INSTRUCTIONS for Dialysis Patients -   Does not apply See Admin Instructions    acetaminophen  975 mg Oral Q8H    calcium carbonate  1,000 mg Oral TID w/meals    carvedilol  6.25 mg Oral BID w/meals    cloNIDine  0.1 mg Oral TID    docusate sodium  100 mg Oral BID    gabapentin  100 mg Oral BID    isosorbide mononitrate  30 mg Oral Daily    lidocaine  2 patch  Transdermal Q24H    miconazole   Topical BID    pantoprazole  40 mg Oral Daily    polyethylene glycol  17 g Oral Daily        Allergies   Allergen Reactions    Aspirin Nausea    Statins-Hmg-Coa Reductase Inhibitors [Statins] Other (See Comments)     Other reaction(s): Renal Failure, Other reaction(s): Renal Failure            Physical Exam:   Vitals were reviewed  BP (!) 148/72 (BP Location: Right arm)   Pulse 76   Temp 97.5  F (36.4  C) (Oral)   Resp 18   Wt 105.9 kg (233 lb 7.5 oz)   SpO2 93%   BMI 44.11 kg/m      Wt Readings from Last 3 Encounters:   08/28/23 105.9 kg (233 lb 7.5 oz)   05/12/23 100.5 kg (221 lb 9.6 oz)   06/07/22 85.7 kg (189 lb)       Intake/Output Summary (Last 24 hours) at 8/28/2023 0846  Last data filed at 8/27/2023 1951  Gross per 24 hour   Intake --   Output 0 ml   Net 0 ml       GENERAL APPEARANCE: NAD, w fatigued appearing  HEENT: normocephalic, MMM  RESP: clear  CV: Regular rate  ABDOMEN: Soft, nontender, nondistended.  PD catheter in place clean and intact  EXTREMITIES/SKIN: 1-2+ nonpitting edema  NEURO: Alert, oriented, normal speech           Data:     BMP  Recent Labs   Lab 08/28/23  0848 08/28/23  0542 08/25/23  0618 08/24/23  0026    138 139 135*   POTASSIUM 4.8 4.5 5.0 5.0   CHLORIDE 100 97* 98 96*   RON 8.5* 9.0 9.0 8.9   CO2 22 22 20* 20*   BUN 64.4* 64.5* 66.2* 66.6*   CR 15.17* 15.57* 15.44* 15.50*   GLC 96 95 127* 83       CBC  Recent Labs   Lab 08/28/23  0848 08/25/23  0618 08/24/23  0026   WBC 6.2 8.2 8.3   HGB 9.5* 10.5* 10.5*   HCT 31.8* 34.8* 34.6*   MCV 86 86 84    202 179       Lab Results   Component Value Date    AST 9 05/10/2023    ALT <9 05/10/2023    ALKPHOS 61 05/10/2023    BILITOTAL 0.5 05/10/2023     No results found for: INR  Color Urine (no units)   Date Value   05/29/2021 Light Yellow     Appearance Urine (no units)   Date Value   05/29/2021 Clear     Glucose Urine (mg/dL)   Date Value   05/29/2021 Negative     Bilirubin Urine (no units)    Date Value   05/29/2021 Negative     Ketones Urine (mg/dL)   Date Value   05/29/2021 Negative     Specific Gravity Urine (no units)   Date Value   05/29/2021 1.013     pH Urine (pH)   Date Value   05/29/2021 7.0     Protein Albumin Urine (mg/dL)   Date Value   05/29/2021 100 (A)     Urobilinogen Urine (no units)   Date Value   08/30/2020 <2.0 E.U./dL     Nitrite Urine (no units)   Date Value   05/29/2021 Negative     Leukocyte Esterase Urine (no units)   Date Value   05/29/2021 Trace (A)         Attestation:  I have reviewed today's vital signs, notes, medications, labs and imaging.    Maggie Alvarez MD  [facility] - Nephrology  Office: [phone]

## 2023-08-29 NOTE — PLAN OF CARE
PRIMARY DIAGNOSIS: Left Hip and knee pain  OUTPATIENT/OBSERVATION GOALS TO BE MET BEFORE DISCHARGE:  1. Pain Status: Improved-controlled with oral pain medications.    2. Return to near baseline physical activity: No    3. Cleared for discharge by consultants (if involved): No    Discharge Planner Nurse   Safe discharge environment identified: No  Barriers to discharge: Yes  A & O X 4. Lung sound diminished to lower lobes. Denies shortness of breath, nausea, vomit, or diarrhea. Patient on 1 LPM supplemental oxygen . On renal diet. A X 2 with Deanne Martinez. Dialysis RN set up dialysis machine . Pain is managed with scheduled Tylenol, prn Dilaudid, and prn Robaxin . C/O muscle spasms, Prn Robaxin given once this shift. Patient on Keflex . Pure wick in place with zero output. PT recommends TCU. Awaiting for TCU placement. Nephrology/PT/OT/SW following.   /74 (BP Location: Left arm)   Pulse 68   Temp 98.3  F (36.8  C) (Oral)   Resp 16   Wt 105.9 kg (233 lb 7.5 oz)   SpO2 98%   BMI 44.11 kg/m          Entered by: Trinidad Liriano RN 08/28/2023 11:03 PM     Please review provider order for any additional goals.   Nurse to notify provider when observation goals have been met and patient is ready for discharge.

## 2023-08-29 NOTE — PROGRESS NOTES
Cycler set up per protocol and per treatment orders      Results from previous treatment:  Effluent color and clarity: yellow, clear  Total UF: 1152ml  Initial Drain: 2ml  Avg Dwell: 1:28        Cycler Serial Number: [id number]  Total Treatment Volume: [id number]  Total treatment time: 8 hrs  Fill Volume: 2900ml  Last Fill Volume:0ml  Heater ba.25% 6000mL;  Lot #: [id number];  Expiration Date: 10/2023  Side Bag #1: 2.5% 6000mL;  Lot #: [id number];  Expiration Date: 2025  Number of cycles including final fill: 4  Dwell Time: 1:23 minutes  Last Fill Volume: 0ml  Initial Drain Alarm: 10ml  PD orders reviewed with Patient procedure and ESRD teaching done and questions answered.  Cycler ready for hook-up.    Report given to: MIKAYLA Herrera RN and ROMAN Liriano RN

## 2023-08-29 NOTE — PLAN OF CARE
"PRIMARY DIAGNOSIS: GENERALIZED WEAKNESS/Fall    OUTPATIENT/OBSERVATION GOALS TO BE MET BEFORE DISCHARGE  1. Orthostatic performed: Yes:          Lying Orthostatic BP: 121/66         Sitting Orthostatic BP: 149/70         Standing Orthostatic BP: 73/58     2. Tolerating PO medications: Yes    3. Return to near baseline physical activity: No    4. Cleared for discharge by consultants (if involved): Yes    Discharge Planner Nurse   Safe discharge environment identified: Yes  Barriers to discharge: Yes       Entered by: Nelida Lu RN 08/29/2023 4:05 PM    Pt A/O x4 and makes needs known to staff. Ax2 with the Deanne Martinez, offered to get pt OOB, but pt refused stating \"only physical therapy does that.\" Educated the pt that Nursing staff can do this as well, yet continues to refuse. Pt on 2L of oxygen, attempted to wean to room air from 1L, but dipped into the mid 80's. 2L placed by colleague, able to wean to 1L again. IS encouraged 10x hourly. Pt was agitated, verbally abusive this morning, demanding dilaudid and Robaxin, refusing tylenol and lidocaine patches, stating that \"you need to give me what is prescribed to me.\" Educated pt that we need to try non-narcotic interventions such as tylenol and lidocaine patches, still refused. Also educated pt that dilaudid causes respiratory depression, then stated to the writer \"you need to stop playing doctor, and give me what is prescribed to me, now.\" Gave dilaudid and Robaxin, 1 hr later pt states was ineffective, but was able to get OOB with therapy. PIV SL. PD site C/D/I. Orthostatics completed.  1200 addendum: pt up in chair, lunch ordered. Still no UO. Pt more calm and cooperative at this time.  Vital signs:  Temp: 97.4  F (36.3  C) Temp src: Oral BP: 137/60 Pulse: 70   Resp: 18 SpO2: 97 % O2 Device: Nasal cannula Oxygen Delivery: 1 LPM   Weight: 105.9 kg (233 lb 7.5 oz)  Estimated body mass index is 44.11 kg/m  as calculated from the following:    Height as of " "5/10/23: 1.549 m (5' 1\").    Weight as of this encounter: 105.9 kg (233 lb 7.5 oz).    Please review provider order for any additional goals.   Nurse to notify provider when observation goals have been met and patient is ready for discharge.  "

## 2023-08-29 NOTE — PROGRESS NOTES
[facility]    Medicine Progress Note - Hospitalist Service    Date of Admission:  8/23/2023    Assessment & Plan   Devora Garcia is a 62 year old female admitted on 8/23/2023. She has hx of HTN, ESRD on PD, GUILLERMO on CPAP, who presents with L hip and knee  pain s/p mechanical fall yesterday. No head trauma or LOC. She has been having difficulty walking. Pt is unable to extrapolate further regarding the fall , but states she fell in the bedroom and there was no head trauma or LOC and her  called EMS. She is on PD and has 4 runs during the day. At baseline she uses a walker to ambulate, however, has had increased difficulty due to swelling of LE's. No fevers/chills/chest pain/SOB.     Interval events:  Continue to have mild orthostasis and still requiring O2 1-2L.  Slow to mobilize. Refusing cares.   Demanding of nursing staff to give pain medications.       Mechanical fall and L hip and knee pain.  Dizziness -- mild orthostasis  L hip CT shows no occult fracture.    --Robaxin and Lidocaine patches. Scheduled APAP. Hydromorphone PRN.  PT and OT recommending TCU.    --Needs to be up to chair QID and PRN.  RN to chart refusal.    --Start SubQ Heparin for DVT prophylaxis.  PADUA score 4    ESRD on PD.  AGMA secondary to ESRD on HD.   Mild orthostasis hypotension.  Continue PD. Renal consult appreciated. Keflex 500 mg q12hrs X 3 days for presumed contamination of PD catheter (finished).      HTN. Change carvedilol to metoprolol given mild orthostasis., clonidine, imdur     Mild hypoxia: Unclear if related to fluid status or evolving pna.  Likely body habitus and history of GUILLERMO contributing. Does not use CPAP but has in the past.  Afebrile.  Not significantly bronchospastic.  Needs aggressive pulmonary toilet.  CXR without acute infiltrate on 8/27/23.   - Pulmonary toilet.  - Acapella device.   - Albuterol    Acute encephalopathy resolved likely due to opiates and ESRD.       Diet:  Combination Diet Renal Diet (dialysis)    DVT Prophylaxis: start Heparin SubQ for DVT prophylaxis due to poor mobility. Padua score 4.  Taylor Catheter: Not present  Lines: None     Cardiac Monitoring: None  Code Status: Full Code      Disposition Plan per clinical course.     Expected Discharge Date: 09/01/2023,  9:00 AM  Discharge Delays: Placement - TCU  *Medically Ready for Discharge  Destination: inpatient rehabilitation facility  Discharge Comments: 8/27/23 medically ready when TCU bed found.        The patient's care was discussed with the Bedside Nurse, Care Coordinator/, and Patient.    FAINA Cerna [facility]  Hospitalist Service  [facility]  Securely message with [facility] (more info)  Text page via [employer] Paging/Directory   ______________________________________________________________________    Interval History   Seen and examined.   Slow to mobilize. Refuses cares at times.  Demanding of staff.  Needs to mobilize.   Awaiting TCU placement.  Mild orthostasis       Physical Exam   Vital Signs: Temp: 97.8  F (36.6  C) Temp src: Oral BP: (!) 156/72 Pulse: 72   Resp: 18 SpO2: 97 % O2 Device: Nasal cannula Oxygen Delivery: 1 LPM  Weight: 233 lbs 7.47 oz    GEN:   Alert, oriented x 3, appears comfortable, NAD.  NECK:   Supple ,no mass or thyromegaly   HEENT:  Normocephalic/atraumatic, no scleral icterus, no nasal discharge, mouth moist.  CV:   Regular rate and rhythm, no murmur or JVD.  S1 + S2 noted, no S3 or S4.  LUNGS:   Clear to auscultation bilaterally without rales/rhonchi/wheezing/retractions.  Diminished bases. Symmetric chest rise on inhalation noted.  ABD:   Active bowel sounds, soft, non-tender/non-distended.  No rebound/guarding/rigidity.  EXT:   No edema.  No cyanosis.  No joint synovitis noted.  SKIN:   Dry to touch, no exanthems noted in the visualized areas.  Neurologic: Grossly intact,non focal.   Neuropsychiatric:  General: normal, calm and normal eye  contact  Level of consciousness: alert / normal  Affect: normal  Orientation: oriented to self, place, time and situation     Medical Decision Making       45 MINUTES SPENT BY ME on the date of service doing chart review, history, exam, documentation & further activities per the note.      Data         Imaging results reviewed over the past 24 hrs:   No results found for this or any previous visit (from the past 24 hour(s)).

## 2023-08-29 NOTE — PROGRESS NOTES
"[facility]  Respiratory Care Note    [facility] RCAT     Date: 08/29/2023  Admission Dx:L hip and knee pain  Pulmonary History None  Home Nebulizer/MDI Use: None  Home Oxygen: None  Acuity Level (RCAT flow sheet): 4  Aerosol Therapy initiated: Albuterol Q2 prn  Pulmonary Hygiene initiated: Cough and deep breathing techniques, Flutter Valve QID  Volume Expansion initiated: IS TID  Current Oxygen Requirements: 1 LPM NC  Current SpO2: 97%  Re-evaluation date: 09/01/2023  Patient Education: Education was performed with the patient in regards to indications/benefits and possible side effects of bronchodilators. Will continue to do education with patient.         See \"RT Assessments\" flow sheet for patient assessment scoring and Acuity Level Details.       Vital signs:  Temp: 97.4  F (36.3  C) Temp src: Oral BP: 137/60 Pulse: 70   Resp: 18 SpO2: 97 % O2 Device: Nasal cannula Oxygen Delivery: 1 LPM   Weight: 105.9 kg (233 lb 7.5 oz)  Estimated body mass index is 44.11 kg/m  as calculated from the following:    Height as of 5/10/23: 1.549 m (5' 1\").    Weight as of this encounter: 105.9 kg (233 lb 7.5 oz).      Past Medical History:   Diagnosis Date    Allergic rhinitis     Cancer (H)     left breast    Chronic kidney disease     Chronic kidney disease, stage 3 (H) 2010    Cyst of left ovary     Diabetes mellitus (H)     12/2020 states no longer takin insulin    GERD (gastroesophageal reflux disease)     Gout     Hypertension     Intraductal carcinoma 2015    Obstructive sleep apnea on CPAP     uses CPAP    Psoriasis        Past Surgical History:   Procedure Laterality Date    BARIATRIC SURGERY  2014    HC CYSTOSCOPY,INSERT URETERAL STENT Right 11/7/2018    Procedure: CYSTOSCOPY, WITH RIGHT URETERAL STENT INSERTION;  Surgeon: Dino Reynoso MD;  Location: [facility];  Service: Urology     CYSTOSCOPY,INSERT URETERAL STENT Right 8/31/2020    Procedure: CYSTOSCOPY, WITH URETERAL STENT " INSERTION;  Surgeon: Dino Reynoso MD;  Location: [facility] Main OR;  Service: Urology    IR CVC TUNNEL PLACEMENT > 5 YRS OF AGE  9/2/2020    IR CVC TUNNEL REVISION RIGHT  10/22/2020    IR TUNNELED CATHETER COMPLETE REPLACEMENT  10/22/2020    IR TUNNELED CATHETER INSERT  9/2/2020    LUMPECTOMY BREAST Left 2015    MAMMOPLASTY REDUCTION  2015    OTHER SURGICAL HISTORY  12/02/2020    peritoneal dialsis catheter placement    AZ CYSTO/URETERO W/LITHOTRIPSY &INDWELL STENT INSRT Right 11/30/2018    Procedure: CYSTOSCOPY, RIGHT URETEROSCOPY, LASER LITHOTRIPSY STENT REMOVAL STENT INSERTION;  Surgeon: Dino Reynoso MD;  Location: [facility] Main OR;  Service: Urology    AZ LAP INSERTION TUNNELED INTRAPERITONEAL CATHETER N/A 12/23/2020    Procedure: LAPAROSCOPY , Enterolysis;  Surgeon: Tigre Rivas MD;  Location: [facility] Main OR;  Service: General    AZ LAP,DIAGNOSTIC ABDOMEN N/A 12/29/2020    Procedure: LAPAROSCOPY, De-Clot of Peritoneal Dialysis Catheter;  Surgeon: Tigre Rivas MD;  Location: [facility] Main OR;  Service: General       Family History   Problem Relation Age of Onset    Kidney failure Father     Hypertension Father     Hypertension Brother     Diabetes Brother     Kidney failure Brother     Clotting Disorder No family hx of     Anesthesia Reaction No family hx of        Social History     Tobacco Use    Smoking status: Never    Smokeless tobacco: Never   Substance Use Topics    Alcohol use: Yes     Comment: Alcoholic Drinks/day: Rare, 2/month     Study Result    Narrative & Impression   EXAM: XR CHEST PORT 1 VIEW  LOCATION: [facility]  DATE: 8/27/2023     INDICATION: Hypoxia.  COMPARISON: Chest CT 05/10/2023. Chest radiograph 08/24/2023.                                                                      IMPRESSION:     Low lung volumes. A hazy opacity within the left midlung is similar to the prior exam and is favored to reflect atelectasis, although infection  could also be possible. No new airspace opacities. No pleural effusions or pneumothorax.     Stable, mildly enlarged cardiac silhouette, likely accentuated by the low lung volumes.     A catheter overlies the right neck and shoulder.         Prior to Admission medications    Medication Sig Last Dose Taking? Auth Provider Long Term End Date   carvedilol (COREG) 6.25 MG tablet TAKE 1 TABLET BY MOUTH TWICE DAILY WITH MEALS Past Week at - Yes Reported, Patient Yes     cloNIDine (CATAPRES) 0.1 MG tablet Take 1 tablet (0.1 mg) by mouth 3 times daily Past Week at - Yes Jacob Silver MD Yes     cyanocobalamin (VITAMIN B-12) 1000 MCG tablet Take 1,000 mcg by mouth daily Past Week at - Yes Unknown, Entered By History       famotidine (PEPCID) 20 MG tablet Take 20 mg by mouth daily as needed (heartburn) prn at prn Yes Unknown, Entered By History       gabapentin (NEURONTIN) 100 MG capsule Take 100 mg by mouth 2 times daily as needed (itching) prn at prn Yes Unknown, Entered By History Yes     Glucos-MSM-C-Fz-Fitroe-Odvswb (GLUCOSAMINE MSM COMPLEX) TABS tablet Take 1 tablet by mouth daily Past Week at - Yes Unknown, Entered By History       hydrOXYzine (VISTARIL) 50 MG capsule Take 50 mg by mouth 4 times daily as needed for itching prn at prn Yes Unknown, Entered By History       isosorbide mononitrate (IMDUR) 30 MG 24 hr tablet Take 1 tablet (30 mg) by mouth daily Past Week at - Yes Jacob Silver MD Yes     omeprazole (PRILOSEC) 40 MG DR capsule Take 40 mg by mouth daily Past Week at - Yes Unknown, Entered By History       traZODone (DESYREL) 50 MG tablet Take 50 mg by mouth nightly as needed for sleep prn at prn Yes Unknown, Entered By History Yes     bumetanide (BUMEX) 2 MG tablet Take 1 tablet (2 mg) by mouth daily  Patient not taking: Reported on 8/24/2023 Not Taking at -   Jacob Silver MD Yes            Tigre [facility]  8/29/2023

## 2023-08-29 NOTE — PROGRESS NOTES
Care Management Follow Up    Length of Stay (days): 0    Expected Discharge Date: 09/01/2023     Concerns to be Addressed:       Patient plan of care discussed at interdisciplinary rounds: Yes    Anticipated Discharge Disposition: Transitional Care     Anticipated Discharge Services:    Anticipated Discharge DME: None    Patient/family educated on Medicare website which has current facility and service quality ratings: no  Education Provided on the Discharge Plan:    Patient/Family in Agreement with the Plan:      Referrals Placed by CM/SW: Post Acute Facilities  Private pay costs discussed: transportation costs    Additional Information:  Follow up call to [facility] to check on status of PD training, [place], ph#[phone]. Nothing has been arranged as of this time and are continuing to work on scheduling.  Will continue to follow for discharge planning.    Jyoti Griffin RN   Inpatient Care Coordination  [facility]   Phone: [phone]

## 2023-08-29 NOTE — PLAN OF CARE
PRIMARY DIAGNOSIS: Fall, left hip and knee pain  OUTPATIENT/OBSERVATION GOALS TO BE MET BEFORE DISCHARGE:  1. Pain Status: Improved-controlled with oral pain medications.    2. Return to near baseline physical activity: No AX2 WITH JODEE STEADY    3. Cleared for discharge by consultants (if involved): No    Discharge Planner Nurse   Safe discharge environment identified: No  Barriers to discharge: Yes       Entered by: America Phelps RN 08/29/2023 1:37 AM  Pt A&Ox4, rates pain 3/10. Requested prn dilaudid for pain management, PIV flushed/SL, tolerating renal diet per pt, will continue to follow poc  BP (!) 87/58 (BP Location: Left arm)   Pulse 75   Temp 98.6  F (37  C) (Oral)   Resp 16   Wt 105.9 kg (233 lb 7.5 oz)   SpO2 97%   BMI 44.11 kg/m     Please review provider order for any additional goals.   Nurse to notify provider when observation goals have been met and patient is ready for discharge.

## 2023-08-30 NOTE — PROGRESS NOTES
[facility]    Medicine Progress Note - Hospitalist Service    Date of Admission:  8/23/2023  HD# 7    Assessment & Plan   Devora Garcia is a 62 year old female admitted on 8/23/2023. She has hx of HTN, ESRD on PD, GUILLERMO on CPAP, who presents with L hip and knee  pain s/p mechanical fall yesterday. No head trauma or LOC. She has been having difficulty walking. Pt is unable to extrapolate further regarding the fall , but states she fell in the bedroom and there was no head trauma or LOC and her  called EMS. She is on PD and has 4 runs during the day. At baseline she uses a walker to ambulate, however, has had increased difficulty due to swelling of LE's. No fevers/chills/chest pain/SOB.     Interval events:  No acute changes.  Did have another episode of possible although not likely catheter contamination on 8/29/23 PM.     Mechanical fall and L hip and knee pain.  Dizziness -- mild orthostasis  L hip CT shows no occult fracture.    --Robaxin and Lidocaine patches. Scheduled APAP. Hydromorphone PRN extend interval to q8hrs prn.  Weaning off hydromorphone.  PT and OT recommending TCU.  Facility located but needs training in PD.   --Needs to be up to chair QID and PRN.  RN to chart refusal.    --Start SubQ Heparin for DVT prophylaxis.  PADUA score 4    ESRD on PD.  AGMA secondary to ESRD on HD.   Mild orthostasis hypotension.  CKD-MBD  Anemia of CKD  Continue PD. Renal consult appreciated. Keflex 500 mg q12hrs X 3 days for presumed contamination of PD catheter (finished). Given potential contamination of catheter tip with alcohol swab to open transfer set.  Tx with iodine cap placed for 10 minutes x 2 prior to PD.  Given 1 dose of cefazolin for prophylaxis and discussed changing transfer set with PD RN by nephrology team.  Appreciate excellent cares. Hgb 10.5 8/25. Continue to monitor.      HTN. Changed carvedilol to metoprolol given mild orthostasis., clonidine, imdur     Mild hypoxia:  Likely body habitus and history of GUILLERMO contributing. Does not use CPAP but has in the past.  Afebrile.  Not significantly bronchospastic.  Needs aggressive pulmonary toilet.  CXR without acute infiltrate on 8/27/23.   - Pulmonary toilet.  - Acapella device.   - Albuterol PRN    Acute encephalopathy resolved likely due to opiates and ESRD.       Diet: Combination Diet Renal Diet (dialysis)    DVT Prophylaxis:  Heparin SubQ for DVT prophylaxis due to poor mobility. Padua score 4.  Taylor Catheter: Not present  Lines: None     Cardiac Monitoring: None  Code Status: Full Code      Disposition Plan per clinical course.     Expected Discharge Date: 09/01/2023    Discharge Delays: Placement - TCU  *Medically Ready for Discharge  Destination: inpatient rehabilitation facility  Discharge Comments: 8/27/23 medically ready when TCU bed found.        The patient's care was discussed with the Bedside Nurse, Care Coordinator/, and Patient.    FAINA Cerna [facility]  Hospitalist Service  [facility]  Securely message with [employer] (more info)  Text page via [employer] Paging/Directory   ______________________________________________________________________    Interval History   Seen and examined.   No acute changes. Up to chair.  Denies any new complaints aside from PD issues last night with possible catheter contamination.        Physical Exam   Vital Signs: Temp: 97.9  F (36.6  C) Temp src: Oral BP: 131/63 Pulse: 63   Resp: 16 SpO2: 98 % O2 Device: Nasal cannula Oxygen Delivery: 1 LPM  Weight: 233 lbs 7.47 oz    GEN:   Alert, oriented x 3, appears comfortable, NAD.  NECK:   Supple ,no mass or thyromegaly   HEENT:  Normocephalic/atraumatic, no scleral icterus, no nasal discharge, mouth moist.  CV:   Regular rate and rhythm, no murmur or JVD.  S1 + S2 noted, no S3 or S4.  LUNGS:   Clear to auscultation bilaterally without rales/rhonchi/wheezing/retractions.  Diminished bases. Symmetric chest rise on  inhalation noted.  ABD:   Active bowel sounds, soft, non-tender/non-distended.  No rebound/guarding/rigidity. PD site CDI.    EXT:   No edema.  No cyanosis.  No joint synovitis noted.  SKIN:   Dry to touch, no exanthems noted in the visualized areas.  Neurologic: Grossly intact,non focal.   Neuropsychiatric:  General: normal, calm and normal eye contact  Level of consciousness: alert / normal  Affect: normal  Orientation: oriented to self, place, time and situation     Medical Decision Making       45 MINUTES SPENT BY ME on the date of service doing chart review, history, exam, documentation & further activities per the note.      Data         Imaging results reviewed over the past 24 hrs:   No results found for this or any previous visit (from the past 24 hour(s)).

## 2023-08-30 NOTE — PLAN OF CARE
"PRIMARY DIAGNOSIS: \"GENERIC\" NURSING  OUTPATIENT/OBSERVATION GOALS TO BE MET BEFORE DISCHARGE:  ADLs back to baseline: No    Activity and level of assistance: Up ax2/w/Sera steady .     Pain status: Improved-controlled with oral pain medications.    Return to near baseline physical activity: Yes     Discharge Planner Nurse   Safe discharge environment identified: Yes  Barriers to discharge: Yes       Entered by: Angel Patel RN     Patient is Alert and Oriented x4 1 Assist with Deanne Steady..  Patient is Saline locked.PD is running  Please review provider order for any additional goals.   Nurse to notify provider when observation goals have been met and patient is ready for discharge.Goal Outcome Evaluation:                        "

## 2023-08-30 NOTE — PROGRESS NOTES
Renal Medicine Progress Note            Assessment/Plan:     Assessment: Devora Garcia is a 62-year-old female with ESRD on PD, GUILLERMO, HTN admitted with fall and weakness.    ESRD on PD  On dialysis for the last 2 years.  PTA does 4 manual exchanges (3 green, 1 red), 1.5-hour dwell time.  Primary nephrologist is Dr. Cole at [facility].  While inpatient we have been using the cycler.    Mechanical fall  No evidence of fractures.    HTN  PTA on carvedilol 6.25 mg twice daily, clonidine 0.1 mg 3 times daily, Imdur 30 mg daily.  Initially BP high, now mostly normal. Having some orthostatic hypotension 8/29, slightly improved 8/30. Changed carvedilol to metoprolol.     Contamination Event  S/p keflex course. Another contamination event 8/29 overnight with alcohol swab to open transfer set. Per [facility] PD protocol, iodine cap placed for 10 minutes x2 prior to starting dialysis. Will give one dose IV cefazolin for prophylaxis and discuss changing transfer set with PD RN.     CKD-MBD  Phosphorus elevated at 11.5.  She reports taking Tums with meals, but often forgets.  We will schedule this for her.    Anemia of CKD  Hemoglobin 10.5 on 8/25.  Within goal.  No changes.      Plan/Recs:  1) one green, one red bag tonight   2) plan to change transfer set     Reviewed notes from hospitalist, RNs, dialysis nurse.    Maggie Alvarez MD   [facility] consultants  Office: [phone]          Interval History:     - contamination event overnight. RN swabbed the exposed catheter to alcohol swab prior to connection. An iodine cap was placed thereafter. RN discussed with me, we discussed after iodine cap in place for some time then ok to resume dialysis with plan for prophylactic antibiotics the next day. Patient reports having to tell the RN to place iodine cap twice prior to starting dialysis (after she had called [facility] herself). Cap was placed twice prior to starting dialysis overnight. Confirmed this AM that  protocol is for iodine cap x2, then ok to resume dialysis, one dose of IV cefazolin. Protocol completed today   - she complains of L shoulder pain, requesting muscle relaxant   - denies SOB   - edema similar            Medications and Allergies:      - MEDICATION INSTRUCTIONS for Dialysis Patients -   Does not apply See Admin Instructions    acetaminophen  975 mg Oral Q8H    calcium carbonate  1,000 mg Oral TID w/meals    [Held by provider] carvedilol  6.25 mg Oral BID w/meals    ceFAZolin  2 g Intravenous Once    cloNIDine  0.1 mg Oral TID    docusate sodium  100 mg Oral BID    gabapentin  100 mg Oral BID    heparin ANTICOAGULANT  5,000 Units Subcutaneous Q12H    isosorbide mononitrate  30 mg Oral Daily    lidocaine  2 patch Transdermal Q24H    metoprolol succinate ER  12.5 mg Oral Daily    miconazole   Topical BID    pantoprazole  40 mg Oral Daily    polyethylene glycol  17 g Oral Daily        Allergies   Allergen Reactions    Aspirin Nausea    Statins-Hmg-Coa Reductase Inhibitors [Statins] Other (See Comments)     Other reaction(s): Renal Failure, Other reaction(s): Renal Failure            Physical Exam:   Vitals were reviewed  /63 (BP Location: Left arm)   Pulse 63   Temp 97.9  F (36.6  C) (Oral)   Resp 16   Wt 105.9 kg (233 lb 7.5 oz)   SpO2 98%   BMI 44.11 kg/m      Wt Readings from Last 3 Encounters:   08/28/23 105.9 kg (233 lb 7.5 oz)   05/12/23 100.5 kg (221 lb 9.6 oz)   06/07/22 85.7 kg (189 lb)       Intake/Output Summary (Last 24 hours) at 8/28/2023 0846  Last data filed at 8/27/2023 1951  Gross per 24 hour   Intake --   Output 0 ml   Net 0 ml       GENERAL APPEARANCE: NAD, fatigued appearing  HEENT: normocephalic, MMM  RESP: clear  CV: Regular rate  ABDOMEN: Soft, nontender, nondistended.  PD catheter in place clean and intact, nontender, no exudate  EXTREMITIES/SKIN: 1-2+ nonpitting edema  NEURO: drowsy, but oriented, normal speech           Data:     BMP  Recent Labs   Lab 08/28/23  0837  08/28/23  0542 08/25/23  0618 08/24/23  0026    138 139 135*   POTASSIUM 4.8 4.5 5.0 5.0   CHLORIDE 100 97* 98 96*   RON 8.5* 9.0 9.0 8.9   CO2 22 22 20* 20*   BUN 64.4* 64.5* 66.2* 66.6*   CR 15.17* 15.57* 15.44* 15.50*   GLC 96 95 127* 83       CBC  Recent Labs   Lab 08/28/23  0848 08/25/23  0618 08/24/23  0026   WBC 6.2 8.2 8.3   HGB 9.5* 10.5* 10.5*   HCT 31.8* 34.8* 34.6*   MCV 86 86 84    202 179       Lab Results   Component Value Date    AST 9 05/10/2023    ALT <9 05/10/2023    ALKPHOS 61 05/10/2023    BILITOTAL 0.5 05/10/2023     No results found for: INR  Color Urine (no units)   Date Value   05/29/2021 Light Yellow     Appearance Urine (no units)   Date Value   05/29/2021 Clear     Glucose Urine (mg/dL)   Date Value   05/29/2021 Negative     Bilirubin Urine (no units)   Date Value   05/29/2021 Negative     Ketones Urine (mg/dL)   Date Value   05/29/2021 Negative     Specific Gravity Urine (no units)   Date Value   05/29/2021 1.013     pH Urine (pH)   Date Value   05/29/2021 7.0     Protein Albumin Urine (mg/dL)   Date Value   05/29/2021 100 (A)     Urobilinogen Urine (no units)   Date Value   08/30/2020 <2.0 E.U./dL     Nitrite Urine (no units)   Date Value   05/29/2021 Negative     Leukocyte Esterase Urine (no units)   Date Value   05/29/2021 Trace (A)         Attestation:  I have reviewed today's vital signs, notes, medications, labs and imaging.    Maggie Alvarez MD  [facility] - Nephrology  Office: [phone]

## 2023-08-30 NOTE — PLAN OF CARE
"PRIMARY DIAGNOSIS: \"GENERIC\" NURSING  OUTPATIENT/OBSERVATION GOALS TO BE MET BEFORE DISCHARGE:  ADLs back to baseline: No    Activity and level of assistance: Up ax2/w/Sera steady .     Pain status: Improved-controlled with oral pain medications.    Return to near baseline physical activity: Yes     Discharge Planner Nurse   Safe discharge environment identified: Yes  Barriers to discharge: Yes       Entered by: Angel Patel RN 08/29/2023      Please review provider order for any additional goals.   Nurse to notify provider when observation goals have been met and patient is ready for discharge.Goal Outcome Evaluation:                        "

## 2023-08-30 NOTE — PROGRESS NOTES
"Black male port of PD transfer set was scrubbed with an alcohol wipe prior to attempting to connect the pt to the PD machine. The pt witnessed this and stopped staff prior to being connected to the PD machine stating that \"nothing can touch that black piece or it will get in to me\". The writer stopped the procedure and replaced the iodine cap to the end of the transfer set.   The pt stated she needs the transfer set replaced by the On-Call [facility] nurse. The writer put a page out for the on-call [facility] PD Nurse and received a call back from Clarisse stating the on-call nephrologist would need to place an order for the transfer set to be replaced in order for the nurse to come out.   The writer paged the on-call nephrologist and received a call back from Dr. Alvarez. The writer explained the situation to the provider. Dr. Alvarez said that alcohol is technically sterile and that replacing the iodine cap should have re-cleansed the port sufficiently enough to perform a run tonight.   This information was shared with the pt who stated she still would like to hear that using the transfer set would be safe to use from the on-call angelinaita nurse specifically. A call was placed to the on-call Angelinaita nurse on the pt's personal phone. The pt and writer spoke with Clarisse, who stating that she would trust the recommendations of the on-call nephrologist and explained that [facility] does not employ providers that would be available to speak with the pt.   The pt was still uncomfortable running tonight and wanted to try calling the on-call [facility] number she has at home. The writer said that is okay and would return shortly.    Addendum 9:56 PM  Pt was able to get a hold of a Angelinaita Nurse that gave the writer instructions to replace and re-sterilize the transfer set prior to running tonight. The writer scrubbed and soaked the set for 1 minute each step and then replaced the mini cap and let it sit for 10 min. After 10 minutes " the writer repeated the above steps and allowed another new min cap to sit for an additional 10 min prior to connecting the pt. Plan to begin run around [id number].

## 2023-08-30 NOTE — PLAN OF CARE
PRIMARY DIAGNOSIS: FALL, LEFT HIP PAIN  OUTPATIENT/OBSERVATION GOALS TO BE MET BEFORE DISCHARGE:  1. Pain Status: Improved-controlled with oral pain medications.    2. Return to near baseline physical activity: No    3. Cleared for discharge by consultants (if involved): Yes    Discharge Planner Nurse   Safe discharge environment identified: Yes  Barriers to discharge: Yes       Entered by: RAMIRO NUNEZ RN 08/30/2023    Vital signs:  Temp: 97.9  F (36.6  C) Temp src: Oral BP: 131/63 Pulse: 63   Resp: 16 SpO2: 98 % O2 Device: Nasal cannula Oxygen Delivery: 1 LPM Alert and oriented x4. Reported shoulder pain. Tylenol and Robaxin given. PD completed this morning. Machine cleared and transfer set changed by Dialysis RN. Nephrology following. Assist of x2 with vivian steady. Up in a chair. On 1L O2 . IV saline locked. On renal diet. Fair appetite. Medically clear for discharge. Waiting for TCU placement. Anticipate discharge on Friday Sep 1st to [facility]. SW following. Will continue to monitor.        Please review provider order for any additional goals.   Nurse to notify provider when observation goals have been met and patient is ready for discharge.

## 2023-08-30 NOTE — PROGRESS NOTES
Cycler set up per protocol and per treatment orders      Results from previous treatment:  Effluent color and clarity: yellow, clear, no fibrin noted  Total UF: 694     Avg Dwell: 1:36     Transfer set changed      Cycler Serial Number: [id number]  Total Treatment Volume: [id number]  Total treatment time: 8 hrs  Fill Volume: 2900ml  Last Fill Volume:0ml  Heater ba.5% 6000mL;  Lot #: [id number];  Expiration Date:   Side Bag #1: 2.5% 6000mL;  Lot #: [id number];  Expiration Date:   Number of cycles including final fill: 4  Dwell Time: 1:23 minutes  Last Fill Volume: 0ml  Initial Drain Alarm: 10ml  PD orders reviewed with Patient procedure and ESRD teaching done and questions answered.  Cycler ready for hook-up.    Report given to: FRED Pollard consent form signed yes

## 2023-08-30 NOTE — PROGRESS NOTES
Care Management Follow Up    Length of Stay (days): 0    Expected Discharge Date: 09/01/2023     Concerns to be Addressed:       Patient plan of care discussed at interdisciplinary rounds: Yes    Anticipated Discharge Disposition: Transitional Care     Anticipated Discharge Services:    Anticipated Discharge DME: None    Additional Information:  [facility] called [facility] to follow up on when they are having their training on peritoneal dialysis. Hodan is following up with their team and will call us back.     Hazel Duvall RN  Care Coordinator  [facility]

## 2023-08-30 NOTE — PLAN OF CARE
"PRIMARY DIAGNOSIS: \"GENERIC\" NURSING  OUTPATIENT/OBSERVATION GOALS TO BE MET BEFORE DISCHARGE:  ADLs back to baseline: No    Activity and level of assistance: Up ax2/w/Sera steady .     Pain status: Improved-controlled with oral pain medications.    Return to near baseline physical activity: Yes     Discharge Planner Nurse   Safe discharge environment identified: Yes  Barriers to discharge: Yes       Entered by: Angel Patel RN      Vitals are Temp: 98.1  F (36.7  C) Temp src: Oral BP: 115/60 Pulse: 66   Resp: 18 SpO2: 94 %.  Patient is Alert and Oriented x4 1 Assist with Deanne Steady.Pt is a Dialysis diet.  complaining of 7/10 pain .  Morphine given for pain.  Patient is Saline locked.PD is running  Please review provider order for any additional goals.   Nurse to notify provider when observation goals have been met and patient is ready for discharge.Goal Outcome Evaluation:                        "

## 2023-08-30 NOTE — PLAN OF CARE
PRIMARY DIAGNOSIS: Fall, LEFT HIP PAIN  OUTPATIENT/OBSERVATION GOALS TO BE MET BEFORE DISCHARGE:  1. Pain Status: Improved-controlled with oral pain medications.    2. Return to near baseline physical activity: No    3. Cleared for discharge by consultants (if involved): Yes    Discharge Planner Nurse   Safe discharge environment identified: Yes  Barriers to discharge: Yes       Entered by: RAMIRO NUNEZ RN 08/30/2023    Vital signs:  Temp: 97.9  F (36.6  C) Temp src: Oral BP: 131/63 Pulse: 63   Resp: 16 SpO2: 98 % O2 Device: Nasal cannula Oxygen Delivery: 1 LPM Alert and oriented x4. Reported shoulder pain. Tylenol and Robaxin given. PD completed this morning. Nephrology following. Assist of x2 with vivian heaton. Up in a chair. On 1L O2 . IV saline locked. On renal diet. Fair appetite. Medical clear for discharge. Waiting for TCU placement. Anticipate discharge on Friday Sep 1st to [facility]. SW following. Will continue to monitor.    Please review provider order for any additional goals.   Nurse to notify provider when observation goals have been met and patient is ready for discharge.

## 2023-08-31 NOTE — PLAN OF CARE
PRIMARY DIAGNOSIS: LEFT HIP AND KNEE PAIN AFTER A FALL  OUTPATIENT/OBSERVATION GOALS TO BE MET BEFORE DISCHARGE:  1. Pain Status: Improved-controlled with oral pain medications.    2. Return to near baseline physical activity: No    3. Cleared for discharge by consultants (if involved): Yes    Discharge Planner Nurse   Safe discharge environment identified: Yes  Barriers to discharge: Yes       Entered by: RAMIRO NUNEZ RN 08/31/2023    Vital signs:  Temp: 97.8  F (36.6  C) Temp src: Oral BP: (!) 159/78 Pulse: 64   Resp: 18 SpO2: 93 % O2 Device: None (Room air) Alert and oriented x4. Robaxin given with scheduled Tylenol and Lidocaine patch for shoulder pain. Wean off from O2. Sat stable in RA. Refused getting up in the chair this morning saying she just woke up. Will get weight and Orthostatic BP when pt is up. On regular diet. No IV access. Prn Lactulose and Senna given with scheduled Miralax and Colace for constipation. Assist of x2 with Deanne heaton. Nephrology following. Medically clear for discharge to TCU. Anticipate discharge tomorrow to [facility]. Will continue to monitor.        Please review provider order for any additional goals.   Nurse to notify provider when observation goals have been met and patient is ready for discharge.

## 2023-08-31 NOTE — PROGRESS NOTES
[facility]    Medicine Progress Note - Hospitalist Service    Date of Admission:  8/23/2023  HD# 8    Assessment & Plan   Devora Gracia is a 62 year old female admitted on 8/23/2023. She has hx of HTN, ESRD on PD, GUILLERMO on CPAP, who presents with L hip and knee  pain s/p mechanical fall yesterday. No head trauma or LOC. She has been having difficulty walking. Pt is unable to extrapolate further regarding the fall , but states she fell in the bedroom and there was no head trauma or LOC and her  called EMS. She is on PD and has 4 runs during the day. At baseline she uses a walker to ambulate, however, has had increased difficulty due to swelling of LE's. No fevers/chills/chest pain/SOB.     Interval events:  No acute changes. Pending placement     Mechanical fall and L hip and knee pain.  Dizziness -- mild orthostasis  L hip CT shows no occult fracture.    --Robaxin and Lidocaine patches. Scheduled APAP. Hydromorphone PRN extend interval to q8hrs prn.  Weaning off hydromorphone.  PT and OT recommending TCU.  Facility located but needs training in PD.   --Needs to be up to chair QID and PRN.  RN to chart refusal.    --SubQ Heparin for DVT prophylaxis.  PADUA score 4    Constipation:  -- Senna   -- Lactulose 20 grams x 1 today.  Needs to mobilize.     ESRD on PD.  AGMA secondary to ESRD on HD.   Mild orthostasis hypotension.  CKD-MBD  Anemia of CKD  Continue PD. Renal consult appreciated. Keflex 500 mg q12hrs X 3 days for presumed contamination of PD catheter (finished). Given 1 dose of cefazolin for prophylaxis on 8/30/23  Appreciate excellent cares. Hgb 10.5 8/25. Continue to monitor.      HTN. Changed carvedilol to metoprolol given mild orthostasis., clonidine, imdur     Mild hypoxia: Likely body habitus and history of GUILLERMO contributing. Does not use CPAP but has in the past.  Afebrile.  Not significantly bronchospastic.  Needs aggressive pulmonary toilet.  CXR without acute infiltrate  on 8/27/23.   - Pulmonary toilet.  - Acapella device.   - Albuterol PRN    Acute encephalopathy resolved likely due to opiates and ESRD.       Diet: Combination Diet Renal Diet (dialysis)    DVT Prophylaxis:  Heparin SubQ for DVT prophylaxis due to poor mobility. Padua score 4.  Taylor Catheter: Not present  Lines: None     Cardiac Monitoring: None  Code Status: Full Code      Disposition Plan per clinical course.     Expected Discharge Date: 09/04/2023    Discharge Delays: Placement - TCU  *Medically Ready for Discharge  Destination: inpatient rehabilitation facility  Discharge Comments: 8/27/23 medically ready when TCU bed found.        The patient's care was discussed with the Bedside Nurse, Care Coordinator/, and Patient.    FAINA Cerna [facility]  Hospitalist Service  [facility]  Securely message with [employer] (more info)  Text page via [facility] Paging/Directory   ______________________________________________________________________    Interval History   Seen and examined.   No acute changes.       Physical Exam   Vital Signs: Temp: 97.7  F (36.5  C) Temp src: Oral BP: (!) 157/86 Pulse: 70   Resp: 18 SpO2: 92 % O2 Device: None (Room air) Oxygen Delivery: 1 LPM  Weight: 233 lbs 7.47 oz    GEN:   Alert, oriented x 3, appears comfortable, NAD.  NECK:   Supple ,no mass or thyromegaly   HEENT:  Normocephalic/atraumatic, no scleral icterus, no nasal discharge, mouth moist.  CV:   Regular rate and rhythm, no murmur or JVD.  S1 + S2 noted, no S3 or S4.  LUNGS:   Clear to auscultation bilaterally without rales/rhonchi/wheezing/retractions.  Diminished bases. Symmetric chest rise on inhalation noted.  ABD:   Active bowel sounds, soft, non-tender/non-distended.  No rebound/guarding/rigidity. PD site CDI.    EXT:   No edema.  No cyanosis.  No joint synovitis noted.  SKIN:   Dry to touch, no exanthems noted in the visualized areas.  Neurologic: Grossly intact,non focal.    Neuropsychiatric:  General: normal, calm and normal eye contact  Level of consciousness: alert / normal  Affect: normal  Orientation: oriented to self, place, time and situation     Medical Decision Making       45 MINUTES SPENT BY ME on the date of service doing chart review, history, exam, documentation & further activities per the note.      Data         Imaging results reviewed over the past 24 hrs:   No results found for this or any previous visit (from the past 24 hour(s)).

## 2023-08-31 NOTE — PLAN OF CARE
PRIMARY DIAGNOSIS: FALL, LEFT HIP PAIN  OUTPATIENT/OBSERVATION GOALS TO BE MET BEFORE DISCHARGE:  1. Pain Status: Improved-controlled with oral pain medications.    2. Return to near baseline physical activity: No    3. Cleared for discharge by consultants (if involved): Yes    Discharge Planner Nurse   Safe discharge environment identified: Yes  Barriers to discharge: Yes       Entered by: RAMIRO NUNEZ RN 08/30/2023    Vital signs:  Temp: 97.6  F (36.4  C) Temp src: Oral BP: 131/64 Pulse: 61   Resp: 17 SpO2: 98 % O2 Device: Nasal cannula Oxygen Delivery: 1 LPM Alert and oriented x4. Reported shoulder pain. Tylenol and Robaxin given. PD completed this morning. Machine cleared and transfer set changed by Dialysis RN. Nephrology following. Assist of x2 with vivian steady. Up in a chair. On 1L O2 . IV saline locked. On renal diet. Fair appetite. No BM for several days. Unknown when was pt's last BM. Additional bowel meds ordered by cross cover provider. Medically clear for discharge. Waiting for TCU placement. Anticipate discharge on Friday Sep 1st to [facility]. SW following. Will continue to monitor.         Please review provider order for any additional goals.   Nurse to notify provider when observation goals have been met and patient is ready for discharge.

## 2023-08-31 NOTE — PLAN OF CARE
PRIMARY DIAGNOSIS: LEFT HIP, KNEE PAIN   OUTPATIENT/OBSERVATION GOALS TO BE MET BEFORE DISCHARGE:  1. Pain Status: pain meds given per MAR. Not fully effective per pt.    2. Return to near baseline physical activity: No    3. Cleared for discharge by consultants (if involved): Yes    Discharge Planner Nurse   Safe discharge environment identified: Yes  Barriers to discharge: Yes       Entered by: RAMIRO NUNEZ RN 08/31/2023 12:55 PM   Vital signs:  Temp: 97.7  F (36.5  C) Temp src: Oral BP: (!) 157/86 Pulse: 70   Resp: 18 SpO2: 92 % O2 Device: None (Room air) Alert and oriented x4. Robaxin given with scheduled Tylenol and Lidocaine patch for shoulder pain. Wean off from O2. Sat stable in RA. Refused getting up in the chair this morning saying she just woke up. Offered pt to get up again at lunch time but pt continue refusingWill get weight and Orthostatic BP when pt is up. On regular diet. No IV access. Prn Lactulose and Senna given with scheduled Miralax and Colace for constipation. Assist of x2 with Deanne heaton. Nephrology following. Medically clear for discharge to TCU. Anticipate discharge tomorrow to [facility]. Will continue to monitor.           Please review provider order for any additional goals.   Nurse to notify provider when observation goals have been met and patient is ready for discharge.

## 2023-08-31 NOTE — PROGRESS NOTES
Cycler set up per protocol and per treatment orders      Results from previous treatment:  Effluent color and clarity: yellow, clear, no fibrin noted  Total UF: 1434     Avg Dwell: 1:29     Transfer set changed        Cycler Serial Number: [id number]  Total Treatment Volume: [id number]  Total treatment time: 8 hrs  Fill Volume: 2900ml  Last Fill Volume:0ml  Heater ba.5% 6000mL;  Lot #: [id number];  Expiration Date:   Side Bag #1: 4.25% 6000mL;  Lot #: [id number];  Expiration Date: 10/23  Number of cycles including final fill: 4  Dwell Time: 1:23 minutes  Last Fill Volume: 0ml  Initial Drain Alarm: 10ml  PD orders reviewed with Patient procedure and ESRD teaching done and questions answered.  Cycler ready for hook-up.    Report given to: FRED Pollard consent form signed yes

## 2023-08-31 NOTE — PROGRESS NOTES
Renal Medicine Progress Note            Assessment/Plan:     Assessment: Devora Garcia is a 62-year-old female with ESRD on PD, GUILLERMO, HTN admitted with fall and weakness.    ESRD on PD  On dialysis for the last 2 years.  PTA does 4 manual exchanges (3 green, 1 red), 1.5-hour dwell time.  Primary nephrologist is Dr. Cole at [facility].  While inpatient we have been using the cycler.    Mechanical fall  No evidence of fractures.    HTN  PTA on carvedilol 6.25 mg twice daily, clonidine 0.1 mg 3 times daily, Imdur 30 mg daily.  Initially BP high, now mostly normal. Having some orthostatic hypotension 8/29, slightly improved 8/30. Changed carvedilol to metoprolol.     Contamination Event  S/p keflex course. Another contamination event 8/29 overnight with alcohol swab to open transfer set. Per [facility] PD protocol, iodine cap placed for 10 minutes x2 prior to starting dialysis. Given one dose cefazolin for prophylaxis on 8/30 and transfer set was exchanged.     CKD-MBD  Phosphorus was elevated at 11.5.  She reports taking Tums with meals, but often forgets.  We will schedule this for her.    Anemia of CKD  Hemoglobin 10.5 on 8/25.  Within goal.  No changes.      Plan/Recs:  1) one green, one red bag tonight   2) Daily weight      Reviewed notes from hospitalist, RNs, dialysis nurse.    Maggie Alvarez MD   [facility] consultants  Office: [phone]          Interval History:     No acute events overnight  UF 1434 mL  Feels okay, feels slightly drowsy  Denies abdominal pain  Denies shortness of breath  Denies any dizziness or orthostasis           Medications and Allergies:      - MEDICATION INSTRUCTIONS for Dialysis Patients -   Does not apply See Admin Instructions    acetaminophen  975 mg Oral Q8H    calcium carbonate  1,000 mg Oral TID w/meals    cloNIDine  0.1 mg Oral TID    docusate sodium  100 mg Oral BID    gabapentin  100 mg Oral BID    heparin ANTICOAGULANT  5,000 Units Subcutaneous Q12H     isosorbide mononitrate  30 mg Oral Daily    lidocaine  2 patch Transdermal Q24H    metoprolol succinate ER  12.5 mg Oral Daily    miconazole   Topical BID    pantoprazole  40 mg Oral Daily    polyethylene glycol  17 g Oral Daily        Allergies   Allergen Reactions    Aspirin Nausea    Statins-Hmg-Coa Reductase Inhibitors [Statins] Other (See Comments)     Other reaction(s): Renal Failure, Other reaction(s): Renal Failure            Physical Exam:   Vitals were reviewed  BP (!) 157/86 (BP Location: Right arm)   Pulse 70   Temp 97.7  F (36.5  C) (Oral)   Resp 18   Wt 105.9 kg (233 lb 7.5 oz)   SpO2 92%   BMI 44.11 kg/m      Wt Readings from Last 3 Encounters:   08/28/23 105.9 kg (233 lb 7.5 oz)   05/12/23 100.5 kg (221 lb 9.6 oz)   06/07/22 85.7 kg (189 lb)       Intake/Output Summary (Last 24 hours) at 8/28/2023 0846  Last data filed at 8/27/2023 1951  Gross per 24 hour   Intake --   Output 0 ml   Net 0 ml       GENERAL APPEARANCE: NAD, fatigued appearing  HEENT: normocephalic, MMM  RESP: clear  CV: Regular rate  ABDOMEN: Soft, nontender, nondistended.  PD catheter in place clean and intact, nontender, no exudate  EXTREMITIES/SKIN: 1-2+ nonpitting edema  NEURO: drowsy, but oriented, normal speech           Data:     BMP  Recent Labs   Lab 08/28/23  0848 08/28/23  0542 08/25/23  0618    138 139   POTASSIUM 4.8 4.5 5.0   CHLORIDE 100 97* 98   RON 8.5* 9.0 9.0   CO2 22 22 20*   BUN 64.4* 64.5* 66.2*   CR 15.17* 15.57* 15.44*   GLC 96 95 127*       CBC  Recent Labs   Lab 08/28/23  0848 08/25/23  0618   WBC 6.2 8.2   HGB 9.5* 10.5*   HCT 31.8* 34.8*   MCV 86 86    202       Lab Results   Component Value Date    AST 9 05/10/2023    ALT <9 05/10/2023    ALKPHOS 61 05/10/2023    BILITOTAL 0.5 05/10/2023     No results found for: INR  Color Urine (no units)   Date Value   05/29/2021 Light Yellow     Appearance Urine (no units)   Date Value   05/29/2021 Clear     Glucose Urine (mg/dL)   Date Value    05/29/2021 Negative     Bilirubin Urine (no units)   Date Value   05/29/2021 Negative     Ketones Urine (mg/dL)   Date Value   05/29/2021 Negative     Specific Gravity Urine (no units)   Date Value   05/29/2021 1.013     pH Urine (pH)   Date Value   05/29/2021 7.0     Protein Albumin Urine (mg/dL)   Date Value   05/29/2021 100 (A)     Urobilinogen Urine (no units)   Date Value   08/30/2020 <2.0 E.U./dL     Nitrite Urine (no units)   Date Value   05/29/2021 Negative     Leukocyte Esterase Urine (no units)   Date Value   05/29/2021 Trace (A)         Attestation:  I have reviewed today's vital signs, notes, medications, labs and imaging.    Maggie Alvarez MD  [facility] - Nephrology  Office: [phone]

## 2023-08-31 NOTE — PLAN OF CARE
PRIMARY DIAGNOSIS: ACUTE PAIN  OUTPATIENT/OBSERVATION GOALS TO BE MET BEFORE DISCHARGE:  1. Pain Status: Improved-controlled with oral pain medications.    2. Return to near baseline physical activity: No    3. Cleared for discharge by consultants (if involved): Yes    Discharge Planner Nurse   Safe discharge environment identified: Yes  Barriers to discharge: Yes, TCU placement       Entered by: Judy Rivera RN 08/31/2023 7:12 AM    A/O x4. VSS on 1 L O2 via NC. Moderate edema to BLE. C/o L hip and shoulder pain, managed with PRN robaxin and PO dilaudid. Ax2 with Deanne Martinez. PD completed without complications from 2150-0550. Plan is to discharge to TCU possibly Friday, pending placement.     Please review provider order for any additional goals.   Nurse to notify provider when observation goals have been met and patient is ready for discharge.

## 2023-08-31 NOTE — PLAN OF CARE
PRIMARY DIAGNOSIS: LEFT HIP AND KNEE PAIN FOLLOWING FALL  OUTPATIENT/OBSERVATION GOALS TO BE MET BEFORE DISCHARGE:  1. Pain Status: Improved-controlled with oral pain medications.    2. Return to near baseline physical activity: No    3. Cleared for discharge by consultants (if involved): Yes    Discharge Planner Nurse   Safe discharge environment identified: Yes  Barriers to discharge: Yes       Entered by: RAMIRO NUNEZ RN 08/31/2023    Vital signs:  Temp: 97.7  F (36.5  C) Temp src: Oral BP: (!) 157/86 Pulse: 70   Resp: 18 SpO2: 92 % O2 Device: None (Room air) Alert and oriented x4. Robaxin given with scheduled Tylenol, Dilaudid, and Lidocaine patch for shoulder pain. Wean off from O2. Sat stable in RA. Refused getting up in the chair this morning saying she just woke up. Offered pt to get up again at lunch time but pt continue refusing. Will get weight and Orthostatic BP when pt is up. On regular diet. No IV access. Prn Lactulose and Senna given with scheduled Miralax and Colace for constipation. No BM yet. Assist of x2 with Deanne heaton. Nephrology following. Medically clear for discharge to TCU. Bed found at [facility]. SW following. Will continue to monitor.         Please review provider order for any additional goals.   Nurse to notify provider when observation goals have been met and patient is ready for discharge.

## 2023-08-31 NOTE — PLAN OF CARE
PRIMARY DIAGNOSIS: ACUTE PAIN, FALL  OUTPATIENT/OBSERVATION GOALS TO BE MET BEFORE DISCHARGE:  1. Pain Status: Improved-controlled with oral pain medications.    2. Return to near baseline physical activity: No    3. Cleared for discharge by consultants (if involved): Yes    Discharge Planner Nurse   Safe discharge environment identified: Yes  Barriers to discharge: Yes, TCU placement       Entered by: Judy Rivera RN 08/31/2023 4:00 AM    Please review provider order for any additional goals.   Nurse to notify provider when observation goals have been met and patient is ready for discharge.

## 2023-08-31 NOTE — PLAN OF CARE
PRIMARY DIAGNOSIS: ACUTE PAIN, FALL  OUTPATIENT/OBSERVATION GOALS TO BE MET BEFORE DISCHARGE:  1. Pain Status: Improved-controlled with oral pain medications.    2. Return to near baseline physical activity: No    3. Cleared for discharge by consultants (if involved): Yes    Discharge Planner Nurse   Safe discharge environment identified: Yes  Barriers to discharge: Yes, TCU placement       Entered by: Judy Rivera RN 08/31/2023 12:00 AM          /62 (BP Location: Left arm)   Pulse 60   Temp 97.5  F (36.4  C) (Axillary)   Resp 18   Wt 105.9 kg (233 lb 7.5 oz)   SpO2 97%   BMI 44.11 kg/m    Initiated peritoneal dialysis at 2150 with no complications. Site CDI, line patent  Please review provider order for any additional goals.   Nurse to notify provider when observation goals have been met and patient is ready for discharge.

## 2023-08-31 NOTE — PLAN OF CARE
PRIMARY DIAGNOSIS: ACUTE PAIN, FALL  OUTPATIENT/OBSERVATION GOALS TO BE MET BEFORE DISCHARGE:  1. Pain Status: Improved-controlled with oral pain medications.    2. Return to near baseline physical activity: No    3. Cleared for discharge by consultants (if involved): Yes    Discharge Planner Nurse   Safe discharge environment identified: Yes  Barriers to discharge: Yes, TCU placement       Entered by: Judy Rivera RN 08/30/2023 8:00 PM    /71 (BP Location: Left arm)   Pulse 65   Temp 97.4  F (36.3  C) (Oral)   Resp 16   Wt 105.9 kg (233 lb 7.5 oz)   SpO2 98%   BMI 44.11 kg/m        Please review provider order for any additional goals.   Nurse to notify provider when observation goals have been met and patient is ready for discharge.

## 2023-09-01 NOTE — PROGRESS NOTES
Cycler set up per protocol and per treatment orders      Results from previous treatment:  Effluent color and clarity: yellow, clear, no fibrin noted  Total UF: 1499     Avg Dwell: 1:27     Cycler Serial Number: [id number]  Total Treatment Volume: [id number]  Total treatment time: 8 hrs  Fill Volume: 2900ml  Last Fill Volume:0ml  Heater ba.5% 6000mL;  Lot #: [id number];  Expiration Date:   Side Bag #1: 4.25% 6000mL;  Lot #: [id number];  Expiration Date: 10/23  Number of cycles including final fill: 4  Dwell Time: 1:23 minutes  Last Fill Volume: 0ml  Initial Drain Alarm: 10ml  PD orders reviewed with Patient procedure and ESRD teaching done and questions answered.  Cycler ready for hook-up.    Report given to: MIKAYLA Pollard consent form signed yes

## 2023-09-01 NOTE — PROGRESS NOTES
Care Management Follow Up    Length of Stay (days): 0    Expected Discharge Date: 09/04/2023     Concerns to be Addressed:       Patient plan of care discussed at interdisciplinary rounds: Yes    Anticipated Discharge Disposition: Transitional Care     Anticipated Discharge Services:    Anticipated Discharge DME: None    Patient/family educated on Medicare website which has current facility and service quality ratings: no  Education Provided on the Discharge Plan:    Patient/Family in Agreement with the Plan:      Referrals Placed by CM/SW: Post Acute Facilities    Additional Information:  Call to [facility] for follow up on PD training. Was informed that training has not been scheduled as of now. Will call back with updates when available.    Jyoti Griffin RN   Inpatient Care Coordination  [facility]   Phone: [phone]

## 2023-09-01 NOTE — PROGRESS NOTES
PRIMARY DIAGNOSIS: LEFT HIP AND KNEE PAIN AFTER A FALL   OUTPATIENT/OBSERVATION GOALS TO BE MET BEFORE DISCHARGE:  1. Pain Status: Improved-controlled with oral pain medications.    2. Return to near baseline physical activity: No    3. Cleared for discharge by consultants (if involved): No    Discharge Planner Nurse   Safe discharge environment identified: Yes  Barriers to discharge: No       Entered by: Starr Sanchez RN 09/01/2023 3:58 AM     Please review provider order for any additional goals.   Nurse to notify provider when observation goals have been met and patient is ready for discharge.    Dialysis going. Pt resting, VSS on RA.

## 2023-09-01 NOTE — PLAN OF CARE
Goal Outcome Evaluation:     PRIMARY DIAGNOSIS: LEFT HIP AND KNEE PAIN AFTER A FALL  OUTPATIENT/OBSERVATION GOALS TO BE MET BEFORE DISCHARGE:  1. Pain Status: Improved-controlled with oral pain medications.    2. Return to near baseline physical activity: No    3. Cleared for discharge by consultants (if involved): No    Discharge Planner Nurse   Safe discharge environment identified: Yes  Barriers to discharge: No       Entered by: Starr Sanchez RN 08/31/2023 10:37 PM     Please review provider order for any additional goals.   Nurse to notify provider when observation goals have been met and patient is ready for discharge.      Patient A&O x4, /79 (BP Location: Right arm)   Pulse 72   Temp 98.9  F (37.2  C) (Oral)   Resp 18   Wt 105.9 kg (233 lb 7.5 oz)   SpO2 92%   BMI 44.11 kg/m  , Dialysis started at 2230. C/O pain in her Lt arm and shoulder. Rated 10/10, stated the Robaxin helped. Discharge in planning.

## 2023-09-01 NOTE — PLAN OF CARE
PRIMARY DIAGNOSIS: FALL/ LEFT HIP PAIN  OUTPATIENT/OBSERVATION GOALS TO BE MET BEFORE DISCHARGE:  1. Pain Status: Improved-controlled with oral pain medications.    2. Return to near baseline physical activity: No    3. Cleared for discharge by consultants (if involved): No    Discharge Planner Nurse   Safe discharge environment identified: Yes  Barriers to discharge: Yes       Entered by: Katarzyna Herrera RN 09/01/2023    Pt awake, A&O x4, A2 with SS, no IV, Purewick in place. Assessed by nephrology, plans still in place for night dialysis and TCU. PRN muscle relaxer and dilaudid given for pain in left shoulder.  PT to see pt this afternoon.   Accepted at TCU - no date of transfer as of yet - TCU training staff to PD.  Please review provider order for any additional goals.   Nurse to notify provider when observation goals have been met and patient is ready for discharge.   ordered

## 2023-09-01 NOTE — PLAN OF CARE
PRIMARY DIAGNOSIS: FALL/ LEFT HIP PAIN  OUTPATIENT/OBSERVATION GOALS TO BE MET BEFORE DISCHARGE:  1. Pain Status: Improved-controlled with oral pain medications.    2. Return to near baseline physical activity: No    3. Cleared for discharge by consultants (if involved): No    Discharge Planner Nurse   Safe discharge environment identified: Yes  Barriers to discharge: Yes       Entered by: Katarzyna Herrera RN 09/01/2023    Pt sleeping soundly. Breaths unlabored and even, 96% RA. PD complete and tubing removed by night staff. Purewick in place.  Accepted at TCU - no date of transfer as of yet - TCU training staff to PD.  Please review provider order for any additional goals.   Nurse to notify provider when observation goals have been met and patient is ready for discharge.

## 2023-09-01 NOTE — PROGRESS NOTES
Renal Medicine Progress Note            Assessment/Plan:     Assessment: Devora Garcia is a 62-year-old female with ESRD on PD, GUILLERMO, HTN admitted with fall and weakness.    ESRD on PD  On dialysis for the last 2 years.  PTA does 4 manual exchanges (3 green, 1 red), 1.5-hour dwell time.  Primary nephrologist is Dr. Cole at [facility].  While inpatient we have been using the cycler.    Mechanical fall  No evidence of fractures.    HTN  PTA on carvedilol 6.25 mg twice daily, clonidine 0.1 mg 3 times daily, Imdur 30 mg daily.  Initially BP high, now mostly normal. Having some orthostatic hypotension 8/29, slightly improved 8/30. Changed carvedilol to metoprolol.     Contamination Event  S/p keflex course. Another contamination event 8/29 overnight with alcohol swab to open transfer set. Per [facility] PD protocol, iodine cap placed for 10 minutes x2 prior to starting dialysis. Given one dose cefazolin for prophylaxis on 8/30 and transfer set was exchanged.     CKD-MBD  Phosphorus was elevated at 11.5.  She reports taking Tums with meals, but often forgets.  Scheduled this, itching has improved.     Anemia of CKD  Hemoglobin 10.5 on 8/25.  Within goal.  No changes.      Plan/Recs:  1) one green, one red bag tonight   2) Daily weight      Reviewed notes from hospitalist, RNs, dialysis nurse.    Maggie Alvarez MD   [facility] consultants  Office: [phone]          Interval History:     No acute events overnight  UF 1.4L   No longer on O2   Continues to have LE edema   She is very frustrated with [facility] needing to train the nurses to perform PD, also that she'll have people that are unfamiliar with PD doing it for her there   She expresses concern that it puts her at risk of issues. Unfortunately no other rehabilitation options with PD trained staff  Denies abd pain   Has pain in L shoulder           Medications and Allergies:      - MEDICATION INSTRUCTIONS for Dialysis Patients -   Does not apply  See Admin Instructions    acetaminophen  975 mg Oral Q8H    calcium carbonate  1,000 mg Oral TID w/meals    cloNIDine  0.1 mg Oral TID    docusate sodium  100 mg Oral BID    gabapentin  100 mg Oral BID    heparin ANTICOAGULANT  5,000 Units Subcutaneous Q12H    isosorbide mononitrate  30 mg Oral Daily    lidocaine  2 patch Transdermal Q24H    metoprolol succinate ER  12.5 mg Oral Daily    miconazole   Topical BID    pantoprazole  40 mg Oral Daily    polyethylene glycol  17 g Oral Daily        Allergies   Allergen Reactions    Aspirin Nausea    Statins-Hmg-Coa Reductase Inhibitors [Statins] Other (See Comments)     Other reaction(s): Renal Failure, Other reaction(s): Renal Failure            Physical Exam:   Vitals were reviewed  /70 (BP Location: Right arm)   Pulse 76   Temp 98  F (36.7  C) (Oral)   Resp 16   Wt 105.9 kg (233 lb 7.5 oz)   SpO2 92%   BMI 44.11 kg/m      Wt Readings from Last 3 Encounters:   08/28/23 105.9 kg (233 lb 7.5 oz)   05/12/23 100.5 kg (221 lb 9.6 oz)   06/07/22 85.7 kg (189 lb)       Intake/Output Summary (Last 24 hours) at 8/28/2023 0846  Last data filed at 8/27/2023 1951  Gross per 24 hour   Intake --   Output 0 ml   Net 0 ml       GENERAL APPEARANCE: NAD, fatigued appearing  HEENT: normocephalic, MMM  RESP: clear  CV: Regular rate  ABDOMEN: Soft, nontender, nondistended.  PD catheter in place clean and intact, nontender, no exudate  EXTREMITIES/SKIN: 1-2+ nonpitting edema  NEURO: drowsy, but oriented, normal speech           Data:     BMP  Recent Labs   Lab 08/28/23  0848 08/28/23  0542    138   POTASSIUM 4.8 4.5   CHLORIDE 100 97*   RON 8.5* 9.0   CO2 22 22   BUN 64.4* 64.5*   CR 15.17* 15.57*   GLC 96 95       CBC  Recent Labs   Lab 09/01/23  0631 08/28/23  0848   WBC  --  6.2   HGB  --  9.5*   HCT  --  31.8*   MCV  --  86    165       Lab Results   Component Value Date    AST 9 05/10/2023    ALT <9 05/10/2023    ALKPHOS 61 05/10/2023    BILITOTAL 0.5 05/10/2023      No results found for: INR  Color Urine (no units)   Date Value   05/29/2021 Light Yellow     Appearance Urine (no units)   Date Value   05/29/2021 Clear     Glucose Urine (mg/dL)   Date Value   05/29/2021 Negative     Bilirubin Urine (no units)   Date Value   05/29/2021 Negative     Ketones Urine (mg/dL)   Date Value   05/29/2021 Negative     Specific Gravity Urine (no units)   Date Value   05/29/2021 1.013     pH Urine (pH)   Date Value   05/29/2021 7.0     Protein Albumin Urine (mg/dL)   Date Value   05/29/2021 100 (A)     Urobilinogen Urine (no units)   Date Value   08/30/2020 <2.0 E.U./dL     Nitrite Urine (no units)   Date Value   05/29/2021 Negative     Leukocyte Esterase Urine (no units)   Date Value   05/29/2021 Trace (A)         Attestation:  I have reviewed today's vital signs, notes, medications, labs and imaging.    Maggie Alvarez MD  [facility] - Nephrology  Office: [phone]

## 2023-09-01 NOTE — PLAN OF CARE
PRIMARY DIAGNOSIS: FALL/ LEFT HIP PAIN  OUTPATIENT/OBSERVATION GOALS TO BE MET BEFORE DISCHARGE:  1. Pain Status: Improved-controlled with oral pain medications.    2. Return to near baseline physical activity: No    3. Cleared for discharge by consultants (if involved): No    Discharge Planner Nurse   Safe discharge environment identified: Yes  Barriers to discharge: Yes       Entered by: Katarzyna Herrera RN 09/01/2023    Pt awake, A&O x4, A2 with SS, no IV, Purewick in place. PT assessed pt - got up to toilet with vivian Steady. Was also able to stand on scale.  at bedside. Requests more dilaudid when able to have - orders changed to q6.   Accepted at TCU - no date of transfer as of yet - TCU training staff to PD.  Please review provider order for any additional goals.   Nurse to notify provider when observation goals have been met and patient is ready for discharge.

## 2023-09-01 NOTE — PROGRESS NOTES
[facility]    Medicine Progress Note - Hospitalist Service    Date of Admission:  8/23/2023    Assessment & Plan   Devora Garcia is a 62 year old female admitted on 8/23/2023. She has hx of HTN, ESRD on PD, GUILLERMO on CPAP, who presents with L hip and knee pain s/p mechanical fall 8/23.   No head trauma or LOC. She has been having difficulty walking. She feels her fall was due to increased fluid retention from dialysis non-compliance. Since retiring in April, she has not been consistently getting her 4 peritoneal exchanges per day. She has a walker at home and has been needing to use this recently. No fevers/chills/chest pain/SOB.     Interval events:  No acute changes. Pending placement      Mechanical fall and L hip and knee pain.  Dizziness -- mild orthostasis  L hip CT shows no occult fracture.    --Robaxin and Lidocaine patches. Scheduled APAP. Hydromorphone PRN, interval decreased to q6hrs prn as she states pain is limiting her mobility.   --PT and OT recommending TCU.  Facility located but needs training in PD. Continue to work with therapy over the weekend  --Needs to be up to chair QID and PRN.  RN to chart refusal.    --SubQ Heparin for DVT prophylaxis.      Constipation:  -- Senna   -- Lactulose 20 grams x 1 today.  Needs to mobilize.      ESRD on PD.  AGMA secondary to ESRD on HD.   Mild orthostasis hypotension.  CKD-MBD  Anemia of CKD  Continue PD. Renal consult appreciated. Keflex 500 mg q12hrs X 3 days for presumed contamination of PD catheter (finished). Given 1 dose of cefazolin for prophylaxis on 8/30/23  Appreciate excellent cares. Hgb 10.5 8/25. Continue to monitor.      HTN. Changed carvedilol to metoprolol given mild orthostasis. Continue clonidine, imdur     Mild hypoxia: Likely body habitus and history of GUILLERMO contributing. Does not use CPAP but has in the past.  Afebrile.  Not significantly bronchospastic.  Needs aggressive pulmonary toilet.  CXR without acute  infiltrate on 8/27/23.   - Pulmonary toilet.  - Acapella device.   - Albuterol PRN     Acute encephalopathy resolved likely due to opiates and ESRD.        Diet: Combination Diet Renal Diet (dialysis)    DVT Prophylaxis: Heparin SQ  Taylor Catheter: Not present  Lines: None     Cardiac Monitoring: None  Code Status: Full Code      Clinically Significant Risk Factors Present on Admission              # Hypoalbuminemia: Lowest albumin = 2.3 g/dL at 8/28/2023  5:42 AM, will monitor as appropriate     # Hypertension: Noted on problem list                 Disposition Plan      Expected Discharge Date: 09/04/2023    Discharge Delays: Placement - TCU  *Medically Ready for Discharge  Destination: inpatient rehabilitation facility  Discharge Comments: 8/27/23 medically ready when TCU bed found.        The patient's care was discussed with the Attending Physician, Dr. Nugent, Bedside Nurse, and Patient.    Alma Hernandez PA-C  Hospitalist Service  [facility]  Securely message with [employer] (more info)  Text page via [employer] Paging/Directory   ______________________________________________________________________    Interval History   States her hip pain is improving but still difficult to mobilize. She relates her fall to fluid accumulation from non compliance with manual peritoneal dialysis at home. She also notes L shoulder pain with movement.    Physical Exam   Vital Signs: Temp: 98.7  F (37.1  C) Temp src: Oral BP: (!) 159/83 Pulse: 62   Resp: 20 SpO2: 94 % O2 Device: None (Room air)    Weight: 233 lbs 7.47 oz    GENERAL:  Comfortable.  PSYCH: pleasant, oriented, No acute distress.  HEENT:  Atraumatic, normocephalic. Normal conjunctiva, normal hearing, and oropharynx is normal.  NECK:  Supple, no neck vein distention  HEART:  Normal S1, S2 with no murmur, no pericardial rub, gallops or S3 or S4.  LUNGS:  Clear to auscultation, normal Respiratory effort. No wheezing, rales or ronchi.  EXTREMITIES:   full passive ROM of L shoulder without pain.  SKIN:  Dry to touch, No rash, wound or ulcerations.  NEUROLOGIC:  grossly intact    Medical Decision Making       45 MINUTES SPENT BY ME on the date of service doing chart review, history, exam, documentation & further activities per the note.      Data     I have personally reviewed the following data over the past 24 hrs:    N/A  \   N/A   / 216     N/A N/A N/A /  N/A   N/A N/A N/A \       Imaging results reviewed over the past 24 hrs:   No results found for this or any previous visit (from the past 24 hour(s)).

## 2023-09-01 NOTE — PROGRESS NOTES
"[facility] RCAT     Date: 09/01/23    Admission Dx: hip and knee pain    Pulmonary History: none    Home Nebulizer/MDI Use: none    Home Oxygen: none    Acuity Level (RCAT flow sheet): level 5    Aerosol Therapy initiated: albuterol prn    Pulmonary Hygiene initiated: Coughing techniques, flutter valve    Volume Expansion initiated: Incentive spirometry    Current Oxygen Requirements: room air    Current SpO2: 94%    Re-evaluation date: 9/8/23    Patient Education: Indications for bronchodilators discussed.      See \"RT Assessments\" flow sheet for patient assessment scoring and Acuity Level Details.    "

## 2023-09-01 NOTE — PROGRESS NOTES
Goal Outcome Evaluation:      PRIMARY DIAGNOSIS: LEFT HIP AND KNEE PAIN AFTER A FALL  OUTPATIENT/OBSERVATION GOALS TO BE MET BEFORE DISCHARGE:  1. Pain Status: Improved-controlled with oral pain medications.     2. Return to near baseline physical activity: No     3. Cleared for discharge by consultants (if involved): No     Discharge Planner Nurse   Safe discharge environment identified: Yes  Barriers to discharge: No       Entered by: Starr Sanchez RN 09/1/2023 1216am  Please review provider order for any additional goals.   Nurse to notify provider when observation goals have been met and patient is ready for discharge.     Patient A&O x 4, Dialysis going. Is resting, declined 1130 dose of tylenol.

## 2023-09-02 NOTE — PLAN OF CARE
PRIMARY DIAGNOSIS: FALL/ LEFT HIP PAIN  OUTPATIENT/OBSERVATION GOALS TO BE MET BEFORE DISCHARGE:  /87 (BP Location: Right arm)   Pulse 66   Temp 98.5  F (36.9  C) (Oral)   Resp 16   Wt 105.9 kg (233 lb 7.5 oz)   SpO2 93%   BMI 44.11 kg/m     1. Pain Status: Improved-controlled with oral pain medications.    2. Return to near baseline physical activity: No    3. Cleared for discharge by consultants (if involved): No    Discharge Planner Nurse   Safe discharge environment identified: Yes  Barriers to discharge: Yes       Entered by: Toby Shelton, RN         Pt A&Ox4. VSS on RA. Ax2 w vivian steady. PW in place per pt request. Pt reported left shoulder pain, given PRN dilaudid. PD started @ midnight. Plan- continue PD, pain management. Pt medically stable for discharge- awaiting TCU facility to be PD trained prior to discharge.     Please review provider order for any additional goals.   Nurse to notify provider when observation goals have been met and patient is ready for discharge.

## 2023-09-02 NOTE — PROGRESS NOTES
Cycler set up per protocol and per treatment orders     Results from previous treatment:  Effluent color and clarity: yellow, clear  Total UF: 1331ml  Initial Drain: 16ml  Avg Dwell: 1:31    Cycler Serial Number: [id number]  Total Treatment Volume: [id number]  Total treatment time: 10 hrs  Fill Volume: 2900ml  Last Fill Volume:0ml  Heater ba.25% 6000mL;  Lot #: [id number];  Expiration Date: 10/2023  Side Bag #1: 4.25% 6000mL;  Lot #: [id number];  Expiration Date: 10/2023  Side Bag #2: 2.5% 6000mL;  Lot #: [id number];  Expiration Date: 2025  Number of cycles including final fill: 5  Dwell Time: 1:23 minutes  Last Fill Volume: 0ml  Initial Drain Alarm: 0ml  PD orders reviewed with Patient procedure and ESRD teaching done and questions answered.  Cycler ready for hook-up.    Report given to: LIBERTAD Diaz RN

## 2023-09-02 NOTE — PLAN OF CARE
PRIMARY DIAGNOSIS: FALL/ LEFT HIP PAIN  OUTPATIENT/OBSERVATION GOALS TO BE MET BEFORE DISCHARGE:  BP (!) 154/85 (BP Location: Right arm)   Pulse 69   Temp 98.6  F (37  C) (Oral)   Resp 20   Wt 105.9 kg (233 lb 7.5 oz)   SpO2 93%   BMI 44.11 kg/m     1. Pain Status: Improved-controlled with oral pain medications.    2. Return to near baseline physical activity: No    3. Cleared for discharge by consultants (if involved): No    Discharge Planner Nurse   Safe discharge environment identified: Yes  Barriers to discharge: Yes       Entered by: Toby Shelton, RN         Pt A&Ox4. VSS on RA. Ax2 w vivian steady. PW in place. PD started @ midnight. Will continue with POC.     Please review provider order for any additional goals.   Nurse to notify provider when observation goals have been met and patient is ready for discharge.

## 2023-09-02 NOTE — PROGRESS NOTES
PRIMARY DIAGNOSIS: Placement TCU     OUTPATIENT/OBSERVATION GOALS TO BE MET BEFORE DISCHARGE  1. Orthostatic performed: No     2. Tolerating PO medications: Yes     3. Return to near baseline physical activity: Yes     4. Cleared for discharge by consultants (if involved): Yes        Discharge Planner Nurse   Safe discharge environment identified: No  Barriers to discharge: Yes       Entered by: Maribeth Gonsalves RN 09/02/2023      Please review provider order for any additional goals.   Nurse to notify provider when observation goals have been met and patient is ready for discharge.     Pt A&Ox4; vss on r/a. Pt stating that she is too tired to ambulate or get to chair. Educated pt on importance of getting OOB, encouraged pt to get to chair, encouraged pt to weight shift, encouraged pt to shower, offered pt bed bath to clean panis and perineum; pt refused all. Asked pt to weight shift to assess back on body, pt refused. Tolerated oral meds.        SW following for TCU referrals. PT/OT following.    [id number]    Pt up to chair for 2-hours, tolerated w/o increased discomfort. A1 GB/W/Pivot. Pt received bed bath, german care, lotion applied to extremities and back.

## 2023-09-02 NOTE — PROGRESS NOTES
"PRIMARY DIAGNOSIS: Placement TCU    OUTPATIENT/OBSERVATION GOALS TO BE MET BEFORE DISCHARGE  1. Orthostatic performed: No    2. Tolerating PO medications: Yes    3. Return to near baseline physical activity: Yes    4. Cleared for discharge by consultants (if involved): Yes    Discharge Planner Nurse   Safe discharge environment identified: No  Barriers to discharge: Yes       Entered by: Maribeth Gonsalves RN 09/02/2023    Please review provider order for any additional goals.   Nurse to notify provider when observation goals have been met and patient is ready for discharge.    Pt A&Ox4; vss on r/a. Pt states on assessment \" I will not get up this morning\". Educated pt on importance of getting OOB, encouraged pt to get to chair, encouraged pt to weight shift, encouraged pt to shower, offered pt bed bath to clean panis and perineum; pt refused all. Asked pt to weight shift to assess back on body, pt refused. Tolerated oral meds.    Peritoneal dialysis finished running CN notified to have 3rd floor CN to come disconnect dialysis per 8/26 note.     SW following for TCU referrals. PT/OT following.   "

## 2023-09-02 NOTE — PLAN OF CARE
[id number]: A&O. VSS on RA. Did not get out of bed this shift. Refusing to take some meds despite education provided. C/o L shoulder pain, given scheduled Tylenol. Last BM 8/31, declined lactulose. +BS. No IV access. Peritoneal dialysis to be run tonight.

## 2023-09-02 NOTE — PROGRESS NOTES
Renal Medicine Progress Note            Assessment/Plan:     Assessment: Devora Garcia is a 62-year-old female with ESRD on PD, GUILLERMO, HTN admitted with fall and weakness.    ESRD on PD  On dialysis for the last 2 years.  PTA does 4 manual exchanges (3 green, 1 red), 1.5-hour dwell time.  Primary nephrologist is Dr. Cole at [facility].  While inpatient we have been using the cycler.    Mechanical fall  No evidence of fractures.    HTN  PTA on carvedilol 6.25 mg twice daily, clonidine 0.1 mg 3 times daily, Imdur 30 mg daily.  Initially BP high, now mostly normal. Having some orthostatic hypotension 8/29, slightly improved 8/30. Changed carvedilol to metoprolol.     Contamination Event  S/p keflex course. Another contamination event 8/29 overnight with alcohol swab to open transfer set. Per [facility] PD protocol, iodine cap placed for 10 minutes x2 prior to starting dialysis. Given one dose cefazolin for prophylaxis on 8/30 and transfer set was exchanged.     CKD-MBD  Phosphorus was elevated at 11.5.  She reports taking Tums with meals, but often forgets.  Scheduled this, itching has improved.     Anemia of CKD  Hemoglobin 10.5 on 8/25.  Within goal.  No changes.      Plan/Recs:  1) imaging prescription to add 1 cycle for total of 5 cycles to see if that improves her mentation.  We will use 2 red and 1 green bags  2) Daily weight      Reviewed notes from hospitalist, RNs, dialysis nurse.    Maggie Alvarez MD   [facility] consultants  Office: [phone]          Interval History:     No acute events overnight  UF 1.3 L  Remains off of oxygen  Very somnolent and drowsy this morning  Very itchy  Denies shortness of breath  No issues with PD overnight  Has some myoclonic jerking         Medications and Allergies:      - MEDICATION INSTRUCTIONS for Dialysis Patients -   Does not apply See Admin Instructions    acetaminophen  975 mg Oral Q8H    calcium carbonate  1,000 mg Oral TID w/meals    cloNIDine   0.1 mg Oral TID    docusate sodium  100 mg Oral BID    gabapentin  100 mg Oral BID    heparin ANTICOAGULANT  5,000 Units Subcutaneous Q12H    isosorbide mononitrate  30 mg Oral Daily    lidocaine  2 patch Transdermal Q24H    metoprolol succinate ER  12.5 mg Oral Daily    miconazole   Topical BID    pantoprazole  40 mg Oral Daily    polyethylene glycol  17 g Oral Daily        Allergies   Allergen Reactions    Aspirin Nausea    Statins-Hmg-Coa Reductase Inhibitors [Statins] Other (See Comments)     Other reaction(s): Renal Failure, Other reaction(s): Renal Failure            Physical Exam:   Vitals were reviewed  BP (!) 126/98 (BP Location: Right arm)   Pulse 70   Temp 97.9  F (36.6  C) (Oral)   Resp 16   Wt 105.9 kg (233 lb 7.5 oz)   SpO2 94%   BMI 44.11 kg/m      Wt Readings from Last 3 Encounters:   08/28/23 105.9 kg (233 lb 7.5 oz)   05/12/23 100.5 kg (221 lb 9.6 oz)   06/07/22 85.7 kg (189 lb)       Intake/Output Summary (Last 24 hours) at 8/28/2023 0846  Last data filed at 8/27/2023 1951  Gross per 24 hour   Intake --   Output 0 ml   Net 0 ml       GENERAL APPEARANCE: NAD, fatigued appearing  HEENT: normocephalic, MMM  RESP: clear  CV: Regular rate  ABDOMEN: Deferred abdominal exam  EXTREMITIES/SKIN: 1-2+ nonpitting edema  NEURO: drowsy, but oriented, normal speech           Data:     BMP  Recent Labs   Lab 08/28/23  0848 08/28/23  0542    138   POTASSIUM 4.8 4.5   CHLORIDE 100 97*   RON 8.5* 9.0   CO2 22 22   BUN 64.4* 64.5*   CR 15.17* 15.57*   GLC 96 95       CBC  Recent Labs   Lab 09/01/23  0631 08/28/23  0848   WBC  --  6.2   HGB  --  9.5*   HCT  --  31.8*   MCV  --  86    165       Lab Results   Component Value Date    AST 9 05/10/2023    ALT <9 05/10/2023    ALKPHOS 61 05/10/2023    BILITOTAL 0.5 05/10/2023     No results found for: INR  Color Urine (no units)   Date Value   05/29/2021 Light Yellow     Appearance Urine (no units)   Date Value   05/29/2021 Clear     Glucose Urine (mg/dL)    Date Value   05/29/2021 Negative     Bilirubin Urine (no units)   Date Value   05/29/2021 Negative     Ketones Urine (mg/dL)   Date Value   05/29/2021 Negative     Specific Gravity Urine (no units)   Date Value   05/29/2021 1.013     pH Urine (pH)   Date Value   05/29/2021 7.0     Protein Albumin Urine (mg/dL)   Date Value   05/29/2021 100 (A)     Urobilinogen Urine (no units)   Date Value   08/30/2020 <2.0 E.U./dL     Nitrite Urine (no units)   Date Value   05/29/2021 Negative     Leukocyte Esterase Urine (no units)   Date Value   05/29/2021 Trace (A)         Attestation:  I have reviewed today's vital signs, notes, medications, labs and imaging.    Maggie Alvarez MD  [facility] - Nephrology  Office: [phone]

## 2023-09-02 NOTE — PROGRESS NOTES
[facility]    Medicine Progress Note - Hospitalist Service    Date of Admission:  8/23/2023    Assessment & Plan   Devora Garcia is a 62 year old female admitted on 8/23/2023. She has hx of HTN, ESRD on PD, GUILLERMO on CPAP, who presents with L hip and knee pain s/p mechanical fall 8/23.   No head trauma or LOC. She has been having difficulty walking. She feels her fall was due to increased fluid retention from dialysis non-compliance. Since retiring in April, she has not been consistently getting her 4 peritoneal exchanges per day. She has a walker at home and has been needing to use this recently. No fevers/chills/chest pain/SOB.     Interval events:  No acute changes. Pending placement      Mechanical fall and L hip and knee pain.  Dizziness -- mild orthostasis  L hip CT shows no occult fracture.    --Robaxin and Lidocaine patches. Scheduled APAP. Hydromorphone PRN, interval decreased to q6hrs prn (9/1)as she states pain is limiting her mobility.   --PT and OT recommending TCU.  Facility located but needs training in PD. Continue to work with therapy over the weekend  --Needs to be up to chair QID and PRN.  RN to chart refusal.    --SubQ Heparin for DVT prophylaxis.      Constipation:  -- Senna   -- Lactulose 20 grams x 1 today.  Needs to mobilize.      ESRD on PD.  AGMA secondary to ESRD on HD.   Mild orthostasis hypotension.  CKD-MBD  Anemia of CKD  Continue PD. Renal consult appreciated. Keflex 500 mg q12hrs X 3 days for presumed contamination of PD catheter (finished). Given 1 dose of cefazolin for prophylaxis on 8/30/23  Appreciate excellent cares. Hgb 10.5 8/25. Continue to monitor.      HTN. Changed carvedilol to metoprolol given mild orthostasis. Continue clonidine, imdur     Mild hypoxia: Likely body habitus and history of GUILLERMO contributing. Does not use CPAP but has in the past.  Afebrile.  Not significantly bronchospastic.  Needs aggressive pulmonary toilet.  CXR without acute  infiltrate on 8/27/23.   - Pulmonary toilet.  - Acapella device.   - Albuterol PRN     Acute encephalopathy resolved likely due to opiates and ESRD.        Diet: Combination Diet Renal Diet (dialysis)    DVT Prophylaxis: Heparin SQ  Taylor Catheter: Not present  Lines: None     Cardiac Monitoring: None  Code Status: Full Code      Clinically Significant Risk Factors Present on Admission              # Hypoalbuminemia: Lowest albumin = 2.3 g/dL at 8/28/2023  5:42 AM, will monitor as appropriate     # Hypertension: Noted on problem list                 Disposition Plan     Expected Discharge Date: 09/04/2023    Discharge Delays: Placement - TCU  *Medically Ready for Discharge  Destination: inpatient rehabilitation facility  Discharge Comments: 8/27/23 medically ready when TCU bed found.        The patient's care was discussed with the Attending Physician, Dr. Jimenes, Bedside Nurse, and Patient.    Alma Hernandez PA-C  Hospitalist Service  [facility]  Securely message with [employer] (more info)  Text page via [employer] Paging/Directory   ______________________________________________________________________    Interval History   No complaints, up to chair currently    Physical Exam   Vital Signs: Temp: 97.9  F (36.6  C) Temp src: Oral BP: (!) 126/98 Pulse: 70   Resp: 16 SpO2: 94 % O2 Device: None (Room air)    Weight: 233 lbs 7.47 oz    GENERAL:  Comfortable.  PSYCH: pleasant, oriented, No acute distress.  HEART:  RRR  LUNGS:  Normal Respiratory effort.   EXTREMITIES:  sitting up in chair  SKIN:  Dry to touch, No rash, wound or ulcerations.  NEUROLOGIC:  grossly intact    Medical Decision Making       45 MINUTES SPENT BY ME on the date of service doing chart review, history, exam, documentation & further activities per the note.      Data         Imaging results reviewed over the past 24 hrs:   No results found for this or any previous visit (from the past 24 hour(s)).

## 2023-09-02 NOTE — PLAN OF CARE
PRIMARY DIAGNOSIS: FALL/ LEFT HIP PAIN  OUTPATIENT/OBSERVATION GOALS TO BE MET BEFORE DISCHARGE:  BP (!) 154/85 (BP Location: Right arm)   Pulse 69   Temp 98.6  F (37  C) (Oral)   Resp 20   Wt 105.9 kg (233 lb 7.5 oz)   SpO2 93%   BMI 44.11 kg/m     1. Pain Status: Improved-controlled with oral pain medications.    2. Return to near baseline physical activity: No    3. Cleared for discharge by consultants (if involved): No    Discharge Planner Nurse   Safe discharge environment identified: Yes  Barriers to discharge: Yes       Entered by: Toby Shelton, RN         Pt A&Ox4. VSS on RA. Ax2 w vivian steady. PW in place. Pt reported 6/10 pain in left shoulder, given PRN dilaudid. Will continue with POC.     Please review provider order for any additional goals.   Nurse to notify provider when observation goals have been met and patient is ready for discharge.

## 2023-09-03 NOTE — PROGRESS NOTES
Patient Transfer Information  Patient connected to monitoring equipment on arrival: N/A     Patient connected to wall oxygen on arrival: Yes    Belongings: Transferred with patient    Safety check completed: Yes     Pt arrived to 301 at 12:50. Pt orientated to room, VSS. Pain assessed.

## 2023-09-03 NOTE — PLAN OF CARE
PRIMARY DIAGNOSIS: FALL/ LEFT HIP PAIN- TCU Placement   OUTPATIENT/OBSERVATION GOALS TO BE MET BEFORE DISCHARGE:  /71 (BP Location: Right arm)   Pulse 63   Temp 97.8  F (36.6  C) (Oral)   Resp 16   Wt 105.9 kg (233 lb 7.5 oz)   SpO2 99%   BMI 44.11 kg/m     1. Pain Status: Improved-controlled with oral pain medications.    2. Return to near baseline physical activity: Yes    3. Cleared for discharge by consultants (if involved): Yes    Discharge Planner Nurse   Safe discharge environment identified: No   Barriers to discharge: Yes       Entered by: Toby Shelton, RN         Pt A&Ox4. VSS on RA. Ax1 w GB and walker. PW in place per pt request. Pt reported left shoulder pain, given PRN dilaudid and robaxin. Peritoneal dialysis started @ 2110. Will continue with POC.     Please review provider order for any additional goals.   Nurse to notify provider when observation goals have been met and patient is ready for discharge.

## 2023-09-03 NOTE — PROGRESS NOTES
Pet connected to peritoneal dialysis at 16:40 per protocol. No alarms present after pressed go on machine.

## 2023-09-03 NOTE — PROGRESS NOTES
Report given to 3rd floor RN on pt care. Manual Dialysis running in room with dialysis nurse prior to xfer.     PRN 1mg Oral Dilaudid given for generalized pain 7/10.

## 2023-09-03 NOTE — PLAN OF CARE
PRIMARY DIAGNOSIS: FALL/ LEFT HIP PAIN- TCU Placement   OUTPATIENT/OBSERVATION GOALS TO BE MET BEFORE DISCHARGE:  /77 (BP Location: Left arm)   Pulse 63   Temp 98  F (36.7  C) (Oral)   Resp 18   Wt 105.9 kg (233 lb 7.5 oz)   SpO2 98%   BMI 44.11 kg/m     1. Pain Status: Improved-controlled with oral pain medications.    2. Return to near baseline physical activity: Yes    3. Cleared for discharge by consultants (if involved): Yes    Discharge Planner Nurse   Safe discharge environment identified: No   Barriers to discharge: Yes       Entered by: Toby Shetlon, RN         Pt A&Ox4. VSS on RA. Ax1 w GB and walker. PW in place per pt request. Pt reported left shoulder pain, given PRN dilaudid and robaxin. Peritoneal dialysis started @ 2110. Plan- SW following for TCU referrals. PT/OT following.     Please review provider order for any additional goals.   Nurse to notify provider when observation goals have been met and patient is ready for discharge.

## 2023-09-03 NOTE — PROGRESS NOTES
0800  Dialysis machine turned OFF on initial assessment of patient. CN notified.     [id number]  Nephrology paged. CN unable to get callback from [facility].

## 2023-09-03 NOTE — PLAN OF CARE
PRIMARY DIAGNOSIS: FALL/ LEFT HIP PAIN- TCU Placement   OUTPATIENT/OBSERVATION GOALS TO BE MET BEFORE DISCHARGE:  /61 (BP Location: Right arm)   Pulse 71   Temp 97.8  F (36.6  C) (Oral)   Resp 16   Wt 105.9 kg (233 lb 7.5 oz)   SpO2 94%   BMI 44.11 kg/m     1. Pain Status: Improved-controlled with oral pain medications.    2. Return to near baseline physical activity: Yes    3. Cleared for discharge by consultants (if involved): Yes    Discharge Planner Nurse   Safe discharge environment identified: No   Barriers to discharge: Yes       Entered by: Toby Shelton, RN         Pt A&Ox4. VSS on RA. Ax1 w GB and walker. PW in place per pt request. Pt reported left shoulder pain, given PRN dilaudid and robaxin. Will continue with POC.     Please review provider order for any additional goals.   Nurse to notify provider when observation goals have been met and patient is ready for discharge.

## 2023-09-03 NOTE — PROGRESS NOTES
Pt set up and connected to peritoneal Dialysis @ 2110 per protocol. No alarms present at this time. Will disconnect in AM.

## 2023-09-03 NOTE — PROGRESS NOTES
Renal Medicine Progress Note            Assessment/Plan:     Assessment: Devora Garcia is a 62-year-old female with ESRD on PD, GUILLERMO, HTN admitted with fall and weakness.    ESRD on PD  On dialysis for the last 2 years.  PTA does 4 manual exchanges (3 green, 1 red), 1.5-hour dwell time.  Primary nephrologist is Dr. Cole at [facility].  While inpatient we have been using the cycler.    Mechanical fall  No evidence of fractures.    HTN  PTA on carvedilol 6.25 mg twice daily, clonidine 0.1 mg 3 times daily, Imdur 30 mg daily.  Initially BP high, now mostly normal. Having some orthostatic hypotension 8/29, slightly improved 8/30. Changed carvedilol to metoprolol.     Contamination Event  S/p keflex course. Another contamination event 8/29 overnight with alcohol swab to open transfer set. Per [facility] PD protocol, iodine cap placed for 10 minutes x2 prior to starting dialysis. Given one dose cefazolin for prophylaxis on 8/30 and transfer set was exchanged.     CKD-MBD  Phosphorus was elevated at 11.5.  She reports taking Tums with meals, but often forgets.  Scheduled this, itching has improved.     Anemia of CKD  Hemoglobin 10.5 on 8/25.  Within goal.  No changes.    Somnolence    Plan/Recs:  1) changing prescription to add 1 cycle for total of 5 cycles to see if that improves her mentation.  We will use 2 red and 1 green bags  2) Daily weight  3) we will do manual drain today      Reviewed notes from hospitalist, RNs, dialysis nurse.    Maggie Alvarez MD   [facility] consultants  Office: [phone]          Interval History:     Overnight PD machine was found unplugged.  Unclear how much of her treatment was completed.  Definitely feels like she has distended abdomen, likely still dwelling from last cycle.  Given presence of full bag on heater, likely completed 4-5 cycles.  She denies any other complaints  Denies lightheadedness or dizziness  Pressures improved  No shortness of breath          Medications and Allergies:      - MEDICATION INSTRUCTIONS for Dialysis Patients -   Does not apply See Admin Instructions    acetaminophen  975 mg Oral Q8H    calcium carbonate  1,000 mg Oral TID w/meals    cloNIDine  0.1 mg Oral TID    docusate sodium  100 mg Oral BID    gabapentin  100 mg Oral BID    heparin ANTICOAGULANT  5,000 Units Subcutaneous Q12H    isosorbide mononitrate  30 mg Oral Daily    lidocaine  2 patch Transdermal Q24H    metoprolol succinate ER  12.5 mg Oral Daily    miconazole   Topical BID    pantoprazole  40 mg Oral Daily    polyethylene glycol  17 g Oral Daily        Allergies   Allergen Reactions    Aspirin Nausea    Statins-Hmg-Coa Reductase Inhibitors [Statins] Other (See Comments)     Other reaction(s): Renal Failure, Other reaction(s): Renal Failure            Physical Exam:   Vitals were reviewed  /77 (BP Location: Right arm)   Pulse 68   Temp 98  F (36.7  C) (Oral)   Resp 18   Wt 105.9 kg (233 lb 7.5 oz)   SpO2 100%   BMI 44.11 kg/m      Wt Readings from Last 3 Encounters:   08/28/23 105.9 kg (233 lb 7.5 oz)   05/12/23 100.5 kg (221 lb 9.6 oz)   06/07/22 85.7 kg (189 lb)       Intake/Output Summary (Last 24 hours) at 8/28/2023 0846  Last data filed at 8/27/2023 1951  Gross per 24 hour   Intake --   Output 0 ml   Net 0 ml       GENERAL APPEARANCE: NAD, fatigued appearing  HEENT: normocephalic, MMM  RESP: clear  CV: Regular rate  ABDOMEN: Deferred abdominal exam  EXTREMITIES/SKIN: 1-2+ nonpitting edema  NEURO: drowsy, but oriented, normal speech           Data:     BMP  Recent Labs   Lab 08/28/23  0848 08/28/23  0542    138   POTASSIUM 4.8 4.5   CHLORIDE 100 97*   RON 8.5* 9.0   CO2 22 22   BUN 64.4* 64.5*   CR 15.17* 15.57*   GLC 96 95       CBC  Recent Labs   Lab 09/01/23  0631 08/28/23  0848   WBC  --  6.2   HGB  --  9.5*   HCT  --  31.8*   MCV  --  86    165       Lab Results   Component Value Date    AST 9 05/10/2023    ALT <9 05/10/2023    ALKPHOS 61  05/10/2023    BILITOTAL 0.5 05/10/2023     No results found for: INR  Color Urine (no units)   Date Value   05/29/2021 Light Yellow     Appearance Urine (no units)   Date Value   05/29/2021 Clear     Glucose Urine (mg/dL)   Date Value   05/29/2021 Negative     Bilirubin Urine (no units)   Date Value   05/29/2021 Negative     Ketones Urine (mg/dL)   Date Value   05/29/2021 Negative     Specific Gravity Urine (no units)   Date Value   05/29/2021 1.013     pH Urine (pH)   Date Value   05/29/2021 7.0     Protein Albumin Urine (mg/dL)   Date Value   05/29/2021 100 (A)     Urobilinogen Urine (no units)   Date Value   08/30/2020 <2.0 E.U./dL     Nitrite Urine (no units)   Date Value   05/29/2021 Negative     Leukocyte Esterase Urine (no units)   Date Value   05/29/2021 Trace (A)         Attestation:  I have reviewed today's vital signs, notes, medications, labs and imaging.    Maggie Alvarez MD  [facility] - Nephrology  Office: [phone]

## 2023-09-03 NOTE — PLAN OF CARE
Pt transferred from Obs unit to 301. Accepted by BG Bernal RN until RN writer could take patient. The patient was transferred with no IV access. RN attempted x2. Flyer RN will need to assist.     She is alert/oriented. Weak.  Pt emotional as she has a lot of stress at home. He spouse had a heart attack 1 month ago and her mother had a stroke in [place]. The patient has been trying to care for both or them.     Resp - CPAP use - the patient states she does not use one at home, despite recommendations. She had some apnea spells, up to 15 seconds at times, upon arrival for the obs unit. (After po dilaudid). More awake after medication wore off. Resp status improved. NOC RN informed to monitor.     GI/: purewick catheter, pt does still void, but minimal.  PD catheter dressing intact. PD started by Charge RN around 1630 - to run for 8 hours. Dilaudid/robaxin for pain. BP prn if needed. Neph following. During PD, the patient prefers to have oxygen. 2L NC placed.     Fall PTA: Tenderness to L hip/shoulder area. Robaxin given x1.     Skin check w/Alejandra FRANZ RN completed. No open skin areas, dry extremities, abdominal/perineal/breast/back folds dry w/interdry.

## 2023-09-03 NOTE — PROGRESS NOTES
PRIMARY DIAGNOSIS: Placement TCU     OUTPATIENT/OBSERVATION GOALS TO BE MET BEFORE DISCHARGE  1. Orthostatic performed: No     2. Tolerating PO medications: Yes     3. Return to near baseline physical activity: Yes     4. Cleared for discharge by consultants (if involved): Yes        Discharge Planner Nurse   Safe discharge environment identified: No  Barriers to discharge: Yes       Entered by: Maribeth Gonsalves RN 09/03/2023      Please review provider order for any additional goals.   Nurse to notify provider when observation goals have been met and patient is ready for discharge.     Pt A&Ox4; vss on r/a. Pt lethargic on assessment; arouses to voice. Educated pt on importance of getting OOB, encouraged pt to get to chair this morning, encouraged pt to weight shift. Panis w/mild skin breakdown in folds; interdry in place. Tolerated oral meds. See previous note on Dialysis.      SW following for TCU referrals. PT/OT following    0930  Nephrology notified to assess pt d/t dialysis not finishing its cycle. Pt with increased abdominal distention.

## 2023-09-03 NOTE — PROGRESS NOTES
[facility]    Medicine Progress Note - Hospitalist Service    Date of Admission:  8/23/2023    Assessment & Plan   Devora Garcia is a 62 year old female admitted on 8/23/2023. She has hx of HTN, ESRD on PD, GUILLERMO on CPAP, who presents with L hip and knee pain s/p mechanical fall 8/23.   No head trauma or LOC. She has been having difficulty walking. She feels her fall was due to increased fluid retention from dialysis non-compliance. Since retiring in April, she has not been consistently getting her 4 peritoneal exchanges per day. She has a walker at home and has been needing to use this recently. No fevers/chills/chest pain/SOB.     Interval events:  peritoneal dialysis did not run overnight as the machine became unplugged somehow. Nephrology aware, recommending manual exchange in the meantime. Pt will transfer to the 3rd floor. Pending placement      Mechanical fall and L hip and knee pain.  Dizziness -- mild orthostasis  L hip CT shows no occult fracture.    --Robaxin and Lidocaine patches. Scheduled APAP. Hydromorphone PRN, interval decreased to q6hrs prn (9/1) as she states pain is limiting her mobility.   --PT and OT recommending TCU.  Facility located but needs training in PD. Continue to work with therapy over the weekend  --Needs to be up to chair QID and PRN.  RN to chart refusal.    --SubQ Heparin for DVT prophylaxis.      Constipation:  -- Senna   -- Lactulose 20 grams x 1 today.  Needs to mobilize.      ESRD on PD.  AGMA secondary to ESRD on HD.   Mild orthostasis hypotension.  CKD-MBD  Anemia of CKD  Continue PD. Renal consult appreciated. Keflex 500 mg q12hrs X 3 days for presumed contamination of PD catheter (finished). Given 1 dose of cefazolin for prophylaxis on 8/30/23  Appreciate excellent cares. Hgb 10.5 8/25. Continue to monitor.      HTN. Changed carvedilol to metoprolol given mild orthostasis. Continue clonidine, imdur     Mild hypoxia: Likely body habitus and history  of GUILLERMO contributing. Does not use CPAP but has in the past.  Afebrile.  Not significantly bronchospastic.  Needs aggressive pulmonary toilet.  CXR without acute infiltrate on 23.   - Pulmonary toilet.  - Acapella device.   - Albuterol PRN     Acute encephalopathy resolved likely due to opiates and ESRD.        Diet: Combination Diet Renal Diet (dialysis)    DVT Prophylaxis: Heparin SQ  Taylor Catheter: Not present  Lines: None     Cardiac Monitoring: None  Code Status: Full Code      Clinically Significant Risk Factors Present on Admission              # Hypoalbuminemia: Lowest albumin = 2.3 g/dL at 2023  5:42 AM, will monitor as appropriate     # Hypertension: Noted on problem list                 Disposition Plan     Expected Discharge Date: 2023    Discharge Delays: Placement - TCU  *Medically Ready for Discharge  Destination: inpatient rehabilitation facility  Discharge Comments: 23 medically ready when TCU bed found.        The patient's care was discussed with the Attending Physician, Dr. Jimenes, Bedside Nurse, and Patient.    Alma Hernandez PA-C  Hospitalist Service  [facility]  Securely message with [email or web address] (more info)  Text page via [facility] Paging/Directory   ______________________________________________________________________    Interval History   No complaints. Dialysis did not run overnight, machine got unplugged and battery , unclear how long it ran before stopping.     Physical Exam   Vital Signs: Temp: 98  F (36.7  C) Temp src: Oral BP: 135/77 Pulse: 63   Resp: 18 SpO2: 98 % O2 Device: None (Room air) Oxygen Delivery: 1 LPM  Weight: 233 lbs 7.47 oz    GENERAL:  Comfortable.  PSYCH: pleasant, oriented, No acute distress.  HEART:  RRR  LUNGS:  Normal Respiratory effort.  GI:  Soft. Non-tender, mildly distended.   EXTREMITIES:  able to move all extremities  SKIN:  Dry to touch, No rash, wound or ulcerations.  NEUROLOGIC:  grossly intact    Medical Decision  Making       60 MINUTES SPENT BY ME on the date of service doing chart review, history, exam, documentation & further activities per the note.      Data         Imaging results reviewed over the past 24 hrs:   No results found for this or any previous visit (from the past 24 hour(s)).

## 2023-09-03 NOTE — PROGRESS NOTES
Cycler set up per protocol and per treatment orders     Per interval history, cycler was found unplugged. 1-800 # on machine called and informed writer if the machine is unplugged for 2 hours it forces an end to treatment.     Pt disconnected and manually drained 3L.    Results from previous treatment:  Effluent color and clarity: yellow, clear  Last UF: -1871ml  Initial Drain: 56ml  Avg Dwell: 1:26      Cycler Serial Number: [id number]  Total Treatment Volume: [id number]  Total treatment time: 10 hrs  Fill Volume: 2900ml  Last Fill Volume:0ml  Heater ba.25% 6000mL;  Lot #: [id number];  Expiration Date: 10/2023  Side Bag #1: 4.25% 6000mL;  Lot #: [id number];  Expiration Date: 10/2023  Side Bag #2: 2.5% 6000mL;  Lot #: [id number];  Expiration Date: 2024  S  Number of cycles including final fill: 5  Dwell Time: 1:24 minutes  Last Fill Volume: 0ml  Initial Drain Alarm: 0ml  PD orders reviewed with Patient procedure and ESRD teaching done and questions answered.  Cycler ready for hook-up.    Report given to: BG Bernal RN

## 2023-09-04 NOTE — PROGRESS NOTES
Cycler set up per protocol and per treatment orders      Results from previous treatment:  Effluent color and clarity: yellow, clear and no fibrin noted  Total UF: 1644ml  Initial Drain: 251ml  Avg Dwell: 1:45        Cycler Serial Number: [id number]  Total Treatment Volume: [id number]  Total treatment time: 10 hrs  Fill Volume: 2900ml  Last Fill Volume:0ml  Heater ba.25% 6000mL;  Lot #: [id number];  Expiration Date: 10/2023  Side Bag #1: 2.5% 6000mL;  Lot #: [id number];  Expiration Date: 2025  Number of cycles including final fill: 5  Dwell Time: 1:23 minutes  Last Fill Volume: 0ml  Initial Drain Alarm: 10ml  PD orders reviewed with Patient procedure and ESRD teaching done and questions answered.  Cycler ready for hook-up and connected at 1850 by Adi ESCAMILLA.    Report given to: LOLI Cui RN

## 2023-09-04 NOTE — PROGRESS NOTES
Renal Medicine Progress Note            Assessment/Plan:     Assessment: Devora Garcia is a 62-year-old female with ESRD on PD, GUILLERMO, HTN admitted with fall and weakness.    ESRD on PD  On dialysis for the last 2 years.  PTA does 4 manual exchanges (3 green, 1 red), 1.5-hour dwell time.  Primary nephrologist is Dr. Cole at [facility].  While inpatient we have been using the cycler.    Mechanical fall  No evidence of fractures.    HTN  PTA on carvedilol 6.25 mg twice daily, clonidine 0.1 mg 3 times daily, Imdur 30 mg daily.  Initially BP high, now mostly normal. Having some orthostatic hypotension 8/29, slightly improved 8/30. Changed carvedilol to metoprolol.     Contamination Event  S/p keflex course. Another contamination event 8/29 overnight with alcohol swab to open transfer set. Per [facility] PD protocol, iodine cap placed for 10 minutes x2 prior to starting dialysis. Given one dose cefazolin for prophylaxis on 8/30 and transfer set was exchanged.     CKD-MBD  Phosphorus was elevated at 11.5.  She reports taking Tums with meals, but often forgets.  Scheduled this, itching has improved.     Anemia of CKD  Hemoglobin 10.5 on 8/25.  Within goal.  No changes.    Cough  New cough, previously off of oxygen but now back on.  Will get chest x-ray to further evaluate and rule out pneumonia.      Plan/Recs:  1) tolerating 5 cycles really well, will continue current prescription with 3 cycles using red bag, 2 with green  2) Daily weight      Reviewed notes from hospitalist, RNs, dialysis nurse.    Maggie Alvarez MD   [facility] consultants  Office: [phone]          Interval History:     No acute overnight events  More alert and awake this morning sitting on edge of bed and eating breakfast continues to have quite a bit of itching  Was off of oxygen previously, complained of cough overnight, back on 1 to 2 L nasal cannula.  She is concerned about pneumonia or other reason for her cough, will get  chest x-ray weight significantly improved, down to 222 pounds from 251 on admission    Tolerated 5 cycles of PD really well last night.  UF 1.6 L.  We will do same prescription tonight.         Medications and Allergies:      - MEDICATION INSTRUCTIONS for Dialysis Patients -   Does not apply See Admin Instructions    acetaminophen  975 mg Oral Q8H    calcium carbonate  1,000 mg Oral TID w/meals    cloNIDine  0.1 mg Oral TID    docusate sodium  100 mg Oral BID    gabapentin  100 mg Oral BID    heparin ANTICOAGULANT  5,000 Units Subcutaneous Q12H    isosorbide mononitrate  30 mg Oral Daily    lidocaine  2 patch Transdermal Q24H    metoprolol succinate ER  12.5 mg Oral Daily    miconazole   Topical BID    pantoprazole  40 mg Oral Daily    polyethylene glycol  17 g Oral Daily        Allergies   Allergen Reactions    Aspirin Nausea    Statins-Hmg-Coa Reductase Inhibitors [Statins] Other (See Comments)     Other reaction(s): Renal Failure, Other reaction(s): Renal Failure            Physical Exam:   Vitals were reviewed  BP (!) 147/56   Pulse 63   Temp 97.8  F (36.6  C) (Oral)   Resp 18   Wt 101.1 kg (222 lb 14.4 oz)   SpO2 97%   BMI 42.12 kg/m      Wt Readings from Last 3 Encounters:   09/04/23 101.1 kg (222 lb 14.4 oz)   05/12/23 100.5 kg (221 lb 9.6 oz)   06/07/22 85.7 kg (189 lb)       Intake/Output Summary (Last 24 hours) at 8/28/2023 0846  Last data filed at 8/27/2023 1951  Gross per 24 hour   Intake --   Output 0 ml   Net 0 ml       GENERAL APPEARANCE: NAD, fatigued appearing  HEENT: normocephalic, MMM  RESP: clear  CV: Regular rate  ABDOMEN: Deferred abdominal exam  EXTREMITIES/SKIN: 1-2+ nonpitting edema  NEURO: Alert, oriented, normal speech           Data:     BMP  Recent Labs   Lab 09/04/23  0657      POTASSIUM 3.8   CHLORIDE 97*   RON 9.2   CO2 25   BUN 54.0*   CR 13.91*   GLC 99       CBC  Recent Labs   Lab 09/04/23  0657 09/01/23  0631    216       Lab Results   Component Value Date     AST 9 05/10/2023    ALT <9 05/10/2023    ALKPHOS 61 05/10/2023    BILITOTAL 0.5 05/10/2023     No results found for: INR  Color Urine (no units)   Date Value   05/29/2021 Light Yellow     Appearance Urine (no units)   Date Value   05/29/2021 Clear     Glucose Urine (mg/dL)   Date Value   05/29/2021 Negative     Bilirubin Urine (no units)   Date Value   05/29/2021 Negative     Ketones Urine (mg/dL)   Date Value   05/29/2021 Negative     Specific Gravity Urine (no units)   Date Value   05/29/2021 1.013     pH Urine (pH)   Date Value   05/29/2021 7.0     Protein Albumin Urine (mg/dL)   Date Value   05/29/2021 100 (A)     Urobilinogen Urine (no units)   Date Value   08/30/2020 <2.0 E.U./dL     Nitrite Urine (no units)   Date Value   05/29/2021 Negative     Leukocyte Esterase Urine (no units)   Date Value   05/29/2021 Trace (A)         Attestation:  I have reviewed today's vital signs, notes, medications, labs and imaging.    Maggie Alvarez MD  [facility] - Nephrology  Office: [phone]

## 2023-09-04 NOTE — PROGRESS NOTES
[facility]    Medicine Progress Note - Hospitalist Service    Date of Admission:  8/23/2023    Assessment & Plan   Devora Garcia is a 62 year old female admitted on 8/23/2023. She has hx of HTN, ESRD on PD, GUILLERMO on CPAP, who presents with L hip and knee pain s/p mechanical fall 8/23.     No head trauma or LOC. She has been having difficulty walking. She feels her fall was due to increased fluid retention from dialysis non-compliance. Since retiring in April, she has not been consistently getting her 4 peritoneal exchanges per day. She has a walker at home and has been needing to use this recently. No fevers/chills/chest pain/SOB.     Interval events:  Nephrology adjusted PD Rx.  Still somewhat groggy.  Mobilizing as able, hopefully ready to discharge in the next next 24 hours but she needs placement.     Mechanical fall and L hip and knee pain.  Dizziness -- mild orthostasis  L hip CT shows no occult fracture.    --Robaxin and Lidocaine patches. Scheduled APAP. Hydromorphone PRN, interval decreased to q6hrs prn (9/1) as she states pain is limiting her mobility.   --PT and OT recommending TCU.  Facility located but needs training in PD. Continue to work with therapy over the weekend  --Needs to be up to chair QID and PRN.  RN to chart refusal.    --SubQ Heparin for DVT prophylaxis.      Constipation:  -- Senna   -- Lactulose 20 grams x 1 today.  Needs to mobilize.      ESRD on PD.  AGMA secondary to ESRD on HD.   Mild orthostasis hypotension.  CKD-MBD  Anemia of CKD  Continue PD. Renal consult appreciated. Keflex 500 mg q12hrs X 3 days for presumed contamination of PD catheter (finished). Given 1 dose of cefazolin for prophylaxis on 8/30/23  Appreciate excellent cares. Hgb 10.5 8/25. Continue to monitor.      HTN. Changed carvedilol to metoprolol given mild orthostasis. Continue clonidine, imdur     Mild hypoxia: Likely body habitus and history of GUILLERMO contributing. Does not use CPAP but has  in the past.  Afebrile.  Not significantly bronchospastic.  Needs aggressive pulmonary toilet.  CXR without acute infiltrate on 8/27/23.   - Pulmonary toilet.  - Acapella device.   - Albuterol PRN     Acute encephalopathy resolved likely due to opiates and ESRD.        Diet: Combination Diet Renal Diet (dialysis)    DVT Prophylaxis: Heparin SQ  Taylor Catheter: Not present  Lines: None     Cardiac Monitoring: None  Code Status: Full Code      Clinically Significant Risk Factors Present on Admission              # Hypoalbuminemia: Lowest albumin = 2.3 g/dL at 8/28/2023  5:42 AM, will monitor as appropriate     # Hypertension: Noted on problem list                 Disposition Plan      Expected Discharge Date: 09/05/2023    Discharge Delays: Placement - TCU  *Medically Ready for Discharge  Destination: inpatient rehabilitation facility  Discharge Comments: 8/27/23 medically ready when TCU bed found.         Maxx Sharp MD  Hospitalist Service  [facility]  Securely message with [employer] (more info)  Text page via [employer] Paging/Directory   ______________________________________________________________________    Interval History   Feels fatigued, not mobilizing much yet  Discussed trying to space out dilaudid dosing  Encouraging mobility.    Physical Exam   Vital Signs: Temp: 97.8  F (36.6  C) Temp src: Oral BP: 121/51 Pulse: 63   Resp: 22 SpO2: 97 % O2 Device: Nasal cannula Oxygen Delivery: 2 LPM  Weight: 233 lbs 7.47 oz    GENERAL:  Comfortable.  PSYCH: pleasant, oriented, No acute distress.  HEART:  RRR  LUNGS:  Normal Respiratory effort.  GI:  Soft. Non-tender, mildly distended.   EXTREMITIES:  able to move all extremities  SKIN:  Dry to touch, No rash, wound or ulcerations.  NEUROLOGIC:  grossly intact    Medical Decision Making       60 MINUTES SPENT BY ME on the date of service doing chart review, history, exam, documentation & further activities per the note.      Data     I have  personally reviewed the following data over the past 24 hrs:    N/A  \   N/A   / 227     N/A N/A N/A /  N/A   N/A N/A N/A \       Imaging results reviewed over the past 24 hrs:   No results found for this or any previous visit (from the past 24 hour(s)).

## 2023-09-04 NOTE — PROVIDER NOTIFICATION
Patient does not have IV access. No IV meds. Is it ok to not have IV?   Also, SHANELL, refused inj Heparin and is refusig CPAP tonight even when told has sleep apnea. Says she doesn't use it at home either and is fine. Thanks    MD said ok.

## 2023-09-04 NOTE — PROGRESS NOTES
Patient Transfer Information  Patient connected to monitoring equipment on arrival: yes Continuous pulse oximetry     Patient connected to wall oxygen on arrival: Yes    Belongings: No belongings present    Safety check completed: Yes

## 2023-09-04 NOTE — PROGRESS NOTES
Update:    Chest x-ray findings noted.  Given cough and lower oxygen saturations we will start Augmentin renally dosed.    Maxx Sharp MD

## 2023-09-04 NOTE — PLAN OF CARE
Goal Outcome Evaluation:    Vitals: /54 (BP Location: Right arm, Patient Position: Semi-Moyer's, Cuff Size: Adult Large)   Pulse 67   Temp 97.3  F (36.3  C) (Oral)   Resp 28   Wt 105.9 kg (233 lb 7.5 oz)   SpO2 95%   BMI 44.11 kg/m      Primary DX: Fall  Orientation: A&OX4. Speech clear. Uses call light appropriately.   Pertinent: Peritoneal Dialysis.   Pain: Rated pain 7/10. PRN Dilaudid effective for pain management.   Anticoagulant: Refused Heparin inj.   Respiratory: LS clear. 2 LPM/NC. Patient refused to even try CPAP. MD was notified of all refusals.   Bowel Sounds: +X4Q. ABD soft, non-tender.   GI/: No BM. Pure wick in place.   Skin: 1 + edema. PD site on ABD, dry, flaky, cracked skin  LDA: No IV access. Per MD, ok.   Diet: Renal   Activity: AX2  Education: Educated on importance of Heparin injection to prevent blood clots, Educated on wearing CPAP, Patient continues to refuse.   Followed By/Consults: Nephrology/ Hemodialysis  Plan of care was reviewed with: Patient  Overall patient progress stable this shift: Yes  Bed alarm on: yes  Safety checks completed: Yes  Plan: Continue current POC    Cares are clustered at night to promote rest and help reduce falls for the patient.

## 2023-09-04 NOTE — PROGRESS NOTES
Care Management Follow Up    Length of Stay (days): 0    Expected Discharge Date: 09/06/2023     Concerns to be Addressed:       Patient plan of care discussed at interdisciplinary rounds: Yes    Anticipated Discharge Disposition: Transitional Care      Referrals Placed by CM/SW: Post Acute Facilities  Private pay costs discussed: Not applicable    Additional Information:  CM called [facility] to follow up whether they have been able to schedule the peritoneal dialysis training. They stated they have contacted the patient's dialysis provider and are waiting to hear back from them as to a date they can come out to provide teaching. She will send another email and will call us back when they know. Care coordination to continue to follow up with Andrzej.     Hazel Duvall RN  Care Coordinator  [facility]

## 2023-09-04 NOTE — PLAN OF CARE
Goal Outcome Evaluation: progressing slowly.       Affect: A/O x4 More awake today. Discomfort 7/10 L hip & shoulder. Lidocaine x2 on L shoulder, robaxin, dilaudid po for pain. Refusing scheduled tylenol  Mobility: Increased mobility. Up to BR/chair, radiology today.   GI/: Obese. Voided x1 on toilet, small BM. PD scheduled for tonight. Daily Weight  Resp: room air 88-89% today. CXR ordered/reviewed. IS to 1250, flutter valve used by pt correctly. Shallow breaths. Infrequent non productive cough.   Refusal: The patient is refusing all heparin injections, scheduled tylenol, repositioning, & cpap despite rationale/explanation of orders.

## 2023-09-05 NOTE — PLAN OF CARE
PRIMARY DIAGNOSIS: GENERALIZED WEAKNESS    OUTPATIENT/OBSERVATION GOALS TO BE MET BEFORE DISCHARGE  1. Orthostatic performed: N/A    2. Tolerating PO medications: Yes    3. Return to near baseline physical activity: No    4. Cleared for discharge by consultants (if involved): No    Discharge Planner Nurse   Safe discharge environment identified: Yes  Barriers to discharge: Yes       Entered by: Yoli Spear RN 09/05/2023 5:11 AM     Please review provider order for any additional goals.   Nurse to notify provider when observation goals have been met and patient is ready for discharge.    -----    Goal Outcome Evaluation: Progressing, no significant change.    A&O. VSS. Patient reported pain ratings of 4, 3, and 6 through shift for her L shoulder and L hip. Robaxin given for 4/10 pain rating at 2030 and pain decreased. 1mg Dilaudid PO given for 6/10 pain rating.    Patient refused Heparin and Tylenol during this shift.    Discussed pneumonia Dx with patient and first dose of Augmentin was administered.  Patient is on 0.5L oxygen. Infrequent non-productive coughing.  Purewick in place.    Note to pass along: Contacted and spoke with an on-call dialysis nurse to troubleshoot the the peritoneal dialysis cycler. The on-call nurse suggested that the patient should have 3 bags set up rather than only 2 for the next cycle.    Patient is awaiting TCU placement.

## 2023-09-05 NOTE — PLAN OF CARE
Goal Outcome Evaluation:    A&Ox4. Pain in left hip and shoulder, patient rated it 5/10. I told her the PO Dilaudid is indicated for pain >6. She then changed her answer to 6 and started moaning and holding her hip. Informed patient I would reassess pain later. Declined Tylenol. MD's note states to wean narcotics. Later gave PO Robaxin after pt asked for Dilaudid again. A1 walker and gb. LS: clear. Using oxygen intermittently as she feels she needs it. No tele. Mild edema in BLE. Neph encouraged pt to drink less. Purewick in place per pt's preference, although producing little urine d/t PD. Discharge to TCU once bed is found.     PRIMARY DIAGNOSIS: GENERALIZED WEAKNESS    OUTPATIENT/OBSERVATION GOALS TO BE MET BEFORE DISCHARGE  1. Orthostatic performed: N/A    2. Tolerating PO medications: Yes    3. Return to near baseline physical activity: No    4. Cleared for discharge by consultants (if involved): No    Discharge Planner Nurse   Safe discharge environment identified: Yes  Barriers to discharge: Yes       Entered by: Nicolette Caro RN 09/05/2023 11:31 AM     Please review provider order for any additional goals.   Nurse to notify provider when observation goals have been met and patient is ready for discharge.

## 2023-09-05 NOTE — PROGRESS NOTES
PRIMARY DIAGNOSIS: GENERALIZED WEAKNESS    OUTPATIENT/OBSERVATION GOALS TO BE MET BEFORE DISCHARGE  1. Orthostatic performed: N/A    2. Tolerating PO medications: Yes    3. Return to near baseline physical activity: No    4. Cleared for discharge by consultants (if involved): No    Discharge Planner Nurse   Safe discharge environment identified: Yes  Barriers to discharge: Yes       Entered by: Yoli Spear RN 09/05/2023 2:18 AM     Please review provider order for any additional goals.   Nurse to notify provider when observation goals have been met and patient is ready for discharge.

## 2023-09-05 NOTE — PROGRESS NOTES
PRIMARY DIAGNOSIS: GENERALIZED WEAKNESS    OUTPATIENT/OBSERVATION GOALS TO BE MET BEFORE DISCHARGE  1. Orthostatic performed: N/A    2. Tolerating PO medications: Yes    3. Return to near baseline physical activity: No    4. Cleared for discharge by consultants (if involved): No    Discharge Planner Nurse   Safe discharge environment identified: Yes  Barriers to discharge: Yes       Entered by: Yoli Spear RN 09/04/2023 10:36 PM     Please review provider order for any additional goals.   Nurse to notify provider when observation goals have been met and patient is ready for discharge.

## 2023-09-05 NOTE — PROGRESS NOTES
Care Management Follow Up    Length of Stay (days): 0    Expected Discharge Date: 09/06/2023     Concerns to be Addressed:       Patient plan of care discussed at interdisciplinary rounds: Yes    Anticipated Discharge Disposition: Transitional Care     Anticipated Discharge Services:    Anticipated Discharge DME: None    Patient/family educated on Medicare website which has current facility and service quality ratings: no  Education Provided on the Discharge Plan:    Patient/Family in Agreement with the Plan:      Referrals Placed by CM/SW: Post Acute Facilities  Private pay costs discussed: Not applicable    Additional Information:  CM called [facility] to follow up again on progress in setting up the education for peritoneal dialysis. Spoke with Hodan who said they have been trying to contact the [facility] and they are unable to leave a voicemail and it just rings. CM called [facility] to verify what facility patient uses and get a working number. [facility] stated that patient uses [facility] in [place] and gave number of [phone]. Called back to Hodan at [facility] and provided number. They will contact and set up training.  Care coordination to continue to follow.    Hazel Duvall RN  Care Coordinator  [facility]

## 2023-09-05 NOTE — PROGRESS NOTES
Cycler set up per protocol and per treatment orders     Results from previous treatment:  Effluent color and clarity: yellow,clear no fibrin  Total UF: 1355  Avg Dwell: 1:10  Lost Dwell: 1:06    Cycler Serial Number: [id number]  Total Treatment Volume: [id number]  Total treatment time: 10 hrs  Fill Volume: 2900ml  Last Fill Volume:0ml  Heater ba.25% 6000mL;  Lot #: [id number];  Expiration Date: 10/23  Side Bag #1: 4.25% 6000mL;  Lot #: [id number];  Expiration Date: 10/23  Side Bag #2: 2.5% 6000mL;  Lot #: [id number];  Expiration Date:     Number of cycles including final fill: 5  Dwell Time: 1:23 minutes  Last Fill Volume: 0ml  Initial Drain Alarm: 0ml  PD orders reviewed with Patient procedure and ESRD teaching done and questions answered.  Cycler ready for hook-up.    Report given to: Mavis Pollard consent form signed yes

## 2023-09-05 NOTE — PROGRESS NOTES
PRIMARY DIAGNOSIS: GENERALIZED WEAKNESS    OUTPATIENT/OBSERVATION GOALS TO BE MET BEFORE DISCHARGE  1. Orthostatic performed: N/A    2. Tolerating PO medications: Yes    3. Return to near baseline physical activity: No    4. Cleared for discharge by consultants (if involved): No    Discharge Planner Nurse   Safe discharge environment identified: Yes  Barriers to discharge: Yes       Entered by: Nicolette Caro RN 09/05/2023 11:16 AM     Please review provider order for any additional goals.   Nurse to notify provider when observation goals have been met and patient is ready for discharge.

## 2023-09-05 NOTE — PROGRESS NOTES
Renal Medicine Progress Note            Assessment/Plan:     Assessment: Devora Garcia is a 62-year-old female with ESRD on PD, GUILLERMO, HTN admitted with fall and weakness.    ESRD on PD  On dialysis for the last 2 years.  PTA does 4 manual exchanges (3 green, 1 red), 1.5-hour dwell time.  Primary nephrologist is Dr. Dobson at [facility].  Getting CCPD here    Mechanical fall  No evidence of fractures.    HTN  PTA on carvedilol 6.25 mg twice daily, clonidine 0.1 mg 3 times daily, Imdur 30 mg daily.  Initially BP high, now mostly normal.      Contamination Event  S/p keflex course. Another contamination event 8/29 overnight with alcohol swab to open transfer set. Per [facility] PD protocol, iodine cap placed for 10 minutes x2 prior to starting dialysis. Given one dose cefazolin for prophylaxis on 8/30 and transfer set was exchanged.   -PD fluid looks clear    CKD-MBD  Phosphorus was elevated at 11.5.  Now on Tums.  Phosphorus improving the last week.  Recheck tomorrow.    Anemia of CKD  Hemoglobin 9.5 on on last check.  Recheck tomorrow.  EPO if needed    Cough  On Augmentin for possible pneumonia.    Plan/Recs:  1) tolerating 5 cycles  well, will continue current prescription with 3 cycles using red bag, 2 with green X 2900 fill vol . No last bag  2) Daily weight  3) counseled on p.o. fluid restriction and low-salt diet.      Reviewed notes from hospitalist, RNs, dialysis nurse.    Vida Guillaume MD  [facility] - Nephrology   [phone]            Interval History:     No acute overnight events  Awake and alert on my evaluation.  He denies any complaints.  Noted PD alarm overnight.  It appears she did not have enough fluid is ordered individually to back since the 3 bags of fluid.  Fluid looks clear.  Total UF of 1.4 L.         Medications and Allergies:      - MEDICATION INSTRUCTIONS for Dialysis Patients -   Does not apply See Admin Instructions    acetaminophen  975 mg Oral Q8H     amoxicillin-clavulanate  1 tablet Oral Q12H [facility] (08[facility])    calcium carbonate  1,000 mg Oral TID w/meals    cloNIDine  0.1 mg Oral TID    docusate sodium  100 mg Oral BID    gabapentin  100 mg Oral BID    heparin ANTICOAGULANT  5,000 Units Subcutaneous Q12H    isosorbide mononitrate  30 mg Oral Daily    lidocaine  2 patch Transdermal Q24H    metoprolol succinate ER  12.5 mg Oral Daily    miconazole   Topical BID    pantoprazole  40 mg Oral Daily    polyethylene glycol  17 g Oral Daily        Allergies   Allergen Reactions    Aspirin Nausea    Statins-Hmg-Coa Reductase Inhibitors [Statins] Other (See Comments)     Other reaction(s): Renal Failure, Other reaction(s): Renal Failure            Physical Exam:   Vitals were reviewed  BP (!) 159/57 (BP Location: Right arm, Patient Position: Semi-Moyer's, Cuff Size: Adult Regular)   Pulse 68   Temp 97.7  F (36.5  C) (Oral)   Resp 18   Wt 103.4 kg (227 lb 14.4 oz)   SpO2 95%   BMI 43.06 kg/m      Wt Readings from Last 3 Encounters:   09/05/23 103.4 kg (227 lb 14.4 oz)   05/12/23 100.5 kg (221 lb 9.6 oz)   06/07/22 85.7 kg (189 lb)         GENERAL APPEARANCE: NAD,   HEENT: normocephalic, MMM  RESP: clear  CV: Regular rate  ABDOMEN: PD exit site looks okay  EXTREMITIES/SKIN: 1-2+ nonpitting edema  NEURO: Alert, oriented, normal speech           Data:     BMP  Recent Labs   Lab 09/04/23  0657      POTASSIUM 3.8   CHLORIDE 97*   RON 9.2   CO2 25   BUN 54.0*   CR 13.91*   GLC 99       CBC  Recent Labs   Lab 09/04/23  0657 09/01/23  0631    216       Lab Results   Component Value Date    AST 9 05/10/2023    ALT <9 05/10/2023    ALKPHOS 61 05/10/2023    BILITOTAL 0.5 05/10/2023     No results found for: INR  Color Urine (no units)   Date Value   05/29/2021 Light Yellow     Appearance Urine (no units)   Date Value   05/29/2021 Clear     Glucose Urine (mg/dL)   Date Value   05/29/2021 Negative     Bilirubin Urine (no units)   Date Value   05/29/2021 Negative      Ketones Urine (mg/dL)   Date Value   05/29/2021 Negative     Specific Gravity Urine (no units)   Date Value   05/29/2021 1.013     pH Urine (pH)   Date Value   05/29/2021 7.0     Protein Albumin Urine (mg/dL)   Date Value   05/29/2021 100 (A)     Urobilinogen Urine (no units)   Date Value   08/30/2020 <2.0 E.U./dL     Nitrite Urine (no units)   Date Value   05/29/2021 Negative     Leukocyte Esterase Urine (no units)   Date Value   05/29/2021 Trace (A)         Attestation:  I have reviewed today's vital signs, notes, medications, labs and imaging.    Vida Guillaume MD  [facility] - Nephrology  Office: [phone]

## 2023-09-05 NOTE — PROGRESS NOTES
SPIRITUAL HEALTH SERVICES Progress Note  MS 3    Saw pt Devora Garcia per length of stay visit.    Patient/Family Understanding of Illness and Goals of Care - Patient understands that she is at  due to complications from a fall and other hospital problems.    Distress and Loss - She is distressed that the results of her fall have required this length of hospital stay.    Strengths, Coping, and Resources - Her  is loving and supportive. She has a very positive attitude.    Meaning, Beliefs and Spirituality - She is [employer] and looks to her toni as a resource.    Plan of Care - No additional needs at this time.  SHS remains available.    Rev. Jovita Rajan M.Div.  Staff   Phone [phone]

## 2023-09-05 NOTE — PLAN OF CARE
PRIMARY DIAGNOSIS: Sepsis  OUTPATIENT/OBSERVATION GOALS TO BE MET BEFORE DISCHARGE:  ADLs back to baseline: No    Activity and level of assistance: 1 assist with GB and walker    Pain status: Improved but still requiring IV narcotics.    Return to near baseline physical activity: No     Discharge Planner Nurse   Safe discharge environment identified: Yes  Barriers to discharge: No       Entered by: Yoli Young RN 09/05/2023 6:55 PM     Please review provider order for any additional goals.   Nurse to notify provider when observation goals have been met and patient is ready for discharge.    Cared for [id number]    Pt is alert and oriented x4. Vital signs stable. Pt denies numbness or tingling. Pt reports pain, refused schedule tylenol. Pt is up 1 assist with GB and walker. Lung sounds clear. Protocol. Nephrology, PT, and OT are following. Will continue with plan of care.

## 2023-09-06 NOTE — PLAN OF CARE
PRIMARY DIAGNOSIS: GENERALIZED WEAKNESS    OUTPATIENT/OBSERVATION GOALS TO BE MET BEFORE DISCHARGE  1. Orthostatic performed: No will obtain upon pt returning to bed from chair    2. Tolerating PO medications: Yes    3. Return to near baseline physical activity: No    4. Cleared for discharge by consultants (if involved): No    Discharge Planner Nurse   Safe discharge environment identified: No  Barriers to discharge: Yes       Entered by: Sophie Turner RN 09/06/2023 11:09 AM     Please review provider order for any additional goals.   Nurse to notify provider when observation goals have been met and patient is ready for discharge.

## 2023-09-06 NOTE — PLAN OF CARE
Care from 1900- 0700      Admit Date: 8/23/23  Admitting Diagnosis: 8/23/23  Pertinent History: HTN, ESRD on PD, GUILLERMO on CPAP,       Neuro: Alert and Oriented x4  Activity: Ax-2 with Vivian Steady.   Telemetry Monitoring: No  Pain: complaining of 6/10 pain in their generalized body aches.  Robaxin given for pain.  GI: bowel sounds audible. Pt stated feeling constipation, colace given. Denies Nausea.   : Pt on dialysis. Purewick placed for overnight and while PD running.  O2: Pt refused CPAP. Pt requested O2, on 1 LPM  Medications: PO abx: Augmentin. Refused Heparin, tylenol.   LDA's:  PD Cath  Fluids: has no IV access.  Diet: Renal   Consults: PT, OT, and Nephrology  Discharge Disposition: Transitional Care Unit recommended. Pt wants to discharge home if able to ambulate.    -PD started 2030 and will need to be discontinued at 0830  -Pt requested to ambulate to the bathroom. Gait belt and walker with Ax-2 people were used. Pt became SOB and was having difficulty ambulating d/t weakness and leg pain. Pt was transported back to bed using the vivian steady.  -Pt triggered sepsis, lactic acid was 1.7, x2 VS completed in 1 hr.

## 2023-09-06 NOTE — PLAN OF CARE
Goal Outcome Evaluation:    Plan of Care Reviewed With: patient    Overall Patient Progress: no change    A&Ox4. Pain in left hip and shoulder, gave Robaxin. A1-2 w/ walker and gb. LOPEZ, LS: clear. No tele. Edema in BLE. PD for 10 hours overnight, started at 2000. Purewick in place but producing very little urine. On PO Augmentin. PT/OT/SW/Neph following. Accepted to TCU, see care coordinator's note from today.    PRIMARY DIAGNOSIS: GENERALIZED WEAKNESS    OUTPATIENT/OBSERVATION GOALS TO BE MET BEFORE DISCHARGE  1. Orthostatic performed: Yes:          Lying Orthostatic BP: 133/62         Sitting Orthostatic BP: 127/64         Standing Orthostatic BP: 91/47     2. Tolerating PO medications: Yes    3. Return to near baseline physical activity: No    4. Cleared for discharge by consultants (if involved): Yes    Discharge Planner Nurse   Safe discharge environment identified: Yes  Barriers to discharge: Yes       Entered by: Nicolette Caro RN 09/06/2023 6:57 PM     Please review provider order for any additional goals.   Nurse to notify provider when observation goals have been met and patient is ready for discharge.

## 2023-09-06 NOTE — PROGRESS NOTES
Cycler set up per protocol and per treatment orders     Results from previous treatment:  Effluent color and clarity: yellow clear  No fibrin noted  Total UF: 1778  Initial Drain:     Avg Dwell: 1:30  Lost Dwell: 0    Cycler Serial Number: [id number]  Total Treatment Volume: [id number]  Total treatment time: 10 hrs  Fill Volume: 2900ml  Last Fill Volume:0ml  Heater ba.25% 6000mL;  Lot #: [id number];  Expiration Date: 10/23  Side Bag #1: 4.25% 6000mL;  Lot #: [id number];  Expiration Date: 10/23  Side Bag #2: 2.5% 6000mL;  Lot #: [id number];  Expiration Date:   Number of cycles including final fill: 5  Dwell Time: 123 minutes  Last Fill Volume: 0ml  Initial Drain Alarm: 0ml  PD orders reviewed with Patient procedure and ESRD teaching done and questions answered.  Cycler ready for hook-up.    Report given to: Nicolette Caro RN and the charge nurse  Atul consent form signed yes

## 2023-09-06 NOTE — PROGRESS NOTES
Care Management Follow Up    Length of Stay (days): 0    Expected Discharge Date: 09/07/2023     Concerns to be Addressed:  discharge planning to Transitional Care Unit with PD     Patient plan of care discussed at interdisciplinary rounds: Yes    Anticipated Discharge Disposition: Transitional Care     Anticipated Discharge Services:  PD  Anticipated Discharge DME: None    Referrals Placed by CM/SW: Post Acute Facilities      Additional Information:  CM following for discharge planning to Transitional Care Unit. Patient has been accepted by [facility] for therapies and PD. Call placed to [facility] admissions [phone] and spoke to Jocelyn for update on discharge plan. She clarified that for all of the new PD patients that they take, they have the patient's home PD clinic come in to teach their staff on how they do the PD. She explained that each PD patient has and individualized plan so they need education on each individual they accept. They now have the correct home clinic for patient as [facility] and have reached out to them. Their , Zoe, has now returned to work this week and will be working with the [facility], on an education plan. They anticipate it will take a couple of days to teach all shifts of their staff. They anticipate patient being able to go to their facility early to mid next week. [facility] Admissions will reach out to CM with update on final plan when determined. Per MD, patient is progressing slowly but may be able to discharge back home if this process will be lengthy. Will cont to follow for Transitional Care Unit and daily progress of patient.               Kristie Otero RN BSN CM  Inpatient Care Coordination  [facility]  [phone]

## 2023-09-06 NOTE — PLAN OF CARE
PRIMARY DIAGNOSIS: GENERALIZED WEAKNESS    OUTPATIENT/OBSERVATION GOALS TO BE MET BEFORE DISCHARGE  1. Orthostatic performed: No pt refused x2    2. Tolerating PO medications: Yes    3. Return to near baseline physical activity: No    4. Cleared for discharge by consultants (if involved): No    Discharge Planner Nurse   Safe discharge environment identified: No  Barriers to discharge: Yes       Entered by: Sophie Turner RN 09/06/2023 2:24 PM     Please review provider order for any additional goals.   Nurse to notify provider when observation goals have been met and patient is ready for discharge.      Pt transitioned from 1L NC to RA, occasionally reports LOPEZ, assistx1 with walker + gait belt, calls appropriately, A&Ox4, alarms on for safety. PD disconnected 0845 for ESRD; low urine output as expected via purewick. Continues to be on obs, waiting for placement with TCU. Incentive spirometry encouraged along with getting out of bed and into chair. PT/OT, social work and nephology following

## 2023-09-06 NOTE — PROGRESS NOTES
PRIMARY DIAGNOSIS: GENERALIZED WEAKNESS    OUTPATIENT/OBSERVATION GOALS TO BE MET BEFORE DISCHARGE  1. Orthostatic performed: Yes:          Lying Orthostatic BP: 133/62         Sitting Orthostatic BP: 127/64         Standing Orthostatic BP: 91/47     2. Tolerating PO medications: Yes    3. Return to near baseline physical activity: No    4. Cleared for discharge by consultants (if involved): Yes    Discharge Planner Nurse   Safe discharge environment identified: Yes  Barriers to discharge: Yes       Entered by: Nicolette Caro RN 09/06/2023 6:49 PM     Please review provider order for any additional goals.   Nurse to notify provider when observation goals have been met and patient is ready for discharge.

## 2023-09-06 NOTE — PROGRESS NOTES
Renal Medicine Progress Note            Assessment/Plan:     Assessment: Devora Garcia is a 62-year-old female with ESRD on PD, GUILLERMO, HTN admitted with fall and weakness.    ESRD on PD  On dialysis for the last 2 years.  PTA does 4 manual exchanges (3 green, 1 red), 1.5-hour dwell time.  Primary nephrologist is Dr. Dobson at [facility].  Getting CCPD here    Mechanical fall  No evidence of fractures.    HTN  PTA on carvedilol 6.25 mg twice daily, clonidine 0.1 mg 3 times daily, Imdur 30 mg daily.  Initially BP high, now mostly normal.      Contamination Event  S/p keflex course. Another contamination event 8/29 overnight with alcohol swab to open transfer set. Per [facility] PD protocol, iodine cap placed for 10 minutes x2 prior to starting dialysis. Given one dose cefazolin for prophylaxis on 8/30 and transfer set was exchanged.   -PD fluid looks clear    CKD-MBD  Phosphorus was elevated at 11.5.  Now on Tums.  Phosphorus improving the last week.  Recheck tomorrow.    Anemia of CKD  Hemoglobin 9.5 on on last check.  Recheck.  EPO 10 K today    Cough  On Augmentin for possible pneumonia.    Plan/Recs:  1) tolerating 5 cycles  well, will continue current prescription with 3 cycles using red bag, 2 with green X 2900 fill vol . No last bag  2) Daily weight  3) counseled on p.o. fluid restriction and low-salt diet.      Reviewed notes from hospitalist, RNs, dialysis nurse.    Vida Guillaume MD  [facility] - Nephrology   [phone]            Interval History:     No acute overnight events  Awake and alert   sHe denies any complaints.  PD ran okay overnight.  Total UF of 1.7 L.         Medications and Allergies:      - MEDICATION INSTRUCTIONS for Dialysis Patients -   Does not apply See Admin Instructions    acetaminophen  975 mg Oral Q8H    amoxicillin-clavulanate  1 tablet Oral Q12H [facility] (08/20)    calcium carbonate  1,000 mg Oral TID w/meals    cloNIDine  0.1 mg Oral TID    docusate sodium  100 mg  Oral BID    gabapentin  100 mg Oral BID    heparin ANTICOAGULANT  5,000 Units Subcutaneous Q12H    isosorbide mononitrate  30 mg Oral Daily    lidocaine  2 patch Transdermal Q24H    metoprolol succinate ER  12.5 mg Oral Daily    miconazole   Topical BID    pantoprazole  40 mg Oral Daily    polyethylene glycol  17 g Oral Daily        Allergies   Allergen Reactions    Aspirin Nausea    Statins-Hmg-Coa Reductase Inhibitors [Statins] Other (See Comments)     Other reaction(s): Renal Failure, Other reaction(s): Renal Failure            Physical Exam:   Vitals were reviewed  BP (!) 158/71   Pulse 62   Temp 99.8  F (37.7  C) (Oral)   Resp 20   Wt 109.7 kg (241 lb 13.5 oz)   SpO2 98%   BMI 45.70 kg/m      Wt Readings from Last 3 Encounters:   09/06/23 109.7 kg (241 lb 13.5 oz)   05/12/23 100.5 kg (221 lb 9.6 oz)   06/07/22 85.7 kg (189 lb)         GENERAL APPEARANCE: NAD,   HEENT: normocephalic, MMM  RESP: clear  CV: Regular rate  ABDOMEN: PD exit site looks okay  EXTREMITIES/SKIN: 1-2+ nonpitting edema  NEURO: Alert, oriented, normal speech           Data:     BMP  Recent Labs   Lab 09/06/23  0830 09/04/23  0657    139   POTASSIUM 3.5 3.8   CHLORIDE 96* 97*   RON 9.1 9.2   CO2 25 25   BUN 51.5* 54.0*   CR 13.45* 13.91*   * 99       CBC  Recent Labs   Lab 09/04/23  0657 09/01/23  0631    216       Lab Results   Component Value Date    AST 9 05/10/2023    ALT <9 05/10/2023    ALKPHOS 61 05/10/2023    BILITOTAL 0.5 05/10/2023     No results found for: INR  Color Urine (no units)   Date Value   05/29/2021 Light Yellow     Appearance Urine (no units)   Date Value   05/29/2021 Clear     Glucose Urine (mg/dL)   Date Value   05/29/2021 Negative     Bilirubin Urine (no units)   Date Value   05/29/2021 Negative     Ketones Urine (mg/dL)   Date Value   05/29/2021 Negative     Specific Gravity Urine (no units)   Date Value   05/29/2021 1.013     pH Urine (pH)   Date Value   05/29/2021 7.0     Protein Albumin  Urine (mg/dL)   Date Value   05/29/2021 100 (A)     Urobilinogen Urine (no units)   Date Value   08/30/2020 <2.0 E.U./dL     Nitrite Urine (no units)   Date Value   05/29/2021 Negative     Leukocyte Esterase Urine (no units)   Date Value   05/29/2021 Trace (A)         Attestation:  I have reviewed today's vital signs, notes, medications, labs and imaging.    Vida Guillaume MD  [facility] - [facility]ph[facility]  Office: [phone]

## 2023-09-07 NOTE — PROGRESS NOTES
Cycler set up per protocol and per treatment orders     Results from previous treatment:  Effluent color and clarity: clear, yellow  Total UF: 1976 mL  Initial Drain: 166 mL  Avg Dwell: 1:27  Added Dwell: 0:18    Cycler Serial Number: [id number]  Total Treatment Volume: [id number]  Total treatment time: 10 hrs  Fill Volume: 2900ml  Last Fill Volume:0ml  Dextrose bags: 4.25 % 6000mL x 2  Lot #: [id number];  Expiration Date: Oct 23  Dextrose ba.5% 6000mL;  Lot #: [id number];  Expiration Date:   Number of cycles including final fill: 5  Dwell Time: 1:22  Last Fill Volume: 0ml  Initial Drain Alarm: 0ml  PD orders reviewed with Patient procedure and ESRD teaching done and questions answered.  Cycler ready for hook-up.    Report given to: FRANCINE Luna consent form signed yes

## 2023-09-07 NOTE — PLAN OF CARE
Goal Outcome Evaluation:      Plan of Care Reviewed With: patient    Overall Patient Progress: no change    /53 (BP Location: Left arm)   Pulse 67   Temp 98.8  F (37.1  C) (Oral)   Resp 16   Wt 104.6 kg (230 lb 9.6 oz)   SpO2 97%   BMI 43.57 kg/m      On RA.    Pertinent Assessments: Withdrawn. A/Ox3, forgetful. Sleepy in AM, but more alert later in day. LS dim. BLE edema 2+. Up with 1a gb/w. Denies pain. Voiding via purewick.   Major Shift Events: None  Treatment Plan: PD nightly. Neph following. Po abx. CM following for discharge planning. Continuing to mobilize with PT/OT.     PRIMARY DIAGNOSIS: GENERALIZED WEAKNESS    OUTPATIENT/OBSERVATION GOALS TO BE MET BEFORE DISCHARGE  1. Orthostatic performed: No    2. Tolerating PO medications: Yes    3. Return to near baseline physical activity: No, up with 1a gb/w. Per discussion with OT, pt is improving.     4. Cleared for discharge by consultants (if involved): N/A    Discharge Planner Nurse   Safe discharge environment identified: Yes, accepted at TCU.   Barriers to discharge: Yes, per CM note pending teaching from PD clinic at TCU.        Entered by: Nadya Lamar RN 09/07/2023 11:28 AM     Please review provider order for any additional goals.   Nurse to notify provider when observation goals have been met and patient is ready for discharge.

## 2023-09-07 NOTE — PLAN OF CARE
Goal Outcome Evaluation:      Plan of Care Reviewed With: patient    Overall Patient Progress: no changeOverall Patient Progress: no change     Vitals: /65 (BP Location: Right arm)   Pulse 71   Temp 97.8  F (36.6  C) (Oral)   Resp 20   Wt 109.7 kg (241 lb 13.5 oz)   SpO2 99%   BMI 45.70 kg/m      Primary Diagnosis: PD dialysis  Pain: 6/10  Orientation: Disoriented to situation  GI/: Pt. On PD, pt. Has minimal urine output  LDA: No IV access, MD OK with this  Skin: Cracked/peeling to BLE  Bowel sounds: Active  Respiratory: Clear  Diet: Renal  Activity: 1-2A GB/walker  Followed by: PT/OT/SW  Plan: Patient has PD running, runs over 10 hrs and will plan to stop around 0600. Patient has been accepted at [facility]. Patient is medically stable and ready for discharge but employee from patient's peritoneal dialysis clinic will need to come to TCU to provide peritoneal dialysis administration education.This process will likely be facilitated by mid-early to next week. POC ongoing.

## 2023-09-07 NOTE — PROGRESS NOTES
PRIMARY DIAGNOSIS: ACUTE PAIN  OUTPATIENT/OBSERVATION GOALS TO BE MET BEFORE DISCHARGE:  1. Pain Status: Pain free.    2. Return to near baseline physical activity: No    3. Cleared for discharge by consultants (if involved): Yes    Discharge Planner Nurse   Safe discharge environment identified: Yes  Barriers to discharge: Yes       Entered by: Keisha Bernal RN 09/07/2023 3:36 PM   Pt needs facility that can take care of peritoneal dialysis.   Please review provider order for any additional goals.   Nurse to notify provider when observation goals have been met and patient is ready for discharge.

## 2023-09-07 NOTE — PROGRESS NOTES
"PRIMARY DIAGNOSIS: \"GENERIC\" NURSING  OUTPATIENT/OBSERVATION GOALS TO BE MET BEFORE DISCHARGE:  ADLs back to baseline: No    Activity and level of assistance: Up with standby assistance.    Pain status: Improved-controlled with oral pain medications.    Return to near baseline physical activity: No     Discharge Planner Nurse   Safe discharge environment identified: Yes  Barriers to discharge: Yes       Entered by: Charmaine Domingo RN 09/07/2023 4:56 AM     Please review provider order for any additional goals.   Nurse to notify provider when observation goals have been met and patient is ready for discharge.  "
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
## 2023-09-07 NOTE — PLAN OF CARE
Goal Outcome Evaluation:      Plan of Care Reviewed With: patient    Overall Patient Progress: no change    PRIMARY DIAGNOSIS: GENERALIZED WEAKNESS    OUTPATIENT/OBSERVATION GOALS TO BE MET BEFORE DISCHARGE  1. Orthostatic performed: No, pt requested to be left to sleep at this time.     2. Tolerating PO medications: Yes    3. Return to near baseline physical activity: No, requiring 1-2a gb/w per RN report.     4. Cleared for discharge by consultants (if involved): No, requiring TCU placement.     Discharge Planner Nurse   Safe discharge environment identified: Yes  Barriers to discharge: Yes, per CM note: pending PD education at TCU.        Entered by: Nadya Lamar RN 09/07/2023 8:07 AM     Please review provider order for any additional goals.   Nurse to notify provider when observation goals have been met and patient is ready for discharge.

## 2023-09-07 NOTE — PROGRESS NOTES
"PRIMARY DIAGNOSIS: \"GENERIC\" NURSING  OUTPATIENT/OBSERVATION GOALS TO BE MET BEFORE DISCHARGE:  ADLs back to baseline: No    Activity and level of assistance: Up with standby assistance.    Pain status: Improved-controlled with oral pain medications.    Return to near baseline physical activity: No     Discharge Planner Nurse   Safe discharge environment identified: Yes  Barriers to discharge: No       Entered by: Charmaine Domingo RN 09/07/2023 1:13 AM     Please review provider order for any additional goals.   Nurse to notify provider when observation goals have been met and patient is ready for discharge.  "

## 2023-09-07 NOTE — PROGRESS NOTES
This writer unhooked patient from PD cycler per protocol. Pt stated dressing did not need to be changed.
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
## 2023-09-08 NOTE — PLAN OF CARE
"PRIMARY DIAGNOSIS: \"GENERIC\" NURSING  OUTPATIENT/OBSERVATION GOALS TO BE MET BEFORE DISCHARGE:  ADLs back to baseline: No    Activity and level of assistance: Assist of 2, walker, gt belt    Pain status: Improved-controlled with oral pain medications.    Return to near baseline physical activity: No     Discharge Planner Nurse   Safe discharge environment identified: Yes  Barriers to discharge: Yes       Entered by: Domenica Sorto RN 09/08/2023 6:12 AM   Pt continues to refuse cares, certain meds, and repositioning. Education provided on skin integrity, importance of medications, pt aware.  Prn robaxin x1 effective for pain.  Accepted at tcu, awaiting staff training for PD  Please review provider order for any additional goals.   Nurse to notify provider when observation goals have been met and patient is ready for discharge.Goal Outcome Evaluation:      Plan of Care Reviewed With: patient    Overall Patient Progress: no changeOverall Patient Progress: no change           "
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
## 2023-09-08 NOTE — PLAN OF CARE
PRIMARY DIAGNOSIS: GENERALIZED WEAKNESS    OUTPATIENT/OBSERVATION GOALS TO BE MET BEFORE DISCHARGE  1. Orthostatic performed: Yes:          Lying Orthostatic BP: 133/62         Sitting Orthostatic BP: 127/64         Standing Orthostatic BP: 91/47     2. Tolerating PO medications: Yes    3. Return to near baseline physical activity: Yes    4. Cleared for discharge by consultants (if involved): Yes    Discharge Planner Nurse   Safe discharge environment identified: Yes  Barriers to discharge: No       Entered by: Laz Marcum RN 09/08/2023 3:57 PM     Please review provider order for any additional goals.   Nurse to notify provider when observation goals have been met and patient is ready for discharge.

## 2023-09-08 NOTE — PLAN OF CARE
"PRIMARY DIAGNOSIS: GENERALIZED WEAKNESS    OUTPATIENT/OBSERVATION GOALS TO BE MET BEFORE DISCHARGE  1. Orthostatic performed: Yes:          Lying Orthostatic BP: 133/62         Sitting Orthostatic BP: 127/64         Standing Orthostatic BP: 91/47     2. Tolerating PO medications: Yes    3. Return to near baseline physical activity: Yes    4. Cleared for discharge by consultants (if involved): Yes    Discharge Planner Nurse   Safe discharge environment identified: Yes  Barriers to discharge: Yes       Entered by: Laz Marcum RN 09/08/2023 5:15 PM     Vital signs:  Temp: 97.7  F (36.5  C) Temp src: Oral BP: 130/88 Pulse: 70   Resp: 16 SpO2: 96 % O2 Device: None (Room air) Oxygen Delivery: 1 LPM   Weight: 103.6 kg (228 lb 6.3 oz)  Estimated body mass index is 43.16 kg/m  as calculated from the following:    Height as of 5/10/23: 1.549 m (5' 1\").    Weight as of this encounter: 103.6 kg (228 lb 6.3 oz).      Please review provider order for any additional goals.   Nurse to notify provider when observation goals have been met and patient is ready for discharge.    A/O x3 w/ forgetful, assist of 2 w/ walker and gait belt. She is up in the chair. Renal Diet, Purewick in place, VS every 4 hours.Peritoneal dialysis run finished this morning, charge nurse clamped and changed the dressing. Low volume drainage alarmed, nephrology aware. Peritoneal dialysis will running again tonight. Robaxin administer x2 for left shoulder pain, her pain subsided. No PIV access. Weaned to room air from 0.5L O2. PT/OT and nephrology is following.  Expect to be discharge to TCU in 3-5 days.   "

## 2023-09-08 NOTE — PROGRESS NOTES
PRIMARY DIAGNOSIS: ACUTE PAIN  OUTPATIENT/OBSERVATION GOALS TO BE MET BEFORE DISCHARGE:  1. Pain Status: Robaxin for right shoulder pain    2. Return to near baseline physical activity: No    3. Cleared for discharge by consultants (if involved): N/A    Discharge Planner Nurse   Safe discharge environment identified: Yes  Barriers to discharge: Yes       Entered by: Kiley Loya RN 09/07/2023 9:03 PM   Patient accepted for TCU once staff receives teaching.  Please review provider order for any additional goals.   Nurse to notify provider when observation goals have been met and patient is ready for discharge.

## 2023-09-08 NOTE — PROGRESS NOTES
[facility]    Medicine Progress Note - Hospitalist Service    Date of Admission:  8/23/2023    Assessment & Plan   Devora Garcia is a 62 year old female admitted on 8/23/2023. She has hx of HTN, ESRD on PD, GUILLERMO on CPAP, who presents with L hip and knee pain s/p mechanical fall 8/23.      No head trauma or LOC. She has been having difficulty walking. She feels her fall was due to increased fluid retention from dialysis non-compliance. Since retiring in April, she has not been consistently getting her 4 peritoneal exchanges per day. She has a walker at home and has been needing to use this recently.      She was admitted here for supportive care and nephrology has been following and adjusting her PD prescription.  She continues to be quite deconditioned.    Mechanical Fall with Acute left hip and knee pain.  Mild orthostatic hypotension  Deconditioning:  -CT scan of left hip shows no occult fractures.  Pain felt musculoskeletal.  -Symptoms slowly improving but deconditioning exacerbating recovery.  -Continue pain medications scheduled and as needed.  -PT and OT following.  -Current recommendation is for TCU, but they cannot immediately take her as they are training staff on Peritoneal Dialysis.  -Social work following.     End-stage kidney disease on PD  Metabolic acidosis earlier in stay  Hypokalemia:  -Nephrology following.  -Continue peritoneal dialysis.  -Did have a previous contamination event, treated with empiric abx.  -Completed cefazolin and cephalexin course for contamination event.  -Will defer potassium replacement/electrolyte management to Nephrology     Hypertension.  Intermittent orthostatic hypotension:  -Blood pressures mostly improved recently.  -Continue metoprolol succinate 12.5 mg once a day in place of previous carvedilol.  -Continue isosorbide mononitrate.  -Continue clonidine 0.1 mg 3 times a day with hold parameters.     Possible pneumonia:  -Noted on chest x-ray  9/4.  Empirically treated.  No ongoing symptoms at this point.  Plan 7 day course abx.  (Last day 9/10/23)  -COVID checked given resp infection and pending TCU discharge.  Negative 9/8.     GERD:  -Continue pantoprazole 40 mg a day.     Acute encephalopathy, now resolved:  -Possibly multifactorial, combination of metabolic and medications.  -Minimize opiates as able.  -Now resolved.      Medical Decision Making       35 MINUTES SPENT BY ME on the date of service doing chart review, history, exam, documentation & further activities per the note.      Labs/Imaging Reviewed:  See Information above and Data section below    PPE Used:  Mask       Diet: Combination Diet Renal Diet (dialysis)    DVT Prophylaxis: Heparin SQ  Taylor Catheter: Not present  Lines: None     Cardiac Monitoring: None  Code Status: Full Code      Clinically Significant Risk Factors Present on Admission        # Hypokalemia: Lowest K = 3.3 mmol/L in last 2 days, will replace as needed      # Anion Gap Metabolic Acidosis: Highest Anion Gap = 20 mmol/L in last 2 days, will monitor and treat as appropriate  # Hypoalbuminemia: Lowest albumin = 2.1 g/dL at 9/4/2023  6:57 AM, will monitor as appropriate     # Hypertension: Noted on problem list                 Disposition Plan      Expected Discharge Date: 09/12/2023    Discharge Delays: Placement - TCU  *Medically Ready for Discharge  Destination: inpatient rehabilitation facility  Discharge Comments: 8/27/23 medically ready when TCU bed found, [facility] TCU accepted but needs to train their staff on pt's PD prior to acceptance          Odin Patel,   Hospitalist Service  [facility]  Securely message with [employer] (more info)  Text page via [employer] Paging/Directory   ______________________________________________________________________    Interval History   Assumed care, history reviewed.  Ms. Garcia feels better the past few days.  RN reports she continues to require a lot of  motivation to get out of bed.  Patient denies new complaints today.    Physical Exam   Vital Signs: Temp: 97.7  F (36.5  C) Temp src: Oral BP: 130/88 Pulse: 70   Resp: 16 SpO2: 96 % O2 Device: None (Room air)    Weight: 228 lbs 6.34 oz    GEN:  Alert, oriented x 3, appears comfortable, no overt distress.  HEENT:  Normocephalic/atraumatic, no scleral icterus, no nasal discharge, mouth moist.  CV:  Regular rate and rhythm, no loud murmur/rub.  LUNGS:  Clear to auscultation bilaterally without rales/rhonchi/wheezing/retractions.  Symmetric chest rise on inhalation noted.  ABD:  Active bowel sounds, soft, non-tender, mildly distended.  No guarding/rigidity.  EXT:  Trace edema.  No cyanosis.  No acute joint synovitis noted.  SKIN:  Dry to touch, no exanthems noted in the visualized areas.    Medications    dianeal PD LOW calcium-2.5% dex (calcium 2.5 mEq/L) 6,000 mL PERITONEAL DIALYSATE      dianeal PD LOW calcium-4.25% dex (calcium 2.5 mEq/L) 6,000 mL PERITONEAL DIALYSATE        - MEDICATION INSTRUCTIONS for Dialysis Patients -   Does not apply See Admin Instructions    acetaminophen  975 mg Oral Q8H    amoxicillin-clavulanate  1 tablet Oral Q12H [facility] (08/20)    calcium carbonate  1,000 mg Oral TID w/meals    cloNIDine  0.1 mg Oral TID    docusate sodium  100 mg Oral BID    gabapentin  100 mg Oral BID    heparin ANTICOAGULANT  5,000 Units Subcutaneous Q12H    isosorbide mononitrate  30 mg Oral Daily    lidocaine  2 patch Transdermal Q24H    metoprolol succinate ER  12.5 mg Oral Daily    miconazole   Topical BID    pantoprazole  40 mg Oral Daily    polyethylene glycol  17 g Oral Daily       Data     Labs and Imaging results below reviewed today.        Recent Labs   Lab 09/07/23  0704 09/04/23  0657   WBC 7.3  --    HGB 10.9*  --    HCT 36.0  --    MCV 85  --     227     Recent Labs   Lab 09/07/23  0704 09/06/23  0830 09/04/23  0657    140 139   POTASSIUM 3.3* 3.5 3.8   CHLORIDE 95* 96* 97*   CO2 25 25 25    ANIONGAP 20* 19* 17*   * 105* 99   BUN 47.8* 51.5* 54.0*   CR 13.34* 13.45* 13.91*   GFRESTIMATED 3* 3* 3*   RON 9.4 9.1 9.2         No results found for this or any previous visit (from the past 24 hour(s)).

## 2023-09-08 NOTE — PROGRESS NOTES
"[facility] RCAT     Date: 09/08/23    Admission Dx: hip and knee pain    Pulmonary History: none    Home Nebulizer/MDI Use: none    Home Oxygen: none    Acuity Level (RCAT flow sheet): level 4    Aerosol Therapy initiated: albuterol prn    Pulmonary Hygiene initiated: Coughing techniques, flutter valve    Volume Expansion initiated: Incentive spirometry    Current Oxygen Requirements: room air    Current SpO2: 96%    Re-evaluation date: 9/11/23    Patient Education: Indications for bronchodilators discussed.      See \"RT Assessments\" flow sheet for patient assessment scoring and Acuity Level Details.    "

## 2023-09-08 NOTE — PLAN OF CARE
"PRIMARY DIAGNOSIS: \"GENERIC\" NURSING  OUTPATIENT/OBSERVATION GOALS TO BE MET BEFORE DISCHARGE:  ADLs back to baseline: No    Activity and level of assistance: assist of 2, walker, gt belt    Pain status: Improved-controlled with oral pain medications.    Return to near baseline physical activity: No     Discharge Planner Nurse   Safe discharge environment identified: Yes  Barriers to discharge: Yes       Entered by: Domenica Sorto RN 09/08/2023 4:26 AM   Awaiting TCU staff to be trained on PD  Please review provider order for any additional goals.   Nurse to notify provider when observation goals have been met and patient is ready for discharge.  "

## 2023-09-08 NOTE — PLAN OF CARE
PRIMARY DIAGNOSIS: GENERALIZED WEAKNESS    OUTPATIENT/OBSERVATION GOALS TO BE MET BEFORE DISCHARGE  1. Orthostatic performed: No    2. Tolerating PO medications: Yes    3. Return to near baseline physical activity: No    4. Cleared for discharge by consultants (if involved): Yes to TCU    Discharge Planner Nurse   Safe discharge environment identified: Yes  Barriers to discharge: No        Entered by: Laz Marcum RN 09/08/2023 10:30 AM     Please review provider order for any additional goals.   Nurse to notify provider when observation goals have been met and patient is ready for discharge.Goal Outcome Evaluation:      Plan of Care Reviewed With: patient    Overall Patient Progress: no changeOverall Patient Progress: no change

## 2023-09-08 NOTE — PLAN OF CARE
Goal Outcome Evaluation:      Plan of Care Reviewed With: patient    Overall Patient Progress: no changeOverall Patient Progress: no change     Assumed care from [id number].  Patient sleeping between cares.  Refuses cares at times.  Purwick in place.  PD started, will run about 10 hours.  Robaxin given for c/o right shoulder pain with improved pain.  Continue with POC.

## 2023-09-09 NOTE — PROGRESS NOTES
"[employer] customer service contacted regarding incomplete drain warning on cycler. Staff RN notified writer that the cycler had alarmed during last drain cycle and message indicated \"low drain volume.\"  [employer] Rep reports that cycler alarm was likely silenced and drain cycle was not resumed. Advised completing manual drain of patient prior to setting up for next run.  "

## 2023-09-09 NOTE — PROGRESS NOTES
PRIMARY DIAGNOSIS: Fall, Left hip contusion  OUTPATIENT/OBSERVATION GOALS TO BE MET BEFORE DISCHARGE:  ADLs back to baseline: No    Activity and level of assistance: Pt not at baseline. In bed, PT/OT consulted plan to discharge to TCU.    Pain status: Improved-controlled with oral pain medications.    Return to near baseline physical activity: No     Discharge Planner Nurse   Safe discharge environment identified: Yes  Barriers to discharge: No, Facility waiting on staff to be trained.       Entered by: Alla Laura RN 09/09/2023 4:23 AM   Pt A&Ox4, VSS, Pt complaining of right shoulder pain med given. Dialysis running overnight.   Please review provider order for any additional goals.   Nurse to notify provider when observation goals have been met and patient is ready for discharge.

## 2023-09-09 NOTE — CARE PLAN
A&O x 4. Up with assist x 1-2 gait belt and walker. Robaxin effective for R shoulder pain. No IV in place. PD dialysis set up for run tonight      Plan to [facility] when bed available and staff trained.     Nephrology following

## 2023-09-09 NOTE — PROGRESS NOTES
Renal Medicine Progress Note            Assessment/Plan:     Assessment: Devora Garcia is a 62-year-old female with ESRD on PD, GUILLERMO, HTN admitted with fall and weakness.    ESRD on PD  On dialysis for the last 2 years.  PTA does 4 manual exchanges (3 green, 1 red), 1.5-hour dwell time.  Primary nephrologist is Dr. Dobson at [facility].  Getting CCPD here    Mechanical fall  No evidence of fractures.    HTN  PTA on carvedilol 6.25 mg twice daily, clonidine 0.1 mg 3 times daily, Imdur 30 mg daily.  Initially BP high, now mostly normal.      Contamination Event  S/p keflex course. Another contamination event 8/29 overnight with alcohol swab to open transfer set. Per [facility] PD protocol, iodine cap placed for 10 minutes x2 prior to starting dialysis. Given one dose cefazolin for prophylaxis on 8/30 and transfer set was exchanged.   -PD fluid looks clear  -Weight trending down.  Still has abdominal edema.    CKD-MBD  Phosphorus was elevated at 11.5.  Now on Tums.  Trending down.  8.2 on last check.  Anemia of CKD  Hemoglobin 10.9.  Hold off on EPO     Cough  On Augmentin for possible pneumonia.    Plan/Recs:  1) tolerating 5 cycles  well, will continue current prescription with 3 cycles using red bag, 2 with green X 2900 fill vol . No last bag.  Continue same until volume status is better.  2) Daily weight  3) counseled on p.o. fluid restriction and low-salt diet.    Awaiting TCU placement.  Discussed with care coordinator.  [facility] PD staff planning and training TCU staff by early to mid next week intervention for discharge.      Reviewed notes from hospitalist, RNs, dialysis nurse.    Vida Guillaume MD  [facility] - Nephrology   [phone]            Interval History:     No acute overnight events  PD ran okay overnight.  2 L ultrafiltration.  No alarms.  No draining issues.  Awake and alert   sHe denies any complaints.           Medications and Allergies:      - MEDICATION INSTRUCTIONS for  Dialysis Patients -   Does not apply See Admin Instructions    acetaminophen  975 mg Oral Q8H    amoxicillin-clavulanate  1 tablet Oral Q12H [facility] (08/20)    calcium carbonate  1,000 mg Oral TID w/meals    cloNIDine  0.1 mg Oral TID    docusate sodium  100 mg Oral BID    gabapentin  100 mg Oral At Bedtime    [START ON 9/10/2023] gabapentin  50 mg Oral QAM    heparin ANTICOAGULANT  5,000 Units Subcutaneous Q12H    isosorbide mononitrate  30 mg Oral Daily    lidocaine  2 patch Transdermal Q24H    metoprolol succinate ER  12.5 mg Oral Daily    miconazole   Topical BID    pantoprazole  40 mg Oral Daily    polyethylene glycol  17 g Oral Daily        Allergies   Allergen Reactions    Aspirin Nausea    Statins-Hmg-Coa Reductase Inhibitors [Statins] Other (See Comments)     Other reaction(s): Renal Failure, Other reaction(s): Renal Failure            Physical Exam:   Vitals were reviewed  /58   Pulse 62   Temp 96.9  F (36.1  C) (Axillary)   Resp 21   Wt 103.9 kg (229 lb)   SpO2 97%   BMI 43.27 kg/m      Wt Readings from Last 3 Encounters:   09/09/23 103.9 kg (229 lb)   05/12/23 100.5 kg (221 lb 9.6 oz)   06/07/22 85.7 kg (189 lb)         GENERAL APPEARANCE: NAD,   HEENT: normocephalic, MMM  RESP: clear  CV: Regular rate  ABDOMEN: PD exit site looks okay  EXTREMITIES/SKIN: Lower extremity edema is better.  Still has abdominal wall edema.  NEURO: Alert, oriented, normal speech           Data:     BMP  Recent Labs   Lab 09/07/23  0704 09/06/23  0830 09/04/23  0657    140 139   POTASSIUM 3.3* 3.5 3.8   CHLORIDE 95* 96* 97*   RON 9.4 9.1 9.2   CO2 25 25 25   BUN 47.8* 51.5* 54.0*   CR 13.34* 13.45* 13.91*   * 105* 99       CBC  Recent Labs   Lab 09/07/23  0704 09/04/23  0657   WBC 7.3  --    HGB 10.9*  --    HCT 36.0  --    MCV 85  --     227       Lab Results   Component Value Date    AST 9 05/10/2023    ALT <9 05/10/2023    ALKPHOS 61 05/10/2023    BILITOTAL 0.5 05/10/2023     No results found  for: INR  Color Urine (no units)   Date Value   05/29/2021 Light Yellow     Appearance Urine (no units)   Date Value   05/29/2021 Clear     Glucose Urine (mg/dL)   Date Value   05/29/2021 Negative     Bilirubin Urine (no units)   Date Value   05/29/2021 Negative     Ketones Urine (mg/dL)   Date Value   05/29/2021 Negative     Specific Gravity Urine (no units)   Date Value   05/29/2021 1.013     pH Urine (pH)   Date Value   05/29/2021 7.0     Protein Albumin Urine (mg/dL)   Date Value   05/29/2021 100 (A)     Urobilinogen Urine (no units)   Date Value   08/30/2020 <2.0 E.U./dL     Nitrite Urine (no units)   Date Value   05/29/2021 Negative     Leukocyte Esterase Urine (no units)   Date Value   05/29/2021 Trace (A)         Attestation:  I have reviewed today's vital signs, notes, medications, labs and imaging.    Vida Guillaume MD  [facility] - Nephrology  Office: [phone]

## 2023-09-09 NOTE — PLAN OF CARE
Goal Outcome Evaluation:      Plan of Care Reviewed With: patient      PRIMARY DIAGNOSIS: GENERALIZED WEAKNESS    OUTPATIENT/OBSERVATION GOALS TO BE MET BEFORE DISCHARGE  1. Orthostatic performed: No    2. Tolerating PO medications: Yes    3. Return to near baseline physical activity: No    4. Cleared for discharge by consultants (if involved): Yes    Discharge Planner Nurse   Safe discharge environment identified: Yes  Barriers to discharge: No       Entered by: Yun Paulino RN 09/09/2023 1:49 PM     Please review provider order for any additional goals.   Nurse to notify provider when observation goals have been met and patient is ready for discharge.

## 2023-09-09 NOTE — PROGRESS NOTES
Cycler set up per protocol and per treatment orders     It appears pt was disconnected during drain 5 of 5. Pt manually drained prior to starting treatment ~800ml. No fibrin/occlusion noted, no further difficulties with draining.    Results from previous treatment:  Effluent color and clarity: yellow, clear  Total UF: 495ml  Initial Drain: 24ml  Avg Dwell: 1:28  Added dwell: 0:22    Cycler Serial Number: [id number]  Total Treatment Volume: [id number]  Total treatment time: 10 hrs  Fill Volume: 2900ml  Last Fill Volume:0ml  Heater ba.25% 6000mL;  Lot #: [id number];  Expiration Date: 10/2023  Side Bag #1: 4.25% 6000mL;  Lot #: [id number];  Expiration Date: 10/2023  Side Bag #2: 2.5% 6000mL;  Lot #: [id number];  Expiration Date: 2025  Number of cycles including final fill: 5  Dwell Time: 1:28 minutes  Last Fill Volume: 0ml  Initial Drain Alarm: 0ml  PD orders reviewed with Patient procedure and ESRD teaching done and questions answered.  Cycler hooked up at  by [employer].   Report given to: DOMINICK Gar RN

## 2023-09-09 NOTE — PROGRESS NOTES
BP (!) 143/64 (BP Location: Right arm)   Pulse 62   Temp 98.3  F (36.8  C) (Oral)   Resp 19   Wt 103.9 kg (229 lb)   SpO2 95%   BMI 43.27 kg/m       VSS on RA. Ween to room air. PT and OT consulted. Nephrology following. PRN Senna given for constipation. See MAR for details. Peritoneal dialysis was finished and discontinued by charge nurse. Up with 2 assist, walker, gait belt. Purewick in place. Complaints of shoulder pain. No PRN pain medications given. No IV access. Plan to discharge to TCU once TCU staff are trained in care.

## 2023-09-09 NOTE — PROGRESS NOTES
[facility]    Medicine Progress Note - Hospitalist Service    Date of Admission:  8/23/2023    Assessment & Plan   Devora Garcia is a 62 year old female admitted on 8/23/2023. She has hx of HTN, ESRD on PD, GUILLERMO on CPAP, who presents with L hip and knee pain s/p mechanical fall 8/23.      No head trauma or LOC. She has been having difficulty walking. She feels her fall was due to increased fluid retention from dialysis non-compliance. Since retiring in April, she has not been consistently getting her 4 peritoneal exchanges per day. She has a walker at home and has been needing to use this recently.      She was admitted here for supportive care and nephrology has been following and adjusting her PD prescription.  She continues to be quite deconditioned.  Awaiting placement location to have trained staff for PD, reported expectation is they can take her the week of 9/11.    Mechanical Fall with Acute left hip and knee pain.  Mild orthostatic hypotension  Deconditioning:  -CT scan of left hip shows no occult fractures.  Pain felt musculoskeletal.  -Symptoms slowly improving but deconditioning exacerbating recovery.  -Continue pain medications scheduled and as needed.  -PT and OT following.  -Current recommendation is for TCU, but they cannot immediately take her as they are training staff on Peritoneal Dialysis.  They recently reported they expect to have staff training complete sometime during week of 9/11.  -Social work following.     End-stage kidney disease on PD  Metabolic acidosis earlier in stay  Hypokalemia:  -Nephrology following.  -Continue peritoneal dialysis.  -Did have a previous contamination event, treated with empiric abx.  -Completed cefazolin and cephalexin course for contamination event.  -Will defer potassium replacement/electrolyte management to Nephrology     Hypertension.  Intermittent orthostatic hypotension:  -Blood pressures mostly improved recently.  -Continue  metoprolol succinate 12.5 mg once a day in place of previous carvedilol.  -Continue isosorbide mononitrate.  -Continue clonidine 0.1 mg 3 times a day with hold parameters.     Possible pneumonia:  -Noted on chest x-ray 9/4.  Empirically treated.  No ongoing symptoms at this point.  Plan 7 day course abx.  (Last day 9/10/23)  -COVID checked given resp infection and pending TCU discharge.  Negative 9/8.     GERD:  -Continue pantoprazole 40 mg a day.     Acute encephalopathy, now resolved:  -Possibly multifactorial, combination of metabolic and medications.  -Minimize opiates as able.  -Now resolved.  Patient did mention at times she feels sleepy during day but does also feel gabapentin needed for her renal symptoms.  She would like to try a little lower dose in AM.  Will continue 100 mg PM dose but reduce AM dose to 50 mg for tomorrow.      Medical Decision Making       30 MINUTES SPENT BY ME on the date of service doing chart review, history, exam, documentation & further activities per the note.      Labs/Imaging Reviewed:  See Information above and Data section below    PPE Used:  Mask       Diet: Combination Diet Renal Diet (dialysis)    DVT Prophylaxis: Heparin SQ  Taylor Catheter: Not present  Lines: None     Cardiac Monitoring: None  Code Status: Full Code      Clinically Significant Risk Factors Present on Admission              # Hypoalbuminemia: Lowest albumin = 2.1 g/dL at 9/4/2023  6:57 AM, will monitor as appropriate                      Disposition Plan      Expected Discharge Date: 09/12/2023    Discharge Delays: Placement - TCU  *Medically Ready for Discharge  Destination: inpatient rehabilitation facility  Discharge Comments: 8/27/23 medically ready when TCU bed found, [facility] TCU accepted but needs to train their staff on pt's PD prior to acceptance          Odin Patel,   Hospitalist Service  [facility]  Securely message with [employer] (more info)  Text page via [employer]  Blanca[employer]ience   ______________________________________________________________________    Interval History   Ms. Garcia feels better the past few days.  She denies new complaints.  We discussed gabapentin as she feels sleepy in AM after morning dose each day.  Patient denies new complaints otherwise today.  Appetite remains intermittently poor.    Physical Exam   Vital Signs: Temp: 98.3  F (36.8  C) Temp src: Oral BP: (!) 142/70 Pulse: 62   Resp: 19 SpO2: 95 % O2 Device: None (Room air) Oxygen Delivery: 1 LPM  Weight: 229 lbs 0 oz    GEN:  Alert, oriented x 3, appears comfortable, no overt distress.  HEENT:  Normocephalic/atraumatic, no scleral icterus, no nasal discharge, mouth moist.  CV:  Regular rate and rhythm, no loud murmur/rub.  LUNGS:  Clear to auscultation bilaterally without rales/rhonchi/wheezing/retractions.  Mildly decreased breath sounds bases.  Symmetric chest rise on inhalation noted.  ABD:  Active bowel sounds, soft, non-tender, mildly distended.  No guarding/rigidity.  EXT:  Trace edema.  No cyanosis.  No acute joint synovitis noted.  SKIN:  Dry to touch, no exanthems noted in the visualized areas.    Medications    dianeal PD LOW calcium-2.5% dex (calcium 2.5 mEq/L) 6,000 mL PERITONEAL DIALYSATE      dianeal PD LOW calcium-4.25% dex (calcium 2.5 mEq/L) 6,000 mL PERITONEAL DIALYSATE        - MEDICATION INSTRUCTIONS for Dialysis Patients -   Does not apply See Admin Instructions    acetaminophen  975 mg Oral Q8H    amoxicillin-clavulanate  1 tablet Oral Q12H [facility] (08/20)    calcium carbonate  1,000 mg Oral TID w/meals    cloNIDine  0.1 mg Oral TID    docusate sodium  100 mg Oral BID    gabapentin  100 mg Oral At Bedtime    [START ON 9/10/2023] gabapentin  50 mg Oral QAM    heparin ANTICOAGULANT  5,000 Units Subcutaneous Q12H    isosorbide mononitrate  30 mg Oral Daily    lidocaine  2 patch Transdermal Q24H    metoprolol succinate ER  12.5 mg Oral Daily    miconazole   Topical BID    pantoprazole   40 mg Oral Daily    polyethylene glycol  17 g Oral Daily       Data     Labs and Imaging results below reviewed today.        Recent Labs   Lab 09/07/23  0704 09/04/23  0657   WBC 7.3  --    HGB 10.9*  --    HCT 36.0  --    MCV 85  --     227     Recent Labs   Lab 09/07/23  0704 09/06/23  0830 09/04/23  0657    140 139   POTASSIUM 3.3* 3.5 3.8   CHLORIDE 95* 96* 97*   CO2 25 25 25   ANIONGAP 20* 19* 17*   * 105* 99   BUN 47.8* 51.5* 54.0*   CR 13.34* 13.45* 13.91*   GFRESTIMATED 3* 3* 3*   RON 9.4 9.1 9.2         No results found for this or any previous visit (from the past 24 hour(s)).

## 2023-09-09 NOTE — PROGRESS NOTES
Cycler set up per protocol and per treatment orders     Results from previous treatment:  Effluent color and clarity: yellow, clear  Total UF: ml  Initial Drain: 42ml  Avg Dwell: 1:25  Added dwell: 7 minutes    Cycler Serial Number: [id number]  Total Treatment Volume: [id number]  Total treatment time: 10 hrs  Fill Volume: 2900ml  Last Fill Volume:0ml  Heater ba.25% 6000mL;  Lot #: [id number];  Expiration Date: 10/2023  Side Bag #1: 4.25% 6000mL;  Lot #: [id number];  Expiration Date: 10/2023  Side Bag #2: 2.5% 6000mL;  Lot #: [id number];  Expiration Date: 2024  Number of cycles including final fill: 5  Dwell Time: 1:24 minutes  Last Fill Volume: 0ml  Initial Drain Alarm: 0ml  PD orders reviewed with Patient procedure and ESRD teaching done and questions answered.  Cycler ready for hook-up.    Report given to: VASHTI Dang RN

## 2023-09-09 NOTE — PLAN OF CARE
A&Ox4, Q4 VSS except BP little high, O2 1L. Assist of two to the side of the bed. Renal diet. Pain 7/10 on the right shoulder med given see mar. Female external cath. Dialysis running overnight. No IV access. Daily weight. Pt refused heparin. Consult to PT/OT.   Plan for discharge to TCU waiting for staff there to be trained.

## 2023-09-09 NOTE — PROGRESS NOTES
PRIMARY DIAGNOSIS: Fall, Left hip contusion  OUTPATIENT/OBSERVATION GOALS TO BE MET BEFORE DISCHARGE:  ADLs back to baseline: No    Activity and level of assistance: Pt not at baseline. In bed, PT/OT consulted plan to discharge to TCU.    Pain status: Improved-controlled with oral pain medications.    Return to near baseline physical activity: No     Discharge Planner Nurse   Safe discharge environment identified: Yes  Barriers to discharge: No, Facility waiting on staff to be trained.       Entered by: Alla Laura RN 09/08/2023 10:44 PM   Pt A&Ox4, VSS, Pt complaining of right shoulder pain med given. Dialysis running overnight.   Please review provider order for any additional goals.   Nurse to notify provider when observation goals have been met and patient is ready for discharge.

## 2023-09-10 NOTE — PLAN OF CARE
PRIMARY DIAGNOSIS: Contusion of left hip  OUTPATIENT/OBSERVATION GOALS TO BE MET BEFORE DISCHARGE:  ADLs back to baseline: No    Activity and level of assistance: Up with maximum assistance. Consider SW and/or PT evaluation.     Pain status: Improved-controlled with oral pain medications.    Return to near baseline physical activity: No     Discharge Planner Nurse   Safe discharge environment identified: Yes  Barriers to discharge: Yes       Entered by: Yoli Young RN 09/09/2023 11:02 PM     Please review provider order for any additional goals.   Nurse to notify provider when observation goals have been met and patient is ready for discharge.      Pt is alert and oriented x4. Vital signs stable. Pt denies numbness or tingling. Pt denies pain. Writer administered schedules gabapentin. Pt refused heparin injection. Pt is up 2 assists. Lung sounds clear but diminished. Will continue with plan of care.

## 2023-09-10 NOTE — PROGRESS NOTES
Renal Medicine Progress Note            Assessment/Plan:     Assessment: Devora Garcia is a 62-year-old female with ESRD on PD, GUILLERMO, HTN admitted with fall and weakness.    ESRD on PD  On dialysis for the last 2 years.  PTA does 4 manual exchanges (3 green, 1 red), 1.5-hour dwell time.  Primary nephrologist is Dr. Dobson at [facility].  Getting CCPD here    Mechanical fall  No evidence of fractures.    HTN  PTA on carvedilol 6.25 mg twice daily, clonidine 0.1 mg 3 times daily, Imdur 30 mg daily.  Initially BP high, now mostly normal.      Contamination Event  S/p keflex course. Another contamination event 8/29 overnight with alcohol swab to open transfer set. Per [facility] PD protocol, iodine cap placed for 10 minutes x2 prior to starting dialysis. Given one dose cefazolin for prophylaxis on 8/30 and transfer set was exchanged.   -PD fluid looks clear  -Weight trending down.  Still has abdominal edema.    CKD-MBD  Phosphorus was elevated at 11.5.  Now on Tums.  Trending down.  8.2 on last check.  Recheck tomorrow    Anemia of CKD  Hemoglobin 10.9.  Hold off on EPO   REcheck tomorrow     Cough  On Augmentin for possible pneumonia.    Plan/Recs:  1) tolerating 5 cycles  well, will continue current prescription with 3 cycles using red bag, 2 with green X 2900 fill vol . No last bag.  Continue same until volume status is better.   Given large fill vol, I have increased total time from 10-> 11 hrs, to allow longer dwell.   2) Daily weight  3) counseled on p.o. fluid restriction and low-salt diet.  4) MIralax/ Senna to ensure daily BM     Awaiting TCU placement.  Discussed with care coordinator.  [facility] PD staff planning and training TCU staff by early to mid next week intervention for discharge.      Reviewed notes from hospitalist, RNs, dialysis nurse.    Vida Guillaume MD  [facility] - Nephrology   [phone]            Interval History:     No acute overnight events  PD ran okay overnight.  2 L  ultrafiltration.  No alarms.  No draining issues.  Reports constipation. Getting MIralax   Awake and alert   sHe denies any complaints.           Medications and Allergies:      - MEDICATION INSTRUCTIONS for Dialysis Patients -   Does not apply See Admin Instructions    acetaminophen  975 mg Oral Q8H    amoxicillin-clavulanate  1 tablet Oral Q12H [facility] (08/20)    calcium carbonate  1,000 mg Oral TID w/meals    cloNIDine  0.1 mg Oral TID    docusate sodium  100 mg Oral BID    gabapentin  100 mg Oral At Bedtime    gabapentin  50 mg Oral QAM    heparin ANTICOAGULANT  5,000 Units Subcutaneous Q12H    isosorbide mononitrate  30 mg Oral Daily    lidocaine  2 patch Transdermal Q24H    metoprolol succinate ER  12.5 mg Oral Daily    miconazole   Topical BID    pantoprazole  40 mg Oral Daily    polyethylene glycol  17 g Oral Daily        Allergies   Allergen Reactions    Aspirin Nausea    Statins-Hmg-Coa Reductase Inhibitors [Statins] Other (See Comments)     Other reaction(s): Renal Failure, Other reaction(s): Renal Failure            Physical Exam:   Vitals were reviewed  /57 (BP Location: Right arm)   Pulse 63   Temp 98  F (36.7  C) (Oral)   Resp 18   Wt 103.3 kg (227 lb 11.8 oz)   SpO2 100%   BMI 43.03 kg/m      Wt Readings from Last 3 Encounters:   09/10/23 103.3 kg (227 lb 11.8 oz)   05/12/23 100.5 kg (221 lb 9.6 oz)   06/07/22 85.7 kg (189 lb)         GENERAL APPEARANCE: NAD,   HEENT: normocephalic, MMM  RESP: clear  CV: Regular rate  ABDOMEN: PD exit site looks okay  EXTREMITIES/SKIN: Lower extremity edema is better.  Still has abdominal wall edema.  NEURO: Alert, oriented, normal speech           Data:     BMP  Recent Labs   Lab 09/07/23  0704 09/06/23  0830 09/04/23  0657    140 139   POTASSIUM 3.3* 3.5 3.8   CHLORIDE 95* 96* 97*   RON 9.4 9.1 9.2   CO2 25 25 25   BUN 47.8* 51.5* 54.0*   CR 13.34* 13.45* 13.91*   * 105* 99       CBC  Recent Labs   Lab 09/10/23  0639 09/07/23  0704  09/04/23  0657   WBC  --  7.3  --    HGB  --  10.9*  --    HCT  --  36.0  --    MCV  --  85  --     271 227       Lab Results   Component Value Date    AST 9 05/10/2023    ALT <9 05/10/2023    ALKPHOS 61 05/10/2023    BILITOTAL 0.5 05/10/2023     No results found for: INR  Color Urine (no units)   Date Value   05/29/2021 Light Yellow     Appearance Urine (no units)   Date Value   05/29/2021 Clear     Glucose Urine (mg/dL)   Date Value   05/29/2021 Negative     Bilirubin Urine (no units)   Date Value   05/29/2021 Negative     Ketones Urine (mg/dL)   Date Value   05/29/2021 Negative     Specific Gravity Urine (no units)   Date Value   05/29/2021 1.013     pH Urine (pH)   Date Value   05/29/2021 7.0     Protein Albumin Urine (mg/dL)   Date Value   05/29/2021 100 (A)     Urobilinogen Urine (no units)   Date Value   08/30/2020 <2.0 E.U./dL     Nitrite Urine (no units)   Date Value   05/29/2021 Negative     Leukocyte Esterase Urine (no units)   Date Value   05/29/2021 Trace (A)         Attestation:  I have reviewed today's vital signs, notes, medications, labs and imaging.    Vida Guillaume MD  [facility] - Nephrology  Office: [phone]

## 2023-09-10 NOTE — PLAN OF CARE
Goal Outcome Evaluation:      Plan of Care Reviewed With: patient    Overall Patient Progress: no changeOverall Patient Progress: no change     Temp: 98.8  F (37.1  C) Temp src: Oral BP: 120/47 Pulse: 74   Resp: 18 SpO2: 98 % O2 Device: Nasal cannula Oxygen Delivery: 1 LPM     Shift [id number]    A/O4. Up A2 walker/gaitbelt. Up in chair x1. Denies meals. Purewick in place, little output, changed. Peritoneal dialysis dressing CDI, clamped. PO Augmentin. Robaxin given x2 for R shoulder pain. Prn senna given for constipation. Discharge to TCU.

## 2023-09-10 NOTE — PROGRESS NOTES
PRIMARY DIAGNOSIS: GENERALIZED WEAKNESS    OUTPATIENT/OBSERVATION GOALS TO BE MET BEFORE DISCHARGE  1. Orthostatic performed: No    2. Tolerating PO medications: Yes    3. Return to near baseline physical activity: No    4. Cleared for discharge by consultants (if involved): Yes    Discharge Planner Nurse   Safe discharge environment identified: Yes  Barriers to discharge: No       Entered by: Yun Paulino RN 09/10/2023 11:22 AM     Please review provider order for any additional goals.   Nurse to notify provider when observation goals have been met and patient is ready for discharge.

## 2023-09-10 NOTE — PLAN OF CARE
PRIMARY DIAGNOSIS: Contusion of left hip  OUTPATIENT/OBSERVATION GOALS TO BE MET BEFORE DISCHARGE:  ADLs back to baseline: No    Activity and level of assistance: Up with maximum assistance. Consider SW and/or PT evaluation.     Pain status: Improved-controlled with oral pain medications.    Return to near baseline physical activity: No     Discharge Planner Nurse   Safe discharge environment identified: Yes  Barriers to discharge: Yes       Entered by: Yoli Young RN 09/10/2023 4:00 AM     Please review provider order for any additional goals.   Nurse to notify provider when observation goals have been met and patient is ready for discharge.      Pt is alert and oriented x4. Vital signs stable. Pt denies numbness or tingling. Pt reports improved pain from a 6 to a 2. Pt is up 2 assist. Lung sounds clear but diminished. Will continue with plan of care.

## 2023-09-10 NOTE — PROGRESS NOTES
PRIMARY DIAGNOSIS: GENERALIZED WEAKNESS    OUTPATIENT/OBSERVATION GOALS TO BE MET BEFORE DISCHARGE  1. Orthostatic performed: No    2. Tolerating PO medications: Yes    3. Return to near baseline physical activity: No    4. Cleared for discharge by consultants (if involved): Yes    Discharge Planner Nurse   Safe discharge environment identified: Yes  Barriers to discharge: No       Entered by: Yun Paulino RN 09/10/2023 2:52 PM     Please review provider order for any additional goals.   Nurse to notify provider when observation goals have been met and patient is ready for discharge.

## 2023-09-10 NOTE — PROGRESS NOTES
[facility]    Medicine Progress Note - Hospitalist Service    Date of Admission:  8/23/2023    Assessment & Plan   Devora Garcia is a 62 year old female admitted on 8/23/2023. She has hx of HTN, ESRD on PD, GUILLERMO on CPAP, who presents with L hip and knee pain s/p mechanical fall 8/23.      No head trauma or LOC. She has been having difficulty walking. She feels her fall was due to increased fluid retention from dialysis non-compliance. Since retiring in April, she has not been consistently getting her 4 peritoneal exchanges per day. She has a walker at home and has been needing to use this recently.      She was admitted here for supportive care and nephrology has been following and adjusting her PD prescription.  She continues to be quite deconditioned.  Awaiting placement location to have trained staff for PD, reported expectation is they can take her the week of 9/11.    Mechanical Fall with Acute left hip and knee pain.  Mild orthostatic hypotension  Deconditioning:  -CT scan of left hip shows no occult fractures.  Pain felt musculoskeletal.  -Symptoms slowly improving but deconditioning exacerbating recovery.  -Continue pain medications scheduled and as needed.  -PT and OT following.  -Current recommendation is for TCU, but they cannot immediately take her as they are training staff on Peritoneal Dialysis.  They recently reported they expect to have staff training complete sometime during week of 9/11.  -Social work following.     End-stage kidney disease on PD  Metabolic acidosis earlier in stay  Hypokalemia:  -Nephrology following.  -Continue peritoneal dialysis.  -Did have a previous contamination event, treated with empiric abx.  -Completed cefazolin and cephalexin course for contamination event earlier in stay.  -Will defer potassium replacement/electrolyte management to Nephrology     Hypertension.  Intermittent orthostatic hypotension:  -Blood pressure reasonably controlled  lately  -Continue metoprolol succinate 12.5 mg once a day in place of previous carvedilol.  -Continue isosorbide mononitrate.  -Continue clonidine 0.1 mg 3 times a day with hold parameters.     Possible pneumonia:  -Noted on chest x-ray 9/4.  Empirically treated.  No ongoing symptoms at this point.  Plan 7 day course abx.  Will have end after 2 more doses.  -COVID checked given resp infection and pending TCU discharge.  Negative 9/8.     GERD:  -Continue pantoprazole 40 mg a day.     Acute encephalopathy, now resolved:  -Possibly multifactorial, combination of metabolic and medications.  -Minimize opiates as able.  -Now resolved.  Patient on 9/9 did mention at times she feels sleepy during day but does also feel gabapentin needed for her renal symptoms.  She would like to try a little lower dose in AM.  Will continue 100 mg PM dose but reduced AM dose to 50 mg.  That appears to have helped so will continue modified dose for now.    Poor appetite  Constipation:  -  Continues being variable with intake.  Sporadic issue throughout stay.  Today she reported feeling more hungry but some days she doesn't eat much.  Continue to monitor.  No new acute complaints.  -  No BM for a few days, will increase miralax to BID and added scheduled senna.    Medical Decision Making       30 MINUTES SPENT BY ME on the date of service doing chart review, history, exam, documentation & further activities per the note.      Labs/Imaging Reviewed:  See Information above and Data section below    PPE Used:  Mask       Diet: Combination Diet Renal Diet (dialysis)    DVT Prophylaxis: Heparin SQ  Taylor Catheter: Not present  Lines: None     Cardiac Monitoring: None  Code Status: Full Code      Clinically Significant Risk Factors Present on Admission              # Hypoalbuminemia: Lowest albumin = 2.1 g/dL at 9/4/2023  6:57 AM, will monitor as appropriate                      Disposition Plan      Expected Discharge Date: 09/12/2023     Discharge Delays: Placement - TCU  *Medically Ready for Discharge  Destination: inpatient rehabilitation facility  Discharge Comments: 8/27/23 medically ready when TCU bed found, [facility] TCU accepted but needs to train their staff on pt's PD prior to acceptance          Odin Patel,   Hospitalist Service  [facility]  Securely message with MyTime (more info)  Text page via [employer] Paging/Directory   ______________________________________________________________________    Interval History   Ms. Garcia feels better overall but reports ongoing sporadic poor appetite and feels more constipated past day or two.  No new pain, worsening nausea, other new complaints.  Continues with PD and no new major problems reported.  She has been up to the chair at least once daily lately and she feels that is going well.    Physical Exam   Vital Signs: Temp: 98.3  F (36.8  C) Temp src: Oral BP: 115/59 Pulse: 60   Resp: 18 SpO2: 98 % O2 Device: Nasal cannula Oxygen Delivery: 1 LPM  Weight: 227 lbs 11.76 oz    GEN:  Alert, oriented x 3, appears comfortable, no overt distress.  HEENT:  Normocephalic/atraumatic, no scleral icterus, no nasal discharge, mouth moist.  CV:  Regular rate and rhythm, no loud murmur/rub.  LUNGS:  Clear to auscultation bilaterally without rales/rhonchi/wheezing/retractions.  Mildly decreased breath sounds bases.  Symmetric chest rise on inhalation noted.  ABD:  Active bowel sounds, soft, non-tender, mildly distended.  No guarding/rigidity.  EXT:  Trace edema.  No cyanosis.  No acute joint synovitis noted.  SKIN:  Dry to touch, no exanthems noted in the visualized areas.    Medications    dianeal PD LOW calcium-2.5% dex (calcium 2.5 mEq/L) 6,000 mL PERITONEAL DIALYSATE      dianeal PD LOW calcium-4.25% dex (calcium 2.5 mEq/L) 6,000 mL PERITONEAL DIALYSATE        - MEDICATION INSTRUCTIONS for Dialysis Patients -   Does not apply See Admin Instructions    acetaminophen  975 mg Oral  Q8H    amoxicillin-clavulanate  1 tablet Oral Q12H [facility] (08/20)    calcium carbonate  1,000 mg Oral TID w/meals    cloNIDine  0.1 mg Oral TID    docusate sodium  100 mg Oral BID    gabapentin  100 mg Oral At Bedtime    gabapentin  50 mg Oral QAM    heparin ANTICOAGULANT  5,000 Units Subcutaneous Q12H    isosorbide mononitrate  30 mg Oral Daily    lidocaine  2 patch Transdermal Q24H    metoprolol succinate ER  12.5 mg Oral Daily    miconazole   Topical BID    pantoprazole  40 mg Oral Daily    polyethylene glycol  17 g Oral Daily       Data     Labs and Imaging results below reviewed today.    I have personally reviewed the following data over the past 24 hrs:    N/A  \   N/A   / 314     N/A N/A N/A /  N/A   N/A N/A N/A \       Recent Labs   Lab 09/10/23  0639 09/07/23  0704 09/04/23  0657   WBC  --  7.3  --    HGB  --  10.9*  --    HCT  --  36.0  --    MCV  --  85  --     271 227     Recent Labs   Lab 09/07/23  0704 09/06/23  0830 09/04/23  0657    140 139   POTASSIUM 3.3* 3.5 3.8   CHLORIDE 95* 96* 97*   CO2 25 25 25   ANIONGAP 20* 19* 17*   * 105* 99   BUN 47.8* 51.5* 54.0*   CR 13.34* 13.45* 13.91*   GFRESTIMATED 3* 3* 3*   RON 9.4 9.1 9.2         No results found for this or any previous visit (from the past 24 hour(s)).

## 2023-09-10 NOTE — PLAN OF CARE
PRIMARY DIAGNOSIS: Contusion of left hip  OUTPATIENT/OBSERVATION GOALS TO BE MET BEFORE DISCHARGE:  ADLs back to baseline: No    Activity and level of assistance: Up with maximum assistance. Consider SW and/or PT evaluation.     Pain status: Improved-controlled with oral pain medications.    Return to near baseline physical activity: No     Discharge Planner Nurse   Safe discharge environment identified: Yes  Barriers to discharge: Yes       Entered by: Yoli Young RN 09/10/2023 1:53 AM     Please review provider order for any additional goals.   Nurse to notify provider when observation goals have been met and patient is ready for discharge.      Pt is alert and oriented x4. Vital signs stable. Pt denies numbness or tingling. Pt reports pain, PRN Robaxin administered. Pt is up 2 assist. Lung sounds clear but diminished. Will continue with plan of care.

## 2023-09-10 NOTE — PROGRESS NOTES
Cycler set up per protocol and per treatment orders     Results from previous treatment:  Effluent color and clarity: yellow, clear  Total UF: 1881ml  Initial Drain: 15ml  Avg Dwell: 1:29  Added dwell: 28min    Cycler Serial Number: [id number]  Total Treatment Volume: [id number]  Total treatment time: 11 hrs  Fill Volume: 2900ml  Last Fill Volume:0ml  Heater ba.25% 6000mL;  Lot #: [id number];  Expiration Date: 10/2023  Side Bag #1: 4.25% 6000mL;  Lot #: [id number];  Expiration Date: 10/2023  Side Bag #2: 2.5% 6000mL;  Lot #: [id number];  Expiration Date: 2024  Number of cycles including final fill: 5  Dwell Time: 1:35 minutes  Last Fill Volume: 0ml  Initial Drain Alarm: 0ml  PD orders reviewed with Patient procedure and ESRD teaching done and questions answered.  Cycler ready for hook-up.    Report given to: LOUIS Scanlon RN

## 2023-09-10 NOTE — PROGRESS NOTES
/58 (BP Location: Right arm)   Pulse 60   Temp 98.3  F (36.8  C) (Oral)   Resp 18   Wt 103.3 kg (227 lb 11.8 oz)   SpO2 98%   BMI 43.03 kg/m       VSS on RA. PT and OT following. Nephrology following. A&O x4. Up with 2 assist, gait belt, walker. Has purewick in place. Refused purewick device change or skin assessment. C/O right shoulder pain. PRN robaxin given. PRN senna given for constipation. See MAR for details. No IV access. On Augmentin. Plan to discharge to TCU once TCU staffed are train in cares.

## 2023-09-11 NOTE — PROVIDER NOTIFICATION
"MD paged: Pt reports cramping in RLE. Gave scheduled gabapentin and Tylenol as well as PRN Robaxin w/ little to no relief. Pt requesting \"something stronger.\" MD note says to avoid narcotics. Any suggestions or PRNs? Thanks.     Per MD: continue w/ current pain management and wait for labs in AM. Cramping may be related to low potassium.   "

## 2023-09-11 NOTE — PROGRESS NOTES
Cycler set up per protocol and per treatment orders      Results from previous treatment:  Effluent color and clarity: yellow, clear  Total UF: 1608ml  Initial Drain: 18ml  Avg Dwell: 1:37  Added dwell: 8min     Cycler Serial Number: [id number]  Total Treatment Volume: [id number]  Total treatment time: 11 hrs  Fill Volume: 2900ml  Last Fill Volume:0ml  Heater ba.25% 6000mL;  Lot #: [id number];  Expiration Date: 10/2023  Side Bag #1: 4.25% 6000mL;  Lot #: [id number];  Expiration Date: 10/2023  Side Bag #2: 2.5% 6000mL;  Lot #: [id number];  Expiration Date: 2024  Number of cycles including final fill: 5  Dwell Time: 1:35 minutes  Last Fill Volume: 0ml  Initial Drain Alarm: 0ml  PD orders reviewed with Patient procedure and ESRD teaching done and questions answered.  Cycler ready for hook-up.    Report given to: LIBERTAD Tovar RN

## 2023-09-11 NOTE — PLAN OF CARE
PRIMARY DIAGNOSIS: contusion of left hip  OUTPATIENT/OBSERVATION GOALS TO BE MET BEFORE DISCHARGE:  1. Stable vital signs Yes  2. Tolerating diet:Yes  3. Pain controlled with oral pain medications:  No  4. Positive bowel sounds:  Yes  5. Voiding without difficulty:   On PD  6. Able to ambulate:  No  7. Provider specific discharge goals met:  Yes    Discharge Planner Nurse   Safe discharge environment identified: Yes  Barriers to discharge: Yes       Entered by: Elsie Monique RN 09/11/2023 4:46 AM     Please review provider order for any additional goals.   Nurse to notify provider when observation goals have been met and patient is ready for discharge.Goal Outcome Evaluation:      Plan of Care Reviewed With: patient    Overall Patient Progress: no changeOverall Patient Progress: no change

## 2023-09-11 NOTE — PLAN OF CARE
Goal Outcome Evaluation:      Plan of Care Reviewed With: patient    Overall Patient Progress: no changeOverall Patient Progress: no change     PRIMARY DIAGNOSIS: Contusion of left hip  OUTPATIENT/OBSERVATION GOALS TO BE MET BEFORE DISCHARGE:  1. Stable vital signs Yes  2. Tolerating diet:Yes  3. Pain controlled with oral pain medications:  Yes  4. Positive bowel sounds:  Yes  5. Voiding without difficulty:   On PD  6. Able to ambulate:  Yes  7. Provider specific discharge goals met:  No    Discharge Planner Nurse   Safe discharge environment identified: Yes  Barriers to discharge: Yes       Entered by: Elsie Monique RN 09/10/2023 11:19 PM     Please review provider order for any additional goals.   Nurse to notify provider when observation goals have been met and patient is ready for discharge.

## 2023-09-11 NOTE — PLAN OF CARE
Care from [id number]    BP 94/55 (BP Location: Left arm)   Pulse 62   Temp 98.2  F (36.8  C) (Oral)   Resp 18   Wt 100.5 kg (221 lb 9 oz)   SpO2 98%   BMI 41.86 kg/m        VS: VSS on RA during day, 1L NC at night  Neuro: A&Ox4, slow speech  Activity: A2 w/ GB and walker.   Respiratory: LS dim. No SOB or LOPEZ reported.  Pain: Reports cramping in RLE and pain in R shoulder. Scheduled tylenol and gabapentin. PRN robaxin and dilaudid given per MAR.   GI: Last BM 9/3 per flowsheet. Denies N/V.  : Purewick in place  LDAs: No IV access  Diet: Renal, poor appetite  Discharge Disposition: Discharge to TCU when TCU staff is trained in pt's PD. Most likely by end of week.

## 2023-09-11 NOTE — PROGRESS NOTES
Patient is reporting cramping in her right leg despite receiving gabapentin, acetaminophen, and methocarbamol.  She did have acute encephalopathy earlier.  Progress note states to minimize opiates as able, however I do not see many other options here as the patient is requesting further medication to alleviate her pain.  Her potassium was low at 3.3 yesterday morning.  She is on peritoneal dialysis, but if she has ongoing hypokalemia this could be the cause of cramping.  -Check BMP and magnesium this morning  -Give 1 dose 1 mg oral Dilaudid and reassess pain relief/mental status

## 2023-09-11 NOTE — PLAN OF CARE
"Goal Outcome Evaluation:      Plan of Care Reviewed With: patient    Overall Patient Progress: no changeOverall Patient Progress: no change     /57 (BP Location: Right arm)   Pulse 86   Temp 97.9  F (36.6  C) (Oral)   Resp 20   Wt 103.3 kg (227 lb 11.8 oz)   SpO2 96%   BMI 43.03 kg/m      VS: VSS on 1 L via NC on NOC.   Pain: Reports cramping in RLE. Scheduled tylenol and gabapentin. PRN robaxin given per MAR. Pt reports the cramping did not decrease. MD notified and x1 dose of 1 mg PO dilaudid given. Mg and BMP in AM draw. Around 04:30, pt requesting \"something stronger.\" RN explained that PRN medications and scheduled medications will be given as soon as they are available, but there are no more narcotics in her MAR. RN attempted to explain that narcotics would also exacerbate the pt's constipation. Pt did not verbalize understanding.   Lines: No IV access.   Cognitive: Aox4.  Respiratory: LS dim on 1 L via NC or RA. No SOB or LOPEZ reported.  Cardiac: Denies CP.   Peripheral neurovascular: 2+ edema to BLE. BLE dry and scaly.   GI: Last BM 9/3 per flowsheet. Denies N/V.  : Purewick replaced per pt request. On PD on NOC. Producing some urine.   Skin: See flowsheet for assessment.   Diet: Renal.  Activity: A2 w/ GB and walker.   Labs: Mg and BMP in AM per MD r/t pt's leg cramping.   Plan: Discharge to TCU when TCU staff is trained in pt's PD. Likely this week. Continue w/ POC.         "

## 2023-09-11 NOTE — PROGRESS NOTES
Patient unhooked from peritoneal dialysis cycler per protocol. Pt stated dressing changed last night.

## 2023-09-11 NOTE — PROGRESS NOTES
Care Management Follow Up    Length of Stay (days): 0    Expected Discharge Date: 09/12/2023     Concerns to be Addressed:       Patient plan of care discussed at interdisciplinary rounds: Yes    Anticipated Discharge Disposition: Transitional Care     Anticipated Discharge Services:    Anticipated Discharge DME: None    Patient/family educated on Medicare website which has current facility and service quality ratings: no  Education Provided on the Discharge Plan:    Patient/Family in Agreement with the Plan:      Referrals Placed by CM/SW: Post Acute Facilities  Private pay costs discussed: Not applicable    Additional Information:  CM left message for Admissions at [facility] to verify the status of education of staff on PD and acceptance of patient to their TCU.    Addendum 1500  [facility] and [facility] have education planned for Tues PM and Wed AM. If all goes well and staff are trained they may be able to accept patient on Wednesday. Patient and  informed.    Jocelyn Ashton, RN, BSN, CM  Inpatient Care Coordination  [facility]  [phone]

## 2023-09-11 NOTE — PROGRESS NOTES
[facility]    Medicine Progress Note - Hospitalist Service    Date of Admission:  8/23/2023    Assessment & Plan     Devora Garcia is a 62 year old female with history of HTN, ESRD on PD, and GUILLERMO on CPAP, who presented to the ED on 8/23/23 with left hip and knee pain after a mechanical fall that same day. She had no head trauma or LOC. She had been having difficulty walking. She felt her fall was due to increased fluid retention from dialysis non-compliance. Since retiring in April, she had not been consistently getting her 4 peritoneal exchanges per day. ED evaluation showed stable vital signs. ED evaluation showed BMP consistent with ESRD, BNP 24,079, and HGB 10.5 but was otherwise unremarkable. Imaging (CXR, left knee XR, pelvis XR, CT abdomen/pelvis, and CT left hip) was unremarkable. She was admitted here for supportive care. Nephrology was consulted for PD.  She continued to be quite deconditioned.  She as seen by PT and OT and TCU was recommended. We are awaiting placement. Placement options are limited due to PD although there is a facility that anticipated being able to take her the week of 9/11 once staff has been trained.    Problem list:     Mechanical fall with acute left hip and knee pain.  Mild orthostatic hypotension  Deconditioning  -Symptoms slowly improving but deconditioning exacerbating recovery.  -Continue pain medications scheduled and as needed.  -PT and OT following.  -Current recommendation is for TCU, but they cannot immediately take her as they are training staff on Peritoneal Dialysis.  They recently reported they expect to have staff training complete some time during week of 9/11.  -Social work following.     End-stage kidney disease on PD  Metabolic acidosis earlier in stay  Hypokalemia  -Nephrology following.  -Continue peritoneal dialysis.  -Did have a previous contamination event, treated with empiric abx.  -Completed cefazolin and cephalexin course for  contamination event earlier in stay.  -Will defer potassium replacement/electrolyte management to Nephrology     Hypertension  Intermittent orthostatic hypotension  -Blood pressure reasonably controlled lately  -Continue metoprolol succinate 12.5 mg once a day in place of previous carvedilol.  -Continue isosorbide mononitrate.  -Continue clonidine 0.1 mg 3 times a day with hold parameters.     Possible pneumonia  -Noted on chest x-ray 9/4.  Empirically treated.   -COVID checked given resp infection and pending TCU discharge.  Negative 9/8.     GERD  -Continue pantoprazole 40 mg a day.     Acute encephalopathy, now resolved  -Possibly multifactorial, combination of metabolic and medications.  -Minimize opiates as able.  -Increase Neurontin to PTA dose of 100 mg BID and monitor     Poor appetite  Constipation  - Continue Miralax and Senna     Diet: Combination Diet Renal Diet (dialysis)    DVT Prophylaxis: Heparin SQ  Taylor Catheter: Not present  Lines: None     Cardiac Monitoring: None  Code Status: Full Code      Clinically Significant Risk Factors Present on Admission        # Hypokalemia: Lowest K = 3.1 mmol/L in last 2 days, will replace as needed    # Hypercalcemia: corrected calcium is >10.1, will monitor as appropriate   # Anion Gap Metabolic Acidosis: Highest Anion Gap = 19 mmol/L in last 2 days, will monitor and treat as appropriate  # Hypoalbuminemia: Lowest albumin = 2.1 g/dL at 9/4/2023  6:57 AM, will monitor as appropriate     # Hypertension: Noted on problem list                 Disposition Plan      Expected Discharge Date: 09/13/2023    Discharge Delays: Placement - TCU  *Medically Ready for Discharge  Destination: inpatient rehabilitation facility  Discharge Comments: 8/27/23 medically ready when TCU bed found, [facility] TCU accepted but needs to train their staff on pt's PD prior to acceptance          Reza Hutton MD  Hospitalist Service  [facility]  Securely message  with Vocera (more info)  Text page via [facility] Paging/Directory   ______________________________________________________________________    Interval History   Strained a muscle in her leg during PT. No other new problems.     Physical Exam   Vital Signs: Temp: 97.7  F (36.5  C) Temp src: Oral BP: 91/44 Pulse: 62   Resp: 20 SpO2: 95 % O2 Device: None (Room air) Oxygen Delivery: 1 LPM  Weight: 221 lbs 9 oz    GENERAL:  Comfortable. Cooperative.  PSYCH: pleasant, oriented, No acute distress.  EYES: PERRLA, Normal conjunctiva.  HEART:  Regular rate and rhythm. No JVD. Pulses normal. No edema.  LUNGS:  Clear to auscultation, normal Respiratory effort.  ABDOMEN:  Soft, no hepatosplenomegaly, normal bowel sounds.  EXTREMETIES: No clubbing, cyanosis or ischemia  SKIN:  Dry to touch, No rash.      Medical Decision Making       45 MINUTES SPENT BY ME on the date of service doing chart review, history, exam, documentation & further activities per the note.      Data     I have personally reviewed the following data over the past 24 hrs:    8.4  \   12.0   / 347     139 94 (L) 40.9 (H) /  146 (H)   3.1 (L) 26 12.27 (H) \     ALT: N/A AST: N/A AP: N/A TBILI: N/A   ALB: 2.9 (L) TOT PROTEIN: N/A LIPASE: N/A       Imaging results reviewed over the past 24 hrs:   No results found for this or any previous visit (from the past 24 hour(s)).  Recent Labs   Lab 09/11/23  0638 09/10/23  0639 09/07/23  0704 09/06/23  0830   WBC 8.4  --  7.3  --    HGB 12.0  --  10.9*  --    MCV 87  --  85  --     314 271  --      --  140 140   POTASSIUM 3.1*  --  3.3* 3.5   CHLORIDE 94*  --  95* 96*   CO2 26  --  25 25   BUN 40.9*  --  47.8* 51.5*   CR 12.27*  --  13.34* 13.45*   ANIONGAP 19*  --  20* 19*   RON 9.6  --  9.4 9.1   *  --  131* 105*   ALBUMIN 2.9*  --   --   --

## 2023-09-11 NOTE — PROGRESS NOTES
Assessment and Plan:     ESRD: PD today. 5 XC of 2.9 L , 11 h cycle, 14,500 ml total. 1 bag of 2.25% and 2 bags of 4.5%. No last fill.     Pt on CaCO3,     Labs show Na 139, K 3.1, HCO3 26. Cr 12.27.  Calcium is 9.6 and phosphorus is 7.4.            Interval History:   Hypertension: on clonidine, imdur, metoprolol. BP well controlled.        GUILLERMO on CPAP           Review of Systems:   She complains of cramps during the dialysis exchanges.  We will monitor how she does tonight and adjust ultrafiltration if she still cramps.          Medications:     Medications Prior to Admission   Medication Sig Dispense Refill Last Dose    carvedilol (COREG) 6.25 MG tablet TAKE 1 TABLET BY MOUTH TWICE DAILY WITH MEALS   Past Week at -    cloNIDine (CATAPRES) 0.1 MG tablet Take 1 tablet (0.1 mg) by mouth 3 times daily 90 tablet 1 Past Week at -    cyanocobalamin (VITAMIN B-12) 1000 MCG tablet Take 1,000 mcg by mouth daily   Past Week at -    famotidine (PEPCID) 20 MG tablet Take 20 mg by mouth daily as needed (heartburn)   prn at prn    gabapentin (NEURONTIN) 100 MG capsule Take 100 mg by mouth 2 times daily as needed (itching)   prn at prn    Glucos-MSM-C-Lz-Cflhyo-Whcfgw (GLUCOSAMINE MSM COMPLEX) TABS tablet Take 1 tablet by mouth daily   Past Week at -    hydrOXYzine (VISTARIL) 50 MG capsule Take 50 mg by mouth 4 times daily as needed for itching   prn at prn    isosorbide mononitrate (IMDUR) 30 MG 24 hr tablet Take 1 tablet (30 mg) by mouth daily 90 tablet 0 Past Week at -    omeprazole (PRILOSEC) 40 MG DR capsule Take 40 mg by mouth daily   Past Week at -    traZODone (DESYREL) 50 MG tablet Take 50 mg by mouth nightly as needed for sleep   prn at prn    bumetanide (BUMEX) 2 MG tablet Take 1 tablet (2 mg) by mouth daily (Patient not taking: Reported on 8/24/2023) 90 tablet 1 Not Taking at -      - MEDICATION INSTRUCTIONS for Dialysis Patients -   Does not apply See Admin Instructions    acetaminophen  975 mg Oral Q8H     calcium carbonate  1,000 mg Oral TID w/meals    cloNIDine  0.1 mg Oral TID    docusate sodium  100 mg Oral BID    gabapentin  100 mg Oral BID    heparin ANTICOAGULANT  5,000 Units Subcutaneous Q12H    isosorbide mononitrate  30 mg Oral Daily    lidocaine  2 patch Transdermal Q24H    metoprolol succinate ER  12.5 mg Oral Daily    miconazole   Topical BID    pantoprazole  40 mg Oral Daily    polyethylene glycol  17 g Oral BID    senna-docusate  1 tablet Oral BID     Current active medications and PTA medications reviewed, see medication list for details.            Physical Exam:   Vitals were reviewed  Patient Vitals for the past 24 hrs:   BP Temp Temp src Pulse Resp SpO2 Weight   23 0757 91/44 97.7  F (36.5  C) Oral 62 20 95 % --   23 0646 -- -- -- -- -- -- 100.5 kg (221 lb 9 oz)   23 0422 100/57 97.9  F (36.6  C) Oral 86 20 96 % --   09/10/23 2328 106/65 97.6  F (36.4  C) Axillary 60 20 99 % --   09/10/23 2012 137/79 97.7  F (36.5  C) Oral 69 18 98 % --   09/10/23 1644 120/47 98.8  F (37.1  C) Oral 74 18 98 % --       Temp:  [97.6  F (36.4  C)-98.8  F (37.1  C)] 97.7  F (36.5  C)  Pulse:  [60-86] 62  Resp:  [18-20] 20  BP: ()/(44-79) 91/44  SpO2:  [95 %-99 %] 95 %    Temperatures:  Current - Temp: 97.7  F (36.5  C); Max - Temp  Av.9  F (36.6  C)  Min: 97.6  F (36.4  C)  Max: 98.8  F (37.1  C)  Respiration range: Resp  Av.2  Min: 18  Max: 20  Pulse range: Pulse  Av.2  Min: 60  Max: 86  Blood pressure range: Systolic (24hrs), Av , Min:91 , Max:137   ; Diastolic (24hrs), Av, Min:44, Max:79    Pulse oximetry range: SpO2  Av.2 %  Min: 95 %  Max: 99 %    I/O last 3 completed shifts:  In: 600 [P.O.:600]  Out: -       Intake/Output Summary (Last 24 hours) at 2023 1526  Last data filed at 9/10/2023 2202  Gross per 24 hour   Intake 600 ml   Output --   Net 600 ml     She is alert and sitting up in the chair  Speech is slow but appears intact  Lungs with clear breath  sounds  Cardiac exam regular rhythm normal S1-S2 no murmur  Abdomen normal bowel sounds soft and nontender  Lower extremities 1+ edema         Wt Readings from Last 4 Encounters:   09/11/23 100.5 kg (221 lb 9 oz)   05/12/23 100.5 kg (221 lb 9.6 oz)   06/07/22 85.7 kg (189 lb)   11/15/21 93.4 kg (206 lb)          Data:          Lab Results   Component Value Date     09/11/2023     09/07/2023     09/06/2023     05/29/2021    Lab Results   Component Value Date    CHLORIDE 94 09/11/2023    CHLORIDE 95 09/07/2023    CHLORIDE 96 09/06/2023    CHLORIDE 98 05/12/2023    CHLORIDE 101 05/11/2023    CHLORIDE 100 05/10/2023    CHLORIDE 96 05/29/2021    Lab Results   Component Value Date    BUN 40.9 09/11/2023    BUN 47.8 09/07/2023    BUN 51.5 09/06/2023    BUN 80 05/12/2023    BUN 77 05/11/2023    BUN 78 05/10/2023    BUN 43 05/29/2021      Lab Results   Component Value Date    POTASSIUM 3.1 09/11/2023    POTASSIUM 3.3 09/07/2023    POTASSIUM 3.5 09/06/2023    POTASSIUM 4.1 05/12/2023    POTASSIUM 4.3 05/11/2023    POTASSIUM 4.0 05/10/2023    POTASSIUM 3.7 05/29/2021    Lab Results   Component Value Date    CO2 26 09/11/2023    CO2 25 09/07/2023    CO2 25 09/06/2023    CO2 22 05/12/2023    CO2 21 05/11/2023    CO2 22 05/10/2023    CO2 29 05/29/2021    Lab Results   Component Value Date    CR 12.27 09/11/2023    CR 13.34 09/07/2023    CR 13.45 09/06/2023    CR 12.30 05/29/2021        Recent Labs   Lab Test 09/11/23  0638 09/10/23  0639 09/07/23  0704 09/01/23  0631 08/28/23  0848   WBC 8.4  --  7.3  --  6.2   HGB 12.0  --  10.9*  --  9.5*   HCT 40.9  --  36.0  --  31.8*   MCV 87  --  85  --  86    314 271   < > 165    < > = values in this interval not displayed.     Recent Labs   Lab Test 05/10/23  0522 06/08/22  0021 05/29/21  1624   AST 9 8 5   ALT <9 <9 9   ALKPHOS 61 49 71   BILITOTAL 0.5 0.3 0.6       Recent Labs   Lab Test 09/11/23  0638 05/10/23  0522 08/30/20  1524   MAG 2.6* 3.1* 2.5      Recent Labs   Lab Test 09/11/23  0638 09/07/23  0704 09/04/23  0657   PHOS 7.4* 8.2* 9.6*     Recent Labs   Lab Test 09/11/23  0638 09/07/23  0704 09/06/23  0830   RON 9.6 9.4 9.1       Lab Results   Component Value Date    RON 9.6 09/11/2023     Lab Results   Component Value Date    WBC 8.4 09/11/2023    HGB 12.0 09/11/2023    HCT 40.9 09/11/2023    MCV 87 09/11/2023     09/11/2023     Lab Results   Component Value Date     09/11/2023    POTASSIUM 3.1 (L) 09/11/2023    CHLORIDE 94 (L) 09/11/2023    CO2 26 09/11/2023     (H) 09/11/2023     Lab Results   Component Value Date    BUN 40.9 (H) 09/11/2023    CR 12.27 (H) 09/11/2023     Lab Results   Component Value Date    MAG 2.6 (H) 09/11/2023     Lab Results   Component Value Date    PHOS 7.4 (H) 09/11/2023       Creatinine   Date Value Ref Range Status   09/11/2023 12.27 (H) 0.51 - 0.95 mg/dL Final   09/07/2023 13.34 (H) 0.51 - 0.95 mg/dL Final   09/06/2023 13.45 (H) 0.51 - 0.95 mg/dL Final   09/04/2023 13.91 (H) 0.51 - 0.95 mg/dL Final   08/28/2023 15.17 (H) 0.51 - 0.95 mg/dL Final   08/28/2023 15.57 (H) 0.51 - 0.95 mg/dL Final   05/29/2021 12.30 (H) 0.52 - 1.04 mg/dL Final       Attestation:  I have reviewed today's vital signs, notes, medications, labs and imaging.     Trevor Chen MD

## 2023-09-12 NOTE — PROGRESS NOTES
PRIMARY DIAGNOSIS: ACUTE PAIN  OUTPATIENT/OBSERVATION GOALS TO BE MET BEFORE DISCHARGE:  1. Pain Status: Improved-controlled with oral pain medications.    2. Return to near baseline physical activity: No    3. Cleared for discharge by consultants (if involved): No    Discharge Planner Nurse   Safe discharge environment identified: Yes  Barriers to discharge: Yes       Entered by: Ivana Ricardo RN 09/12/2023 12:52 PM   Pt. A&O, has pain got PRN Dilaudid, refused to get up, refused P/T, on Dialysis during night, dressing intact, pure-wick in place.  /64 (BP Location: Left arm)   Pulse 86   Temp 97.6  F (36.4  C) (Oral)   Resp 16   Wt 100.5 kg (221 lb 9 oz)   SpO2 94%   BMI 41.86 kg/m     Please review provider order for any additional goals.   Nurse to notify provider when observation goals have been met and patient is ready for discharge.

## 2023-09-12 NOTE — PLAN OF CARE
Goal Outcome Evaluation:          PRIMARY DIAGNOSIS: ACUTE PAIN  OUTPATIENT/OBSERVATION GOALS TO BE MET BEFORE DISCHARGE:  1. Pain Status: Improved-controlled with oral pain medications.     2. Return to near baseline physical activity: No     3. Cleared for discharge by consultants (if involved): No     Discharge Planner Nurse   Safe discharge environment identified: Yes  Barriers to discharge: Yes       Entered by: Delmy Richardson RN 09/12/2023 1610  Please review provider order for any additional goals.   Nurse to notify provider when observation goals have been met and patient is ready for discharge.      PRIMARY DIAGNOSIS: Fall, Left hip contusion, PD  OUTPATIENT/OBSERVATION GOALS TO BE MET BEFORE DISCHARGE:  ADLs back to baseline: No     Activity and level of assistance: Assist of 2, FWW & gait belt.         Pain status: Improved-controlled with oral pain medications.     Return to near baseline physical activity: No          Discharge Planner Nurse   Safe discharge environment identified: Yes  Barriers to discharge: Yes       Entered by: Delmy Richardson RN 09/12/2023 [id number]  Please review provider order for any additional goals.   Nurse to notify provider when observation goals have been met and patient is ready for discharge.     A&O x4. Pain 8/10 Rt hip, Rt shoulder. Pain managed with PO medications. Reports decreased appetite. On PD, managed by dialysis. Atarax added for pain management. Complained of n/v, refuses zofran.  refused purwick change as patient was sleeping.      Temp: 97.6  F (36.4  C) Temp src: Oral BP: 109/64 Pulse: 86   Resp: 16 SpO2: 94 % O2 Device: Nasal cannula

## 2023-09-12 NOTE — PLAN OF CARE
G ACUTE PAIN  OUTPATIENT/OBSERVATION GOALS TO BE MET BEFORE DISCHARGE:  1. Pain Status: Improved-controlled with oral pain medications.    2. Return to near baseline physical activity: No    3. Cleared for discharge by consultants (if involved): N/A    Discharge Planner Nurse   Safe discharge environment identified: Yes  Barriers to discharge: Yes       Entered by: Ivana Ricardo RN 09/12/2023 08:20 am     Pt. A&O, has pain got PRN Dilaudid, refused to get up, refused P/T, on Dialysis during night, dressing intact, pure-wick in place.  /64 (BP Location: Left arm)   Pulse 86   Temp 97.6  F (36.4  C) (Oral)   Resp 16   Wt 100.5 kg (221 lb 9 oz)   SpO2 94%   BMI 41.86 kg/m     Please review provider order for any additional goals.   Nurse to notify provider when observation goals have been met and patient is ready for discharge.

## 2023-09-12 NOTE — PROGRESS NOTES
Assessment and Plan:     ESRD: on PD. Orders placed for 2.5% Dianeal, 2500 ml per XC, 5 XC, no last fill.   BP is low, will use 2.5% and not 4.25% for less UF.     On CaCO3 as binder.               Interval History:   Hypertension: on clonidine, imdur, metoprolol. BP low , will decrease meds. .         GUILLERMO on CPAP                 Review of Systems:   C/O N, V. Has had some diarrhea. C/O headache.           Medications:      - MEDICATION INSTRUCTIONS for Dialysis Patients -   Does not apply See Admin Instructions    calcium carbonate  1,000 mg Oral TID w/meals    cloNIDine  0.1 mg Oral TID    docusate sodium  100 mg Oral BID    gabapentin  100 mg Oral BID    heparin ANTICOAGULANT  5,000 Units Subcutaneous Q12H    isosorbide mononitrate  30 mg Oral Daily    lidocaine  2 patch Transdermal Q24H    metoprolol succinate ER  12.5 mg Oral Daily    miconazole   Topical BID    pantoprazole  40 mg Oral Daily    polyethylene glycol  17 g Oral BID    senna-docusate  1 tablet Oral BID      dianeal PD LOW calcium-2.5% dex (calcium 2.5 mEq/L) 6,000 mL PERITONEAL DIALYSATE      dianeal PD LOW calcium-2.5% dex (calcium 2.5 mEq/L) 6,000 mL PERITONEAL DIALYSATE      dianeal PD LOW calcium-4.25% dex (calcium 2.5 mEq/L) 6,000 mL PERITONEAL DIALYSATE       Current active medications and PTA medications reviewed, see medication list for details.            Physical Exam:   Vitals were reviewed  Patient Vitals for the past 24 hrs:   BP Temp Temp src Pulse Resp SpO2   09/12/23 1323 (!) 81/50 -- -- -- -- --   09/12/23 0742 109/64 97.6  F (36.4  C) Oral 86 16 94 %   09/12/23 0012 106/65 98.2  F (36.8  C) Axillary 72 18 100 %   09/11/23 2028 118/69 -- -- -- -- --   09/11/23 1603 94/55 98.2  F (36.8  C) Oral 62 18 98 %       Temp:  [97.6  F (36.4  C)-98.2  F (36.8  C)] 97.6  F (36.4  C)  Pulse:  [62-86] 86  Resp:  [16-18] 16  BP: ()/(50-69) 81/50  SpO2:  [94 %-100 %] 94 %    Temperatures:  Current - Temp: 97.6  F (36.4  C); Max -  Temp  Av  F (36.7  C)  Min: 97.6  F (36.4  C)  Max: 98.2  F (36.8  C)  Respiration range: Resp  Av.3  Min: 16  Max: 18  Pulse range: Pulse  Av.3  Min: 62  Max: 86  Blood pressure range: Systolic (24hrs), Av , Min:81 , Max:118   ; Diastolic (24hrs), Av, Min:50, Max:69    Pulse oximetry range: SpO2  Av.3 %  Min: 94 %  Max: 100 %    No intake/output data recorded.    No intake or output data in the 24 hours ending 23 1351    Alert, up in chair  Lungs clear BS  Cor RRR nl S1 S2 2/6 sys M  LE soft, no edema       Wt Readings from Last 4 Encounters:   23 100.5 kg (221 lb 9 oz)   23 100.5 kg (221 lb 9.6 oz)   22 85.7 kg (189 lb)   11/15/21 93.4 kg (206 lb)          Data:          Lab Results   Component Value Date     2023     2023     2023     2021    Lab Results   Component Value Date    CHLORIDE 94 2023    CHLORIDE 95 2023    CHLORIDE 96 2023    CHLORIDE 98 2023    CHLORIDE 101 2023    CHLORIDE 100 05/10/2023    CHLORIDE 96 2021    Lab Results   Component Value Date    BUN 40.9 2023    BUN 47.8 2023    BUN 51.5 2023    BUN 80 2023    BUN 77 2023    BUN 78 05/10/2023    BUN 43 2021      Lab Results   Component Value Date    POTASSIUM 3.1 2023    POTASSIUM 3.3 2023    POTASSIUM 3.5 2023    POTASSIUM 4.1 2023    POTASSIUM 4.3 2023    POTASSIUM 4.0 05/10/2023    POTASSIUM 3.7 2021    Lab Results   Component Value Date    CO2 26 2023    CO2 25 2023    CO2 25 2023    CO2 22 2023    CO2 21 2023    CO2 22 05/10/2023    CO2 29 2021    Lab Results   Component Value Date    CR 12.27 2023    CR 13.34 2023    CR 13.45 2023    CR 12.30 2021        Recent Labs   Lab Test 23  0638 09/10/23  0639 23  0704 23  0631 23  0848   WBC 8.4  --  7.3  --   6.2   HGB 12.0  --  10.9*  --  9.5*   HCT 40.9  --  36.0  --  31.8*   MCV 87  --  85  --  86    314 271   < > 165    < > = values in this interval not displayed.     Recent Labs   Lab Test 05/10/23  0522 06/08/22  0021 05/29/21  1624   AST 9 8 5   ALT <9 <9 9   ALKPHOS 61 49 71   BILITOTAL 0.5 0.3 0.6       Recent Labs   Lab Test 09/11/23  0638 05/10/23  0522 08/30/20  1524   MAG 2.6* 3.1* 2.5     Recent Labs   Lab Test 09/11/23 0638 09/07/23  0704 09/04/23  0657   PHOS 7.4* 8.2* 9.6*     Recent Labs   Lab Test 09/11/23 0638 09/07/23  0704 09/06/23  0830   RON 9.6 9.4 9.1       Lab Results   Component Value Date    RON 9.6 09/11/2023     Lab Results   Component Value Date    WBC 8.4 09/11/2023    HGB 12.0 09/11/2023    HCT 40.9 09/11/2023    MCV 87 09/11/2023     09/11/2023     Lab Results   Component Value Date     09/11/2023    POTASSIUM 3.1 (L) 09/11/2023    CHLORIDE 94 (L) 09/11/2023    CO2 26 09/11/2023     (H) 09/11/2023     Lab Results   Component Value Date    BUN 40.9 (H) 09/11/2023    CR 12.27 (H) 09/11/2023     Lab Results   Component Value Date    MAG 2.6 (H) 09/11/2023     Lab Results   Component Value Date    PHOS 7.4 (H) 09/11/2023       Creatinine   Date Value Ref Range Status   09/11/2023 12.27 (H) 0.51 - 0.95 mg/dL Final   09/07/2023 13.34 (H) 0.51 - 0.95 mg/dL Final   09/06/2023 13.45 (H) 0.51 - 0.95 mg/dL Final   09/04/2023 13.91 (H) 0.51 - 0.95 mg/dL Final   08/28/2023 15.17 (H) 0.51 - 0.95 mg/dL Final   08/28/2023 15.57 (H) 0.51 - 0.95 mg/dL Final   05/29/2021 12.30 (H) 0.52 - 1.04 mg/dL Final       Attestation:  I have reviewed today's vital signs, notes, medications, labs and imaging.  Peritoneal orders reviewed and discussed with dialysis nurse.      Trevor Chen MD

## 2023-09-12 NOTE — PROGRESS NOTES
PRIMARY DIAGNOSIS: ACUTE PAIN  OUTPATIENT/OBSERVATION GOALS TO BE MET BEFORE DISCHARGE:  1. Pain Status: Improved-controlled with oral pain medications.    2. Return to near baseline physical activity: No    3. Cleared for discharge by consultants (if involved): No    Discharge Planner Nurse   Safe discharge environment identified: Yes  Barriers to discharge: Yes       Entered by: Ivana Ricardo RN 09/12/2023 6:18 PM   Pt. A&O, has pain got PRN Dilaudid, refused to get up, refuse V.S. to be check.,refused P/T, on Dialysis during night, dressing intact, pure-wick in place. Spouse present at bed side.  BP (!) 81/50   Pulse 86   Temp 97.6  F (36.4  C) (Oral)   Resp 16   Wt 100.5 kg (221 lb 9 oz)   SpO2 94%   BMI 41.86 kg/m     Please review provider order for any additional goals.   Nurse to notify provider when observation goals have been met and patient is ready for discharge.

## 2023-09-12 NOTE — PROGRESS NOTES
Care Management Follow Up    Length of Stay (days): 0    Expected Discharge Date: 09/13/2023     Concerns to be Addressed:       Patient plan of care discussed at interdisciplinary rounds: Yes    Anticipated Discharge Disposition: Transitional Care     Anticipated Discharge Services:    Anticipated Discharge DME: None    Patient/family educated on Medicare website which has current facility and service quality ratings: no  Education Provided on the Discharge Plan:    Patient/Family in Agreement with the Plan:      Referrals Placed by CM/SW: Post Acute Facilities  Private pay costs discussed: Not applicable    Additional Information:  Adi outpatient Dialysis RN, [facility] [phone],called to inquire about dialysis run. Read Nephrologist's note to her which was helpful.     Jocelyn Ashton, RN, BSN, CM  Inpatient Care Coordination  [facility]  [phone]

## 2023-09-12 NOTE — PLAN OF CARE
Goal Outcome Evaluation:       Temp: 97.6  F (36.4  C) Temp src: Oral BP: 109/64 Pulse: 86   Resp: 16 SpO2: 94 % O2 Device: Nasal cannula       AO4. On 1L O2 NC only HS, RA during the day. A2 gb/walker. Had 2 Bms on my shift, one was reported as diarrhea. C/o nausea/vomit, but declined antiemetic med. Uses purewick and bedside commode. Has no IV assess. Refused most of her bedtime meds and head to toe assessment. C/o pain 8/10, dilaudid and robaxin PRN given. Refused dressing change on peritoneal site. Possible discharge to a TCU. Continue with POC.

## 2023-09-12 NOTE — PLAN OF CARE
Goal Outcome Evaluation:          PRIMARY DIAGNOSIS: ACUTE PAIN  OUTPATIENT/OBSERVATION GOALS TO BE MET BEFORE DISCHARGE:  1. Pain Status: Improved-controlled with oral pain medications.     2. Return to near baseline physical activity: No     3. Cleared for discharge by consultants (if involved): No     Discharge Planner Nurse   Safe discharge environment identified: Yes  Barriers to discharge: Yes       Entered by: Delmy Richardson RN 09/12/2023 1220  Please review provider order for any additional goals.   Nurse to notify provider when observation goals have been met and patient is ready for discharge.      PRIMARY DIAGNOSIS: Fall, Left hip contusion, PD  OUTPATIENT/OBSERVATION GOALS TO BE MET BEFORE DISCHARGE:  ADLs back to baseline: No     Activity and level of assistance: Assist of 2, FWW & gait belt.         Pain status: Improved-controlled with oral pain medications.     Return to near baseline physical activity: No          Discharge Planner Nurse   Safe discharge environment identified: Yes  Barriers to discharge: Yes       Entered by: Delmy Richardson RN 09/12/2023 1220  Please review provider order for any additional goals.   Nurse to notify provider when observation goals have been met and patient is ready for discharge.     A&O x4. Pain 8/10 Rt hip, Rt shoulder. Pain managed with PO medications. Reports decreased appetite. On PD, managed by dialysis. Atarax added for pain management. Complained of n/v, refuses zofran.      Temp: 97.6  F (36.4  C) Temp src: Oral BP: 109/64 Pulse: 86   Resp: 16 SpO2: 94 % O2 Device: Nasal cannula

## 2023-09-12 NOTE — PROGRESS NOTES
[facility]    Medicine Progress Note - Hospitalist Service    Date of Admission:  8/23/2023    Assessment & Plan   Devora Garcia is a 62 year old female with history of HTN, ESRD on PD, and GUILLERMO on CPAP, who presented to the ED on 8/23/23 with left hip and knee pain after a mechanical fall that same day. She had no head trauma or LOC. She had been having difficulty walking. She felt her fall was due to increased fluid retention from dialysis non-compliance. Since retiring in April, she had not been consistently getting her 4 peritoneal exchanges per day. ED evaluation showed stable vital signs. ED evaluation showed BMP consistent with ESRD, BNP 24,079, and HGB 10.5 but was otherwise unremarkable. Imaging (CXR, left knee XR, pelvis XR, CT abdomen/pelvis, and CT left hip) was unremarkable. She was admitted here for supportive care. Nephrology was consulted for PD.  She continued to be quite deconditioned.  She as seen by PT and OT and TCU was recommended. We are awaiting placement. Placement options are limited due to PD although there is a facility that anticipated being able to take her the week of 9/11 once staff has been trained.    Problem list:     Mechanical fall with acute left hip and knee pain.  Mild orthostatic hypotension  Deconditioning  -Symptoms slowly improving but deconditioning exacerbating recovery.  -Continue pain medications scheduled and as needed.  -PT and OT following.  -Current recommendation is for TCU, but they cannot immediately take her as they are training staff on Peritoneal Dialysis.  They recently reported they expect to have staff training complete some time during week of 9/11.  -Social work following.  -Still with pain issues but not taking Tylenol or interested in much else besides opiates. Discussed that we need to work towards management of pain without opiates. Continue Neurontin at 100 mg BID (renal dosed) and Aspercreme. I changed Tylenol to prn. I  added Voltaren gel. 2 more doses of oral dilaudid are available. Add prn hydroxyzine. Could try renal dosed ultram if needed (25-50 mg q 12 hours prn)     End-stage kidney disease on PD  Metabolic acidosis earlier in stay  Hypokalemia  -Nephrology following.  -Continue peritoneal dialysis.  -Did have a previous contamination event, treated with empiric abx.  -Completed cefazolin and cephalexin course for contamination event earlier in stay.  -Will defer potassium replacement/electrolyte management to Nephrology     Hypertension  Intermittent orthostatic hypotension  -Blood pressure reasonably controlled lately  -Continue metoprolol succinate 12.5 mg once a day in place of previous carvedilol.  -Continue isosorbide mononitrate.  -Continue clonidine 0.1 mg 3 times a day with hold parameters.     Possible pneumonia  -Noted on chest x-ray 9/4.  Empirically treated.   -COVID checked given resp infection and pending TCU discharge.  Negative 9/8.     GERD  -Continue pantoprazole 40 mg a day.     Acute encephalopathy, now resolved  -Possibly multifactorial, combination of metabolic and medications.  -Minimize opiates as able.  -Increase Neurontin to PTA dose of 100 mg BID and monitor     Poor appetite  Constipation  - Continue Miralax and Senna       Diet: Combination Diet Renal Diet (dialysis)    DVT Prophylaxis: Heparin SQ  Taylor Catheter: Not present  Lines: None     Cardiac Monitoring: None  Code Status: Full Code      Clinically Significant Risk Factors Present on Admission        # Hypokalemia: Lowest K = 3.1 mmol/L in last 2 days, will replace as needed    # Hypercalcemia: corrected calcium is >10.1, will monitor as appropriate   # Anion Gap Metabolic Acidosis: Highest Anion Gap = 19 mmol/L in last 2 days, will monitor and treat as appropriate  # Hypoalbuminemia: Lowest albumin = 2.1 g/dL at 9/4/2023  6:57 AM, will monitor as appropriate     # Hypertension: Noted on problem list                 Disposition Plan       Expected Discharge Date: 09/13/2023    Discharge Delays: Placement - TCU  *Medically Ready for Discharge  Destination: inpatient rehabilitation facility  Discharge Comments: 8/27/23 medically ready when TCU bed found, [facility] TCU accepted but needs to train their staff on pt's PD prior to acceptance          Reza Hutton MD  Hospitalist Service  [facility]  Securely message with [employer] (more info)  Text page via [employer] Paging/Directory   ______________________________________________________________________    Interval History   No new problems. Had complaints of not getting enough help as quickly as she would like overnight.     Physical Exam   Vital Signs: Temp: 97.6  F (36.4  C) Temp src: Oral BP: (!) 81/50 Pulse: 86   Resp: 16 SpO2: 94 % O2 Device: Nasal cannula    Weight: 221 lbs 9 oz    GENERAL:  Comfortable. Cooperative.  PSYCH: pleasant, oriented, No acute distress.  EYES: PERRLA, Normal conjunctiva.  HEART:  Regular rate and rhythm. No JVD. Pulses normal. No edema.  LUNGS:  Clear to auscultation, normal Respiratory effort.  ABDOMEN:  Soft, no hepatosplenomegaly, normal bowel sounds.  EXTREMETIES: No clubbing, cyanosis or ischemia  SKIN:  Dry to touch, No rash.      Medical Decision Making       45 MINUTES SPENT BY ME on the date of service doing chart review, history, exam, documentation & further activities per the note.      Data         Imaging results reviewed over the past 24 hrs:   No results found for this or any previous visit (from the past 24 hour(s)).  Recent Labs   Lab 09/11/23  0638 09/10/23  0639 09/07/23  0704 09/06/23  0830   WBC 8.4  --  7.3  --    HGB 12.0  --  10.9*  --    MCV 87  --  85  --     314 271  --      --  140 140   POTASSIUM 3.1*  --  3.3* 3.5   CHLORIDE 94*  --  95* 96*   CO2 26  --  25 25   BUN 40.9*  --  47.8* 51.5*   CR 12.27*  --  13.34* 13.45*   ANIONGAP 19*  --  20* 19*   RON 9.6  --  9.4 9.1   *  --  131* 105*    ALBUMIN 2.9*  --   --   --

## 2023-09-12 NOTE — PROGRESS NOTES
Cycler set up per protocol and per treatment orders     Results from previous treatment: no data available  Effluent color and clarity: clear, yellow  Total UF: NA  Initial Drain: NA  Avg Dwell: NA  Lost Dwell: NA    Cycler Serial Number: [id number]  Total Treatment Volume: [id number]  Total treatment time: 10 hrs  Fill Volume: [id number]  Last Fill Volume:0ml  Dextrose ba.5% 6000mL;  Lot #: [id number];  Expiration Date:     Number of cycles including final fill: 5  Dwell Time: 1:22 minutes  Last Fill Volume: 0ml  Initial Drain Alarm: 0ml  PD orders reviewed with Patient procedure and ESRD teaching done and questions answered.  Cycler ready for hook-up.    Report given to: [address] FRANCINE Pollard consent form signed yes

## 2023-09-12 NOTE — PLAN OF CARE
Goal Outcome Evaluation:             PRIMARY DIAGNOSIS: ACUTE PAIN  OUTPATIENT/OBSERVATION GOALS TO BE MET BEFORE DISCHARGE:  1. Pain Status: Improved-controlled with oral pain medications.    2. Return to near baseline physical activity: No    3. Cleared for discharge by consultants (if involved): No    Discharge Planner Nurse   Safe discharge environment identified: Yes  Barriers to discharge: Yes       Entered by: Delmy Richardson RN 09/12/2023 0831     Please review provider order for any additional goals.   Nurse to notify provider when observation goals have been met and patient is ready for discharge.     PRIMARY DIAGNOSIS: Fall, Left hip contusion, PD  OUTPATIENT/OBSERVATION GOALS TO BE MET BEFORE DISCHARGE:  ADLs back to baseline: No    Activity and level of assistance: Assist of 2, FWW & gait belt.      Pain status: Improved-controlled with oral pain medications.    Return to near baseline physical activity: No     Discharge Planner Nurse   Safe discharge environment identified: Yes  Barriers to discharge: Yes       Entered by: Delmy Richardson RN 09/12/2023 0831     Please review provider order for any additional goals.   Nurse to notify provider when observation goals have been met and patient is ready for discharge.    A&O x4. Pain 8/10 Rt hip, Rt shoulder. Pain managed with PO medications. Reports decreased appetite. On PD, managed by dialysis.     Temp: 97.6  F (36.4  C) Temp src: Oral BP: 109/64 Pulse: 86   Resp: 16 SpO2: 94 % O2 Device: Nasal cannula

## 2023-09-13 NOTE — PROGRESS NOTES
Assessment and Plan:     End-stage renal disease on peritoneal dialysis: Orders are placed for today.  We will do 5 cycles of 2500 mL.  No last fill.  We will drop her down to 1.5 Constance Tod to reduce ultrafiltration and try and prevent cramping.  She will have a 10-hour run with 12,000 mL total.  On exam I do not see any evidence of fluid overload.  We will check a set of laboratories in the morning.            Interval History:   Hypertension: She is on clonidine, Imdur, metoprolol.  Obstructive sleep apnea on CPAP                 Review of Systems:   She complains of cramping in the right thigh.  This is associated with her peritoneal dialysis.  I do not get a history of shortness of breath chest pain or abdominal pain.          Medications:      - MEDICATION INSTRUCTIONS for Dialysis Patients -   Does not apply See Admin Instructions    calcium carbonate  1,000 mg Oral TID w/meals    cloNIDine  0.1 mg Oral BID    docusate sodium  100 mg Oral BID    gabapentin  100 mg Oral BID    heparin ANTICOAGULANT  5,000 Units Subcutaneous Q12H    isosorbide mononitrate  15 mg Oral Daily    lidocaine  2 patch Transdermal Q24H    metoprolol succinate ER  12.5 mg Oral Daily    miconazole   Topical BID    pantoprazole  40 mg Oral Daily    polyethylene glycol  17 g Oral BID    senna-docusate  1 tablet Oral BID       Current active medications and PTA medications reviewed, see medication list for details.            Physical Exam:   Vitals were reviewed  Patient Vitals for the past 24 hrs:   BP Temp Temp src Pulse Resp SpO2   23 0812 113/62 97.5  F (36.4  C) Oral 73 18 95 %   23 2300 131/61 98.6  F (37  C) Oral 72 18 96 %   23 2051 106/48 -- -- -- -- --       Temp:  [97.5  F (36.4  C)-98.6  F (37  C)] 97.5  F (36.4  C)  Pulse:  [72-73] 73  Resp:  [18] 18  BP: (106-131)/(48-62) 113/62  SpO2:  [95 %-96 %] 95 %    Temperatures:  Current - Temp: 97.5  F (36.4  C); Max - Temp  Av.1  F (36.7  C)  Min:  97.5  F (36.4  C)  Max: 98.6  F (37  C)  Respiration range: Resp  Av  Min: 18  Max: 18  Pulse range: Pulse  Av.5  Min: 72  Max: 73  Blood pressure range: Systolic (24hrs), Av , Min:106 , Max:131   ; Diastolic (24hrs), Av, Min:48, Max:62    Pulse oximetry range: SpO2  Av.5 %  Min: 95 %  Max: 96 %    I/O last 3 completed shifts:  In: 100 [P.O.:100]  Out: -       Intake/Output Summary (Last 24 hours) at 2023 1403  Last data filed at 2023 1000  Gross per 24 hour   Intake 100 ml   Output 548 ml   Net -448 ml       She is alert, resting comfortably in bed  Lower extremities show stasis dermatitis but there soft and I do not see much edema  Lungs are clear anterior breath sounds  Cardiac exam regular rhythm normal S1-S2 2/6 systolic murmur  Head shows no trauma the pupils are round and reactive to light  Abdomen is soft and nontender, there is a left lower quadrant peritoneal dialysis catheter in place       Wt Readings from Last 4 Encounters:   23 100.5 kg (221 lb 9 oz)   23 100.5 kg (221 lb 9.6 oz)   22 85.7 kg (189 lb)   11/15/21 93.4 kg (206 lb)          Data:          Lab Results   Component Value Date     2023     2023     2023     2021    Lab Results   Component Value Date    CHLORIDE 94 2023    CHLORIDE 95 2023    CHLORIDE 96 2023    CHLORIDE 98 2023    CHLORIDE 101 2023    CHLORIDE 100 05/10/2023    CHLORIDE 96 2021    Lab Results   Component Value Date    BUN 40.9 2023    BUN 47.8 2023    BUN 51.5 2023    BUN 80 2023    BUN 77 2023    BUN 78 05/10/2023    BUN 43 2021      Lab Results   Component Value Date    POTASSIUM 3.1 2023    POTASSIUM 3.3 2023    POTASSIUM 3.5 2023    POTASSIUM 4.1 2023    POTASSIUM 4.3 2023    POTASSIUM 4.0 05/10/2023    POTASSIUM 3.7 2021    Lab Results   Component Value Date    CO2  26 09/11/2023    CO2 25 09/07/2023    CO2 25 09/06/2023    CO2 22 05/12/2023    CO2 21 05/11/2023    CO2 22 05/10/2023    CO2 29 05/29/2021    Lab Results   Component Value Date    CR 12.27 09/11/2023    CR 13.34 09/07/2023    CR 13.45 09/06/2023    CR 12.30 05/29/2021        Recent Labs   Lab Test 09/13/23  0709 09/11/23  0638 09/10/23  0639 09/07/23  0704 09/01/23  0631 08/28/23  0848   WBC  --  8.4  --  7.3  --  6.2   HGB  --  12.0  --  10.9*  --  9.5*   HCT  --  40.9  --  36.0  --  31.8*   MCV  --  87  --  85  --  86    347 314 271   < > 165    < > = values in this interval not displayed.     Recent Labs   Lab Test 05/10/23  0522 06/08/22  0021 05/29/21  1624   AST 9 8 5   ALT <9 <9 9   ALKPHOS 61 49 71   BILITOTAL 0.5 0.3 0.6       Recent Labs   Lab Test 09/11/23  0638 05/10/23  0522 08/30/20  1524   MAG 2.6* 3.1* 2.5     Recent Labs   Lab Test 09/11/23  0638 09/07/23  0704 09/04/23  0657   PHOS 7.4* 8.2* 9.6*     Recent Labs   Lab Test 09/11/23  0638 09/07/23  0704 09/06/23  0830   RON 9.6 9.4 9.1       Lab Results   Component Value Date    RON 9.6 09/11/2023     Lab Results   Component Value Date    WBC 8.4 09/11/2023    HGB 12.0 09/11/2023    HCT 40.9 09/11/2023    MCV 87 09/11/2023     09/13/2023     Lab Results   Component Value Date     09/11/2023    POTASSIUM 3.1 (L) 09/11/2023    CHLORIDE 94 (L) 09/11/2023    CO2 26 09/11/2023     (H) 09/11/2023     Lab Results   Component Value Date    BUN 40.9 (H) 09/11/2023    CR 12.27 (H) 09/11/2023     Lab Results   Component Value Date    MAG 2.6 (H) 09/11/2023     Lab Results   Component Value Date    PHOS 7.4 (H) 09/11/2023       Creatinine   Date Value Ref Range Status   09/11/2023 12.27 (H) 0.51 - 0.95 mg/dL Final   09/07/2023 13.34 (H) 0.51 - 0.95 mg/dL Final   09/06/2023 13.45 (H) 0.51 - 0.95 mg/dL Final   09/04/2023 13.91 (H) 0.51 - 0.95 mg/dL Final   08/28/2023 15.17 (H) 0.51 - 0.95 mg/dL Final   08/28/2023 15.57 (H) 0.51 - 0.95  mg/dL Final   05/29/2021 12.30 (H) 0.52 - 1.04 mg/dL Final       Attestation:  I have reviewed today's vital signs, notes, medications, labs and imaging.  Peritoneal dialysis orders reviewed and placed for tonight.     Trevor Chen MD

## 2023-09-13 NOTE — PLAN OF CARE
Goal Outcome Evaluation:      Plan of Care Reviewed With: patient    Overall Patient Progress: improvingOverall Patient Progress: improving     Temp: 98.6  F (37  C) Temp src: Oral BP: 131/61 Pulse: 72   Resp: 18 SpO2: 96 % O2 Device: None (Room air)       AO4. RA. A2 gb/walker. C/o nausea/vomit, but declined antiemetic med. Uses purewick and bedside commode. Has no IV assess. Refused head to toe assessment. C/o pain 8/10, dilaudid,atarax and robaxin PRN given. Possible discharge to a TCU. Continue with POC.

## 2023-09-13 NOTE — PROGRESS NOTES
Care Management Follow Up    Length of Stay (days): 0    Expected Discharge Date: 09/14/2023     Concerns to be Addressed:       Patient plan of care discussed at interdisciplinary rounds: Yes    Anticipated Discharge Disposition: Transitional Care     Anticipated Discharge Services:    Anticipated Discharge DME: None    Patient/family educated on Medicare website which has current facility and service quality ratings: no  Education Provided on the Discharge Plan:    Patient/Family in Agreement with the Plan:      Referrals Placed by CM/SW: Post Acute Facilities  Private pay costs discussed: transportation costs    Additional Information:  CM received call back from [facility]  that they are all set to take patient. They are waiting for auth yet so the plan is for patient to go there tomorrow. MD updated that they need the discharge orders to have the specific peritoneal dialysis orders.   Will arrange wheelchair transport for tomorrow.     [address] transport set up for 9/14 [id number].    Hazel Duvall RN  Care Coordinator  [facility]

## 2023-09-13 NOTE — PLAN OF CARE
Goal Outcome Evaluation:          PRIMARY DIAGNOSIS: ESRD on PD  OUTPATIENT/OBSERVATION GOALS TO BE MET BEFORE DISCHARGE:  ADLs back to baseline: No     Activity and level of assistance: A-2, FWW & belt     Pain status: Improved-controlled with oral pain medications.     Return to near baseline physical activity: No          Discharge Planner Nurse   Safe discharge environment identified: Yes  Barriers to discharge: Yes       Entered by: Delmy Richardson RN 09/13/2023   Please review provider order for any additional goals.   Nurse to notify provider when observation goals have been met and patient is ready for discharge.  Temp: 97.5  F (36.4  C) Temp src: Oral BP: 113/62 Pulse: 73   Resp: 18 SpO2: 95 % O2 Device: None (Room air)        A&O x4, forgetful. Refused PT today. Appetite still decreased. Tolerated all meds. Denies nausea. Has not requested any PRN medications for pain. Refuses to get OOB. Has refused breakfast and lunch.

## 2023-09-13 NOTE — PROGRESS NOTES
"Patient upset at writer and Physical Therapist for \"waking her up\" this morning to participate in therapy. Writer walked into room after patient was awoken. Patient refused therapy. Writer asked patient to \"please let us help you get up while the therapist has time to help us\". Patient continued to refuse and stated she will \"get up when her  gets here at 3pm\".   Approximately an hour later later, patient upset and stated that \"what you did earlier was wrong and unprofessional\". Writer asked patient to clarify what she meant, patient asked to talk to manager. ANS was notified to talk to patient. Writer again apologized for anything that upset patient, although not sure why patient is upset at writer. Patient then stated, \" If I was white, you would not have woken me up like that.\" Reiterated that patient was awake when writer walked into the room. Writer told patient that we were trying to convince her to get into the chair to increase mobility. Apologized that patient felt that way. Writer asked for manager's information; patient given nurse manager's phone number.   " Indications, risks, benefits and alternatives to YAG capsulotomy discussed with patient. Questions answered. Educational handout given.

## 2023-09-13 NOTE — PLAN OF CARE
Goal Outcome Evaluation:         PRIMARY DIAGNOSIS: ESRD on PD  OUTPATIENT/OBSERVATION GOALS TO BE MET BEFORE DISCHARGE:  ADLs back to baseline: No    Activity and level of assistance: A-2, FWW & belt    Pain status: Improved-controlled with oral pain medications.    Return to near baseline physical activity: No     Discharge Planner Nurse   Safe discharge environment identified: Yes  Barriers to discharge: Yes       Entered by: Delmy Richardson RN 09/13/2023      Please review provider order for any additional goals.   Nurse to notify provider when observation goals have been met and patient is ready for discharge.      Temp: 97.5  F (36.4  C) Temp src: Oral BP: 113/62 Pulse: 73   Resp: 18 SpO2: 95 % O2 Device: None (Room air)       A&O x4, forgetful. Refused PT today. Appetite still decreased. Tolerated all meds. Denies nausea. No labs drawn since 9/11/23, MD and Nephrologist aware, no new orders.

## 2023-09-13 NOTE — PROGRESS NOTES
[facility]    Medicine Progress Note - Hospitalist Service    Date of Admission:  8/23/2023    Assessment & Plan   Devora Garcia is a 62 year old female with history of HTN, ESRD on PD, and GUILLERMO on CPAP, who presented to the ED on 8/23/23 with left hip and knee pain after a mechanical fall that same day. She had no head trauma or LOC. She had been having difficulty walking. She felt her fall was due to increased fluid retention from dialysis non-compliance. Since retiring in April, she had not been consistently getting her 4 peritoneal exchanges per day. ED evaluation showed stable vital signs. ED evaluation showed BMP consistent with ESRD, BNP 24,079, and HGB 10.5 but was otherwise unremarkable. Imaging (CXR, left knee XR, pelvis XR, CT abdomen/pelvis, and CT left hip) was unremarkable. She was admitted here for supportive care. Nephrology was consulted for PD.  She continued to be quite deconditioned.  She as seen by PT and OT and TCU was recommended. We are awaiting placement. Placement options are limited due to PD although there is a facility that anticipated being able to take her the week of 9/11 once staff has been trained.    Problem list:     Mechanical fall with acute left hip and knee pain.  Mild orthostatic hypotension  Deconditioning  -Symptoms slowly improving but deconditioning exacerbating recovery.  -Continue pain medications scheduled and as needed.  -PT and OT following.  -Current recommendation is for TCU, but they cannot immediately take her as they are training staff on Peritoneal Dialysis.  They recently reported they expect to have staff training complete some time during week of 9/11.  -Social work following.  -Still with pain issues but not taking Tylenol or interested in much else besides opiates. Discussed that we need to work towards management of pain without opiates. Continue Neurontin at 100 mg BID (renal dosed) and Aspercreme. I changed Tylenol to prn. I  added Voltaren gel. 2 more doses of oral dilaudid are available. I added prn hydroxyzine. Could try renal dosed ultram if needed (25-50 mg q 12 hours prn)     End-stage kidney disease on PD  Metabolic acidosis earlier in stay  Hypokalemia  -Nephrology following.  -Continue peritoneal dialysis.  -Did have a previous contamination event, treated with empiric abx.  -Completed cefazolin and cephalexin course for contamination event earlier in stay.  -Will defer potassium replacement/electrolyte management to Nephrology     Hypertension  Intermittent orthostatic hypotension  -Blood pressure reasonably controlled lately  -Continue metoprolol succinate 12.5 mg once a day in place of previous carvedilol.  -Continue isosorbide mononitrate.  -Continue clonidine 0.1 mg 3 times a day with hold parameters.     Possible pneumonia  -Noted on chest x-ray 9/4.  Empirically treated.   -COVID negative 9/8.     GERD  -Continue pantoprazole 40 mg a day.     Acute encephalopathy, now resolved  -Possibly multifactorial, combination of metabolic and medications.  -Minimize opiates as able.  -Increased Neurontin to PTA dose of 100 mg BID; monitor     Poor appetite  Constipation  - Continue Miralax and Senna       Diet: Combination Diet Renal Diet (dialysis)    DVT Prophylaxis: Heparin SQ  Taylor Catheter: Not present  Lines: None     Cardiac Monitoring: None  Code Status: Full Code      Clinically Significant Risk Factors Present on Admission              # Hypoalbuminemia: Lowest albumin = 2.1 g/dL at 9/4/2023  6:57 AM, will monitor as appropriate     # Hypertension: Noted on problem list                 Disposition Plan      Expected Discharge Date: 09/14/2023    Discharge Delays: Placement - TCU  *Medically Ready for Discharge  Destination: inpatient rehabilitation facility  Discharge Comments: 8/27/23 medically ready when TCU bed found, [facility] TCU accepted but needs to train their staff on pt's PD prior to acceptance          Reza  DAVID Hutton MD  Hospitalist Service  [facility]  Securely message with [employer] (more info)  Text page via [employer] Paging/Directory   ______________________________________________________________________    Interval History   No new problems. Feeling ok. Some cramps in right leg and shoulder with dialysis    Physical Exam   Vital Signs: Temp: 97.5  F (36.4  C) Temp src: Oral BP: 113/62 Pulse: 73   Resp: 18 SpO2: 95 % O2 Device: None (Room air)    Weight: 221 lbs 9 oz    GENERAL:  Comfortable. Cooperative.  PSYCH: pleasant, oriented, No acute distress.  EYES: PERRLA, Normal conjunctiva.  HEART:  Regular rate and rhythm. No JVD. Pulses normal. No edema.  LUNGS:  Clear to auscultation, normal Respiratory effort.  ABDOMEN:  Soft, no hepatosplenomegaly, normal bowel sounds.  EXTREMETIES: No clubbing, cyanosis or ischemia  SKIN:  Dry to touch, No rash.      Medical Decision Making       40 MINUTES SPENT BY ME on the date of service doing chart review, history, exam, documentation & further activities per the note.      Data     I have personally reviewed the following data over the past 24 hrs:    N/A  \   N/A   / 270     N/A N/A N/A /  N/A   N/A N/A N/A \       Imaging results reviewed over the past 24 hrs:   No results found for this or any previous visit (from the past 24 hour(s)).  Recent Labs   Lab 09/13/23  0709 09/11/23  0638 09/10/23  0639 09/07/23  0704   WBC  --  8.4  --  7.3   HGB  --  12.0  --  10.9*   MCV  --  87  --  85    347 314 271   NA  --  139  --  140   POTASSIUM  --  3.1*  --  3.3*   CHLORIDE  --  94*  --  95*   CO2  --  26  --  25   BUN  --  40.9*  --  47.8*   CR  --  12.27*  --  13.34*   ANIONGAP  --  19*  --  20*   RON  --  9.6  --  9.4   GLC  --  146*  --  131*   ALBUMIN  --  2.9*  --   --

## 2023-09-13 NOTE — PLAN OF CARE
Goal Outcome Evaluation:      PRIMARY DIAGNOSIS: ESRD on PD  OUTPATIENT/OBSERVATION GOALS TO BE MET BEFORE DISCHARGE:  ADLs back to baseline: No     Activity and level of assistance: A-2, FWW & belt     Pain status: Improved-controlled with oral pain medications.     Return to near baseline physical activity: No          Discharge Planner Nurse   Safe discharge environment identified: Yes  Barriers to discharge: Yes       Entered by: Delmy Richardson RN 09/13/2023   Please review provider order for any additional goals.   Nurse to notify provider when observation goals have been met and patient is ready for discharge.  Temp: 97.5  F (36.4  C) Temp src: Oral BP: 113/62 Pulse: 73   Resp: 18 SpO2: 95 % O2 Device: None (Room air)        A&O x4, forgetful. Refused to participate with PT. Appetite still decreased, has not eaten, refuses to order food. Tolerated all meds. Denies nausea. Has not requested any PRN medications for pain. Refuses to get OOB. Has refused breakfast and lunch. PD set up in room, ready to start when due. Plan to discharge to [facility] tomorrow

## 2023-09-13 NOTE — PROGRESS NOTES
Cycler set up per protocol and per treatment orders     Results from previous treatment:  Effluent color and clarity: Yellow/clear  Total UF: 548  Initial Drain: 84  Avg Dwell: 1:31  Added Dwell: 8 minutes    Cycler Serial Number: [id number]  Total Treatment Volume: [id number]  Total treatment time: 10 hrs  Fill Volume: 2400ml  Last Fill Volume:0ml  Heater ba.5% 6000mL;  Lot #: [id number];  Expiration Date:   Side Bag #1: 1.5% 6000mL;  Lot #: [id number];  Expiration Date:     Number of cycles including final fill: 5  Dwell Time: 1:29 minutes  Last Fill Volume: 0ml  Initial Drain Alarm: 0ml  PD orders reviewed with Patient procedure and ESRD teaching done and questions answered.  Cycler ready for hook-up.    Report given to: Delmy Pollard consent form signed yes

## 2023-09-14 NOTE — PROGRESS NOTES
"PRIMARY DIAGNOSIS: ACUTE PAIN  OUTPATIENT/OBSERVATION GOALS TO BE MET BEFORE DISCHARGE:  1. Pain Status: Improved-controlled with oral pain medications.    2. Return to near baseline physical activity: No    3. Cleared for discharge by consultants (if involved): No    Discharge Planner Nurse   Safe discharge environment identified: Yes  Barriers to discharge: Yes       Entered by: Alla Laura RN 09/13/2023 10:58 PM  A&Ox4. Pt refusing PT and some medication. PRN med for pain given. Vital signs:  Temp: 97.9  F (36.6  C) Temp src: Oral BP: 121/72 Pulse: 67   Resp: 20 SpO2: 96 % O2 Device: None (Room air) Oxygen Delivery: 1 LPM   Weight: 100.5 kg (221 lb 9 oz)  Estimated body mass index is 41.86 kg/m  as calculated from the following:    Height as of 5/10/23: 1.549 m (5' 1\").    Weight as of this encounter: 100.5 kg (221 lb 9 oz).    Please review provider order for any additional goals.   Nurse to notify provider when observation goals have been met and patient is ready for discharge.  "

## 2023-09-14 NOTE — PROGRESS NOTES
Care Management Follow Up    Length of Stay (days): 0    Expected Discharge Date: 09/14/2023     Concerns to be Addressed:       Patient plan of care discussed at interdisciplinary rounds: Yes    Anticipated Discharge Disposition: Transitional Care     Anticipated Discharge Services:    Anticipated Discharge DME: None    Patient/family educated on Medicare website which has current facility and service quality ratings: no  Education Provided on the Discharge Plan:    Patient/Family in Agreement with the Plan:      Referrals Placed by CM/SW: Post Acute Facilities    Additional Information:  Waiting on authorization from [facility]. Rescheduled transportation for today to 1538 to 1623. Pt aware of delay.  Will follow up with Loc for auth.    ADDENDUM: Loc called [facility] and was told that they have 3 to 5 days to review pt and pt has not been reviewed as of now. Transportation changed to 9/15/2023 from 1440 to 1520.  Will continue to follow for discharge planning.    Jyoti Griffin RN   Inpatient Care Coordination  [facility]   Phone: [phone]

## 2023-09-14 NOTE — PROGRESS NOTES
Assessment and Plan:     ESDR: on PD. Plan 5 x 2500 or 1.5% dianeal. No last fill.   If she discharges to home she can go back to manual exchanges per her usual prescription. She can do these manual exchanges by herself.               Interval History:   Hypertension: She is on clonidine, Imdur, metoprolol.  Obstructive sleep apnea on CPAP                       Review of Systems:   C/O anorexia. No N or V. No headache. C/O pain and cramps in R upper leg. Up with assistance only.           Medications:      - MEDICATION INSTRUCTIONS for Dialysis Patients -   Does not apply See Admin Instructions    calcium carbonate  1,000 mg Oral TID w/meals    cloNIDine  0.1 mg Oral BID    docusate sodium  100 mg Oral BID    gabapentin  100 mg Oral BID    heparin ANTICOAGULANT  5,000 Units Subcutaneous Q12H    isosorbide mononitrate  15 mg Oral Daily    lidocaine  2 patch Transdermal Q24H    metoprolol succinate ER  12.5 mg Oral Daily    miconazole   Topical BID    pantoprazole  40 mg Oral Daily    polyethylene glycol  17 g Oral BID    senna-docusate  1 tablet Oral BID      dianeal PD LOW calcium-1.5% dex (calcium 2.5 mEq/L) 6,000 mL PERITONEAL DIALYSATE       Current active medications and PTA medications reviewed, see medication list for details.            Physical Exam:   Vitals were reviewed  Patient Vitals for the past 24 hrs:   BP Temp Temp src Pulse Resp SpO2 Weight   09/14/23 1306 99/47 97.6  F (36.4  C) Oral 80 18 93 % --   09/14/23 1014 120/86 -- -- 70 -- -- --   09/14/23 1013 120/86 -- -- -- -- -- --   09/14/23 0810 106/56 97.8  F (36.6  C) Oral 69 18 95 % --   09/14/23 0704 -- -- -- -- -- -- 97 kg (213 lb 13.5 oz)   09/14/23 0403 114/66 97.6  F (36.4  C) Oral 76 20 97 % --   09/13/23 2349 117/65 97.9  F (36.6  C) Oral 77 16 98 % --   09/13/23 2314 122/66 99.4  F (37.4  C) Oral 70 18 96 % --   09/13/23 2036 121/72 97.9  F (36.6  C) Oral 67 20 96 % --   09/13/23 1613 96/48 97.7  F (36.5  C) Oral 69 18 94 % --        Temp:  [97.6  F (36.4  C)-99.4  F (37.4  C)] 97.6  F (36.4  C)  Pulse:  [67-80] 80  Resp:  [16-20] 18  BP: ()/(47-86) 99/47  SpO2:  [93 %-98 %] 93 %    Temperatures:  Current - Temp: 97.6  F (36.4  C); Max - Temp  Av  F (36.7  C)  Min: 97.6  F (36.4  C)  Max: 99.4  F (37.4  C)  Respiration range: Resp  Av.3  Min: 16  Max: 20  Pulse range: Pulse  Av.3  Min: 67  Max: 80  Blood pressure range: Systolic (24hrs), Av , Min:96 , Max:122   ; Diastolic (24hrs), Av, Min:47, Max:86    Pulse oximetry range: SpO2  Av.6 %  Min: 93 %  Max: 98 %    I/O last 3 completed shifts:  In: 139 [Other:139]  Out: -       Intake/Output Summary (Last 24 hours) at 2023 1520  Last data filed at 2023 1013  Gross per 24 hour   Intake 139 ml   Output --   Net 139 ml       Alert and responsive  LE tender to palpation, stasis changes, soft and minimal edema       Wt Readings from Last 4 Encounters:   23 97 kg (213 lb 13.5 oz)   23 100.5 kg (221 lb 9.6 oz)   22 85.7 kg (189 lb)   11/15/21 93.4 kg (206 lb)          Data:          Lab Results   Component Value Date     2023     2023     2023     2021    Lab Results   Component Value Date    CHLORIDE 94 2023    CHLORIDE 95 2023    CHLORIDE 96 2023    CHLORIDE 98 2023    CHLORIDE 101 2023    CHLORIDE 100 05/10/2023    CHLORIDE 96 2021    Lab Results   Component Value Date    BUN 40.9 2023    BUN 47.8 2023    BUN 51.5 2023    BUN 80 2023    BUN 77 2023    BUN 78 05/10/2023    BUN 43 2021      Lab Results   Component Value Date    POTASSIUM 3.1 2023    POTASSIUM 3.3 2023    POTASSIUM 3.5 2023    POTASSIUM 4.1 2023    POTASSIUM 4.3 2023    POTASSIUM 4.0 05/10/2023    POTASSIUM 3.7 2021    Lab Results   Component Value Date    CO2 26 2023    CO2 25 2023    CO2 25 2023     CO2 22 05/12/2023    CO2 21 05/11/2023    CO2 22 05/10/2023    CO2 29 05/29/2021    Lab Results   Component Value Date    CR 12.27 09/11/2023    CR 13.34 09/07/2023    CR 13.45 09/06/2023    CR 12.30 05/29/2021        Recent Labs   Lab Test 09/13/23  0709 09/11/23  0638 09/10/23  0639 09/07/23  0704 09/01/23  0631 08/28/23  0848   WBC  --  8.4  --  7.3  --  6.2   HGB  --  12.0  --  10.9*  --  9.5*   HCT  --  40.9  --  36.0  --  31.8*   MCV  --  87  --  85  --  86    347 314 271   < > 165    < > = values in this interval not displayed.     Recent Labs   Lab Test 05/10/23  0522 06/08/22  0021 05/29/21  1624   AST 9 8 5   ALT <9 <9 9   ALKPHOS 61 49 71   BILITOTAL 0.5 0.3 0.6       Recent Labs   Lab Test 09/11/23  0638 05/10/23  0522 08/30/20  1524   MAG 2.6* 3.1* 2.5     Recent Labs   Lab Test 09/11/23  0638 09/07/23  0704 09/04/23  0657   PHOS 7.4* 8.2* 9.6*     Recent Labs   Lab Test 09/11/23  0638 09/07/23  0704 09/06/23  0830   RON 9.6 9.4 9.1       Lab Results   Component Value Date    RON 9.6 09/11/2023     Lab Results   Component Value Date    WBC 8.4 09/11/2023    HGB 12.0 09/11/2023    HCT 40.9 09/11/2023    MCV 87 09/11/2023     09/13/2023     Lab Results   Component Value Date     09/11/2023    POTASSIUM 3.1 (L) 09/11/2023    CHLORIDE 94 (L) 09/11/2023    CO2 26 09/11/2023     (H) 09/11/2023     Lab Results   Component Value Date    BUN 40.9 (H) 09/11/2023    CR 12.27 (H) 09/11/2023     Lab Results   Component Value Date    MAG 2.6 (H) 09/11/2023     Lab Results   Component Value Date    PHOS 7.4 (H) 09/11/2023       Creatinine   Date Value Ref Range Status   09/11/2023 12.27 (H) 0.51 - 0.95 mg/dL Final   09/07/2023 13.34 (H) 0.51 - 0.95 mg/dL Final   09/06/2023 13.45 (H) 0.51 - 0.95 mg/dL Final   09/04/2023 13.91 (H) 0.51 - 0.95 mg/dL Final   08/28/2023 15.17 (H) 0.51 - 0.95 mg/dL Final   08/28/2023 15.57 (H) 0.51 - 0.95 mg/dL Final   05/29/2021 12.30 (H) 0.52 - 1.04 mg/dL Final        Attestation:  I have reviewed today's vital signs, notes, medications, labs and imaging.  PD orders written and reviewed with PD nurse.      Trevor Chen MD

## 2023-09-14 NOTE — PROGRESS NOTES
"PRIMARY DIAGNOSIS: ACUTE PAIN  OUTPATIENT/OBSERVATION GOALS TO BE MET BEFORE DISCHARGE:  1. Pain Status: Improved-controlled with oral pain medications.    2. Return to near baseline physical activity: No    3. Cleared for discharge by consultants (if involved): No    Discharge Planner Nurse   Safe discharge environment identified: Yes  Barriers to discharge: Yes       Entered by: Alla Laura RN 09/14/2023 1:49 AM  A&Ox4. Pt refusing PT and some medication. PRN med for pain given. Pt felt SOB but sats were 92. Pt is on O2 1L. Assist of 2 pt in bed. Renal diet. External cath in place.  Vital signs:  Vital signs:  Temp: 97.9  F (36.6  C) Temp src: Oral BP: 117/65 Pulse: 77   Resp: 16 SpO2: 98 % O2 Device: Nasal cannula Oxygen Delivery: 1 LPM   Weight: 100.5 kg (221 lb 9 oz)  Estimated body mass index is 41.86 kg/m  as calculated from the following:    Height as of 5/10/23: 1.549 m (5' 1\").    Weight as of this encounter: 100.5 kg (221 lb 9 oz).    Please review provider order for any additional goals.   Nurse to notify provider when observation goals have been met and patient is ready for discharge.  "

## 2023-09-14 NOTE — PROGRESS NOTES
[facility]    Medicine Progress Note - Hospitalist Service    Date of Admission:  8/23/2023    Assessment & Plan   Devora Garcia is a 62 year old female with history of HTN, ESRD on PD, and GUILLERMO on CPAP, who presented to the ED on 8/23/23 with left hip and knee pain after a mechanical fall that same day. She had no head trauma or LOC. She had been having difficulty walking. She felt her fall was due to increased fluid retention from dialysis non-compliance. Since retiring in April, she had not been consistently getting her 4 peritoneal exchanges per day. ED evaluation showed stable vital signs. ED evaluation showed BMP consistent with ESRD, BNP 24,079, and HGB 10.5 but was otherwise unremarkable. Imaging (CXR, left knee XR, pelvis XR, CT abdomen/pelvis, and CT left hip) was unremarkable. She was admitted here for supportive care. Nephrology was consulted for PD.  She continued to be quite deconditioned.  She as seen by PT and OT and TCU was recommended. We are awaiting placement. Placement options are limited due to PD although there is a facility that anticipated being able to take her the week of 9/11 once staff has been trained.    Problem list:     Mechanical fall with acute left hip and knee pain.  Mild orthostatic hypotension  Deconditioning  -Symptoms slowly improving but deconditioning exacerbating recovery.  -Continue pain medications scheduled and as needed.  -PT and OT following.  -Current recommendation is for TCU, but they cannot immediately take her as they are training staff on Peritoneal Dialysis.  They recently reported they expect to have staff training complete some time during week of 9/11.  -Social work following.  -Discussed that we need to work towards management of pain without opiates. Continue Tylenol as needed, Neurontin at 100 mg BID (renal dosed), Voltaren gel, and Aspercreme. I added Mateo Casanova. I do not think opiates are appropriate for this pain which is vague  and migratory without clear significant cause.      End-stage kidney disease on PD  Metabolic acidosis earlier in stay  Hypokalemia  -Nephrology following.  -Continue peritoneal dialysis.  -Did have a previous contamination event, treated with empiric abx.  -Completed cefazolin and cephalexin course for contamination event earlier in stay.  -Will defer potassium replacement/electrolyte management to Nephrology     Hypertension  Intermittent orthostatic hypotension  -Blood pressure reasonably controlled lately  -Continue metoprolol succinate 12.5 mg once a day in place of previous carvedilol.  -Continue isosorbide mononitrate.  -Continue clonidine 0.1 mg 3 times a day with hold parameters.     Possible pneumonia  -Noted on chest x-ray 9/4.  Empirically treated.   -COVID negative 9/8.     GERD  -Continue pantoprazole 40 mg a day.     Acute encephalopathy, now resolved  -Possibly multifactorial, combination of metabolic and medications.  -Minimize opiates as able.  -Increased Neurontin to PTA dose of 100 mg BID; monitor     Poor appetite  Constipation  - Continue Miralax and Senna       Diet: Combination Diet Renal Diet (dialysis)    DVT Prophylaxis: Heparin SQ  Taylor Catheter: Not present  Lines: None     Cardiac Monitoring: None  Code Status: Full Code      Clinically Significant Risk Factors Present on Admission              # Hypoalbuminemia: Lowest albumin = 2.1 g/dL at 9/4/2023  6:57 AM, will monitor as appropriate     # Hypertension: Noted on problem list                 Disposition Plan      Expected Discharge Date: 09/14/2023    Discharge Delays: Placement - TCU  *Medically Ready for Discharge  Destination: inpatient rehabilitation facility  Discharge Comments: 8/27/23 medically ready when TCU bed found, [facility]CU accepted but needs to train their staff on pt's PD prior to acceptance          Reza Hutton MD  Hospitalist Service  [facility]  Securely message with [employer]  info)  Text page via [facility] Paging/Directory   ______________________________________________________________________    Interval History   No new problems. Still with migratory pains- always rated as near maximal (neck, shoulder, leg, etc).    Physical Exam   Vital Signs: Temp: 97.6  F (36.4  C) Temp src: Oral BP: 102/57 Pulse: 80   Resp: (P) 14 SpO2: 91 % O2 Device: None (Room air) Oxygen Delivery: 1 LPM  Weight: 213 lbs 13.54 oz    GENERAL:  Comfortable appearing. Cooperative.  PSYCH: pleasant, oriented, No acute distress.  EYES: PERRLA, Normal conjunctiva.  HEART:  Regular rate and rhythm. No JVD. Pulses normal. No edema.  LUNGS:  Clear to auscultation, normal Respiratory effort.  ABDOMEN:  Soft, no hepatosplenomegaly, normal bowel sounds.  EXTREMETIES: No clubbing, cyanosis or ischemia  SKIN:  Dry to touch, No rash.      Medical Decision Making       40 MINUTES SPENT BY ME on the date of service doing chart review, history, exam, documentation & further activities per the note.      Data         Imaging results reviewed over the past 24 hrs:   No results found for this or any previous visit (from the past 24 hour(s)).  Recent Labs   Lab 09/13/23  0709 09/11/23  0638 09/10/23  0639   WBC  --  8.4  --    HGB  --  12.0  --    MCV  --  87  --     347 314   NA  --  139  --    POTASSIUM  --  3.1*  --    CHLORIDE  --  94*  --    CO2  --  26  --    BUN  --  40.9*  --    CR  --  12.27*  --    ANIONGAP  --  19*  --    RON  --  9.6  --    GLC  --  146*  --    ALBUMIN  --  2.9*  --

## 2023-09-14 NOTE — PROGRESS NOTES
"PRIMARY DIAGNOSIS: \"GENERIC\" NURSING  OUTPATIENT/OBSERVATION GOALS TO BE MET BEFORE DISCHARGE:  ADLs back to baseline: Yes    Activity and level of assistance: Up with maximum assistance. Consider SW and/or PT evaluation.     Pain status: Improved-controlled with oral pain medications.    Return to near baseline physical activity: Yes     Discharge Planner Nurse   Safe discharge environment identified: Yes  Barriers to discharge: No       Entered by: Keegan Caicedo RN 09/14/2023 10:26 AM   Pt is discharging to TCU.  Please review provider order for any additional goals.   Nurse to notify provider when observation goals have been met and patient is ready for discharge.  "

## 2023-09-14 NOTE — PROGRESS NOTES
"PRIMARY DIAGNOSIS: ACUTE PAIN  OUTPATIENT/OBSERVATION GOALS TO BE MET BEFORE DISCHARGE:  1. Pain Status: Improved-controlled with oral pain medications.    2. Return to near baseline physical activity: No    3. Cleared for discharge by consultants (if involved): No    Discharge Planner Nurse   Safe discharge environment identified: Yes  Barriers to discharge: Yes       Entered by: Alla Laura RN 09/14/2023 5:25 AM  A&Ox4. Pt refusing PT and some medication. PRN med for pain given. Pt felt SOB but sats were 92. Pt is on O2 1L. Assist of 2 pt in bed. Renal diet. External cath in place.  Vital signs:  Vital signs:  Temp: 97.6  F (36.4  C) Temp src: Oral BP: 114/66 Pulse: 76   Resp: 20 SpO2: 97 % O2 Device: Nasal cannula Oxygen Delivery: 1 LPM   Weight: 100.5 kg (221 lb 9 oz)  Estimated body mass index is 41.86 kg/m  as calculated from the following:    Height as of 5/10/23: 1.549 m (5' 1\").    Weight as of this encounter: 100.5 kg (221 lb 9 oz).    Please review provider order for any additional goals.   Nurse to notify provider when observation goals have been met and patient is ready for discharge.  "

## 2023-09-14 NOTE — PROGRESS NOTES
"PRIMARY DIAGNOSIS: \"GENERIC\" NURSING  OUTPATIENT/OBSERVATION GOALS TO BE MET BEFORE DISCHARGE:  ADLs back to baseline: Yes    Activity and level of assistance: Up with standby assistance.    Pain status: No improvement noted. Consider adjustment in pain regimen.    Return to near baseline physical activity: Yes     Discharge Planner Nurse   Safe discharge environment identified: Yes  Barriers to discharge: No       Entered by: Keegan Caicedo RN 09/14/2023 4:29 PM   Pt no longer discharging. ETA for tomorrow.   Please review provider order for any additional goals.   Nurse to notify provider when observation goals have been met and patient is ready for discharge.  "

## 2023-09-15 NOTE — PROGRESS NOTES
Ok for discharge.     PD orders for discharge:     Cycler    2.5% Dianeal 2900 ml fill times 4 exchanges over 9 hours daily.     Discussed with [place] ZaheerHull) [facility] Home dialysis nurse who will  follow the patient.

## 2023-09-15 NOTE — PLAN OF CARE
"PRIMARY DIAGNOSIS: \"GENERIC\" NURSING  OUTPATIENT/OBSERVATION GOALS TO BE MET BEFORE DISCHARGE:  ADLs back to baseline: No, declines OOB activity    Activity and level of assistance: Max assist of 2 to lift    Pain status: Continues to C/O pain, declines pain ointments, topical patches, agreeable to PO medications.    Return to near baseline physical activity: No     Discharge Planner Nurse   Safe discharge environment identified:  Yes, awaiting prior authorization   Barriers to discharge: Yes, insurance prior authorization       Entered by: Jyothi Marquez RN 09/15/2023 12:27 PM     Please review provider order for any additional goals.   Nurse to notify provider when observation goals have been met and patient is ready for discharge.    BERTA Fajardo, RN  [facility]  Medical/Telemetry/IMC    ADDM Re: Activity: Pt up with minimal assist of 2, change to up with assist of 1, BG, walker. Up ambulated from bed to recliner.  "

## 2023-09-15 NOTE — PLAN OF CARE
Occupational Therapy Discharge Summary    Reason for therapy discharge:    Discharged to transitional care facility.    Progress towards therapy goal(s). See goals on Care Plan in [facility] electronic health record for goal details.  Goals partially met.  Barriers to achieving goals:   discharge from facility.    Therapy recommendation(s):    Continued therapy is recommended.  Rationale/Recommendations:  Continued skilled OT at TCU to progress safety and independence with ADL/IADL.

## 2023-09-15 NOTE — DISCHARGE SUMMARY
[facility]  Hospitalist Discharge Summary      Date of Admission:  8/23/2023  Date of Discharge:  9/15/2023  Discharging Provider: Reza Hutton MD  Discharge Service: Hospitalist Service    Discharge Diagnoses     Mechanical fall with acute left hip and knee pain  Mild orthostatic hypotension  Deconditioning  End-stage kidney disease on peritoneal dialysis  Metabolic acidosis, resolved  Hypokalemia  Hypertension  Intermittent orthostatic hypotension  Possible pneumonia  GERD  Acute encephalopathy, now resolved  Poor appetite  Constipation    Clinically Significant Risk Factors          Follow-ups Needed After Discharge   Follow-up Appointments     Follow Up and recommended labs and tests      Follow up with retirement physician.  The following labs/tests are   recommended: Weekly BMP, mag, phos.        Peritoneal dialysis ordered by Nephrology    Unresulted Labs Ordered in the Past 30 Days of this Admission       No orders found from 7/24/2023 to 8/24/2023.            Discharge Disposition   Discharged to short-term care facility  Condition at discharge: Stable    Hospital Course   Devora Garcia is a 62 year old female with history of HTN, ESRD on PD, and GUILLERMO on CPAP, who presented to the ED on 8/23/23 with left hip and knee pain after a mechanical fall that same day. She had no head trauma or LOC. She had been having difficulty walking. She felt her fall was due to increased fluid retention from dialysis non-compliance. Since retiring in April, she had not been consistently getting her 4 peritoneal exchanges per day. ED evaluation showed stable vital signs. ED evaluation showed BMP consistent with ESRD, BNP 24,079, and HGB 10.5 but was otherwise unremarkable. Imaging (CXR, left knee XR, pelvis XR, CT abdomen/pelvis, and CT left hip) was unremarkable. She was admitted here for supportive care. Nephrology was consulted for PD.  She continued to be quite deconditioned.  She as seen  by PT and OT and TCU was recommended. We are awaiting placement. Placement options were limited due to PD but a facility was found.     Problem list:     Mechanical fall with acute left hip and knee pain.  Mild orthostatic hypotension  Deconditioning  -Symptoms slowly improving but deconditioning exacerbating recovery.  -Continue pain medications scheduled and as needed.  -PT and OT followed and TCU recommended  -Discussed that we need to manage pain without opiates. Continue Tylenol as needed, Neurontin at 100 mg BID (renal dosed), Voltaren gel, Aspercreme, and Mateo Casanova. I do not think opiates are appropriate for this pain which is vague and migratory without clear significant cause.      End-stage kidney disease on PD  Metabolic acidosis earlier in stay  Hypokalemia  -Nephrology followed  -Continue peritoneal dialysis.  -Did have a previous contamination event, treated with empiric abx.  -Completed cefazolin and cephalexin course for contamination event earlier in stay.     Hypertension  Intermittent orthostatic hypotension  -Continue metoprolol succinate 12.5 mg once a day in place of previous carvedilol.  -Continue isosorbide mononitrate.  -Continue clonidine 0.1 mg 3 times a day with hold parameters.     Possible pneumonia  -Noted on chest x-ray 9/4.  Empirically treated.   -COVID negative 9/8.     GERD  -Continue pantoprazole 40 mg a day.     Acute encephalopathy, now resolved  -Possibly multifactorial, combination of metabolic and medications.  -Minimize opiates as able.  -Increased Neurontin to PTA dose of 100 mg BID; monitor     Poor appetite  Constipation  -Continue Miralax and Senna    Consultations This Hospital Stay   CARE MANAGEMENT / SOCIAL WORK IP CONSULT  PHYSICAL THERAPY ADULT IP CONSULT  OCCUPATIONAL THERAPY ADULT IP CONSULT  NEPHROLOGY IP CONSULT  PHYSICAL THERAPY ADULT IP CONSULT  OCCUPATIONAL THERAPY ADULT IP CONSULT    Code Status   Full Code    Time Spent on this Encounter   Reza PRAKASH  MD Anni, personally saw the patient today and spent greater than 30 minutes discharging this patient.       Reza Hutton MD  [place] [address] MEDICAL SURGICAL  [address]  [facility] [address]  Phone: [phone]  Fax: [phone]  ______________________________________________________________________    Physical Exam   Vital Signs: Temp: 98  F (36.7  C) Temp src: Oral BP: 126/70 Pulse: 68   Resp: 18 SpO2: 94 % O2 Device: None (Room air)    Weight: 213 lbs 13.54 oz  GENERAL:  Comfortable. Cooperative.  PSYCH: pleasant, oriented, No acute distress.  EYES: PERRLA, Normal conjunctiva.  HEART:  Regular rate and rhythm. No JVD. Pulses normal. No edema.  LUNGS:  Clear to auscultation, normal Respiratory effort.  ABDOMEN:  Soft, no hepatosplenomegaly, normal bowel sounds.  EXTREMETIES: No clubbing, cyanosis or ischemia  SKIN:  Dry to touch, No rash.         Primary Care Physician   Josesito Evans    Discharge Orders      General info for SNF    Length of Stay Estimate: Short Term Care: Estimated # of Days 31-90  Condition at Discharge: Improving  Level of care:skilled   Rehabilitation Potential: Fair  Admission H&P remains valid and up-to-date: Yes  Recent Chemotherapy: N/A  Use Nursing Home Standing Orders: NO     Mantoux instructions    Give two-step Mantoux (PPD) Per Facility Policy Yes     Follow Up and recommended labs and tests    Follow up with snf physician.  The following labs/tests are recommended: Weekly BMP, mag, phos.     Reason for your hospital stay    Leg pain, weakness, and failure to thrive     Daily weights    Call Provider for weight gain of more than 2 pounds per day or 5 pounds per week.     Activity - Up with nursing assistance     Full Code     Physical Therapy Adult Consult    Evaluate and treat as clinically indicated.    Reason:  weakness and deconditioning     Occupational Therapy Adult Consult    Evaluate and treat as clinically indicated.    Reason:   ADLs     Fall precautions     Diet    Follow this diet upon discharge: Orders Placed This Encounter      Combination Diet Renal Diet (dialysis)       Significant Results and Procedures   Most Recent 3 CBC's:  Recent Labs   Lab Test 09/13/23  0709 09/11/23  0638 09/10/23  0639 09/07/23  0704 09/01/23  0631 08/28/23  0848   WBC  --  8.4  --  7.3  --  6.2   HGB  --  12.0  --  10.9*  --  9.5*   MCV  --  87  --  85  --  86    347 314 271   < > 165    < > = values in this interval not displayed.     Most Recent 3 BMP's:  Recent Labs   Lab Test 09/11/23  0638 09/07/23  0704 09/06/23  0830    140 140   POTASSIUM 3.1* 3.3* 3.5   CHLORIDE 94* 95* 96*   CO2 26 25 25   BUN 40.9* 47.8* 51.5*   CR 12.27* 13.34* 13.45*   ANIONGAP 19* 20* 19*   RON 9.6 9.4 9.1   * 131* 105*     Most Recent 2 LFT's:  Recent Labs   Lab Test 05/10/23  0522 06/08/22  0021   AST 9 8   ALT <9 <9   ALKPHOS 61 49   BILITOTAL 0.5 0.3     7-Day Micro Results       No results found for the last 168 hours.        ,   Results for orders placed or performed during the hospital encounter of 08/23/23   XR Pelvis and Hip Left 1 View    Narrative    EXAM: XR PELVIS AND HIP LEFT 1 VIEW  LOCATION: [facility]  DATE: 8/24/2023    INDICATION: Left hip pain  COMPARISON: CT on 05/10/2023      Impression    IMPRESSION: Bilateral sacroiliac and hip joint spaces appear symmetrically intact. No acute fracture or dislocation identified. Significant aortoiliac atherosclerotic calcification redemonstrated, unchanged.   XR Knee Left 1/2 Views    Narrative    EXAM: XR KNEE LEFT 1/2 VIEWS  LOCATION: [facility]  DATE: 8/24/2023    INDICATION: fall, anterior knee pain  COMPARISON: None.      Impression    IMPRESSION: No acute fracture or dislocation. Mild osteoarthritis. Multiple vascular calcifications.   CT Abdomen Pelvis w/o Contrast    Narrative    EXAM: CT ABDOMEN PELVIS W/O CONTRAST  LOCATION: [facility]  DATE: 8/24/2023    INDICATION: Left hip pain, flank pain following a fall.  COMPARISON: CT abdomen and pelvis on 06/07/2022 and CTA of the chest, abdomen and pelvis on 05/10/2023.  TECHNIQUE: CT scan of the abdomen and pelvis was performed without intravenous contrast Multiplanar reformats were obtained. Dose reduction techniques were used.    FINDINGS:   LOWER CHEST: Multiple calcified foci in the left breast. Bibasilar pulmonary opacities, likely atelectasis.    ABDOMEN/PELVIS: Limited evaluation of the abdominal organs due to lack of intravenous contrast.    HEPATOBILIARY: The unenhanced liver is grossly unremarkable. Cholelithiasis without CT evidence of acute cholecystitis.    PANCREAS: The unenhanced pancreas is grossly unremarkable.    SPLEEN: No splenomegaly.    ADRENAL GLANDS: No adrenal nodules.    KIDNEYS/BLADDER: Atrophic native kidneys. No radiodense kidney/ureteral stones or hydronephrosis in either kidney.    BOWEL: No abnormally dilated bowel loops. The appendix is visualized and appears normal. Postsurgical changes of gastric sleeve surgery.    PERITONEUM: Small amount of free fluid in the abdomen and pelvis, could be related to the presence of the peritoneal dialysis catheter.    PELVIC ORGANS: Unremarkable.    VASCULATURE: Moderate atherosclerotic vascular calcification of the abdominal aorta and major vessels.    LYMPH NODES: Unremarkable.    MUSCULOSKELETAL: Cutaneous thickening and subcutaneous fat stranding most prominent in the anterior lower abdominal wall, can be seen with infection. Multilevel degenerative changes of the spine. No evidence of acute osseous pathology.      Impression    IMPRESSION: Within the limitation of noncontrast exam, there is;  1. No evidence of acute traumatic injury in the abdomen and pelvis.  2. Cutaneous thickening and subcutaneous fat stranding most prominent in the anterior lower abdominal wall, can be seen with cellulitis.  3. Cholelithiasis  without CT evidence of acute cholecystitis.     XR Chest 2 Views    Narrative    EXAM: XR CHEST 2 VIEWS  LOCATION: [facility]  DATE: 8/24/2023    INDICATION: Fall. History of hypertension and ESRD. On CPAP.  COMPARISON: 05/10/2023      Impression    IMPRESSION: Cardiac enlargement. Mild left basilar atelectasis. No vascular congestion or significant effusion. Degenerative change osseous structures.   CT Hip Left w/o Contrast    Narrative    CT HIP LEFT WITHOUT CONTRAST  DATE/TIME: 8/24/2023 12:29 PM    INDICATION: Fall and pain, fracture.  COMPARISON: Same day radiograph.  TECHNIQUE: Noncontrast. Axial, sagittal and coronal thin-section  reconstruction. Dose reduction techniques were used.     FINDINGS:     BONES:  -Bones are demineralized, which limits the detection for nondisplaced  fracture. There is no evidence of an acute fracture with attention to  the left hip. Mild left hip degenerative changes. No dislocation. Mild  degenerative changes bilateral SI joints.    SOFT TISSUES:  -There is scattered areas of stranding in the subcutaneous tissues  about the pelvis and hips. Bladder is distended. Peritoneal dialysis  catheter. No large soft tissue hematoma.      Impression    IMPRESSION:  1.  No CT evidence of acute left hip fracture.  2.  Bone demineralization.    EMIR COLEMAN MD         SYSTEM ID:  [id number]   XR Chest Port 1 View    Narrative    EXAM: XR CHEST PORT 1 VIEW  LOCATION: [facility]  DATE: 8/27/2023    INDICATION: Hypoxia.  COMPARISON: Chest CT 05/10/2023. Chest radiograph 08/24/2023.      Impression    IMPRESSION:    Low lung volumes. A hazy opacity within the left midlung is similar to the prior exam and is favored to reflect atelectasis, although infection could also be possible. No new airspace opacities. No pleural effusions or pneumothorax.    Stable, mildly enlarged cardiac silhouette, likely accentuated by the low lung volumes.    A catheter  overlies the right neck and shoulder.   XR Chest 2 Views    Narrative    EXAM: XR CHEST 2 VIEWS  LOCATION: [facility]  DATE: 9/4/2023    INDICATION: New cough and oxygen requirement. Please evaluate for PNA.  COMPARISON: 08/27/2023.      Impression    IMPRESSION: Heart size is normal. Shallow inspiration. Faint patchy opacity right upper lobe is new and may be early pneumonia. Linear opacity in the left lower lung, difficult to distinguish pneumonia from atelectasis. No pleural effusion or   pneumothorax.       Discharge Medications   Current Discharge Medication List        START taking these medications    Details   acetaminophen (TYLENOL) 500 MG tablet Take 1-2 tablets (500-1,000 mg) by mouth every 6 hours as needed for fever or pain    Associated Diagnoses: Pain      !! calcium carbonate (TUMS) 500 MG chewable tablet Take 1 tablet (500 mg) by mouth 3 times daily as needed for heartburn    Associated Diagnoses: Gastroesophageal reflux disease with esophagitis without hemorrhage; Hyperphosphatemia      !! calcium carbonate (TUMS) 500 MG chewable tablet Take 2 tablets (1,000 mg) by mouth 3 times daily (with meals)    Associated Diagnoses: Gastroesophageal reflux disease with esophagitis without hemorrhage; Hyperphosphatemia      diclofenac (VOLTAREN) 1 % topical gel Apply 2 g topically every 6 hours as needed for other (pain)    Associated Diagnoses: Pain      diphenhydrAMINE-zinc acetate (BENADRYL) 2-0.1 % external cream Apply topically 3 times daily as needed for itching    Associated Diagnoses: Itching      docusate sodium (COLACE) 100 MG capsule Take 1 capsule (100 mg) by mouth 2 times daily    Associated Diagnoses: Constipation, unspecified constipation type      gentamicin (GARAMYCIN) 0.1 % external cream Apply topically daily as needed (promotion of catheter exit site healing.) Apply cream daily to the peritoneal catheter exit site with dressing change.  Pharmacy will send tube with  first order or upon request.  Peritoneal Dialysis    Associated Diagnoses: Wound infection      Lidocaine (LIDOCARE) 4 % Patch Place 2 patches onto the skin every 24 hours To prevent lidocaine toxicity, patient should be patch free for 12 hrs daily.Apply patch(s) to affected areas. To prevent lidocaine toxicity, patient should be patch free for 12 hrs daily. Patches may be cut to smaller size prior to removing release liner.  Reminder: Remove previous patch before applying new patch.  NEVER APPLY HEAT OVER PATCH which increases absorption and may lead to local anesthetic toxicity. Do not apply over area where liposomal bupivacaine was injected for 96 hours post injection.    Associated Diagnoses: Pain      loperamide (IMODIUM) 2 MG capsule Take 1 capsule (2 mg) by mouth 3 times daily as needed for diarrhea    Associated Diagnoses: Diarrhea, unspecified type      melatonin 1 MG TABS tablet Take 1 tablet (1 mg) by mouth nightly as needed for sleep    Associated Diagnoses: Insomnia, unspecified type      menthol, Topical Analgesic, 2.5% (BENGAY VANISHING SCENT) 2.5 % GEL topical gel Apply topically every 6 hours as needed    Associated Diagnoses: Pain      methocarbamol (ROBAXIN) 500 MG tablet Take 1 tablet (500 mg) by mouth 4 times daily as needed for muscle spasms (muscle pain)    Associated Diagnoses: Pain      metoprolol succinate ER (TOPROL XL) 25 MG 24 hr tablet Take 0.5 tablets (12.5 mg) by mouth daily    Associated Diagnoses: Hypertension, unspecified type      ondansetron (ZOFRAN ODT) 4 MG ODT tab Take 1 tablet (4 mg) by mouth every 6 hours as needed for nausea or vomiting    Associated Diagnoses: Nausea and vomiting, unspecified vomiting type      polyethylene glycol (MIRALAX) 17 GM/Dose powder Take 17 g by mouth daily  Qty: 510 g    Associated Diagnoses: Constipation, unspecified constipation type      trolamine salicylate (ASPERCREME) 10 % external cream Apply topically 4 times daily as needed    Associated  Diagnoses: Pain       !! - Potential duplicate medications found. Please discuss with provider.        CONTINUE these medications which have CHANGED    Details   cloNIDine (CATAPRES) 0.1 MG tablet Take 1 tablet (0.1 mg) by mouth 2 times daily  Qty: 90 tablet, Refills: 1    Associated Diagnoses: Hypertension, unspecified type; Secondary hypertension      isosorbide mononitrate (IMDUR) 30 MG 24 hr tablet Take 0.5 tablets (15 mg) by mouth daily  Qty: 90 tablet, Refills: 0    Associated Diagnoses: Hypertension, unspecified type           CONTINUE these medications which have NOT CHANGED    Details   carvedilol (COREG) 6.25 MG tablet TAKE 1 TABLET BY MOUTH TWICE DAILY WITH MEALS      cyanocobalamin (VITAMIN B-12) 1000 MCG tablet Take 1,000 mcg by mouth daily      famotidine (PEPCID) 20 MG tablet Take 20 mg by mouth daily as needed (heartburn)      gabapentin (NEURONTIN) 100 MG capsule Take 100 mg by mouth 2 times daily as needed (itching)      Glucos-MSM-C-It-Ngthxs-Hdxsfh (GLUCOSAMINE MSM COMPLEX) TABS tablet Take 1 tablet by mouth daily      hydrOXYzine (VISTARIL) 50 MG capsule Take 50 mg by mouth 4 times daily as needed for itching      omeprazole (PRILOSEC) 40 MG DR capsule Take 40 mg by mouth daily      traZODone (DESYREL) 50 MG tablet Take 50 mg by mouth nightly as needed for sleep           STOP taking these medications       bumetanide (BUMEX) 2 MG tablet Comments:   Reason for Stopping:             Allergies   Allergies   Allergen Reactions    Aspirin Nausea    Statins-Hmg-Coa Reductase Inhibitors [Statins] Other (See Comments)     Other reaction(s): Renal Failure, Other reaction(s): Renal Failure

## 2023-09-15 NOTE — PROGRESS NOTES
Care Management Discharge Note    Discharge Date: 09/15/2023       Discharge Disposition: Transitional Care    Discharge Services:      Discharge DME: None    Discharge Transportation: agency    Does the patient's insurance plan have a 3 day qualifying hospital stay waiver?  No    PAS Confirmation Code:  ([id number]  Patient/family educated on Medicare website which has current facility and service quality ratings: no    Education Provided on the Discharge Plan:  Yes  Persons Notified of Discharge Plans: Provider, bedside nurse, pt, [facility]  Patient/Family in Agreement with the Plan:      Handoff Referral Completed: No    Additional Information:  [facility] at 1440 to 1520. Orders have been faxed to [place]. Nephrology unable to enter the PD orders into the discharge navigator. PD instructions written in a progress note and faxed to facility.  Please call if additional needs arise.  Jyoti Griffin RN   Inpatient Care Coordination  [facility]   Phone: [phone]

## 2023-09-15 NOTE — PROGRESS NOTES
Cycler set up per protocol and per treatment orders     Results from previous treatment:  Effluent color and clarity: Clear, Yellow  Total UF: -139mL  Initial Drain: 61mL  Avg Dwell: 1:35  Lost Dwell: 0  Added Dwell: 0:25    Cycler Serial Number: [id number]  Total Treatment Volume: [id number]  Total treatment time: 10 hrs  Fill Volume: 2500ml  Last Fill Volume:0ml  Heater ba.5% 6000mL;  Lot #: [id number];  Expiration Date: 2024  Side Bag #1: 1.5% 6000mL;  Lot #: [id number];  Expiration Date: 2024  Side Bag #2: 1.5% 2000mL;  Lot #: [id number];  Expiration Date: 3/2024    Number of cycles including final fill: 5  Dwell Time: 88 minutes  Last Fill Volume: 0ml  Initial Drain Alarm: --ml  PD orders reviewed with Patient procedure and ESRD teaching done and questions answered.  Cycler ready for hook-up.    Report given to: FRANCINE Woodall consent form signed Yes

## 2023-09-15 NOTE — PROGRESS NOTES
PRIMARY DIAGNOSIS: ACUTE PAIN  OUTPATIENT/OBSERVATION GOALS TO BE MET BEFORE DISCHARGE:  1. Pain Status: Improved-controlled with oral pain medications.    2. Return to near baseline physical activity: Yes    3. Cleared for discharge by consultants (if involved): Yes    Discharge Planner Nurse   Safe discharge environment identified: Yes  Barriers to discharge: No       Entered by: Alla Laura RN 09/14/2023 10:59 PM   A&Ox4, room air, VSS.   Please review provider order for any additional goals.   Nurse to notify provider when observation goals have been met and patient is ready for discharge.   No

## 2023-09-15 NOTE — PROGRESS NOTES
PRIMARY DIAGNOSIS: ACUTE PAIN  OUTPATIENT/OBSERVATION GOALS TO BE MET BEFORE DISCHARGE:  1. Pain Status: Improved-controlled with oral pain medications.    2. Return to near baseline physical activity: Yes    3. Cleared for discharge by consultants (if involved): Yes    Discharge Planner Nurse   Safe discharge environment identified: Yes  Barriers to discharge: No       Entered by: Alla Laura RN 09/15/2023 2:40 AM   A&Ox4, room air, VSS. Renal diet. External cath in placed per patient request. Pt complaining of pain PRN were given except the topicals because pt was refusing. MD notified and recommended to continue with the PRN's and the topical cream. Pt agree to give the topical a try.   Discharge plans: 9/15/23  Please review provider order for any additional goals.   Nurse to notify provider when observation goals have been met and patient is ready for discharge.

## 2023-09-15 NOTE — PLAN OF CARE
Physical Therapy Discharge Summary    Reason for therapy discharge:    Discharged to transitional care facility.    Progress towards therapy goal(s). See goals on Care Plan in [facility] electronic health record for goal details.  Goals partially met.  Barriers to achieving goals:   discharge from facility.    Therapy recommendation(s):    PT to eval and treat at TCU    **Pt not seen by discharging therapist, summary written per prior therapist's recommendations   Stable

## 2023-09-15 NOTE — PLAN OF CARE
Goal Outcome Evaluation:      Plan of Care Reviewed With: patient, spouse    Overall Patient Progress: improvingOverall Patient Progress: improving       /70 (BP Location: Right arm)   Pulse 68   Temp 98  F (36.7  C) (Oral)   Resp 18   Wt 97 kg (213 lb 13.5 oz)   SpO2 94%   BMI 40.41 kg/m       Significant shift events:    Physical assessment WDL w/exceptions as documented on Adult PCS.    Refer to VS, I/O, Adult PCS for full assessment & shift details.  Continue w/current POC & update PRN.  Hourly safety checks completed per unit routine & time of day. Call light, oral fluids, & personal items maintained within pt's reach throughout shift.  Disposition plan to TCU today     All belongings returned. Pt escorted to front door by [facility] transport staff.  Jyothi Marquez, NELLAN, RN  [facility]  Medical/Telemetry/IMC

## 2023-09-15 NOTE — PROGRESS NOTES
PRIMARY DIAGNOSIS: ACUTE PAIN  OUTPATIENT/OBSERVATION GOALS TO BE MET BEFORE DISCHARGE:  1. Pain Status: Improved-controlled with oral pain medications.    2. Return to near baseline physical activity: Yes    3. Cleared for discharge by consultants (if involved): Yes    Discharge Planner Nurse   Safe discharge environment identified: Yes  Barriers to discharge: No       Entered by: Alla Laura RN 09/15/2023 6:37 AM   A&Ox4, room air, VSS. Renal diet. External cath in placed per patient request. Pt complaining of pain PRN were given except the topicals because pt was refusing. MD notified and recommended to continue with the PRN's and the topical cream. Pt agree to give the topical a try. Pt has not called for pain since getting the prn atarax and topical.   Discharge plans: 9/15/23  Please review provider order for any additional goals.   Nurse to notify provider when observation goals have been met and patient is ready for discharge.

## 2023-09-16 NOTE — PROGRESS NOTES
[facility]    Background: Transitional Care Management program identified per system criteria and reviewed by [facility] team for possible outreach.    Assessment: Upon chart review, CCRC Team member will not proceed with patient outreach related to this episode of Transitional Care Management program due to reason below:    Non-[facility] TCU: CCRC team member noted patient discharged to TCU/ARU/LTACH. Patient is not established with a [facility] currently supported by Primary Care-Care Coordination therefore handoff to Primary Care-Care Coordination is not appropriate at this time.    Plan: Transitional Care Management episode addressed appropriately per reason noted above.      Catie Scott MA  [facility], [facility]    *Connected Care Resource Team does NOT follow patient ongoing. Referrals are identified based on internal discharge reports and the outreach is to ensure patient has an understanding of their discharge instructions.

## 2023-09-16 NOTE — PROGRESS NOTES
Cycler removed from patient's room as discharge confirmed for today.     Results from previous treatment:  Effluent color and clarity: Clear, Yellow  Total UF: -381mL  Initial Drain: 9mL  Avg Dwell: 1:43  Lost Dwell: 0  Added Dwell: 0:28     Cycler Serial Number: [id number]  Returned to [place] storage after disinfection.

## 2023-09-20 PROBLEM — R55 SYNCOPE: Status: ACTIVE | Noted: 2023-01-01

## 2023-09-20 PROBLEM — E87.20 ACIDOSIS: Status: ACTIVE | Noted: 2023-01-01

## 2023-09-20 NOTE — PHARMACY-ADMISSION MEDICATION HISTORY
Pharmacist Admission Medication History    Admission medication history is complete. The information provided in this note is only as accurate as the sources available at the time of the update.    Medication reconciliation/reorder completed by provider prior to medication history? No    Information Source(s): Family member, Hospital records, and CareEverywhere/SureScripts via in-person    Pertinent Information: Spoke with the patient's  who stated there has not been any changes to her medications since she was discharged from [facility] on 9/15/23 and when she was discharged from [facility] on Sunday. He stated he has only been giving her the carvedilol, clonidine, and gabapentin at home since Sunday but no other meds. Of note, the 9/15/23 discharge AVS states metoprolol succinate was supposed to replace carvedilol but both were listed on the AVS med list. Please address which one she should be taking and discontinue the other duplicate.     Changes made to PTA medication list:  Added: None  Deleted: None  Changed: None       Allergies reviewed with patient and updates made in EHR: no    Medication History Completed By: EMMANUEL JHAVERI [facility] 9/20/2023 1:32 PM    Prior to Admission medications    Medication Sig Last Dose Taking? Auth Provider Long Term End Date   acetaminophen (TYLENOL) 500 MG tablet Take 1-2 tablets (500-1,000 mg) by mouth every 6 hours as needed for fever or pain Unknown at PRN Yes Reza Hutton MD     calcium carbonate (TUMS) 500 MG chewable tablet Take 1 tablet (500 mg) by mouth 3 times daily as needed for heartburn Unknown at PRN Yes Reza Hutton MD     calcium carbonate (TUMS) 500 MG chewable tablet Take 2 tablets (1,000 mg) by mouth 3 times daily (with meals) Past Week at unknown time Yes Reza Hutton MD     carvedilol (COREG) 6.25 MG tablet TAKE 1 TABLET BY MOUTH TWICE DAILY WITH MEALS 9/20/2023 at AM Yes Reported, Patient Yes    cloNIDine (CATAPRES) 0.1 MG tablet Take 1 tablet (0.1  mg) by mouth 2 times daily 9/20/2023 at AM Yes Reza Hutton MD Yes    cyanocobalamin (VITAMIN B-12) 1000 MCG tablet Take 1,000 mcg by mouth daily Past Week at unknown time Yes Unknown, Entered By History     diclofenac (VOLTAREN) 1 % topical gel Apply 2 g topically every 6 hours as needed for other (pain) Unknown at PRN Yes Reza Hutton MD     diphenhydrAMINE-zinc acetate (BENADRYL) 2-0.1 % external cream Apply topically 3 times daily as needed for itching Unknown at PRN Yes Reza Hutton MD     docusate sodium (COLACE) 100 MG capsule Take 1 capsule (100 mg) by mouth 2 times daily Past Week at unknown time Yes Reza Hutton MD     famotidine (PEPCID) 20 MG tablet Take 20 mg by mouth daily as needed (heartburn) Unknown at PRN Yes Unknown, Entered By History     gabapentin (NEURONTIN) 100 MG capsule Take 100 mg by mouth 2 times daily as needed (itching) 9/20/2023 at AM Yes Unknown, Entered By History Yes    gentamicin (GARAMYCIN) 0.1 % external cream Apply topically daily as needed (promotion of catheter exit site healing.) Apply cream daily to the peritoneal catheter exit site with dressing change.  Pharmacy will send tube with first order or upon request.  Peritoneal Dialysis Unknown at PRN Yes Reza Hutton MD     Glucos-MSM-C-Tt-Gufyiz-Sibgpz (GLUCOSAMINE MSM COMPLEX) TABS tablet Take 1 tablet by mouth daily Past Week at unknown time Yes Unknown, Entered By History     hydrOXYzine (VISTARIL) 50 MG capsule Take 50 mg by mouth 4 times daily as needed for itching Unknown at PRN Yes Unknown, Entered By History     isosorbide mononitrate (IMDUR) 30 MG 24 hr tablet Take 0.5 tablets (15 mg) by mouth daily Past Week at unknown time Yes Reza Hutton MD Yes    Lidocaine (LIDOCARE) 4 % Patch Place 2 patches onto the skin every 24 hours To prevent lidocaine toxicity, patient should be patch free for 12 hrs daily.Apply patch(s) to affected areas. To prevent lidocaine toxicity, patient should be  patch free for 12 hrs daily. Patches may be cut to smaller size prior to removing release liner.  Reminder: Remove previous patch before applying new patch.  NEVER APPLY HEAT OVER PATCH which increases absorption and may lead to local anesthetic toxicity. Do not apply over area where liposomal bupivacaine was injected for 96 hours post injection. Past Week at unknown time Yes Reza Hutton MD     loperamide (IMODIUM) 2 MG capsule Take 1 capsule (2 mg) by mouth 3 times daily as needed for diarrhea Unknown at PRN Yes Reza Hutton MD     melatonin 1 MG TABS tablet Take 1 tablet (1 mg) by mouth nightly as needed for sleep Unknown at PRN Yes Reza Hutton MD     menthol, Topical Analgesic, 2.5% (BENGAY VANISHING SCENT) 2.5 % GEL topical gel Apply topically every 6 hours as needed Unknown at PRN Yes Reza Hutton MD     methocarbamol (ROBAXIN) 500 MG tablet Take 1 tablet (500 mg) by mouth 4 times daily as needed for muscle spasms (muscle pain) Unknown at PRN Yes Reza Hutton MD     metoprolol succinate ER (TOPROL XL) 25 MG 24 hr tablet Take 0.5 tablets (12.5 mg) by mouth daily Unknown at unknown time Yes Reza Hutton MD Yes    omeprazole (PRILOSEC) 40 MG DR capsule Take 40 mg by mouth daily Past Week at unknown time Yes Unknown, Entered By History     ondansetron (ZOFRAN ODT) 4 MG ODT tab Take 1 tablet (4 mg) by mouth every 6 hours as needed for nausea or vomiting Unknown at PRN Yes Reza Hutton MD     polyethylene glycol (MIRALAX) 17 GM/Dose powder Take 17 g by mouth daily Past Week at unknown time Yes Reza Hutton MD     traZODone (DESYREL) 50 MG tablet Take 50 mg by mouth nightly as needed for sleep Unknown at PRN Yes Unknown, Entered By History Yes    trolamine salicylate (ASPERCREME) 10 % external cream Apply topically 4 times daily as needed Unknown at PRN Yes Reza Hutton MD

## 2023-09-20 NOTE — ED NOTES
"[facility]  ED Nurse Handoff Report    ED Chief complaint: Syncope  . ED Diagnosis:   Final diagnoses:   Syncope   Failure to thrive in adult   Acidosis       Allergies:   Allergies   Allergen Reactions    Aspirin Nausea    Statins-Hmg-Coa Reductase Inhibitors [Statins] Other (See Comments)     Other reaction(s): Renal Failure, Other reaction(s): Renal Failure       Code Status: Full Code    Activity level - Baseline/Home:  assist of 1.  Activity Level - Current:   assist of 2.   Lift room needed: No.   Bariatric: No   Needed: No   Isolation: No.   Infection: Not Applicable.     Respiratory status: Room air    Vital Signs (within 30 minutes):   Vitals:    09/20/23 1143 09/20/23 1232 09/20/23 1233 09/20/23 1237   BP: 127/69  104/61    Pulse: 68 57 56    Resp: 18 13  13   Temp: 98.1  F (36.7  C)      TempSrc: Oral      SpO2: 98% 94%  99%   Weight: 93.9 kg (207 lb)      Height: 1.549 m (5' 1\")          Cardiac Rhythm:  ,   Cardiac  Cardiac Rhythm: Normal sinus rhythm  Pain level:    Patient confused: no  Patient Falls Risk: nonskid shoes/slippers when out of bed, arm band in place, patient and family education, and activity supervised.   Elimination Status:  Pt has not voided in ED      Patient Report - Initial Complaint: syncope.   Focused Assessment:    Neuro Cognitive  CT      Sensory AssessmentSensation - All Extremities: no impairment  CognitiveCommunication / Language: appropriate  Neuro Cognitive (Adult)Cognitive/Neuro/Behavioral WDL: .WDL exceptLevel of Consciousness: lethargicArousal Level: opens eyes spontaneouslyOrientation: oriented x 4Speech: clearMood/Behavior: hypoactive (quiet, withdrawn)  [place] Coma ScaleBest Eye Response: 4-->(E4) spontaneousBest Motor Response: 6-->(M6) obeys commandsBest Verbal Response: 5-->(V5) orientedGlasgow Coma Scale Score: 15  Other flowsheet entriesBest Language: 0 - No aphasiaFacial Symmetry: Symmetrical        Cardiac  CT      Cardiac " (Adult)Cardiac WDL: .WDL except; allCardiac Rhythm: NSR  Chest Pain AssessmentAssociated Signs/Symptoms: dizziness; syncope (Pt had syncope at home PTA. Per EMS pt was c/o dizziness to . Dizziness and fatigue present on exam. AOx4.)Chest Pain Intervention: cardiac monitor placed; 12-lead ECG obtained; activity minimized          Abnormal Results:   Labs Ordered and Resulted from Time of ED Arrival to Time of ED Departure   COMPREHENSIVE METABOLIC PANEL - Abnormal       Result Value    Sodium 138      Potassium 3.9      Chloride 91 (*)     Carbon Dioxide (CO2) 20 (*)     Anion Gap 27 (*)     Urea Nitrogen 63.2 (*)     Creatinine 15.39 (*)     Calcium 8.9      Glucose 84      Alkaline Phosphatase 106 (*)     AST 19      ALT <5      Protein Total 6.3 (*)     Albumin 2.5 (*)     Bilirubin Total 0.4      GFR Estimate 2 (*)    CBC WITH PLATELETS AND DIFFERENTIAL - Abnormal    WBC Count 9.8      RBC Count 4.23      Hemoglobin 11.0 (*)     Hematocrit 34.7 (*)     MCV 82      MCH 26.0 (*)     MCHC 31.7      RDW 16.5 (*)     Platelet Count 210      % Neutrophils 76      % Lymphocytes 12      % Monocytes 8      % Eosinophils 2      % Basophils 1      % Immature Granulocytes 1      NRBCs per 100 WBC 0      Absolute Neutrophils 7.5      Absolute Lymphocytes 1.2      Absolute Monocytes 0.8      Absolute Eosinophils 0.2      Absolute Basophils 0.1      Absolute Immature Granulocytes 0.1      Absolute NRBCs 0.0     ISTAT BASIC CHEM ICA HEMATOCRIT POCT - Abnormal    Chloride POCT 95      Potassium POCT 3.4      Sodium POCT 133      UREA NITROGEN POCT 69 (*)     Calcium, Ionized Whole Blood POCT 4.3 (*)     Glucose Whole Blood POCT 78      Anion Gap POCT 19.0 (*)     Hemoglobin POCT 11.9      Hematocrit POCT 35      Creatinine POCT 18.3 (*)     TOTAL CO2 POCT 24     MAGNESIUM   PHOSPHORUS        No orders to display       Treatments provided: none currently  Family Comments: Pt here by herself,  to be coming per pt  OBS  brochure/video discussed/provided to patient:  N/A  ED Medications: Medications - No data to display    Drips infusing:  No  For the majority of the shift this patient was Green.   Interventions performed were n/a.    Sepsis treatment initiated: No    Cares/treatment/interventions/medications to be completed following ED care: continue to watch for orders    ED Nurse Name: Gely Patel RN  12:41 PM     RECEIVING UNIT ED HANDOFF REVIEW    Above ED Nurse Handoff Report was reviewed: Yes  Reviewed by: Tita Simon RN on September 20, 2023 at 3:50 PM

## 2023-09-20 NOTE — ED PROVIDER NOTES
"  History     Chief Complaint:  Syncope       HPI   Devora Garcia is a 62 year old female with history of ESRD on daily peritoneal dialysis, HTN, GUILLERMO on CPAP, who presents with syncopal episode. Patient reports that she was getting her home diaylsis set up when she \"passed out\".  was attempting to get her to bed as she was feeling increasingly weak and dizzy.  states that patient did not hit her head, denies seizure like activity. Patient came to after several minutes he states. No history of syncopal events. Patient states that she has been more fatigued with each day this week. Has not received dialysis since Sunday when she was discharged from rehab facility due to worsening fatigue and weakness.       Independent Historian:   None - Patient Only    Review of External Notes:   Reviewed nephrology note form 9/15/23 - [facility] following patient      Medications:    acetaminophen (TYLENOL) 500 MG tablet  calcium carbonate (TUMS) 500 MG chewable tablet  calcium carbonate (TUMS) 500 MG chewable tablet  carvedilol (COREG) 6.25 MG tablet  cloNIDine (CATAPRES) 0.1 MG tablet  cyanocobalamin (VITAMIN B-12) 1000 MCG tablet  diclofenac (VOLTAREN) 1 % topical gel  diphenhydrAMINE-zinc acetate (BENADRYL) 2-0.1 % external cream  docusate sodium (COLACE) 100 MG capsule  famotidine (PEPCID) 20 MG tablet  gabapentin (NEURONTIN) 100 MG capsule  gentamicin (GARAMYCIN) 0.1 % external cream  Glucos-MSM-C-Kw-Qxndpo-Yjsfxw (GLUCOSAMINE MSM COMPLEX) TABS tablet  hydrOXYzine (VISTARIL) 50 MG capsule  isosorbide mononitrate (IMDUR) 30 MG 24 hr tablet  Lidocaine (LIDOCARE) 4 % Patch  loperamide (IMODIUM) 2 MG capsule  melatonin 1 MG TABS tablet  menthol, Topical Analgesic, 2.5% (BENGAY VANISHING SCENT) 2.5 % GEL topical gel  methocarbamol (ROBAXIN) 500 MG tablet  metoprolol succinate ER (TOPROL XL) 25 MG 24 hr tablet  omeprazole (PRILOSEC) 40 MG DR capsule  ondansetron (ZOFRAN ODT) 4 MG ODT tab  polyethylene " glycol (MIRALAX) 17 GM/Dose powder  traZODone (DESYREL) 50 MG tablet  trolamine salicylate (ASPERCREME) 10 % external cream        Past Medical History:    Past Medical History:   Diagnosis Date    Allergic rhinitis     Cancer (H)     Chronic kidney disease     Chronic kidney disease, stage 3 (H) 2010    Cyst of left ovary     Diabetes mellitus (H)     GERD (gastroesophageal reflux disease)     Gout     Hypertension     Intraductal carcinoma 2015    Obstructive sleep apnea on CPAP     Psoriasis        Past Surgical History:    Past Surgical History:   Procedure Laterality Date    BARIATRIC SURGERY  2014    HC CYSTOSCOPY,INSERT URETERAL STENT Right 11/7/2018    Procedure: CYSTOSCOPY, WITH RIGHT URETERAL STENT INSERTION;  Surgeon: Dino Reynoso MD;  Location: [facility] Main OR;  Service: Urology    HC CYSTOSCOPY,INSERT URETERAL STENT Right 8/31/2020    Procedure: CYSTOSCOPY, WITH URETERAL STENT INSERTION;  Surgeon: Dino Reynoso MD;  Location: [facility] Main OR;  Service: Urology    IR CVC TUNNEL PLACEMENT > 5 YRS OF AGE  9/2/2020    IR CVC TUNNEL REVISION RIGHT  10/22/2020    IR TUNNELED CATHETER COMPLETE REPLACEMENT  10/22/2020    IR TUNNELED CATHETER INSERT  9/2/2020    LUMPECTOMY BREAST Left 2015    MAMMOPLASTY REDUCTION  2015    OTHER SURGICAL HISTORY  12/02/2020    peritoneal dialsis catheter placement    WV CYSTO/URETERO W/LITHOTRIPSY &INDWELL STENT INSRT Right 11/30/2018    Procedure: CYSTOSCOPY, RIGHT URETEROSCOPY, LASER LITHOTRIPSY STENT REMOVAL STENT INSERTION;  Surgeon: Dino Reynoso MD;  Location: [facility] Main OR;  Service: Urology    WV LAP INSERTION TUNNELED INTRAPERITONEAL CATHETER N/A 12/23/2020    Procedure: LAPAROSCOPY , Enterolysis;  Surgeon: Tigre Rivas MD;  Location: [facility] OR;  Service: General    WV LAP,DIAGNOSTIC ABDOMEN N/A 12/29/2020    Procedure: LAPAROSCOPY, De-Clot of Peritoneal Dialysis Catheter;  Surgeon: Tigre Rivas MD;  Location: [facility]  "OR;  Service: General        Physical Exam   Patient Vitals for the past 24 hrs:   BP Temp Temp src Pulse Resp SpO2 Height Weight   09/20/23 1237 -- -- -- -- 13 99 % -- --   09/20/23 1233 104/61 -- -- 56 -- -- -- --   09/20/23 1232 -- -- -- 57 13 94 % -- --   09/20/23 1143 127/69 98.1  F (36.7  C) Oral 68 18 98 % 1.549 m (5' 1\") 93.9 kg (207 lb)        Physical Exam  General: Somnolent but able to be aroused, eyes closed  Head:  Scalp is NC/AT  Eyes:  No scleral icterus, PERRL  ENT:  The external nose and ears are normal.  Neck:  Normal range of motion without rigidity.  CV:  Regular rate and rhythm    No pathologic murmur   Resp:  Breath sounds are clear bilaterally    Non-labored, no retractions or accessory muscle use  GI:  Abdomen is soft, no distension, no tenderness. No peritoneal signs  MS:  Right anterior thigh discomfort with palpation. No evidence of redness or warmth to anterior thigh. Full ROM of right lower extremity at knee and hip without pain.   Skin:  Warm and dry, No rash or lesions noted.  Neuro:  Oriented x 3. No gross motor deficits.      Emergency Department Course     ECG results from 09/20/23   EKG 12-lead, tracing only     Value    Systolic Blood Pressure     Diastolic Blood Pressure     Ventricular Rate 63    Atrial Rate 63    KS Interval 210    QRS Duration 110        QTc 458    P Axis 46    R AXIS 0    T Axis 0    Interpretation ECG      Sinus rhythm with 1st degree A-V block  Nonspecific ST and T wave abnormality  Abnormal ECG  When compared with ECG of 24-AUG-2023 05:39,  Nonspecific T wave abnormality, improved in Lateral leads         Laboratory:  Labs Ordered and Resulted from Time of ED Arrival to Time of ED Departure   COMPREHENSIVE METABOLIC PANEL - Abnormal       Result Value    Sodium 138      Potassium 3.9      Chloride 91 (*)     Carbon Dioxide (CO2) 20 (*)     Anion Gap 27 (*)     Urea Nitrogen 63.2 (*)     Creatinine 15.39 (*)     Calcium 8.9      Glucose 84      " Alkaline Phosphatase 106 (*)     AST 19      ALT <5      Protein Total 6.3 (*)     Albumin 2.5 (*)     Bilirubin Total 0.4      GFR Estimate 2 (*)    CBC WITH PLATELETS AND DIFFERENTIAL - Abnormal    WBC Count 9.8      RBC Count 4.23      Hemoglobin 11.0 (*)     Hematocrit 34.7 (*)     MCV 82      MCH 26.0 (*)     MCHC 31.7      RDW 16.5 (*)     Platelet Count 210      % Neutrophils 76      % Lymphocytes 12      % Monocytes 8      % Eosinophils 2      % Basophils 1      % Immature Granulocytes 1      NRBCs per 100 WBC 0      Absolute Neutrophils 7.5      Absolute Lymphocytes 1.2      Absolute Monocytes 0.8      Absolute Eosinophils 0.2      Absolute Basophils 0.1      Absolute Immature Granulocytes 0.1      Absolute NRBCs 0.0     ISTAT BASIC CHEM ICA HEMATOCRIT POCT - Abnormal    Chloride POCT 95      Potassium POCT 3.4      Sodium POCT 133      UREA NITROGEN POCT 69 (*)     Calcium, Ionized Whole Blood POCT 4.3 (*)     Glucose Whole Blood POCT 78      Anion Gap POCT 19.0 (*)     Hemoglobin POCT 11.9      Hematocrit POCT 35      Creatinine POCT 18.3 (*)     TOTAL CO2 POCT 24     MAGNESIUM   PHOSPHORUS        Procedures   None    Emergency Department Course & Assessments:      Interventions:  Medications - No data to display     Independent Interpretation (X-rays, CTs, rhythm strip):  None    Consultations/Discussion of Management or Tests:   ED Course as of 09/20/23 1331   Wed Sep 20, 2023   1249 Consulted with Briseyda CABRERA with inpatient hospitalist service   1302 Consulted with nephrology and informed them of patient's admission   1330 Spoke with patient's , Tavares, who states that patient was trying to get up from seated position and fell over having syncopal episode. Also reports that he has been trying to feed/hydrate patient since discharge from TCU, however patient has been refusing. Tavares states that he does not feel he can provide the level of care patient requires at home.        Social Determinants  of Health affecting care:   History of dialysis noncompliance     Disposition:  The patient was admitted to the hospital under the care of Dr. Nugent.     Impression & Plan      Medical Decision Making:  Devora Garcia is a 62 year old female who presents with generalized weakness, fatigue, and syncopal episode today. Has not received peritoneal dialysis in 3 days, typically completes this daily. No history of syncopal episodes, did not hit her head and not anticoagulated, head CT not indicated. Patient denies chest pain at time of episode or ongoing, EKG without ischemic changes, unchanged from previous, low suspicion for ACS/BOO. Considered PE however patient denies shortness of breath or chest pain, not tachycardic, no recent inciting event to cause thrombus. Labs are reassuring as she is without significant electrolyte derangements, potassium normal at 3.4. Renal function appears mildly impaired from baseline dysfunction, Cr 15.4 BUN 63.9, GFR at baseline of 3. Afebrile and without leukocytosis, low suspicion for sepsis. Acidosis likely renal. With her ongoing failure to thrive and new syncopal episode, patient will be admitted for observation under the care of Dr. Nugent. Nephrology informed of patient's admission.       Diagnosis:    ICD-10-CM    1. Syncope  R55       2. Failure to thrive in adult  R62.7       3. Acidosis  E87.20            Discharge Medications:  New Prescriptions    No medications on file          9/20/2023   RICK Trujillo Lauren R, PA-C  09/20/23 [id number]

## 2023-09-20 NOTE — PROGRESS NOTES
Cycler set up per protocol and per treatment orders     Results from previous treatment:  Effluent color and clarity: NA  Total UF: NA  Initial Drain: NA  Avg Dwell: NA  Lost Dwell: NA    Cycler Serial Number: [id number]  Total Treatment Volume: [id number]  Total treatment time: 9 hrs  Fill Volume: 2900ml  Last Fill Volume:0ml  Heater ba.5% 6000mL;  Lot #: [id number];  Expiration Date: 2025  Side Bag #1:% 6000mL;  Lot #:  [id number];  Expiration Date:   Number of cycles including final fill: 4  Dwell Time: 98 minutes  Last Fill Volume: 0ml  Initial Drain Alarm: 0ml  PD orders reviewed with Patient procedure and ESRD teaching done and questions answered.  Cycler ready for hook-up.    Report given to: alba Rivers RN, Elsie Pollard consent form signed yes

## 2023-09-20 NOTE — ED TRIAGE NOTES
Pt arrives to the ED via EMS from home due to syncopal episode. Per EMS,  was helping set up home dialysis when she was feeling dizzy and weak. Assisted to get her to the bed but states she passed out, and was out for a couple min. BG for EMS 86. Not on thinners. Per EMS, did not hit head. Lethargic for EMS. Pt states she is tired, but is able to tell nurse correct month and year.

## 2023-09-20 NOTE — H&P
"[facility]    History and Physical - Hospitalist Service       Date of Admission:  9/20/2023    Assessment & Plan      Devora Garcia is a 62 year old female with PMH HTN, ESRD on PD, and GUILLERMO on CPAP who presents with concerns for syncopal episode at home.    Recently admitted to [facility] from 8/24-9/15 eventually discharge to TCU for left hip and knee pain status post mechanical fall.  She also had an episode of acute encephalopathy and that eventually resolved during admission.  She was quite deconditioned.  She reports she was discharged from TCU on 9/17 back home.    Since discharging home from TCU patient has been growing weaker and weaker.  She has not had dialysis since Sunday 9/17 due to \"not having time\" per the patient.  She also has been feeling increasingly more sleepy.  Denies any concerns such as fever, chills, chest pain, shortness of breath, abdominal pain.  Patient reports she had a fall earlier in the day.  Attempted to call the significant other to get further history but was unable to reach him via phone.  Per the ED provider's note who spoke with the  he was moving her from the chair to start her dialysis when she became increasingly weak and dizzy and had a syncopal event.   denies seizure-like activity and reports she was unconscious for couple minutes and then came to.  No postictal state.  Patient also reported that she has not had anything to eat or drink for \"8 days\" due to lack of appetite.    In the ED, afebrile, blood pressure 104/61, heart rate 56, respirations 18, oxygen 99% on room air.  CMP notable for chloride 91, CO2 20, creatinine 15.39, anion gap 27, alk phos 106. CBC notable for hemoglobin of 11.0. EKG shows sinus rhythm with 1st degree av block.     Syncope  Fatigue   Based on the  report to ED provider sounds like patient had a prodrome of dizziness and weakness while trying to transferring from chair prior to syncope. Questioning " "a possible orthostatic hypotension.  Likely multifactorial with her poor oral intake, chronic deconditioning and also missing dialysis for the last 4 days.  Patient is alert and orientated x4 able to answer all my questions appropriately, neuro exam normal but is just sleepy. Last echo in May 2023 - normal left ventricular, moderate concentric left ventricular, EF 65-70%, moderate to severe mitral annular calcificiation  - cardiac monitoring  - check UA, TSH  - orthostatic vitals  - PT consult  - SW consult    ESRD on PD  AGMA likely secondary to ESRD on HD   Anion gap slightly higher than her baseline suspect due to missing dialysis for the past 4 days.  Also questioning if missing dialysis is contributing to her increased fatigue and sleepiness.  - nephrology consult  - last dialysis run was on 9/17    HTN  - hold BP medications been on the softer side     GUILLERMO  - use CPAP at night        Diet:  regular diet   DVT Prophylaxis: mechanical, if stays after tomorrow would start lovenox  Taylor Catheter: Not present  Lines: None     Cardiac Monitoring: None  Code Status:  Full code     Clinically Significant Risk Factors Present on Admission          # Hypocalcemia: Lowest iCa = 4.3 mg/dL in last 2 days, will monitor and replace as appropriate    # Anion Gap Metabolic Acidosis: Highest Anion Gap = 27 mmol/L in last 2 days, will monitor and treat as appropriate  # Hypoalbuminemia: Lowest albumin = 2.5 g/dL at 9/20/2023 12:02 PM, will monitor as appropriate    # Acute Kidney Injury, unspecified: based on a >150% or 0.3 mg/dL increase in last creatinine compared to past 90 day average, will monitor renal function  # Hypertension: Noted on problem list      # Obesity: Estimated body mass index is 39.11 kg/m  as calculated from the following:    Height as of this encounter: 1.549 m (5' 1\").    Weight as of this encounter: 93.9 kg (207 lb).              Disposition Plan likely 1-2 days pending improvement of fatigue     The " "patient's care was discussed with the Patient.    BRIT West PA-C  Hospitalist Service  [facility]  Securely message with [employer] (more info)  Text page via [facility] Paging/Directory     ______________________________________________________________________    Chief Complaint   Syncope   Fatigue     History is obtained from the patient    History of Present Illness   Devora Garcia is a 62 year old female with PMH HTN, ESRD on PD, and GUILLERMO on CPAP who presents with concerns for syncopal episode at home.    Recently admitted to [facility] from 8/24-9/15 eventually discharge to TCU for left hip and knee pain status post mechanical fall.  She also had an episode of acute encephalopathy and that eventually resolved during admission.  She was quite deconditioned.  She reports she was discharged from TCU on 9/17 back home.    Since discharging home from TCU patient has been growing weaker and weaker.  She has not had dialysis since Sunday 9/17 due to \"not having time\" per the patient.  She also has been feeling increasingly more sleepy.  Denies any concerns such as fever, chills, chest pain, shortness of breath, abdominal pain.  Patient reports she had a fall earlier in the day.  Attempted to call the significant other to get further history but was unable to reach him via phone.  Per the ED provider's note who spoke with the  he was moving her from the chair to start her dialysis when she became increasingly weak and dizzy and had a syncopal event.   denies seizure-like activity and reports she was unconscious for couple minutes and then came to.  No postictal state.  Patient also reported that she has not had anything to eat or drink for \"8 days\" due to lack of appetite.    In the ED, afebrile, blood pressure 104/61, heart rate 56, respirations 18, oxygen 99% on room air.  CMP notable for chloride 91, CO2 20, creatinine 15.39, anion gap 27, alk phos 106. CBC notable for hemoglobin of " 11.0. EKG shows sinus rhythm with 1st degree av block.     Past Medical History    Past Medical History:   Diagnosis Date    Allergic rhinitis     Cancer (H)     left breast    Chronic kidney disease     Chronic kidney disease, stage 3 (H) 2010    Cyst of left ovary     Diabetes mellitus (H)     12/2020 states no longer takin insulin    GERD (gastroesophageal reflux disease)     Gout     Hypertension     Intraductal carcinoma 2015    Obstructive sleep apnea on CPAP     uses CPAP    Psoriasis      Past Surgical History   Past Surgical History:   Procedure Laterality Date    BARIATRIC SURGERY  2014    HC CYSTOSCOPY,INSERT URETERAL STENT Right 11/7/2018    Procedure: CYSTOSCOPY, WITH RIGHT URETERAL STENT INSERTION;  Surgeon: Dino eRynoso MD;  Location: [facility] Main OR;  Service: Urology    HC CYSTOSCOPY,INSERT URETERAL STENT Right 8/31/2020    Procedure: CYSTOSCOPY, WITH URETERAL STENT INSERTION;  Surgeon: Dino Reynoso MD;  Location: [facility] Main OR;  Service: Urology    IR CVC TUNNEL PLACEMENT > 5 YRS OF AGE  9/2/2020    IR CVC TUNNEL REVISION RIGHT  10/22/2020    IR TUNNELED CATHETER COMPLETE REPLACEMENT  10/22/2020    IR TUNNELED CATHETER INSERT  9/2/2020    LUMPECTOMY BREAST Left 2015    MAMMOPLASTY REDUCTION  2015    OTHER SURGICAL HISTORY  12/02/2020    peritoneal dialsis catheter placement    MD CYSTO/URETERO W/LITHOTRIPSY &INDWELL STENT INSRT Right 11/30/2018    Procedure: CYSTOSCOPY, RIGHT URETEROSCOPY, LASER LITHOTRIPSY STENT REMOVAL STENT INSERTION;  Surgeon: Dino Reynoso MD;  Location: [facility] Main OR;  Service: Urology    MD LAP INSERTION TUNNELED INTRAPERITONEAL CATHETER N/A 12/23/2020    Procedure: LAPAROSCOPY , Enterolysis;  Surgeon: Tigre Rivas MD;  Location: [facility] Main OR;  Service: General    MD LAP,DIAGNOSTIC ABDOMEN N/A 12/29/2020    Procedure: LAPAROSCOPY, De-Clot of Peritoneal Dialysis Catheter;  Surgeon: Tigre Rivas MD;  Location: [facility] OR;   Service: General     Prior to Admission Medications   Prior to Admission Medications   Prescriptions Last Dose Informant Patient Reported? Taking?   Glucos-MSM-C-Pd-Lrlkfq-Bgsjom (GLUCOSAMINE MSM COMPLEX) TABS tablet   Yes No   Sig: Take 1 tablet by mouth daily   Lidocaine (LIDOCARE) 4 % Patch   No No   Sig: Place 2 patches onto the skin every 24 hours To prevent lidocaine toxicity, patient should be patch free for 12 hrs daily.Apply patch(s) to affected areas. To prevent lidocaine toxicity, patient should be patch free for 12 hrs daily. Patches may be cut to smaller size prior to removing release liner.  Reminder: Remove previous patch before applying new patch.  NEVER APPLY HEAT OVER PATCH which increases absorption and may lead to local anesthetic toxicity. Do not apply over area where liposomal bupivacaine was injected for 96 hours post injection.   acetaminophen (TYLENOL) 500 MG tablet   No No   Sig: Take 1-2 tablets (500-1,000 mg) by mouth every 6 hours as needed for fever or pain   calcium carbonate (TUMS) 500 MG chewable tablet   No No   Sig: Take 1 tablet (500 mg) by mouth 3 times daily as needed for heartburn   calcium carbonate (TUMS) 500 MG chewable tablet   No No   Sig: Take 2 tablets (1,000 mg) by mouth 3 times daily (with meals)   carvedilol (COREG) 6.25 MG tablet   Yes No   Sig: TAKE 1 TABLET BY MOUTH TWICE DAILY WITH MEALS   cloNIDine (CATAPRES) 0.1 MG tablet   No No   Sig: Take 1 tablet (0.1 mg) by mouth 2 times daily   cyanocobalamin (VITAMIN B-12) 1000 MCG tablet   Yes No   Sig: Take 1,000 mcg by mouth daily   diclofenac (VOLTAREN) 1 % topical gel   No No   Sig: Apply 2 g topically every 6 hours as needed for other (pain)   diphenhydrAMINE-zinc acetate (BENADRYL) 2-0.1 % external cream   No No   Sig: Apply topically 3 times daily as needed for itching   docusate sodium (COLACE) 100 MG capsule   No No   Sig: Take 1 capsule (100 mg) by mouth 2 times daily   famotidine (PEPCID) 20 MG tablet   Yes No    Sig: Take 20 mg by mouth daily as needed (heartburn)   gabapentin (NEURONTIN) 100 MG capsule   Yes No   Sig: Take 100 mg by mouth 2 times daily as needed (itching)   gentamicin (GARAMYCIN) 0.1 % external cream   No No   Sig: Apply topically daily as needed (promotion of catheter exit site healing.) Apply cream daily to the peritoneal catheter exit site with dressing change.  Pharmacy will send tube with first order or upon request.  Peritoneal Dialysis   hydrOXYzine (VISTARIL) 50 MG capsule   Yes No   Sig: Take 50 mg by mouth 4 times daily as needed for itching   isosorbide mononitrate (IMDUR) 30 MG 24 hr tablet   No No   Sig: Take 0.5 tablets (15 mg) by mouth daily   loperamide (IMODIUM) 2 MG capsule   No No   Sig: Take 1 capsule (2 mg) by mouth 3 times daily as needed for diarrhea   melatonin 1 MG TABS tablet   No No   Sig: Take 1 tablet (1 mg) by mouth nightly as needed for sleep   menthol, Topical Analgesic, 2.5% (BENGAY VANISHING SCENT) 2.5 % GEL topical gel   No No   Sig: Apply topically every 6 hours as needed   methocarbamol (ROBAXIN) 500 MG tablet   No No   Sig: Take 1 tablet (500 mg) by mouth 4 times daily as needed for muscle spasms (muscle pain)   metoprolol succinate ER (TOPROL XL) 25 MG 24 hr tablet   No No   Sig: Take 0.5 tablets (12.5 mg) by mouth daily   omeprazole (PRILOSEC) 40 MG DR capsule   Yes No   Sig: Take 40 mg by mouth daily   ondansetron (ZOFRAN ODT) 4 MG ODT tab   No No   Sig: Take 1 tablet (4 mg) by mouth every 6 hours as needed for nausea or vomiting   polyethylene glycol (MIRALAX) 17 GM/Dose powder   No No   Sig: Take 17 g by mouth daily   traZODone (DESYREL) 50 MG tablet   Yes No   Sig: Take 50 mg by mouth nightly as needed for sleep   trolamine salicylate (ASPERCREME) 10 % external cream   No No   Sig: Apply topically 4 times daily as needed      Facility-Administered Medications: None        Social History   I have reviewed this patient's social history and updated it with  pertinent information if needed.  Social History     Tobacco Use    Smoking status: Never    Smokeless tobacco: Never   Substance Use Topics    Alcohol use: Yes     Comment: Alcoholic Drinks/day: Rare, 2/month    Drug use: No     Allergies   Allergies   Allergen Reactions    Aspirin Nausea    Statins-Hmg-Coa Reductase Inhibitors [Statins] Other (See Comments)     Other reaction(s): Renal Failure, Other reaction(s): Renal Failure        Physical Exam   Vital Signs: Temp: 98.1  F (36.7  C) Temp src: Oral BP: 104/61 Pulse: 56   Resp: 13 SpO2: 99 % O2 Device: None (Room air)    Weight: 207 lbs 0 oz    GENERAL:  Sleepy, Comfortable, No acute distress. Laying in bed.   PSYCH: pleasant, oriented to place, time and event  HEENT:  Normocephalic, PERRLA. No scleral icterus or conjunctival injection, normal hearing  HEART:  Normal S1, S2 with no murmur, RRR  LUNGS:  Normal Respiratory effort. Clear to auscultation anteriorly bilaterally with no wheezing, rales or ronchi.  ABDOMEN:  Soft, non-tender, non distended. No peritoneal signs.   EXTREMITIES: Chronic venous statis changes, No pitting pedal edema, No cyanosis.   SKIN:  Warm, dry to touch.   NEUROLOGIC:  CN 2-12 intact, BL 5/5 symmetric upper and lower extremity strength, sensation intact with no focal deficits. Speech clear, alert & orientated x 4    Medical Decision Making       MANAGEMENT DISCUSSED with the following over the past 24 hours: patient, ED provider, nurse   NOTE(S)/MEDICAL RECORDS REVIEWED over the past 24 hours: labs, imaging, progress notes       Data     I have personally reviewed the following data over the past 24 hrs:    9.8  \   11.9   / 210     133 95 69 (H) /  78   3.4 20 (L) 18.3 (H) \     ALT: <5 AST: 19 AP: 106 (H) TBILI: 0.4   ALB: 2.5 (L) TOT PROTEIN: 6.3 (L) LIPASE: N/A       Imaging results reviewed over the past 24 hrs:   No results found for this or any previous visit (from the past 24 hour(s)).

## 2023-09-20 NOTE — PLAN OF CARE
"BP (!) 140/72 (BP Location: Left arm)   Pulse 63   Temp 97.7  F (36.5  C) (Oral)   Resp 16   Ht 1.549 m (5' 1\")   Wt 92.9 kg (204 lb 12.9 oz)   SpO2 96%   BMI 38.70 kg/m      Neuro: lethargic, a/o x4  Cardiac: tele- SR w/ prolonged QT  Lungs: WNL  GI: WNL  : purewick, peritoneal dialysis   Pain: tenderness in legs  IV: saline locked  Meds: metoprolol, protonix  Diet: reg  Activity: assist x2/lift  Misc: PT/SW following. To collect UA. Peritoneal dialysis  Plan: Currently resting in bed, able to make need known.     "

## 2023-09-20 NOTE — ED PROVIDER NOTES
"ED ATTENDING PHYSICIAN NOTE:   I evaluated this patient in conjunction with Makayla Seals PA-C  I have participated in the care of the patient and personally performed key elements of the history, exam, and medical decision making.      HPI:   Devora Garcia is a 62 year old female with a history of CKD on peritoneal dialysis, HTN, GUILLERMO on CPAP with recent admission for fall and weakness with subsequent SNF admission who presents to the emergency room with concerns for a syncopal event at home. She reports that she has felt weaker and weaker for days. Today her  was moving her from her chair to get dialysis at home.  She was feeling increasingly weak and dizzy and then had a syncopal event.   denies seizure-like activity.  She does not remember exact details of the event.  She was reportedly unconscious for a couple of minutes and then came to.  There is no postictal period.  The patient notes she has not had anything to eat or drink for \"8 days\", but she is unable to say why aside from she has no appetite.  She reports that she has become increasingly fatigued and weak.  She denies a headache.  She denies fever or chills.  She denies chest pain or shortness of breath.  She denies abdominal pain.  She denies any change in her legs.    Independent Historian:   None - Patient Only    Review of External Notes:   Outpatient clinic notes reviewed.  Outpatient nephrology note reviewed.  Recent hospitalizations reviewed.     Medications:    acetaminophen (TYLENOL) 500 MG tablet  calcium carbonate (TUMS) 500 MG chewable tablet  calcium carbonate (TUMS) 500 MG chewable tablet  carvedilol (COREG) 6.25 MG tablet  cloNIDine (CATAPRES) 0.1 MG tablet  cyanocobalamin (VITAMIN B-12) 1000 MCG tablet  diclofenac (VOLTAREN) 1 % topical gel  diphenhydrAMINE-zinc acetate (BENADRYL) 2-0.1 % external cream  docusate sodium (COLACE) 100 MG capsule  famotidine (PEPCID) 20 MG tablet  gabapentin (NEURONTIN) 100 MG " "capsule  gentamicin (GARAMYCIN) 0.1 % external cream  Glucos-MSM-C-Yz-Cflzib-Jwavls (GLUCOSAMINE MSM COMPLEX) TABS tablet  hydrOXYzine (VISTARIL) 50 MG capsule  isosorbide mononitrate (IMDUR) 30 MG 24 hr tablet  Lidocaine (LIDOCARE) 4 % Patch  loperamide (IMODIUM) 2 MG capsule  melatonin 1 MG TABS tablet  menthol, Topical Analgesic, 2.5% (BENGAY VANISHING SCENT) 2.5 % GEL topical gel  methocarbamol (ROBAXIN) 500 MG tablet  metoprolol succinate ER (TOPROL XL) 25 MG 24 hr tablet  omeprazole (PRILOSEC) 40 MG DR capsule  ondansetron (ZOFRAN ODT) 4 MG ODT tab  polyethylene glycol (MIRALAX) 17 GM/Dose powder  traZODone (DESYREL) 50 MG tablet  trolamine salicylate (ASPERCREME) 10 % external cream           Past Medical History:         Past Medical History:   Diagnosis Date    Allergic rhinitis      Cancer (H)      Chronic kidney disease      Chronic kidney disease, stage 3 (H) 2010    Cyst of left ovary      Diabetes mellitus (H)      GERD (gastroesophageal reflux disease)      Gout      Hypertension      Intraductal carcinoma 2015    Obstructive sleep apnea on CPAP      Psoriasis          EXAM:   /56   Pulse 55   Temp 98.1  F (36.7  C) (Oral)   Resp 12   Ht 1.549 m (5' 1\")   Wt 93.9 kg (207 lb)   SpO2 99%   BMI 39.11 kg/m    General: Elderly female, laying on stretcher  Eyes: PERRL, Conjunctive within normal limits.  No scleral icterus.  ENT: Dry mucous membranes, oropharynx clear.   Neck: No rigidity.  CV: Normal S1S2, no murmur, rub or gallop. Regular rate and rhythm  Resp: Clear to auscultation bilaterally, no wheezes, rales or rhonchi. Normal respiratory effort.  GI: Abdomen is soft, nontender and nondistended.  Peritoneal drain in place.  No palpable masses. No rebound or guarding.  MSK: No pitting edema.  Ranges all extremities on command.  Skin: Warm and dry. No rashes or lesions or ecchymoses on visible skin.  Neuro: Alert and oriented, however sleepy. Responds appropriately to all questions and " commands. No focal findings appreciated. Normal muscle tone.  Psych: Appropriate mood and affect.    Independent Interpretation (X-rays, CTs, rhythm strip):  None    Consultations/Discussion of Management or Tests:  Care was discussed with admitting hospitalist as well as nephrology as per Makayla Seals's note.    Social Determinants of Health affecting care:   None     MEDICAL DECISION MAKING/ASSESSMENT AND PLAN:   Devora Garcia is a 62-year-old female with end-stage renal disease requiring dialysis who presents with syncopal event after missing daily home dialysis and not having adequate p.o. intake per her report.  She was syncopal event today that is not described by any prodrome aside from dizziness with movement.  She was in normal sinus rhythm with a relatively unchanged ECG from previous.  She did not have any events on telemetry over her stay.  She had no concerning cardiac murmur nor was reporting chest pain.  She was hemodynamically normal.  She had no focus of infection nor appeared to be compromised from a neurologic standpoint.  Acute CVA seems unlikely.  Although sleepy, there was no other obvious physical finding that would be a clue to weakness aside from general deconditioning and weakness due to her chronic medical conditions and poor oral intake.  There is no significant electrolyte derangement.  She is noted to have a metabolic acidosis, likely secondary to underlying kidney disease and similar to recent admission.  She will be admitted for ongoing supportive care and nephrology consult.     DIAGNOSIS:     ICD-10-CM    1. Syncope  R55       2. Failure to thrive in adult  R62.7       3. Acidosis  E87.20             DISPOSITION:   Admitted in stable condition to the hospitalist service     Doretha Osuna MD  9/20/2023  [facility] EMERGENCY DEPT      Doretha Osuna MD  09/20/23 1321

## 2023-09-20 NOTE — CONSULTS
[facility]    Nephrology Consultation     Date of Admission:  9/20/2023    Assessment & Plan     Devora Garcia is a 62 year old female with PMH ESRD on PD, HTN, GUILLERMO who was admitted on 9/20/2023 failure to thrive, fall/syncope.     Assessment:    ESRD on PD  Prior to recent admission patient was doing 4 manual exchanges, 1.5-hour dwell time.  Primary nephrologist is Dr. Cole at [facility].  While inpatient we have used cycler, discharged with prescription 9-hour treatment, 4 cycles, 2.9 L fill volume.  All green bags.  She notes last dialyzing Saturday 9/16 at [place], using all red bags.  Once she went home she did not do any dialysis due to feeling sleepy she reports.    AGMA  Mild, likely due to missed dialysis.    HTN  BP normal, low normal on arrival.  Recently discharged on carvedilol 6.25 mg twice daily, clonidine 0.1 mg twice daily, Imdur 15 mg daily, and metoprolol XL 12.5 mg daily.  Both carvedilol and metoprolol both listed on discharge summary.  Unclear if she was taking both of these.  Could contribute to what she is feeling weak.  Agree with holding for now and resuming if she becomes hypertensive.    Anemia of CKD  Globin 11 on admission.  At goal.    CKD-MBD  Continue Tums 2 tabs 3 times daily with meals    Failure to thrive  Fall  Possible syncope  Left TCU, unclear if this was AMA or she was discharged.  This is her second admission with a fall/weakness.  Clear that she is unable to care for herself at home at this time.    Plan/Recs:  1) agree with holding BP meds, can reintroduce them one by one if becomes hypertensive  2) would complete med rec to see if patient was actually taking both beta-blockers as discharge summary indicates  3) plan for PD tonight, all yellow bags.  If patient does not get assigned a room by the end of the day, will be unable to set up her PD machine today and can plan to do PD during the day tomorrow.    Maggie Alvarez MD  "  [facility] - Nephrology  [phone]  --------------------------------------------------------------------------------------------  Reason for Consult     I was asked to see the patient for ESRD.    Primary Care Physician     Josesito Evans    Chief Complaint     Failure to thrive    History is obtained from the patient and chart review.      History of Present Illness     Devora Garcia is a 62 year old female who presents with failure to thrive.     She was recently admitted from 8/24 to 9/15 at [facility] after fall and generalized weakness.  She discharged to [facility] where they learned and accommodated her PD.  She reports leaving the TCU on Sunday 9/17 because she \"did not like it there\", unclear whether patient was discharged or if she left AMA.  She reports not doing dialysis since Saturday evening when she had it done at [facility].  She states that she did not do it at home because she felt too sleepy.  She notes having a fall/fainting episode today.  She notes that she went from sitting to standing and felt weak and dizzy.  She reports poor p.o. intake, decreased appetite, poor fluid intake.  She denies any shortness of breath.  Denies any nausea or vomiting.  She denies any abdominal pain or pain around her PD catheter.  Denies fevers or chills.    In the ED, vitals stable, she satting well on room air.  Labs notable for slight acidosis related to missed dialysis.  No other remarkable labs.    Past Medical History   I have reviewed this patient's medical history and updated it with pertinent information if needed.   Past Medical History:   Diagnosis Date    Allergic rhinitis     Cancer (H)     left breast    Chronic kidney disease     Chronic kidney disease, stage 3 (H) 2010    Cyst of left ovary     Diabetes mellitus (H)     12/2020 states no longer takin insulin    GERD (gastroesophageal reflux disease)     Gout     Hypertension     Intraductal carcinoma 2015    Obstructive " sleep apnea on CPAP     uses CPAP    Psoriasis        Past Surgical History   I have reviewed this patient's surgical history and updated it with pertinent information if needed.  Past Surgical History:   Procedure Laterality Date    BARIATRIC SURGERY  2014    HC CYSTOSCOPY,INSERT URETERAL STENT Right 11/7/2018    Procedure: CYSTOSCOPY, WITH RIGHT URETERAL STENT INSERTION;  Surgeon: Dino Reynoso MD;  Location: [facility] Main OR;  Service: Urology    HC CYSTOSCOPY,INSERT URETERAL STENT Right 8/31/2020    Procedure: CYSTOSCOPY, WITH URETERAL STENT INSERTION;  Surgeon: Dino Reynoso MD;  [facility] Main [facility];  Service: Urology    IR CVC TUNNEL PLACEMENT > 5 YRS OF AGE  9/2/2020    IR CVC TUNNEL REVISION RIGHT  10/22/2020    IR TUNNELED CATHETER COMPLETE REPLACEMENT  10/22/2020    IR TUNNELED CATHETER INSERT  9/2/2020    LUMPECTOMY BREAST Left 2015    MAMMOPLASTY REDUCTION  2015    OTHER SURGICAL HISTORY  12/02/2020    peritoneal dialsis catheter placement    MO CYSTO/URETERO W/LITHOTRIPSY &INDWELL STENT INSRT Right 11/30/2018    Procedure: CYSTOSCOPY, RIGHT URETEROSCOPY, LASER LITHOTRIPSY STENT REMOVAL STENT INSERTION;  Surgeon: Dino Reynoso MD;  Location: [facility] Main OR;  Service: Urology    MO LAP INSERTION TUNNELED INTRAPERITONEAL CATHETER N/A 12/23/2020    Procedure: LAPAROSCOPY , Enterolysis;  Surgeon: Tigre Rivas MD;  Location: [facility] Main OR;  Service: General    MO LAP,DIAGNOSTIC ABDOMEN N/A 12/29/2020    Procedure: LAPAROSCOPY, De-Clot of Peritoneal Dialysis Catheter;  Surgeon: Tigre Rivas MD;  Location: [facility] Main OR;  Service: General       Prior to Admission Medications   Prior to Admission Medications   Prescriptions Last Dose Informant Patient Reported? Taking?   Glucos-MSM-C-Bh-Qeinqm-Xbocoo (GLUCOSAMINE MSM COMPLEX) TABS tablet Past Week at unknown time  Yes Yes   Sig: Take 1 tablet by mouth daily   Lidocaine (LIDOCARE) 4 % Patch Past Week at unknown time   No Yes   Sig: Place 2 patches onto the skin every 24 hours To prevent lidocaine toxicity, patient should be patch free for 12 hrs daily.Apply patch(s) to affected areas. To prevent lidocaine toxicity, patient should be patch free for 12 hrs daily. Patches may be cut to smaller size prior to removing release liner.  Reminder: Remove previous patch before applying new patch.  NEVER APPLY HEAT OVER PATCH which increases absorption and may lead to local anesthetic toxicity. Do not apply over area where liposomal bupivacaine was injected for 96 hours post injection.   acetaminophen (TYLENOL) 500 MG tablet Unknown at PRN  No Yes   Sig: Take 1-2 tablets (500-1,000 mg) by mouth every 6 hours as needed for fever or pain   calcium carbonate (TUMS) 500 MG chewable tablet Unknown at PRN  No Yes   Sig: Take 1 tablet (500 mg) by mouth 3 times daily as needed for heartburn   calcium carbonate (TUMS) 500 MG chewable tablet Past Week at unknown time  No Yes   Sig: Take 2 tablets (1,000 mg) by mouth 3 times daily (with meals)   cloNIDine (CATAPRES) 0.1 MG tablet 9/20/2023 at AM  No Yes   Sig: Take 1 tablet (0.1 mg) by mouth 2 times daily   cyanocobalamin (VITAMIN B-12) 1000 MCG tablet Past Week at unknown time  Yes Yes   Sig: Take 1,000 mcg by mouth daily   diclofenac (VOLTAREN) 1 % topical gel Unknown at PRN  No Yes   Sig: Apply 2 g topically every 6 hours as needed for other (pain)   diphenhydrAMINE-zinc acetate (BENADRYL) 2-0.1 % external cream Unknown at PRN  No Yes   Sig: Apply topically 3 times daily as needed for itching   docusate sodium (COLACE) 100 MG capsule Past Week at unknown time  No Yes   Sig: Take 1 capsule (100 mg) by mouth 2 times daily   famotidine (PEPCID) 20 MG tablet Unknown at PRN  Yes Yes   Sig: Take 20 mg by mouth daily as needed (heartburn)   gabapentin (NEURONTIN) 100 MG capsule 9/20/2023 at AM  Yes Yes   Sig: Take 100 mg by mouth 2 times daily as needed (itching)   gentamicin (GARAMYCIN) 0.1 % external  cream Unknown at PRN  No Yes   Sig: Apply topically daily as needed (promotion of catheter exit site healing.) Apply cream daily to the peritoneal catheter exit site with dressing change.  Pharmacy will send tube with first order or upon request.  Peritoneal Dialysis   hydrOXYzine (VISTARIL) 50 MG capsule Unknown at PRN  Yes Yes   Sig: Take 50 mg by mouth 4 times daily as needed for itching   isosorbide mononitrate (IMDUR) 30 MG 24 hr tablet Past Week at unknown time  No Yes   Sig: Take 0.5 tablets (15 mg) by mouth daily   loperamide (IMODIUM) 2 MG capsule Unknown at PRN  No Yes   Sig: Take 1 capsule (2 mg) by mouth 3 times daily as needed for diarrhea   melatonin 1 MG TABS tablet Unknown at PRN  No Yes   Sig: Take 1 tablet (1 mg) by mouth nightly as needed for sleep   menthol, Topical Analgesic, 2.5% (BENGAY VANISHING SCENT) 2.5 % GEL topical gel Unknown at PRN  No Yes   Sig: Apply topically every 6 hours as needed   methocarbamol (ROBAXIN) 500 MG tablet Unknown at PRN  No Yes   Sig: Take 1 tablet (500 mg) by mouth 4 times daily as needed for muscle spasms (muscle pain)   metoprolol succinate ER (TOPROL XL) 25 MG 24 hr tablet Unknown at unknown time  No Yes   Sig: Take 0.5 tablets (12.5 mg) by mouth daily   omeprazole (PRILOSEC) 40 MG DR capsule Past Week at unknown time  Yes Yes   Sig: Take 40 mg by mouth daily   ondansetron (ZOFRAN ODT) 4 MG ODT tab Unknown at PRN  No Yes   Sig: Take 1 tablet (4 mg) by mouth every 6 hours as needed for nausea or vomiting   polyethylene glycol (MIRALAX) 17 GM/Dose powder Past Week at unknown time  No Yes   Sig: Take 17 g by mouth daily   traZODone (DESYREL) 50 MG tablet Unknown at PRN  Yes Yes   Sig: Take 50 mg by mouth nightly as needed for sleep   trolamine salicylate (ASPERCREME) 10 % external cream Unknown at PRN  No Yes   Sig: Apply topically 4 times daily as needed      Facility-Administered Medications: None     Allergies   Allergies   Allergen Reactions    Aspirin Nausea     Statins-Hmg-Coa Reductase Inhibitors [Statins] Other (See Comments)     Other reaction(s): Renal Failure, Other reaction(s): Renal Failure       Social History   I have reviewed this patient's social history and updated it with pertinent information if needed. Devora Garcia  reports that she has never smoked. She has never used smokeless tobacco. She reports current alcohol use. She reports that she does not use drugs.    Family History   I have reviewed this patient's family history and updated it with pertinent information if needed.   Family History   Problem Relation Age of Onset    Kidney failure Father     Hypertension Father     Hypertension Brother     Diabetes Brother     Kidney failure Brother     Clotting Disorder No family hx of     Anesthesia Reaction No family hx of        Review of Systems   The 10 point Review of Systems is negative other than noted in the HPI.     Physical Exam   Temp: 98.1  F (36.7  C) Temp src: Oral BP: 114/62 Pulse: 54   Resp: 14 SpO2: 98 % O2 Device: None (Room air)    Vital Signs with Ranges  Temp:  [98.1  F (36.7  C)] 98.1  F (36.7  C)  Pulse:  [54-68] 54  Resp:  [12-18] 14  BP: (104-130)/(56-69) 114/62  SpO2:  [94 %-99 %] 98 %  207 lbs 0 oz    GENERAL: NAD, fatigued appearing  HEENT:  Normocephalic.  Dry mucous membranes  CV: RRR, no murmurs  RESP: Clear bilaterally with good efforts  GI: Abdomen soft/nt/nd.  Left lower quadrant with PD catheter exit site clean and intact.  MUSCULOSKELETAL: No lower extremity edema  SKIN: no suspicious lesions or rashes, dry to touch  NEURO: Diffusely weak.  Drowsy but awakens to voice.  PSYCH: Fatigued appearing      Data   BMP  Recent Labs   Lab 09/20/23  1218 09/20/23  1202    138   POTASSIUM 3.4 3.9   CHLORIDE 95 91*   RON  --  8.9   CO2  --  20*   BUN 69* 63.2*   CR 18.3* 15.39*   GLC 78 84     [email or web address](phos:4)  CBC)  Recent Labs   Lab 09/20/23  1218 09/20/23  1202   WBC  --  9.8   HGB 11.9 11.0*   HCT 35 34.7*   MCV   --  82   PLT  --  210     Recent Labs   Lab 09/20/23  1202   AST 19   ALT <5   ALKPHOS 106*   BILITOTAL 0.4     No lab results found in last 7 days.  No results found for: D2VIT, D3VIT, DTOT  Recent Labs   Lab 09/20/23  1218 09/20/23  1202   HGB 11.9 11.0*   HCT 35 34.7*   MCV  --  82     No results for input(s): PTHI in the last 168 hours.  Color Urine (no units)   Date Value   05/29/2021 Light Yellow     Appearance Urine (no units)   Date Value   05/29/2021 Clear     Glucose Urine (mg/dL)   Date Value   05/29/2021 Negative     Bilirubin Urine (no units)   Date Value   05/29/2021 Negative     Ketones Urine (mg/dL)   Date Value   05/29/2021 Negative     Specific Gravity Urine (no units)   Date Value   05/29/2021 1.013     pH Urine (pH)   Date Value   05/29/2021 7.0     Protein Albumin Urine (mg/dL)   Date Value   05/29/2021 100 (A)     Urobilinogen Urine (no units)   Date Value   08/30/2020 <2.0 E.U./dL     Nitrite Urine (no units)   Date Value   05/29/2021 Negative     Leukocyte Esterase Urine (no units)   Date Value   05/29/2021 Trace (A)           Maggie Alvarez MD   [facility] - Nephrology  [phone]

## 2023-09-21 NOTE — PROGRESS NOTES
"PRIMARY DIAGNOSIS: \"GENERIC\" NURSING  OUTPATIENT/OBSERVATION GOALS TO BE MET BEFORE DISCHARGE:  ADLs back to baseline: No    Activity and level of assistance: Up with maximum assistance. Consider SW and/or PT evaluation.     Pain status: Improved-controlled with oral pain medications.    Return to near baseline physical activity: No     Discharge Planner Nurse   Safe discharge environment identified: No  Barriers to discharge: Yes       Entered by: Sonali Alfonso RN 09/21/2023 2:47 PM     Please review provider order for any additional goals.   Nurse to notify provider when observation goals have been met and patient is ready for discharge.  "

## 2023-09-21 NOTE — PROGRESS NOTES
09/21/23 2925   Appointment Info   Signing Clinician's Name / Credentials (PT) Yasmin Toussaint DPT   Living Environment   People in Home spouse   Current Living Arrangements house   Home Accessibility stairs to enter home   Number of Stairs, Main Entrance 1   Number of Stairs, Within Home, Primary greater than 10 stairs   Stair Railings, Within Home, Primary railings safe and in good condition   Transportation Anticipated family or friend will provide   Living Environment Comments Pt lives in [facility] with 5,6 split level house;   Self-Care   Usual Activity Tolerance fair   Current Activity Tolerance poor   Equipment Currently Used at Home walker, standard   Fall history within last six months yes   Number of times patient has fallen within last six months 2   General Information   Onset of Illness/Injury or Date of Surgery 09/20/23   Referring Physician Briseyda West PA-C   Patient/Family Therapy Goals Statement (PT) find a different TCU, continue to get rehab per spouse he can not help at home like he did   Pertinent History of Current Problem (include personal factors and/or comorbidities that impact the POC) 62 year old female with PMH HTN, ESRD on PD, and GUILLERMO on CPAP who presents with concerns for syncopal episode at home.   Weight-Bearing Status - LUE full weight-bearing   Weight-Bearing Status - RUE full weight-bearing   Weight-Bearing Status - LLE full weight-bearing   Weight-Bearing Status - RLE full weight-bearing   Cognition   Affect/Mental Status (Cognition) WFL   Orientation Status (Cognition) oriented x 4   Pain Assessment   Patient Currently in Pain Yes, see Vital Sign flowsheet  (reports cramps in R LE)   Range of Motion (ROM)   Range of Motion ROM deficits secondary to weakness   Bed Mobility   Comment, (Bed Mobility) Max A x 1 supine > sit, Max A x 2 sit > supine   Transfers   Comment, (Transfers) Mod A x 1 from elevated surface with FWW   Gait/Stairs (Locomotion)   Distance in Feet unable to  ambulate today d/t weakness and fatigue (per pt she hasn't eaten in 8 days)   Balance   Balance Comments Poor sitting balance related to fatigue leaning towards right, poor standing balance and unable to stand > 1 minute d/t LE buckling and shakiness   Sensory Examination   Sensory Perception patient reports no sensory changes   Clinical Impression   Criteria for Skilled Therapeutic Intervention Yes, treatment indicated   PT Diagnosis (PT) impaired mobility   Influenced by the following impairments pain, weakness, impaired balance   Functional limitations due to impairments fall risk, decreased activity tolerance   Clinical Presentation (PT Evaluation Complexity) Stable/Uncomplicated   Clinical Presentation Rationale clinical judgement   Clinical Decision Making (Complexity) low complexity   Planned Therapy Interventions (PT) balance training;bed mobility training;gait training;stair training;strengthening;transfer training;progressive activity/exercise   Anticipated Equipment Needs at Discharge (PT) walker, rolling;wheelchair   Risk & Benefits of therapy have been explained evaluation/treatment results reviewed;care plan/treatment goals reviewed;risks/benefits reviewed;current/potential barriers reviewed;participants voiced agreement with care plan;participants included;patient   PT Total Evaluation Time   PT Eval, Low Complexity Minutes (55726) 20   Physical Therapy Goals   PT Frequency 4x/week   PT Predicted Duration/Target Date for Goal Attainment 09/29/23   PT: Bed Mobility Minimal assist;Supine to/from sit   PT: Transfers Minimal assist;Bed to/from chair;Assistive device   PT: Gait Minimal assist;Rolling walker;50 feet   PT: Stairs Minimal assist;1 stair  (has 1 BERYL, could live on main level living but ideally needs to be able to do 5-6 stairs to access full bathroom and bedroom)   Therapeutic Activity   Therapeutic Activities: dynamic activities to improve functional performance Minutes (86084) 15   Symptoms  Noted During/After Treatment Fatigue   Treatment Detail/Skilled Intervention Cued for improved sitting balance with UE support on bed and pt was able to correct progressing from Mod A sitting balance to CGA. Cues for safe standing but ultimately needing elevated bed height and Mod A x 1 with FWW. Standing x 1 mins with cues for weight shifting and pre-gait activities but pt unable to take step and fell backwards towards bed. Unable to scoot higher, Max A x 2 for sit > supine and dependent with supine scoot with ceiling lift. Left with all needs in reach and bed alarm on.   PT Discharge Planning   PT Plan bed mob, bed <> chair transfers, LE strengthening   PT Discharge Recommendation (DC Rec) Transitional Care Facility   PT Rationale for DC Rec Pt currently needing A x 1-2 people for safe mobility; spouse was unable to provide the level of assist needed for patient as she did not finish her rehab intended at TCU. Needed 2 people to enter 1 BERYL into house and pt was unable to safely ambulate having a fall at home. Currently pt needing Mod Ax1-2 for bed mob and transfers, unable to safely ambulate at this time and recommend TCU prior to discharge home. Pt and family do not want to return to previous TCU.   PT Brief overview of current status Mod A x 1-2 for bed mob, transfers, fall risk   Total Session Time   Timed Code Treatment Minutes 15   Total Session Time (sum of timed and untimed services) 35

## 2023-09-21 NOTE — PROGRESS NOTES
PRIMARY DIAGNOSIS: SYNCOPE/TIA  OUTPATIENT/OBSERVATION GOALS TO BE MET BEFORE DISCHARGE:  1. Orthostatic performed: No (pt unable to stand out of bed)    2. Diagnostic testing complete & at baseline neurologic testing: No    3. Cleared by consultants (if involved): No    4. Interpretation of cardiac rhythm per telemetry tech: NSR with prolong QTc    5. Tolerating adequate PO diet and medications: Yes    6. Return to near baseline physical activity or neurologic status: No    Discharge Planner Nurse   Safe discharge environment identified: No  Barriers to discharge: Yes       Entered by: Alla Laura RN 09/21/2023 1:02 AM   Pt A&Ox4 but not physically at baseline. Dialysis running overnight. Pain of the right tight prn med given.   Please review provider order for any additional goals.   Nurse to notify provider when observation goals have been met and patient is ready for discharge.

## 2023-09-21 NOTE — PLAN OF CARE
"PRIMARY DIAGNOSIS: \"GENERIC\" NURSING  OUTPATIENT/OBSERVATION GOALS TO BE MET BEFORE DISCHARGE:  ADLs back to baseline: No    Activity and level of assistance: Up with maximum assistance. Consider SW and/or PT evaluation.     Pain status: Improved-controlled with oral pain medications.    Return to near baseline physical activity: No     Discharge Planner Nurse   Safe discharge environment identified: No  Barriers to discharge: Yes       Entered by: Sonali Alfonso RN 09/21/2023 2:50 PM     Please review provider order for any additional goals.   Nurse to notify provider when observation goals have been met and patient is ready for discharge.      Plan of Care Reviewed With: patient    Overall Patient Progress: no changeOverall Patient Progress: no change     Vitals:/62 (BP Location: Left arm)   Pulse 66   Temp 98.7  F (37.1  C) (Oral)   Resp 16   Ht 1.549 m (5' 1\")   Wt 92.9 kg (204 lb 12.9 oz)   SpO2 96%   BMI 38.70 kg/m      Pain: reporting cramping in legs. Tylenol and atarax given, intervention not effective per pt. MD notified, flexeril ordered.   Neuro: A&O but forgetful.  Cardiac/Tele: SR  GI/: Pt not able to have BM, took colace but refusing miralax. Little UOP. Had unmeasured occurrence while trying to have BM on bedside commode.   LDAs: PIV saline locked, Peritoneal dialysis catheter in abdomen.   Diet: changed to renal this shift.   Activity: Assist 1-2 with GB and walker to pivot to commode, recommend lift otherwise.   Plan: continue to monitor per POC.         "

## 2023-09-21 NOTE — PROGRESS NOTES
Cycler set up per protocol and per treatment orders      Results from previous treatment:  Effluent color and clarity: clear yellow and no fibrin noted  Total UF: 297mL  Initial Drain: 0mL  Avg Dwell: 1:43  Lost Dwell: 0:19     Cycler Serial Number: [id number]  Total Treatment Volume: [id number]  Total treatment time: 9 hrs  Fill Volume: 2900ml  Last Fill Volume:0ml  Heater ba.5% 6000mL;  Lot #: [id number];  Expiration Date: 2024  Side Bag #1: 1.5% 6000mL;  Lot #: [id number];  Expiration Date: 2024  Number of cycles including final fill: 4  Dwell Time: 98 minutes  Last Fill Volume: 0ml  Initial Drain Alarm: 0ml  PD orders reviewed with Patient procedure and ESRD teaching done and questions answered.  Cycler ready for hook-up.    Report given to: FANTASMA Alfonso RN  [facility] consent form signed 2023

## 2023-09-21 NOTE — PROGRESS NOTES
PRIMARY DIAGNOSIS: SYNCOPE/TIA  OUTPATIENT/OBSERVATION GOALS TO BE MET BEFORE DISCHARGE:  1. Orthostatic performed: No (pt unable to stand out of bed)    2. Diagnostic testing complete & at baseline neurologic testing: No    3. Cleared by consultants (if involved): No    4. Interpretation of cardiac rhythm per telemetry tech: NSR with prolong QTc    5. Tolerating adequate PO diet and medications: Yes    6. Return to near baseline physical activity or neurologic status: No    Discharge Planner Nurse   Safe discharge environment identified: No  Barriers to discharge: Yes       Entered by: Alla Laura RN 09/20/2023 9:34 PM   Pt A&Ox4 but not physically at baseline.   Please review provider order for any additional goals.   Nurse to notify provider when observation goals have been met and patient is ready for discharge.

## 2023-09-21 NOTE — PROGRESS NOTES
Renal Medicine Progress Note            Assessment/Plan:     Assessment: Devora Garcia is a 62 year old female with PMH ESRD on PD, HTN, GUILLERMO who was admitted on 9/20/2023 failure to thrive, fall/syncope.       ESRD on PD  Prior to recent admission patient was doing 4 manual exchanges, 1.5-hour dwell time.  Primary nephrologist is Dr. Cole at [facility].  While inpatient we have used cycler, discharged with prescription 9-hour treatment, 4 cycles, 2.9 L fill volume.  All green bags.  She notes last dialyzing Saturday 9/16 at [place], using all red bags.  Once she went home she did not do any dialysis due to feeling sleepy she reports.     AGMA  Mild, likely due to missed dialysis.     HTN  BP normal, low normal on arrival.  Recently discharged on carvedilol 6.25 mg twice daily, clonidine 0.1 mg twice daily, Imdur 15 mg daily, and metoprolol XL 12.5 mg daily.  Both carvedilol and metoprolol both listed on discharge summary.  Unclear if she was taking both of these.  Could contribute to what she is feeling weak.  Agree with holding for now and resuming if she becomes hypertensive.     Anemia of CKD  Hemoglobin 11 on admission.  At goal.     CKD-MBD  Continue Tums 2 tabs 3 times daily with meals     Failure to thrive  Fall  Possible syncope  Left TCU, unclear if this was AMA or she was discharged.  This is her second admission with a fall/weakness.  Clear that she is unable to care for herself at home at this time.    Plan/Recs:  1) yellow bags tonight      Reviewed notes from hospitalist, Pharm.D., RNs.    Maggie Alvarez MD   [facility] consultants  Office: [phone]          Interval History:     No acute events overnight  PD went unremarkable,  mL  Denies shortness of breath or lower extremity edema  Much more awake and alert today  Reports still feeling slightly dizzy  Reports she hopes to discharge to home with home care, home PT, somebody to help her with her PD.  We discussed  "that home care cannot do patient's PD every night and that it would be on her to get retrained to do it at night safely.  I discussed with her that discharging home without a PD plan is not wise, seeing as she left the TCU and went home without doing dialysis since  She notes some jerking motions/tremors.  Likely due to not dialyzing          Medications and Allergies:      calcium carbonate  1,000 mg Oral TID w/meals    [Held by provider] cloNIDine  0.1 mg Oral BID    docusate sodium  100 mg Oral BID    [Held by provider] isosorbide mononitrate  15 mg Oral Daily    metoprolol succinate ER  12.5 mg Oral Daily    pantoprazole  40 mg Oral BID    polyethylene glycol  17 g Oral Daily    sodium chloride (PF)  3 mL Intracatheter Q8H        Allergies   Allergen Reactions    Aspirin Nausea    Statins-Hmg-Coa Reductase Inhibitors [Statins] Other (See Comments)     Other reaction(s): Renal Failure, Other reaction(s): Renal Failure            Physical Exam:   Vitals were reviewed  /62 (BP Location: Left arm)   Pulse 66   Temp 98.7  F (37.1  C) (Oral)   Resp 16   Ht 1.549 m (5' 1\")   Wt 92.9 kg (204 lb 12.9 oz)   SpO2 96%   BMI 38.70 kg/m      Wt Readings from Last 3 Encounters:   09/20/23 92.9 kg (204 lb 12.9 oz)   09/14/23 97 kg (213 lb 13.5 oz)   05/12/23 100.5 kg (221 lb 9.6 oz)     No intake or output data in the 24 hours ending 09/21/23 1329    GENERAL APPEARANCE: NAD, fatigued appearing  HEENT: normocephalic, dry mucous membranes  RESP: clear  CV: RRR  ABDOMEN: Soft, nontender.  Left lower quadrant with PD catheter clean and intact  EXTREMITIES/SKIN: no c/c/rashes/lesions; no edema  NEURO: Alert, oriented, slow speech             Data:     BMP  Recent Labs   Lab 09/21/23  0637 09/20/23  1218 09/20/23  1202   * 133 138   POTASSIUM 3.1* 3.4 3.9   CHLORIDE 90* 95 91*   RON 9.3  --  8.9   CO2 19*  --  20*   BUN 57.6* 69* 63.2*   CR 15.19* 18.3* 15.39*   * 78 84     CBC  Recent Labs   Lab " 09/21/23  0637 09/20/23  1218 09/20/23  1202   WBC 9.2  --  9.8   HGB 11.8 11.9 11.0*   HCT 38.0 35 34.7*   MCV 83  --  82     --  210     Lab Results   Component Value Date    AST 19 09/20/2023    ALT <5 09/20/2023    ALKPHOS 106 (H) 09/20/2023    BILITOTAL 0.4 09/20/2023     No results found for: INR  Color Urine (no units)   Date Value   05/29/2021 Light Yellow     Appearance Urine (no units)   Date Value   05/29/2021 Clear     Glucose Urine (mg/dL)   Date Value   05/29/2021 Negative     Bilirubin Urine (no units)   Date Value   05/29/2021 Negative     Ketones Urine (mg/dL)   Date Value   05/29/2021 Negative     Specific Gravity Urine (no units)   Date Value   05/29/2021 1.013     pH Urine (pH)   Date Value   05/29/2021 7.0     Protein Albumin Urine (mg/dL)   Date Value   05/29/2021 100 (A)     Urobilinogen Urine (no units)   Date Value   08/30/2020 <2.0 E.U./dL     Nitrite Urine (no units)   Date Value   05/29/2021 Negative     Leukocyte Esterase Urine (no units)   Date Value   05/29/2021 Trace (A)         Attestation:  I have reviewed today's vital signs, notes, medications, labs and imaging.    Maggie Alvarez MD  [facility] - Nephrology  Office: [phone]

## 2023-09-21 NOTE — UTILIZATION REVIEW
Concurrent stay review; Secondary Review Determination    Under the authority of the Utilization Management Committee, the utilization review process indicated a secondary review on the above patient. The review outcome is based on review of the medical records, discussions with staff, and applying clinical experience noted on the date of the review.    (x) Observation Status Appropriate - Concurrent stay review    RATIONALE FOR DETERMINATION    Devora Garcia is a 62 year old female with history of HTN, ESRD on PD, and GUILLERMO on CPAP.  She presented to the ED on 9/20/2023 after a suspected syncopal episode at home.  She had recently been admitted to [facility] from 8/24/2023 through 9/15/2023 with left hip and knee pain after a fall.  During that hospital stay she had some encephalopathy that resolved without particular intervention.  She discharged to TCU on 9/15/2023 and, evidently, discharged to home from [facility] on 9/17/2023 because she did not like the facility.  Since that time she has been getting weaker and weaker.  She had not eaten well for several days.  She had not done her peritoneal dialysis for a couple of days for unclear reasons.  She become more sleepy.  She denied focal symptoms.  She had had a fall earlier in the day.  Emergency department evaluation showed stable vital signs.  Laboratory evaluation showed abnormalities suggestive of end-stage renal disease with an adequate peritoneal dialysis but nothing dangerous.  She was admitted with suspected syncope and fatigue.  No clear cause of syncope was identified.    Patient is clinically improving and there is no clear indication to change patient's status to inpatient. The severity of illness, intensity of service provided, expected LOS and risk for adverse outcome make the care appropriate for observation.    This document was produced using voice recognition software    The information on this document is developed by the utilization review team in  order for the business office to ensure compliance. This only denotes the appropriateness of proper admission status and does not reflect the quality of care rendered.    The definitions of Inpatient Status and Observation Status used in making the determination above are those provided in the CMS Coverage Manual, Chapter 1 and Chapter 6, section 70.4.    Sincerely,    Reza Hutton MD     Utilization Review    Physician Advisor    [facility].

## 2023-09-21 NOTE — PROGRESS NOTES
"[facility]    Hospitalist Progress Note  Name: Devora Garcia    MRN: [id number]  Provider:  Myles Avilez DO, MPH  Date of Service: 09/21/2023    Summary of Stay: Devora Garcia is a 62 year old female with PMH HTN, ESRD on PD, and GUILLERMO on CPAP who presents with concerns for syncopal episode at home.     Recently admitted to [facility] from 8/24-9/15 eventually discharge to TCU for left hip and knee pain status post mechanical fall.  She also had an episode of acute encephalopathy and that eventually resolved during admission.  She was quite deconditioned.  She reports she was discharged from TCU on 9/17 back home.     Since discharging home from TCU patient has been growing weaker and weaker.  She has not had dialysis since Sunday 9/17 due to \"not having time\" per the patient.  She also has been feeling increasingly more sleepy.  Denies any concerns such as fever, chills, chest pain, shortness of breath, abdominal pain.  Patient reports she had a fall earlier in the day.  Attempted to call the significant other to get further history but was unable to reach him via phone.  Per the ED provider's note who spoke with the  he was moving her from the chair to start her dialysis when she became increasingly weak and dizzy and had a syncopal event.   denies seizure-like activity and reports she was unconscious for couple minutes and then came to.  No postictal state.  Patient also reported that she has not had anything to eat or drink for \"8 days\" due to lack of appetite.     In the ED, afebrile, blood pressure 104/61, heart rate 56, respirations 18, oxygen 99% on room air.  CMP notable for chloride 91, CO2 20, creatinine 15.39, anion gap 27, alk phos 106. CBC notable for hemoglobin of 11.0. EKG shows sinus rhythm with 1st degree av block.      Syncope  Fatigue   Based on the  report to ED provider sounds like patient had a prodrome of dizziness and weakness while trying to " transferring from chair prior to syncope. Questioning a possible orthostatic hypotension.  Likely multifactorial with her poor oral intake, chronic deconditioning and also missing dialysis for the last 4 days.  Patient is alert and orientated x4 able to answer all my questions appropriately, neuro exam normal. Last echo in May 2023 - normal left ventricular, moderate concentric left ventricular, EF 65-70%, moderate to severe mitral annular calcificiation. Also may have been receiving both metoprolol and carvedilol PTA.   - cardiac monitoring, no arrhythmia seen thus far  - unable to make urine for UA but no evidence on infection  - TSH slightly high and free T4 slightly low but unlikely to explain the presentation  - orthostatic vitals ordered, not performed  - PT consult  - SW consult  - likely placement     ESRD on PD  AGMA likely secondary to ESRD  Anemia of CKD  Anion gap slightly higher than her baseline suspect due to missing dialysis for the past 4 days. Also questioning if missing dialysis is contributing to her increased fatigue and sleepiness. Hemoglobin stable.  - nephrology consult, received PD overnight  - last dialysis run was on 9/17     HTN  BP trending up and essentially normal now but slightly hypotensive on arrival.  - continue metoprolol, ensure not discharge on both metoprolol and carvedilol  - hold PTA clonidine and Imdur for now     GUILLERMO  - use CPAP at night     Abnormal TFTs  Only slightly abnormal, showing possibly some hypothyroidism.  - would recheck TFTs in 1 month, if still abnormal consider small dose of levothyroxine.    DVT Prophylaxis: Pneumatic Compression Devices  Code Status: Full Code  Diet: Renal Diet (dialysis)    Taylor Catheter: Not present  Disposition: Expected discharge in 1-2 days to TCU. Goals prior to discharge include monitor and placement.   Incidental Findings: None.  Family updated today: No, I offered to update her  but she declined.    40 MINUTES SPENT BY ME  on the date of service doing chart review, history, exam, documentation & further activities per the note.      Interval History   Assumed care from previous hospitalist. The history was fully reviewed.  The patient reports doing well. No chest pain or shortness of breath. No nausea, vomiting, diarrhea, constipation. No fevers. No other specific complaints identified.     -Data reviewed today: I personally reviewed all new labs and imaging results over the last 24 hours.     Physical Exam   Temp: 98.7  F (37.1  C) Temp src: Oral BP: 126/62 Pulse: 66   Resp: 16 SpO2: 96 % O2 Device: None (Room air)    Vitals:    09/20/23 1143 09/20/23 1609   Weight: 93.9 kg (207 lb) 92.9 kg (204 lb 12.9 oz)     Vital Signs with Ranges  Temp:  [97.5  F (36.4  C)-98.7  F (37.1  C)] 98.7  F (37.1  C)  Pulse:  [54-71] 66  Resp:  [12-18] 16  BP: (104-140)/(50-92) 126/62  SpO2:  [94 %-99 %] 96 %  No intake/output data recorded.    GENERAL: No apparent distress. Awake, alert, and fully oriented.  HEENT: Normocephalic, atraumatic. Extraocular movements intact.  CARDIOVASCULAR: Regular rate and rhythm without murmurs or rubs. No S3.  PULMONARY: Clear bilaterally.  GASTROINTESTINAL: Soft, non-tender, non-distended. Bowel sounds normoactive.   EXTREMITIES: No cyanosis or clubbing. No edema.  NEUROLOGICAL: CN 2-12 grossly intact, no focal neurological deficits.  DERMATOLOGICAL: No rash, ulcer, bruising, nor jaundice.     Medications    dianeal PD LOW calcium-1.5% dex (calcium 2.5 mEq/L) 6,000 mL PERITONEAL DIALYSATE      dianeal PD LOW calcium-1.5% dex (calcium 2.5 mEq/L) 6,000 mL PERITONEAL DIALYSATE        calcium carbonate  1,000 mg Oral TID w/meals    [Held by provider] cloNIDine  0.1 mg Oral BID    docusate sodium  100 mg Oral BID    [Held by provider] isosorbide mononitrate  15 mg Oral Daily    metoprolol succinate ER  12.5 mg Oral Daily    pantoprazole  40 mg Oral BID    polyethylene glycol  17 g Oral Daily    sodium chloride (PF)  3 mL  Intracatheter Q8H     Data     Laboratory:  Recent Labs   Lab 09/21/23  0637 09/20/23  1218 09/20/23  1202   WBC 9.2  --  9.8   HGB 11.8 11.9 11.0*   HCT 38.0 35 34.7*   MCV 83  --  82     --  210     Recent Labs   Lab 09/21/23  0637 09/20/23  1218 09/20/23  1202   * 133 138   POTASSIUM 3.1* 3.4 3.9   CHLORIDE 90* 95 91*   CO2 19*  --  20*   ANIONGAP 25*  --  27*   * 78 84   BUN 57.6* 69* 63.2*   CR 15.19* 18.3* 15.39*   GFRESTIMATED 2*  --  2*   RON 9.3  --  8.9     No results for input(s): CULT in the last 168 hours.    Imaging:  No results found for this or any previous visit (from the past 24 hour(s)).      Myles Avilez DO MPH  [facility] Hospitalist  [address] [place] [address]  09/21/2023

## 2023-09-21 NOTE — PROGRESS NOTES
PRIMARY DIAGNOSIS: SYNCOPE/TIA  OUTPATIENT/OBSERVATION GOALS TO BE MET BEFORE DISCHARGE:  1. Orthostatic performed: No (pt unable to stand out of bed)    2. Diagnostic testing complete & at baseline neurologic testing: No    3. Cleared by consultants (if involved): No    4. Interpretation of cardiac rhythm per telemetry tech: NSR with prolong QTc    5. Tolerating adequate PO diet and medications: Yes    6. Return to near baseline physical activity or neurologic status: No    Discharge Planner Nurse   Safe discharge environment identified: No  Barriers to discharge: Yes       Entered by: Alla Laura RN 09/21/2023 6:25 AM   Pt A&Ox4, VSS, room air, tele SR. Assist of 2 lift. Dialysis running overnight. Pain of the right tigh prn med given. Saline lock. External cath in place to see if we can get a UA.   Please review provider order for any additional goals.   Nurse to notify provider when observation goals have been met and patient is ready for discharge.

## 2023-09-21 NOTE — PROGRESS NOTES
Care Management Initial Consult    General Information  Assessment completed with: Patient, Spouse or significant other, Kristopher Kennedy  Type of CM/SW Visit: Initial Assessment    Primary Care Provider verified and updated as needed: Yes   Readmission within the last 30 days: current reason for admission unrelated to previous admission      Reason for Consult: discharge planning, care coordination/care conference  Advance Care Planning:            Communication Assessment  Patient's communication style: spoken language (English or Bilingual)    Hearing Difficulty or Deaf: no   Wear Glasses or Blind: no    Cognitive  Cognitive/Neuro/Behavioral: WDL  Level of Consciousness: alert  Arousal Level: opens eyes spontaneously  Orientation: oriented x 4  Mood/Behavior: calm, cooperative, flat affect  Best Language: 0 - No aphasia  Speech: spontaneous, clear, logical    Living Environment:   People in home: spouse  Kristopher Kennedy  Current living Arrangements: house (townhouse)      Able to return to prior arrangements: other (see comments) (PT later today to determine)       Family/Social Support:  Care provided by: self  Provides care for: no one  Marital Status:   , Children          Description of Support System: Involved, Supportive    Support Assessment: Adequate social supports, Adequate family and caregiver support    Current Resources:   Patient receiving home care services: No     Community Resources: None  Equipment currently used at home: walker, standard  Supplies currently used at home: Other, Diabetic Supplies (PD supplies)            Does the patient's insurance plan have a 3 day qualifying hospital stay waiver?  No    Lifestyle & Psychosocial Needs:  Social Determinants of Health     Food Insecurity: Not on file   Depression: Not on file   Housing Stability: Not on file   Tobacco Use: Low Risk  (11/15/2021)    Patient History     Smoking Tobacco Use: Never     Smokeless Tobacco Use: Never      Passive Exposure: Not on file   Financial Resource Strain: Not on file   Alcohol Use: Not on file   Transportation Needs: Not on file   Physical Activity: Not on file   Interpersonal Safety: Not on file   Stress: Not on file   Social Connections: Not on file       Functional Status:  Prior to admission patient needed assistance:   Dependent ADLs:: Ambulation-walker, Independent  Dependent IADLs:: Cleaning, Cooking, Laundry, Shopping, Meal Preparation, Medication Management, Money Management, Transportation  Assesssment of Functional Status: Not at  functional baseline, Not at baseline with mobility, Not at baseline with ADL Functioning                Additional Information:  Patient admitted under Observation for concerns of weakness and syncopal episode at home. She wa recently discharged on 9[facility]. The family states they did not like the facility. Patient is followed by Nephrology and the Hospitalist.  CM met with patient and her  at the bedside. She reports she was independent in ADL's and her  states he does the IADL's. She reports she sets up her own medications. She was not receiving any home care services, but is interested in obtaining this at discharge. Transportation to be provided by her .  CM will monitor PT recommendations.    Jocelyn Ashton, RN, BSN, CM  Inpatient Care Coordination  [facility]  [phone]

## 2023-09-22 NOTE — PROGRESS NOTES
Care Management Follow Up    Length of Stay (days): 0    Expected Discharge Date: 09/22/2023     Concerns to be Addressed: discharge planning, care coordination/care conferences     Patient plan of care discussed at interdisciplinary rounds: Yes    Anticipated Discharge Disposition:PT recs TCU- patient declines [facility]     Anticipated Discharge Services: None  Anticipated Discharge DME:      Patient/family educated on Medicare website which has current facility and service quality ratings:  NA  Education Provided on the Discharge Plan:  no  Patient/Family in Agreement with the Plan: considering    Referrals Placed by CM/SW:  home care  Private pay costs discussed: Not applicable    Additional Information:  CM met with patient to discuss PT recommendations of TCU.  [facility]  was not a good experience and she won't go back. CM will send to TCU's where list shows they take Peritoneal Dialysis. Referrals sent.- See list under destinations. As referrals return, if accepted, will offer choice.    Jocelyn Ashton, RN, BSN, CM  Inpatient Care Coordination  [facility]  [phone]

## 2023-09-22 NOTE — PROGRESS NOTES
PRIMARY DIAGNOSIS: SYNCOPE/TIA  OUTPATIENT/OBSERVATION GOALS TO BE MET BEFORE DISCHARGE:  1. Orthostatic performed: No  Too weak to stand   2. Diagnostic testing complete & at baseline neurologic testing: Yes    3. Cleared by consultants (if involved): No    4. Interpretation of cardiac rhythm per telemetry tech: SR 1* AVB     5. Tolerating adequate PO diet and medications: Yes    6. Return to near baseline physical activity or neurologic status: No    Discharge Planner Nurse   Safe discharge environment identified: No  Barriers to discharge: Yes       Entered by: Alla Laura RN 09/22/2023 1:41 AM   Pt A&Ox4, vss, room air, dialysis running overnight, prn pain med given. Assist of 2  Please review provider order for any additional goals.   Nurse to notify provider when observation goals have been met and patient is ready for discharge.

## 2023-09-22 NOTE — PROGRESS NOTES
This writer took down peritoneal dialysis from cycler per protocol. Dressing changed, no drainage or redness.

## 2023-09-22 NOTE — PLAN OF CARE
"Goal Outcome Evaluation:    /57 (BP Location: Right arm)   Pulse 73   Temp 98.8  F (37.1  C) (Oral)   Resp 16   Ht 1.549 m (5' 1\")   Wt 92.9 kg (204 lb 12.9 oz)   SpO2 94%   BMI 38.70 kg/m       A&Ox4, A2 w/GB and walker, pivot to bedside commode. Renal diet. Tele - SR with prolonged QT. BLE edema. VSS on RA. PRN Tylenol, Atarax, Flexeril and Gabapentin given for pain. Compazine given for N/Vx1. Potassium critically low at 2.6 this morning, replaced.                       "

## 2023-09-22 NOTE — PROGRESS NOTES
PRIMARY DIAGNOSIS: SYNCOPE/TIA  OUTPATIENT/OBSERVATION GOALS TO BE MET BEFORE DISCHARGE:  1. Orthostatic performed: No  Too weak to stand   2. Diagnostic testing complete & at baseline neurologic testing: Yes    3. Cleared by consultants (if involved): No    4. Interpretation of cardiac rhythm per telemetry tech: SR 1* AVB     5. Tolerating adequate PO diet and medications: Yes    6. Return to near baseline physical activity or neurologic status: No    Discharge Planner Nurse   Safe discharge environment identified: No  Barriers to discharge: Yes       Entered by: Alla Laura RN 09/22/2023 6:09 AM   Pt A&Ox4, vss, room air, dialysis running overnight, prn pain med given. Assist of 2  Please review provider order for any additional goals.   Nurse to notify provider when observation goals have been met and patient is ready for discharge.

## 2023-09-22 NOTE — PROGRESS NOTES
"[facility]    Hospitalist Progress Note  Name: Devora Garcia    MRN: [id number]  Provider:  Myles Avilez DO, MPH  Date of Service: 09/22/2023    Summary of Stay: Devora Garcia is a 62 year old female with PMH HTN, ESRD on PD, and GUILLERMO on CPAP who presents with concerns for syncopal episode at home.     Recently admitted to [facility] from 8/24-9/15 eventually discharge to TCU for left hip and knee pain status post mechanical fall.  She also had an episode of acute encephalopathy and that eventually resolved during admission.  She was quite deconditioned.  She reports she was discharged from TCU on 9/17 back home.     Since discharging home from TCU patient has been growing weaker and weaker.  She has not had dialysis since Sunday 9/17 due to \"not having time\" per the patient.  She also has been feeling increasingly more sleepy.  Denies any concerns such as fever, chills, chest pain, shortness of breath, abdominal pain.  Patient reports she had a fall earlier in the day.  Attempted to call the significant other to get further history but was unable to reach him via phone.  Per the ED provider's note who spoke with the  he was moving her from the chair to start her dialysis when she became increasingly weak and dizzy and had a syncopal event.   denies seizure-like activity and reports she was unconscious for couple minutes and then came to.  No postictal state.  Patient also reported that she has not had anything to eat or drink for \"8 days\" due to lack of appetite.     In the ED, afebrile, blood pressure 104/61, heart rate 56, respirations 18, oxygen 99% on room air.  CMP notable for chloride 91, CO2 20, creatinine 15.39, anion gap 27, alk phos 106. CBC notable for hemoglobin of 11.0. EKG shows sinus rhythm with 1st degree av block.      Syncope  Fatigue   Based on the  report to ED provider sounds like patient had a prodrome of dizziness and weakness while trying to " transferring from chair prior to syncope. Questioning a possible orthostatic hypotension.  Likely multifactorial with her poor oral intake, chronic deconditioning and also missing dialysis for the last 4 days.  Patient is alert and orientated x4 able to answer all my questions appropriately, neuro exam normal. Last echo in May 2023 - normal left ventricular, moderate concentric left ventricular, EF 65-70%, moderate to severe mitral annular calcificiation. Also may have been receiving both metoprolol and carvedilol PTA.   - cardiac monitoring, no arrhythmia seen thus far, will continue for hypokalemia purposes  - unable to make urine for UA but no evidence on infection otherwise  - TSH slightly high and free T4 slightly low but unlikely to explain the presentation  - orthostatic vitals ordered, not performed  - PT/SW consulted, TCU recommended     ESRD on PD  AGMA likely secondary to ESRD  Anemia of CKD  Hypokalemia  Anion gap slightly higher than her baseline suspect due to missing dialysis for the past 4 days. Also questioning if missing dialysis is contributing to her increased fatigue and sleepiness. Hemoglobin stable. Potassium low at 2.6 this morning.  - nephrology consult, PD performed nightly  - 40 meq oral KCl x1 today     HTN  BP trending up and essentially normal now but slightly hypotensive on arrival.  - continue metoprolol, ensure not discharge on both metoprolol and carvedilol  - hold PTA clonidine and Imdur for now     GUILLERMO  - use CPAP at night     Abnormal TFTs  Only slightly abnormal, showing possibly some hypothyroidism.  - would recheck TFTs in 1 month, if still abnormal consider small dose of levothyroxine.    DVT Prophylaxis: Pneumatic Compression Devices  Code Status: Full Code  Diet: Renal Diet (dialysis)    Taylor Catheter: Not present  Disposition: Expected discharge in 1 days to TCU. Goals prior to discharge include replace potassium and placement.   Incidental Findings: None.  Family updated  today: No, I offered to update her  but she declined.    40 MINUTES SPENT BY ME on the date of service doing chart review, history, exam, documentation & further activities per the note.      Interval History   The patient reports doing well. No chest pain or shortness of breath. No nausea, vomiting, diarrhea, constipation. No fevers. No other specific complaints identified.     -Data reviewed today: I personally reviewed all new labs and imaging results over the last 24 hours.     Physical Exam   Temp: 98.4  F (36.9  C) Temp src: Oral BP: 116/54 Pulse: 71   Resp: 18 SpO2: 96 % O2 Device: None (Room air)    Vitals:    09/20/23 1143 09/20/23 1609   Weight: 93.9 kg (207 lb) 92.9 kg (204 lb 12.9 oz)     Vital Signs with Ranges  Temp:  [97.6  F (36.4  C)-98.4  F (36.9  C)] 98.4  F (36.9  C)  Pulse:  [69-77] 71  Resp:  [16-20] 18  BP: (116-140)/(54-71) 116/54  SpO2:  [91 %-100 %] 96 %  I/O last 3 completed shifts:  In: -   Out: 20 [Emesis/NG output:20]    GENERAL: No apparent distress. Awake, alert, and fully oriented.  HEENT: Normocephalic, atraumatic. Extraocular movements intact.  CARDIOVASCULAR: Regular rate and rhythm without murmurs or rubs. No S3.  PULMONARY: Clear bilaterally.  GASTROINTESTINAL: Soft, non-tender, non-distended. Bowel sounds normoactive.   EXTREMITIES: No cyanosis or clubbing. No edema.  NEUROLOGICAL: CN 2-12 grossly intact, no focal neurological deficits.  DERMATOLOGICAL: No rash, ulcer, bruising, nor jaundice.     Medications    dianeal PD LOW calcium-1.5% dex (calcium 2.5 mEq/L) 6,000 mL PERITONEAL DIALYSATE      dianeal PD LOW calcium-2.5% dex (calcium 2.5 mEq/L) 6,000 mL PERITONEAL DIALYSATE        - MEDICATION INSTRUCTIONS for Dialysis Patients -   Does not apply See Admin Instructions    calcium carbonate  1,000 mg Oral TID w/meals    [Held by provider] cloNIDine  0.1 mg Oral BID    docusate sodium  100 mg Oral BID    [Held by provider] isosorbide mononitrate  15 mg Oral Daily     metoprolol succinate ER  12.5 mg Oral Daily    pantoprazole  40 mg Oral BID    polyethylene glycol  17 g Oral Daily    sodium chloride (PF)  3 mL Intracatheter Q8H     Data     Laboratory:  Recent Labs   Lab 09/22/23  0648 09/21/23  0637 09/20/23  1218 09/20/23  1202   WBC 8.5 9.2  --  9.8   HGB 11.2* 11.8 11.9 11.0*   HCT 35.5 38.0 35 34.7*   MCV 82 83  --  82    269  --  210       Recent Labs   Lab 09/22/23  0648 09/21/23  0637 09/20/23  1218 09/20/23  1202    134* 133 138   POTASSIUM 2.6* 3.1* 3.4 3.9   CHLORIDE 92* 90* 95 91*   CO2 22 19*  --  20*   ANIONGAP 22* 25*  --  27*   * 100* 78 84   BUN 54.0* 57.6* 69* 63.2*   CR 14.49* 15.19* 18.3* 15.39*   GFRESTIMATED 3* 2*  --  2*   RON 8.9 9.3  --  8.9       No results for input(s): CULT in the last 168 hours.    Imaging:  No results found for this or any previous visit (from the past 24 hour(s)).      Myles Avilez DO MPH  Formerly [facility] Hospitalist  [address] [place] [place] [address]  09/22/2023

## 2023-09-22 NOTE — PROGRESS NOTES
Cycler set up per protocol and per treatment orders     Results from previous treatment:  Effluent color and clarity: clear yellow  Total UF: 240  Initial Drain: n/a  Avg Dwell: 1.44  add Dwell: 0:22    Cycler Serial Number: [id number]  Total Treatment Volume:23076 mL  Total treatment time: 9 hrs  Fill Volume: 2900ml  Last Fill Volume:0ml  Heater ba.5% 6000mL;  Lot #: [id number]  Expiration Date:   Side Bag #1: 1.5% 6000mL;  Lot #: [id number];  Expiration Date:       Number of cycles including final fill: 4  Dwell Time: 138 minutes  Last Fill Volume: 0ml  Initial Drain Alarm: 0ml  PD orders reviewed with Patient procedure and ESRD teaching done and questions answered.  Cycler ready for hook-up.    Report given to: AIDEN Pollard consent form signed yes

## 2023-09-22 NOTE — UTILIZATION REVIEW
Concurrent stay review; Secondary Review Determination     [facility]          Under the authority of the Utilization Management Committee, the utilization review process indicated a secondary review on the above patient.  The review outcome is based on review of the medical records, discussions with staff, and applying clinical experience noted on the date of the review.          (x) Observation Status Appropriate - Concurrent stay review    RATIONALE FOR DETERMINATION     62-year-old female with a history of hypertension, end-stage renal disease (ESRD) on peritoneal dialysis, and obstructive sleep apnea on CPAP presented with concerns of a syncopal episode at home. She had recently been discharged from the hospital after an admission for left hip and knee pain following a mechanical fall, during which she also experienced an episode of acute encephalopathy. Since her discharge from the transitional care unit (TCU), the patient had been growing weaker, missed dialysis for the past four days, and reported feeling increasingly sleepy, along with a fall on the day of presentation. In the ED, she had a blood pressure of 104/61, heart rate of 56, and an EKG showed sinus rhythm with 1st degree AV block. Laboratory findings included an elevated creatinine of 15.39, an anion gap of 27, and a low potassium level of 2.6. The severity of illness, intensity of service provided, expected LOS and risk for adverse outcome make the care appropriate for observation.      This document was produced using voice recognition software       The information on this document is developed by the utilization review team in order for the business office to ensure compliance.  This only denotes the appropriateness of proper admission status and does not reflect the quality of care rendered.         The definitions of Inpatient Status and Observation Status used in making the determination above are those provided in the CMS  Patient here for paracentesis today. Patient states understanding of discharge instructions and declined a paper AVS.    Coverage Manual, Chapter 1 and Chapter 6, section 70.4.      Sincerely,     CHRISTINA DELANEY MD    System Medical Director  Utilization Management  [facility].

## 2023-09-23 NOTE — PROGRESS NOTES
"[facility]    Hospitalist Progress Note  Name: Devora Garcia    MRN: [id number]  Provider:  Myles Avilez DO, MPH  Date of Service: 09/23/2023    Summary of Stay: Devora Garcia is a 62 year old female with PMH HTN, ESRD on PD, and GUILLERMO on CPAP who presents with concerns for syncopal episode at home.     Recently admitted to [facility] from 8/24-9/15 eventually discharge to TCU for left hip and knee pain status post mechanical fall.  She also had an episode of acute encephalopathy and that eventually resolved during admission.  She was quite deconditioned.  She reports she was discharged from TCU on 9/17 back home.     Since discharging home from TCU patient has been growing weaker and weaker.  She has not had dialysis since Sunday 9/17 due to \"not having time\" per the patient.  She also has been feeling increasingly more sleepy.  Denies any concerns such as fever, chills, chest pain, shortness of breath, abdominal pain.  Patient reports she had a fall earlier in the day.  Attempted to call the significant other to get further history but was unable to reach him via phone.  Per the ED provider's note who spoke with the  he was moving her from the chair to start her dialysis when she became increasingly weak and dizzy and had a syncopal event.   denies seizure-like activity and reports she was unconscious for couple minutes and then came to.  No postictal state.  Patient also reported that she has not had anything to eat or drink for \"8 days\" due to lack of appetite.     In the ED, afebrile, blood pressure 104/61, heart rate 56, respirations 18, oxygen 99% on room air.  CMP notable for chloride 91, CO2 20, creatinine 15.39, anion gap 27, alk phos 106. CBC notable for hemoglobin of 11.0. EKG shows sinus rhythm with 1st degree av block.      Syncope  Fatigue   Based on the  report to ED provider sounds like patient had a prodrome of dizziness and weakness while trying to " transferring from chair prior to syncope. Questioning a possible orthostatic hypotension.  Likely multifactorial with her poor oral intake, chronic deconditioning and also missing dialysis for the last 4 days.  Patient is alert and orientated x4 able to answer all my questions appropriately, neuro exam normal. Last echo in May 2023 - normal left ventricular, moderate concentric left ventricular, EF 65-70%, moderate to severe mitral annular calcificiation. Also may have been receiving both metoprolol and carvedilol PTA.   - cardiac monitoring, no arrhythmia seen thus far, will continue for hypokalemia purposes  - unable to make urine for UA but no evidence on infection otherwise  - TSH slightly high and free T4 slightly low but unlikely to explain the presentation  - orthostatic vitals ordered, not performed  - PT/SW consulted, TCU recommended  - Ensure discharge on only one BB at discharge.     ESRD on PD  AGMA likely secondary to ESRD  Anemia of CKD  Hypokalemia  Anion gap slightly higher than her baseline suspect due to missing dialysis for the past 4 days. Also questioning if missing dialysis is contributing to her increased fatigue and sleepiness. Hemoglobin stable. Potassium low at 2.9 this morning.  - nephrology consult, PD performed nightly  - Will discuss with nephro if they would like us to replace oral K depending on level   - [place] from Dr. Manriquez, PD bags without potassium, will replace 60mEq orally today.     HTN  BP trending up and essentially normal now but slightly hypotensive on arrival.  - continue metoprolol, ensure not discharge on both metoprolol and carvedilol  - hold PTA clonidine and Imdur for now     GUILLERMO  - use CPAP at night     Abnormal TFTs  Only slightly abnormal, showing possibly some hypothyroidism.  - would recheck TFTs in 1 month, if still abnormal consider small dose of levothyroxine.    DVT Prophylaxis: Pneumatic Compression Devices  Code Status: Full Code  Diet: Renal Diet (dialysis)     Taylor Catheter: Not present  Disposition: Expected discharge in 1 days to TCU. Goals prior to discharge include replace potassium and placement.   Incidental Findings: None.    40 MINUTES SPENT BY ME on the date of service doing chart review, history, exam, documentation & further activities per the note.      Interval History   Patient reports poor appetite and skipped both breakfast and lunch today.  Does not enjoy hospital food, reports that her  is bringing her dinner tonight.    -Data reviewed today: I personally reviewed all new labs and imaging results over the last 24 hours.     Physical Exam   Temp: 97.6  F (36.4  C) Temp src: Oral BP: (!) 143/76 Pulse: 81   Resp: 18 SpO2: 93 % O2 Device: None (Room air)    Vitals:    09/20/23 1143 09/20/23 1609 09/23/23 1144   Weight: 93.9 kg (207 lb) 92.9 kg (204 lb 12.9 oz) 97.2 kg (214 lb 4.6 oz)     Vital Signs with Ranges  Temp:  [97.6  F (36.4  C)-98.8  F (37.1  C)] 97.6  F (36.4  C)  Pulse:  [] 81  Resp:  [16-18] 18  BP: (100-143)/(57-78) 143/76  SpO2:  [92 %-94 %] 93 %  I/O last 3 completed shifts:  In: 580 [P.O.:580]  Out: -     GENERAL: No apparent distress. Awake, alert, and fully oriented.  HEENT: Normocephalic, atraumatic. Extraocular movements intact.  CARDIOVASCULAR: Regular rate and rhythm without murmurs or rubs. No S3.  PULMONARY: Clear bilaterally.  GASTROINTESTINAL: Soft, non-tender, non-distended. Bowel sounds normoactive.   EXTREMITIES: No cyanosis or clubbing. No edema.  NEUROLOGICAL: CN 2-12 grossly intact, no focal neurological deficits.  DERMATOLOGICAL: No rash, ulcer, bruising, nor jaundice.     Medications    dianeal PD LOW calcium-1.5% dex (calcium 2.5 mEq/L) 6,000 mL PERITONEAL DIALYSATE      dianeal PD LOW calcium-2.5% dex (calcium 2.5 mEq/L) 6,000 mL PERITONEAL DIALYSATE      dianeal PD LOW calcium-2.5% dex (calcium 2.5 mEq/L) 6,000 mL PERITONEAL DIALYSATE        - MEDICATION INSTRUCTIONS for Dialysis Patients -   Does not  apply See Admin Instructions    calcium carbonate  1,000 mg Oral TID w/meals    [Held by provider] cloNIDine  0.1 mg Oral BID    docusate sodium  100 mg Oral BID    [Held by provider] isosorbide mononitrate  15 mg Oral Daily    metoprolol succinate ER  12.5 mg Oral Daily    pantoprazole  40 mg Oral BID    polyethylene glycol  17 g Oral Daily    sodium chloride (PF)  3 mL Intracatheter Q8H     Data     Laboratory:  Recent Labs   Lab 09/22/23  0648 09/21/23  0637 09/20/23  1218 09/20/23  1202   WBC 8.5 9.2  --  9.8   HGB 11.2* 11.8 11.9 11.0*   HCT 35.5 38.0 35 34.7*   MCV 82 83  --  82    269  --  210     Recent Labs   Lab 09/23/23  0620 09/22/23  0648 09/21/23  0637    136 134*   POTASSIUM 2.9* 2.6* 3.1*   CHLORIDE 94* 92* 90*   CO2 22 22 19*   ANIONGAP 20* 22* 25*   * 111* 100*   BUN 50.3* 54.0* 57.6*   CR 13.83* 14.49* 15.19*   GFRESTIMATED 3* 3* 2*   RON 8.9 8.9 9.3     No results for input(s): CULT in the last 168 hours.    Imaging:  No results found for this or any previous visit (from the past 24 hour(s)).    Tigre Lilly MD

## 2023-09-23 NOTE — PROGRESS NOTES
Renal Medicine Progress Note            Assessment/Plan:     Assessment: Devora Garcia is a 62 year old female with PMH ESRD on PD, HTN, GUILLERMO who was admitted on 9/20/2023 failure to thrive, fall/syncope.       ESRD on PD  Prior to recent admission patient was doing 4 manual exchanges, 1.5-hour dwell time.  Primary nephrologist is Dr. Cole at [facility].  While inpatient we have used cycler, discharged with prescription 9-hour treatment, 4 cycles, 2.9 L fill volume.  All green bags.  She notes last dialyzing Saturday 9/16 at [place], using all red bags.  Once she went home she did not do any dialysis due to feeling sleepy she reports.     AGMA, resolved  Mild, likely due to missed dialysis.     HTN  BP normal, low normal on arrival.  Recently discharged on carvedilol 6.25 mg twice daily, clonidine 0.1 mg twice daily, Imdur 15 mg daily, and metoprolol XL 12.5 mg daily.  Both carvedilol and metoprolol both listed on discharge summary.  Unclear if she was taking both of these.  Could contribute to what she is feeling weak.  Agree with holding for now and resuming if she becomes hypertensive.     Anemia of CKD  Hemoglobin 11 on admission.  At goal.     CKD-MBD  Continue Tums 2 tabs 3 times daily with meals     Failure to thrive  Fall  Possible syncope  Left TCU, unclear if this was AMA or she was discharged.  This is her second admission with a fall/weakness.  Clear that she is unable to care for herself at home at this time.    Plan/Recs:  1) all green bags tonight      Reviewed notes from hospitalist, Pharm.D., RNs.    Maggie Alvarez MD   [facility] consultants  Office: [phone]          Interval History:     No acute events overnight   Feels ok, just fatigued   Poor appetite, not drinking much fluid either   Denies abd pain  No issues with PD   Denies lightheadedness or dizziness   Denies SOB    Net positive 144 ml with PD overnight.           Medications and Allergies:      - MEDICATION  "INSTRUCTIONS for Dialysis Patients -   Does not apply See Admin Instructions    calcium carbonate  1,000 mg Oral TID w/meals    [Held by provider] cloNIDine  0.1 mg Oral BID    docusate sodium  100 mg Oral BID    [Held by provider] isosorbide mononitrate  15 mg Oral Daily    metoprolol succinate ER  12.5 mg Oral Daily    pantoprazole  40 mg Oral BID    polyethylene glycol  17 g Oral Daily    sodium chloride (PF)  3 mL Intracatheter Q8H        Allergies   Allergen Reactions    Aspirin Nausea    Statins-Hmg-Coa Reductase Inhibitors [Statins] Other (See Comments)     Other reaction(s): Renal Failure, Other reaction(s): Renal Failure            Physical Exam:   Vitals were reviewed  /75 (BP Location: Right arm)   Pulse 79   Temp 97.7  F (36.5  C) (Oral)   Resp 16   Ht 1.549 m (5' 1\")   Wt 92.9 kg (204 lb 12.9 oz)   SpO2 93%   BMI 38.70 kg/m      Wt Readings from Last 3 Encounters:   09/20/23 92.9 kg (204 lb 12.9 oz)   09/14/23 97 kg (213 lb 13.5 oz)   05/12/23 100.5 kg (221 lb 9.6 oz)     No intake or output data in the 24 hours ending 09/21/23 1329    GENERAL APPEARANCE: NAD, fatigued appearing  HEENT: normocephalic, dry mucous membranes  RESP: clear  CV: RRR  ABDOMEN: Soft, nontender.  Left lower quadrant with PD catheter clean and intact  EXTREMITIES/SKIN: no c/c/rashes/lesions; no edema  NEURO: Alert, oriented, slow speech             Data:     BMP  Recent Labs   Lab 09/23/23  0620 09/22/23  0648 09/21/23  0637 09/20/23  1218 09/20/23  1202    136 134* 133 138   POTASSIUM 2.9* 2.6* 3.1* 3.4 3.9   CHLORIDE 94* 92* 90* 95 91*   RON 8.9 8.9 9.3  --  8.9   CO2 22 22 19*  --  20*   BUN 50.3* 54.0* 57.6* 69* 63.2*   CR 13.83* 14.49* 15.19* 18.3* 15.39*   * 111* 100* 78 84       CBC  Recent Labs   Lab 09/22/23  0648 09/21/23  0637 09/20/23  1218 09/20/23  1202   WBC 8.5 9.2  --  9.8   HGB 11.2* 11.8 11.9 11.0*   HCT 35.5 38.0 35 34.7*   MCV 82 83  --  82    269  --  210       Lab Results "   Component Value Date    AST 19 09/20/2023    ALT <5 09/20/2023    ALKPHOS 106 (H) 09/20/2023    BILITOTAL 0.4 09/20/2023     No results found for: INR  Color Urine (no units)   Date Value   05/29/2021 Light Yellow     Appearance Urine (no units)   Date Value   05/29/2021 Clear     Glucose Urine (mg/dL)   Date Value   05/29/2021 Negative     Bilirubin Urine (no units)   Date Value   05/29/2021 Negative     Ketones Urine (mg/dL)   Date Value   05/29/2021 Negative     Specific Gravity Urine (no units)   Date Value   05/29/2021 1.013     pH Urine (pH)   Date Value   05/29/2021 7.0     Protein Albumin Urine (mg/dL)   Date Value   05/29/2021 100 (A)     Urobilinogen Urine (no units)   Date Value   08/30/2020 <2.0 E.U./dL     Nitrite Urine (no units)   Date Value   05/29/2021 Negative     Leukocyte Esterase Urine (no units)   Date Value   05/29/2021 Trace (A)         Attestation:  I have reviewed today's vital signs, notes, medications, labs and imaging.    Maggie Alvarez MD  [facility] - Nephrology  Office: [phone]

## 2023-09-23 NOTE — PROGRESS NOTES
Care Management Follow Up    Length of Stay (days): 0    Expected Discharge Date: 09/25/2023     Concerns to be Addressed: discharge planning, care coordination/care conferences     Patient plan of care discussed at interdisciplinary rounds: Yes    Anticipated Discharge Disposition: Transitional Care to Peritoneal Dialysis accepting facility     Anticipated Discharge Services: None  Anticipated Discharge DME:  yes      Referrals Placed by CM/SW:  follow up PD TCU  Private pay costs discussed: Not applicable    Additional Information:  Patient has 5 pending TCU referrals to PD facilities. No accepting TCU at this time.    CM will continue to follow and offer choice when accepted by TCU.     Raya Lazcano RN Case Manager  Inpatient Care Coordination   [facility]   [phone]      Raya Lazcano, RN

## 2023-09-23 NOTE — PLAN OF CARE
"Goal Outcome Evaluation:         Pt. A&O, with flat face, refused meals on this shift, did not voided, on Dialysis, for mobility use Deanne-steady with one assist, spouse present at bed side.    BP (!) 143/76 (BP Location: Right arm)   Pulse 81   Temp 97.6  F (36.4  C) (Oral)   Resp 18   Ht 1.549 m (5' 1\")   Wt 97.2 kg (214 lb 4.6 oz)   SpO2 93%   BMI 40.49 kg/m                  "

## 2023-09-23 NOTE — PLAN OF CARE
"  Alert and oriented x4  PERITONEAL DIALYSIS - 9h duration, started at 2130    Some nausea/vomiting; emesis x4  PRN Zofran given. Nausea was reduced, pt refused another dose/compazine   Pt refused HS meds and 2nd dose of Potassium Chloride due to remaining nausea and fear of vomiting again.     Tele: SR  K: 2.9  PRN Acetaminophen and Gabapentin given for abdominal and back pain.       /66 (BP Location: Right arm)   Pulse 83   Temp 98.3  F (36.8  C) (Oral)   Resp 16   Ht 1.549 m (5' 1\")   Wt 97.2 kg (214 lb 4.6 oz)   SpO2 94%   BMI 40.49 kg/m      "

## 2023-09-23 NOTE — PROGRESS NOTES
Orientation: x4  VS: Temp: 97.9  F (36.6  C) Temp src: Oral BP: 132/78 Pulse: 73   Resp: 16 SpO2: 94 % O2 Device: None (Room air)     Resp: RA  Pain: Rt. Leg. Given PRN Gabapentin.  Tele: SR, Prolonged QT  Activity: CL x 2 (A2 Pivot to Commode)  Diet: Renal, I/Os  : External Catheter  GI: WDL  Skin: Bilateral LE Edema  LDAs: Peritoneal Dialysis Access Site, Left PIV Saline Locked.

## 2023-09-23 NOTE — PLAN OF CARE
Temp: 97.9  F (36.6  C) Temp src: Oral BP: 101/71 Pulse: 100   Resp: 18 SpO2: 93 % O2 Device: None (Room air)       Assumed care [id number]. A&Ox4. Ax2 with a lift for transfers or Ax2 with a pivot to BSC. On RA. On a renal diet. Dialysis ran throughout night. PIV in L arm is saline locked. Purewick in place & no output overnight. Bladder scanned for 647 this AM. SHANELL fregoso MD & wants to hold off. Abrasion noted on sacrum/coccyx. Sacral Mepilex applied. No reports of pain/discomfort. Repositioned as tolerated. On telemetry- NSR. Orthostatic BP done this AM.

## 2023-09-23 NOTE — PROGRESS NOTES
Cycler set up per protocol and per treatment orders     Results from previous treatment:  Effluent color and clarity: clear, yellow no fibrinnnoted  Total UF: -114  Initial Drain: 0  Avg Dwell: 1:44  Lost Dwell: 0:22    Cycler Serial Number: [id number]  Total Treatment Volume: [id number]  Total treatment time: 9 hrs  Fill Volume: 2900ml  Last Fill Volume:0ml  Heater ba.25% 6000mL;  Lot #: [id number];  Expiration Date:   Side Bag #1: 1.5% 6000mL;  Lot #: [id number];  Expiration Date:     Number of cycles including final fill: 4  Dwell Time: 1:33 minutes  Last Fill Volume: 0ml  Initial Drain Alarm: 0ml  PD orders reviewed with Patient procedure and ESRD teaching done and questions answered.  Cycler ready for hook-up.    Report given to: Siva Pollard consent form signed yes

## 2023-09-23 NOTE — PROVIDER NOTIFICATION
Cross cover MD paged.    Bladder scanned for 647. Patient on peritoneal dialysis & unable to make urine.     MD verbally stated he wants to hold off on straight cath for now as she is not uncomfortable and feels the fluid may be from Dialysis.

## 2023-09-24 NOTE — PROGRESS NOTES
Renal Medicine Progress Note            Assessment/Plan:     Assessment: Devora Garcia is a 62 year old female with PMH ESRD on PD, HTN, GUILLERMO who was admitted on 9/20/2023 failure to thrive, fall/syncope.       ESRD on PD  Prior to recent admission patient was doing 4 manual exchanges, 1.5-hour dwell time.  Primary nephrologist is Dr. Cole at [facility].  While inpatient we have used cycler, discharged with prescription 9-hour treatment, 4 cycles, 2.9 L fill volume.  All green bags.  She notes last dialyzing Saturday 9/16 at [facility], using all red bags.  Once she went home she did not do any dialysis due to feeling sleepy she reports.     AGMA, resolved  Mild, likely due to missed dialysis.     HTN  BP normal, low normal on arrival.  Recently discharged on carvedilol 6.25 mg twice daily, clonidine 0.1 mg twice daily, Imdur 15 mg daily, and metoprolol XL 12.5 mg daily.  Both carvedilol and metoprolol both listed on discharge summary.  Unclear if she was taking both of these.  Could contribute to what she is feeling weak.  Agree with holding for now and resuming if she becomes hypertensive.     Anemia of CKD  Hemoglobin 11 on admission.  At goal.     CKD-MBD  Continue Tums 2 tabs 3 times daily with meals    Hypokalemia   Due to poor PO intake. Can replace prn.      Failure to thrive  Fall  Possible syncope  Left TCU, unclear if this was AMA or she was discharged.  This is her second admission with a fall/weakness.  Clear that she is unable to care for herself at home at this time.    Plan/Recs:  1) all green bags tonight      Discussed with Dr. Lilly. Reviewed notes from hospitalist, Pharm.D., RNs.    Maggie Alvarez MD   [facility] consultants  Office: [phone]          Interval History:     No acute events overnight  No issues with PD   Denies lightheadedness  Still has poor PO intake   K remains low requiring supplementation   Feels fatigued and weak    Net negative ~240ml with PD  "overnight.           Medications and Allergies:      - MEDICATION INSTRUCTIONS for Dialysis Patients -   Does not apply See Admin Instructions    calcium carbonate  1,000 mg Oral TID w/meals    cloNIDine  0.1 mg Oral BID    docusate sodium  100 mg Oral BID    isosorbide mononitrate  15 mg Oral Daily    metoprolol succinate ER  12.5 mg Oral Daily    pantoprazole  40 mg Oral BID    polyethylene glycol  17 g Oral Daily    potassium chloride  40 mEq Oral BID    sodium chloride (PF)  3 mL Intracatheter Q8H        Allergies   Allergen Reactions    Aspirin Nausea    Statins-Hmg-Coa Reductase Inhibitors [Statins] Other (See Comments)     Other reaction(s): Renal Failure, Other reaction(s): Renal Failure            Physical Exam:   Vitals were reviewed  BP (!) 188/94 (BP Location: Right arm)   Pulse 93   Temp 97.7  F (36.5  C) (Oral)   Resp 16   Ht 1.549 m (5' 1\")   Wt 96.9 kg (213 lb 10 oz)   SpO2 96%   BMI 40.36 kg/m      Wt Readings from Last 3 Encounters:   09/24/23 96.9 kg (213 lb 10 oz)   09/14/23 97 kg (213 lb 13.5 oz)   05/12/23 100.5 kg (221 lb 9.6 oz)     No intake or output data in the 24 hours ending 09/21/23 1329    GENERAL APPEARANCE: NAD, fatigued appearing  HEENT: normocephalic, dry mucous membranes  RESP: clear  CV: RRR  ABDOMEN: Soft, nontender.  Left lower quadrant with PD catheter clean and intact  EXTREMITIES/SKIN: no c/c/rashes/lesions; no edema  NEURO: drowsy, oriented, slow speech             Data:     BMP  Recent Labs   Lab 09/24/23  0702 09/23/23  0620 09/22/23  0648 09/21/23  0637    136 136 134*   POTASSIUM 3.0* 2.9* 2.6* 3.1*   CHLORIDE 92* 94* 92* 90*   RON 9.2 8.9 8.9 9.3   CO2 24 22 22 19*   BUN 47.4* 50.3* 54.0* 57.6*   CR 13.24* 13.83* 14.49* 15.19*   * 105* 111* 100*       CBC  Recent Labs   Lab 09/22/23  0648 09/21/23  0637 09/20/23  1218 09/20/23  1202   WBC 8.5 9.2  --  9.8   HGB 11.2* 11.8 11.9 11.0*   HCT 35.5 38.0 35 34.7*   MCV 82 83  --  82    269  --  210 "       Lab Results   Component Value Date    AST 19 09/20/2023    ALT <5 09/20/2023    ALKPHOS 106 (H) 09/20/2023    BILITOTAL 0.4 09/20/2023     No results found for: INR  Color Urine (no units)   Date Value   05/29/2021 Light Yellow     Appearance Urine (no units)   Date Value   05/29/2021 Clear     Glucose Urine (mg/dL)   Date Value   05/29/2021 Negative     Bilirubin Urine (no units)   Date Value   05/29/2021 Negative     Ketones Urine (mg/dL)   Date Value   05/29/2021 Negative     Specific Gravity Urine (no units)   Date Value   05/29/2021 1.013     pH Urine (pH)   Date Value   05/29/2021 7.0     Protein Albumin Urine (mg/dL)   Date Value   05/29/2021 100 (A)     Urobilinogen Urine (no units)   Date Value   08/30/2020 <2.0 E.U./dL     Nitrite Urine (no units)   Date Value   05/29/2021 Negative     Leukocyte Esterase Urine (no units)   Date Value   05/29/2021 Trace (A)         Attestation:  I have reviewed today's vital signs, notes, medications, labs and imaging.    Maggie Alvarez MD  [facility] - Nephrology  Office: [phone]

## 2023-09-24 NOTE — PLAN OF CARE
"  Alert and oriented x4  PERITONEAL DIALYSIS - 9h duration, started at 2130  Denies pain, N/V, SoB    Pt ambulated w/2 person asssist, GB/walker to chair and to bedside commode     Tele: SR  K: 3.0      BP (!) 147/76 (BP Location: Right arm)   Pulse 88   Temp 98  F (36.7  C) (Oral)   Resp 18   Ht 1.549 m (5' 1\")   Wt 96.9 kg (213 lb 10 oz)   SpO2 97%   BMI 40.36 kg/m      "

## 2023-09-24 NOTE — PROGRESS NOTES
"[facility]    Hospitalist Progress Note  Name: Devora Garcia    MRN: [id number]  Provider:  Myles Avilez DO, MPH  Date of Service: 09/24/2023    Summary of Stay: Devora Garcia is a 62 year old female with PMH HTN, ESRD on PD, and GUILLERMO on CPAP who presents with concerns for syncopal episode at home.     Recently admitted to [facility] from 8/24-9/15 eventually discharge to TCU for left hip and knee pain status post mechanical fall.  She also had an episode of acute encephalopathy and that eventually resolved during admission.  She was quite deconditioned.  She reports she was discharged from TCU on 9/17 back home.     Since discharging home from TCU patient has been growing weaker and weaker.  She has not had dialysis since Sunday 9/17 due to \"not having time\" per the patient.  She also has been feeling increasingly more sleepy.  Denies any concerns such as fever, chills, chest pain, shortness of breath, abdominal pain.  Patient reports she had a fall earlier in the day.  Attempted to call the significant other to get further history but was unable to reach him via phone.  Per the ED provider's note who spoke with the  he was moving her from the chair to start her dialysis when she became increasingly weak and dizzy and had a syncopal event.   denies seizure-like activity and reports she was unconscious for couple minutes and then came to.  No postictal state.  Patient also reported that she has not had anything to eat or drink for \"8 days\" due to lack of appetite.     In the ED, afebrile, blood pressure 104/61, heart rate 56, respirations 18, oxygen 99% on room air.  CMP notable for chloride 91, CO2 20, creatinine 15.39, anion gap 27, alk phos 106. CBC notable for hemoglobin of 11.0. EKG shows sinus rhythm with 1st degree av block.      Syncope  Fatigue   Based on the  report to ED provider sounds like patient had a prodrome of dizziness and weakness while trying to " transferring from chair prior to syncope. Questioning a possible orthostatic hypotension.  Likely multifactorial with her poor oral intake, chronic deconditioning and also missing dialysis for the last 4 days.  Patient is alert and orientated x4 able to answer all my questions appropriately, neuro exam normal. Last echo in May 2023 - normal left ventricular, moderate concentric left ventricular, EF 65-70%, moderate to severe mitral annular calcificiation. Also may have been receiving both metoprolol and carvedilol PTA.   - unable to make urine for UA but no evidence on infection otherwise  - TSH slightly high and free T4 slightly low but unlikely to explain the presentation  - orthostatic vitals ordered, not performed  - PT/SW consulted, TCU recommended  - Ensure discharge on only one BB at discharge.     ESRD on PD  AGMA likely secondary to ESRD  Anemia of CKD  Hypokalemia  Anion gap slightly higher than her baseline suspect due to missing dialysis for the past 4 days. Also questioning if missing dialysis is contributing to her increased fatigue and sleepiness. Hemoglobin stable. Potassium low at 2.9 this morning.  - nephrology consult, PD performed nightly  -Patient will need ongoing potassium supplementation as needed, will do 40 mEq x 2 today     HTN  BP trending up and essentially normal now but slightly hypotensive on arrival.  - continue metoprolol, ensure not discharge on both metoprolol and carvedilol  - hold PTA clonidine and Imdur for now     GUILLERMO  - use CPAP at night     Abnormal TFTs  Only slightly abnormal, showing possibly some hypothyroidism.  - would recheck TFTs in 1 month, if still abnormal consider small dose of levothyroxine.    DVT Prophylaxis: Pneumatic Compression Devices  Code Status: Full Code  Diet: Renal Diet (dialysis)    Taylor Catheter: Not present  Disposition: Expected discharge in 1 days to TCU. Goals prior to discharge include replace potassium and placement.   Incidental Findings:  None.    40 MINUTES SPENT BY ME on the date of service doing chart review, history, exam, documentation & further activities per the note.      Interval History    brought her chicken nuggets last night which she enjoyed  Continues to feel overall poorly    -Data reviewed today: I personally reviewed all new labs and imaging results over the last 24 hours.     Physical Exam   Temp: 98.6  F (37  C) Temp src: Axillary BP: (!) 147/80 Pulse: 87   Resp: 16 SpO2: 97 % O2 Device: None (Room air)    Vitals:    09/20/23 1609 09/23/23 1144 09/24/23 0051   Weight: 92.9 kg (204 lb 12.9 oz) 97.2 kg (214 lb 4.6 oz) 96.9 kg (213 lb 10 oz)     Vital Signs with Ranges  Temp:  [97.7  F (36.5  C)-98.6  F (37  C)] 98.6  F (37  C)  Pulse:  [81-93] 87  Resp:  [16-20] 16  BP: (134-188)/(66-95) 147/80  SpO2:  [93 %-97 %] 97 %  I/O last 3 completed shifts:  In: 200 [P.O.:200]  Out: 400 [Emesis/NG output:400]    GENERAL: No apparent distress. Awake, alert, and fully oriented.  HEENT: Normocephalic, atraumatic. Extraocular movements intact.  CARDIOVASCULAR: Regular rate and rhythm without murmurs or rubs. No S3.  PULMONARY: Clear bilaterally.  GASTROINTESTINAL: Soft, non-tender, non-distended. Bowel sounds normoactive.   EXTREMITIES: No cyanosis or clubbing. No edema.  NEUROLOGICAL: CN 2-12 grossly intact, no focal neurological deficits.  DERMATOLOGICAL: No rash, ulcer, bruising, nor jaundice.     Medications    dianeal PD LOW calcium-2.5% dex (calcium 2.5 mEq/L) 6,000 mL PERITONEAL DIALYSATE      dianeal PD LOW calcium-2.5% dex (calcium 2.5 mEq/L) 6,000 mL PERITONEAL DIALYSATE        - MEDICATION INSTRUCTIONS for Dialysis Patients -   Does not apply See Admin Instructions    calcium carbonate  1,000 mg Oral TID w/meals    cloNIDine  0.1 mg Oral BID    docusate sodium  100 mg Oral BID    isosorbide mononitrate  15 mg Oral Daily    metoprolol succinate ER  12.5 mg Oral Daily    pantoprazole  40 mg Oral BID    polyethylene glycol  17 g  Oral Daily    potassium chloride  40 mEq Oral BID    sodium chloride (PF)  3 mL Intracatheter Q8H     Data     Laboratory:  Recent Labs   Lab 09/22/23  0648 09/21/23  0637 09/20/23  1218 09/20/23  1202   WBC 8.5 9.2  --  9.8   HGB 11.2* 11.8 11.9 11.0*   HCT 35.5 38.0 35 34.7*   MCV 82 83  --  82    269  --  210     Recent Labs   Lab 09/24/23  0702 09/23/23  0620 09/22/23  0648    136 136   POTASSIUM 3.0* 2.9* 2.6*   CHLORIDE 92* 94* 92*   CO2 24 22 22   ANIONGAP 21* 20* 22*   * 105* 111*   BUN 47.4* 50.3* 54.0*   CR 13.24* 13.83* 14.49*   GFRESTIMATED 3* 3* 3*   RON 9.2 8.9 8.9     No results for input(s): CULT in the last 168 hours.    Imaging:  No results found for this or any previous visit (from the past 24 hour(s)).    Tigre Lilly MD

## 2023-09-24 NOTE — PLAN OF CARE
"BP (!) 185/95 (BP Location: Right arm)   Pulse 86   Temp 97.7  F (36.5  C) (Oral)   Resp 16   Ht 1.549 m (5' 1\")   Wt 96.9 kg (213 lb 10 oz)   SpO2 93%   BMI 40.36 kg/m      Pt A&Ox4. A2 lift. Denies pain. Pt arouses to voice but very lethargic. on tele NSR. Clonidine and imdur unheld and given this AM along with scheduled metoprolol. /95. Recheck 147/80. K 3, K 40 meq BID ordered. Peritoneal dialysis ran over night. Cr 13.24. pt did not eat breakfast or lunch, very poor appeitite. Pt refused repositioning at times and refused skin assessment. Awaiting placement for TCU for discharge.     "

## 2023-09-24 NOTE — PLAN OF CARE
Temp: 98.2  F (36.8  C) Temp src: Oral BP: (!) 171/90 Pulse: 81   Resp: 20 SpO2: 97 % O2 Device: None (Room air)       Assmued care [id number]. A&Ox4. Ax2 with a lift for transfers or Ax2 with a picot to BS. On RA. On a renal diet. Dialysis ran throughout the night. PIV in L arm is saline locked. Bladder scanned this AM for 386. No reports of pain/discomfort overnight. Repositioned as tolerated. On telemetry- NSR.

## 2023-09-25 NOTE — PROGRESS NOTES
[facility]    Nephrology Progress Note    Assessment & Plan      <ESRD, ON PERITONEAL DIALYSIS (CAPD AT HOME, CCPD IN HOSPITAL)    ~9-hour treatment, 4 cycles, 2.9 L fill volume. On all 2.5% dextrose last night although UF amount small. With the weigth coming down over past two days and appearance on examination, I think may be having hypotension on the basis of decreasing volume status and on BP meds.     - Agree with holding BP meds. OK to hold ISMD and clonidine but will need to watch for clonidine withdrawal rebound HTN.     -I decreased the cextrose back to all 1.5% tonight.       Sriram Castaneda MD  Nephrology  [facility]  Cell:[phone]  On [place]   Pager:[phone]          .........    Interval History     Some hypotention in 80s systolic overnight. UF number on machine only 100 overnight. Was back on 2.5% last night. Weight down 1.3 over the past two days.     Clonidine held as well as isosorbide.       Temp: 97.5  F (36.4  C) Temp src: Oral BP: 119/70 Pulse: 82   Resp: 16 SpO2: 94 % O2 Device: None (Room air)      Vitals:    09/23/23 1144 09/24/23 0051 09/25/23 0440   Weight: 97.2 kg (214 lb 4.6 oz) 96.9 kg (213 lb 10 oz) 96.1 kg (211 lb 13.8 oz)       Vital Signs with Ranges    Temp:  [97.3  F (36.3  C)-98.6  F (37  C)] 97.5  F (36.4  C)  Pulse:  [80-94] 82  Resp:  [16-18] 16  BP: ()/(57-80) 119/70  SpO2:  [94 %-98 %] 94 %    I/O last 3 completed shifts:  In: 60 [P.O.:60]  Out: -     Physical Exam    BP Readings from Last 5 Encounters:   09/25/23 119/70   09/15/23 126/70   05/12/23 (!) 142/67   06/08/22 (!) 155/70   11/15/21 (!) 168/92        Wt Readings from Last 10 Encounters:   09/25/23 96.1 kg (211 lb 13.8 oz)   09/14/23 97 kg (213 lb 13.5 oz)   05/12/23 100.5 kg (221 lb 9.6 oz)   06/07/22 85.7 kg (189 lb)   11/15/21 93.4 kg (206 lb)   10/17/21 95.3 kg (210 lb)   12/29/20 111.6 kg (246 lb)   12/23/20 109.3 kg (241 lb)   09/05/20 121.6 kg (268 lb 1.6 oz)    11/30/18 124.7 kg (275 lb)     GENERAL: Alert, in no apparent distress. Fatigued appearing, but attentive and fluent.   HEENT:  Normocephalic. No gross abnormalities.  The mouth moist.    Neck The jugular venous pressure is not elevated  CV: RRR  RESP: Breathing unlabored   GI: Abdomen non-distended  MUSCULOSKELETAL: extremities nl - no gross deformities noted. No edema  SKIN: no new lesions or rashes, dry to touch. Some increased skin turgor.   NEURO:  No overt deficit.  PSYCH: affect neutral        Medications      dianeal PD LOW calcium-2.5% dex (calcium 2.5 mEq/L) 6,000 mL PERITONEAL DIALYSATE      dianeal PD LOW calcium-2.5% dex (calcium 2.5 mEq/L) 6,000 mL PERITONEAL DIALYSATE          - MEDICATION INSTRUCTIONS for Dialysis Patients -   Does not apply See Admin Instructions    calcium carbonate  1,000 mg Oral TID w/meals    [Held by provider] cloNIDine  0.1 mg Oral BID    docusate sodium  100 mg Oral BID    [Held by provider] isosorbide mononitrate  15 mg Oral Daily    metoprolol succinate ER  12.5 mg Oral Daily    pantoprazole  40 mg Oral BID    polyethylene glycol  17 g Oral Daily    sodium chloride (PF)  3 mL Intracatheter Q8H       Data     UA RESULTS:  Recent Labs   Lab Test 05/29/21  1832 08/30/20  1455   COLOR Light Yellow Yellow   APPEARANCE Clear Cloudy*   URINEGLC Negative 150 mg/dL*   URINEBILI Negative Negative   URINEKETONE Negative Negative   SG 1.013 1.005   UBLD Small* Moderate*   URINEPH 7.0 5.0   PROTEIN 100* >=500 mg/dL*   UROBILINOGEN  --  <2.0 E.U./dL   NITRITE Negative Negative   LEUKEST Trace* Large*   RBCU 8* 10-25*   WBCU 6* >100*      BMP  Recent Labs   Lab 09/25/23  0609 09/25/23  0542 09/24/23  0702 09/23/23  0620 09/22/23  0648   NA  --  140 137 136 136   POTASSIUM  --  3.3* 3.0* 2.9* 2.6*   CHLORIDE  --  96* 92* 94* 92*   RON  --  9.2 9.2 8.9 8.9   CO2  --  24 24 22 22   BUN  --  44.7* 47.4* 50.3* 54.0*   CR  --  12.85* 13.24* 13.83* 14.49*   * 142* 145* 105* 111*      [email or web address](phos:4)  CBC)  Recent Labs   Lab 09/25/23  0542 09/22/23  0648 09/21/23  0637 09/20/23  1218 09/20/23  1202   WBC  --  8.5 9.2  --  9.8   HGB 11.9 11.2* 11.8 11.9 11.0*   HCT  --  35.5 38.0 35 34.7*   MCV  --  82 83  --  82   PLT  --  238 269  --  210     Lab Results   Component Value Date    ALBUMIN 2.5 09/20/2023    ALBUMIN 2.9 09/11/2023    ALBUMIN 2.1 09/04/2023    ALBUMIN 2.4 05/12/2023    ALBUMIN 2.3 05/11/2023    ALBUMIN 2.5 05/10/2023    ALBUMIN 2.5 05/29/2021        Recent Labs   Lab 09/25/23  0542 09/22/23  0648   HGB 11.9 11.2*   HCT  --  35.5   MCV  --  82       Recent Labs   Lab 09/20/23  1202   AST 19   ALT <5   ALKPHOS 106*   BILITOTAL 0.4     No lab results found in last 7 days.  No results found for: D2VIT, D3VIT, DTOT  No results for input(s): PTHI in the last 168 hours.    Attestation:   I have reviewed today's relevant vital signs, notes, medications, labs and imaging.

## 2023-09-25 NOTE — PLAN OF CARE
Cross cover MD paged.    Patient's most recent BP 87/59 in R arm and 88/57 in L arm.     MD recommended to page if patient develops symptoms or SBP drops below 80

## 2023-09-25 NOTE — PROGRESS NOTES
Care Management Follow Up    Length of Stay (days): 0    Expected Discharge Date: 09/25/2023     Concerns to be Addressed: discharge planning, care coordination/care conferences     Patient plan of care discussed at interdisciplinary rounds: Yes    Anticipated Discharge Disposition: Transitional Care, Other (Comments) will need peritoneal dialysis     Anticipated Discharge Services: None    Patient/family educated on Medicare website which has current facility and service quality ratings:  yes  Education Provided on the Discharge Plan:  yes  Patient/Family in Agreement with the Plan: yes    Referrals Placed by CM/SW:  TCU      Additional Information:  CM following for discharge planning and need for Transitional Care Unit with PD. Met with patient and  at bedside to discuss Transitional Care Unit referral status. Updated  that we are still waiting for a couple of facilities to respond to the referrals sent. Also discussed difficulty finding Transitional Care Unit with PD available and that [facility] is one of the only ones. They do not want to return there. We discussed option of patient returning home with  services.  states he just had a pacemaker put in and is not able to assist patient. They have a son but he is not able to assist all the time. Will continue to follow for Transitional Care Unit needs but anticipate this to be a difficult placement.             Kristie Otero RN BSN CM  Inpatient Care Coordination  [facility]  [phone]

## 2023-09-25 NOTE — PLAN OF CARE
Cared for [id number]    Pt A/O x 4, VSS; Pt denies pain, headache, dizziness, N/V & SOB. Pt up Asst: 1-2 w/gait belt & walker. Lung sounds clear, RA. Tele: SR. Potassium level 3.3 - not on protocols, MD notified. Blanchable redness on sacrum - foam dressing in place. Neph & Social Work following. Plan to discharge 1-2 days. Will continue with plan of care.     Need to collect U/A    PRIMARY DIAGNOSIS: GENERALIZED WEAKNESS    OUTPATIENT/OBSERVATION GOALS TO BE MET BEFORE DISCHARGE  1. Orthostatic performed: N/A    2. Tolerating PO medications: Yes    3. Return to near baseline physical activity: No    4. Cleared for discharge by consultants (if involved): No    Discharge Planner Nurse   Safe discharge environment identified: No  Barriers to discharge: Yes - peritoneal dialysis pt       Entered by: Jaquelin Interiano RN 09/25/2023 1:52 PM     Please review provider order for any additional goals.   Nurse to notify provider when observation goals have been met and patient is ready for discharge.

## 2023-09-25 NOTE — PROGRESS NOTES
Cycler set up per protocol and per treatment orders     Results from previous treatment:  Effluent color and clarity: clear, yellow  Total UF: 231 mL  Initial Drain: 99 mL  Avg Dwell: 1:44  AddedDwell: 0:23    Cycler Serial Number: [id number]  Total Treatment Volume: [id number]  Total treatment time: 9 hrs  Fill Volume: 2500ml  Last Fill Volume:0ml  Heater ba.5% 6000mL;  Lot #: [id number];  Expiration Date:   Side Bag #1: 2,5% 6000mL;  Lot #: [id number];  Expiration Date:   Number of cycles including final fill: 4  Dwell Time: 1:33 minutes  Last Fill Volume: 0ml  Initial Drain Alarm: 0ml  PD orders reviewed with Patient procedure and ESRD teaching done and questions answered.  Cycler ready for hook-up.    [facility] yes

## 2023-09-25 NOTE — PROGRESS NOTES
X-cover    Notified re: asymptomatic hypotension in this dialysis patient    SBP currently in 80s.  SBP was in low 90s earlier this evening; prior to this patient was experiencing hypertension    Currently receiving metoprolol, clonidine, and Imdur.  Clonidine and Imdur appear to have been started the morning of 9/24    Plan:  - check hgb now for completeness.  No reported blood loss  - as pt having no sx and is a dialysis patient, will hold off on IVF bolus.  If she becomes symptomatic or SBP falls further reconsider  - have added hold parameters to anti-HTN meds to avoid giving if hypotensive

## 2023-09-25 NOTE — PROGRESS NOTES
Cycler set up per protocol and per treatment orders     Results from previous treatment:  Effluent color and clarity: clear and yellow  Total UF: 108 ml  Initial Drain: 43 ml  Avg Dwell: 1:42  Added dwell: 0:17    Cycler Serial Number: [id number]   Total Treatment Volume: [id number]  Total treatment time: 9 hrs  Fill Volume: 2900ml  Last Fill Volume:0ml  Heater ba.5% 6000mL;  Lot #: [id number];  Expiration Date:   Side Bag #1: 2.5% 6000mL;  Lot #: [id number];  Expiration Date:     Number of cycles including final fill: 4  Dwell Time: 1:33 minutes  Last Fill Volume: 0ml    PD orders reviewed with Patient procedure and ESRD teaching done and questions answered.  Cycler ready for hook-up.    Report given to: Med[facility]ge FRANCINE Pollard consent form signed yes

## 2023-09-25 NOTE — PLAN OF CARE
Temp: 98.1  F (36.7  C) Temp src: Oral BP: 105/63 Pulse: 91   Resp: 16 SpO2: 94 % O2 Device: None (Room air)       Assumed care 9401-0900. A&Ox4. Ax2 with a lift for transfers or Ax2 with a pivot to BSC. On RA. On a renal diet. Dialysis ran throughout the night. PIV in L arm is saline locked. Hypotensive overnight, see note. No reports of pain/discomfort. No nausea/vomiting. On telemetry- NSR.

## 2023-09-25 NOTE — PROGRESS NOTES
"[facility]    Hospitalist Progress Note  Name: Devora Garcia    MRN: [id number]  Provider:  Myles Avilez DO, MPH  Date of Service: 09/25/2023    Summary of Stay: Devora Garcia is a 62 year old female with PMH HTN, ESRD on PD, and GUILLERMO on CPAP who presents with concerns for syncopal episode at home.     Recently admitted to [facility] from 8/24-9/15 eventually discharge to TCU for left hip and knee pain status post mechanical fall.  She also had an episode of acute encephalopathy and that eventually resolved during admission.  She was quite deconditioned.  She reports she was discharged from TCU on 9/17 back home.     Since discharging home from TCU patient has been growing weaker and weaker.  She has not had dialysis since Sunday 9/17 due to \"not having time\" per the patient.  She also has been feeling increasingly more sleepy.  Denies any concerns such as fever, chills, chest pain, shortness of breath, abdominal pain.  Patient reports she had a fall earlier in the day.  Attempted to call the significant other to get further history but was unable to reach him via phone.  Per the ED provider's note who spoke with the  he was moving her from the chair to start her dialysis when she became increasingly weak and dizzy and had a syncopal event.   denies seizure-like activity and reports she was unconscious for couple minutes and then came to.  No postictal state.  Patient also reported that she has not had anything to eat or drink for \"8 days\" due to lack of appetite.     In the ED, afebrile, blood pressure 104/61, heart rate 56, respirations 18, oxygen 99% on room air.  CMP notable for chloride 91, CO2 20, creatinine 15.39, anion gap 27, alk phos 106. CBC notable for hemoglobin of 11.0. EKG shows sinus rhythm with 1st degree av block.      Syncope  Fatigue   Based on the  report to ED provider sounds like patient had a prodrome of dizziness and weakness while trying to " transferring from chair prior to syncope. Questioning a possible orthostatic hypotension.  Likely multifactorial with her poor oral intake, chronic deconditioning and also missing dialysis for the last 4 days.  Patient is alert and orientated x4 able to answer all my questions appropriately, neuro exam normal. Last echo in May 2023 - normal left ventricular, moderate concentric left ventricular, EF 65-70%, moderate to severe mitral annular calcificiation. Also may have been receiving both metoprolol and carvedilol PTA.   - unable to make urine for UA but no evidence on infection otherwise  - TSH slightly high and free T4 slightly low but unlikely to explain the presentation  - orthostatic vitals ordered, not performed  - PT/SW consulted, TCU recommended  - Ensure discharge on only one BB at discharge.     ESRD on PD  AGMA likely secondary to ESRD  Anemia of CKD  Hypokalemia  Anion gap slightly higher than her baseline suspect due to missing dialysis for the past 4 days. Also questioning if missing dialysis is contributing to her increased fatigue and sleepiness. Hemoglobin stable. Potassium low at 2.9 this morning.  - Nephrology consult, PD performed nightly  - Patient may need ongoing potassium supplementation as needed, hold replacement today     HTN  Hypotensive on arrival.  Resumed clonidine and Imdur yesterday, hypotensive again today.  - continue metoprolol, ensure not discharge on both metoprolol and carvedilol  - hold PTA clonidine and Imdur for now, watch for rebound hypertension     GUILLERMO  - use CPAP at night      Abnormal TFTs  Only slightly abnormal, showing possibly some hypothyroidism.  - would recheck TFTs in 1 month, if still abnormal consider small dose of levothyroxine.    DVT Prophylaxis: Pneumatic Compression Devices  Code Status: Full Code  Diet: Renal Diet (dialysis)    Taylor Catheter: Not present  Disposition: Expected discharge in 2 days to TCU. Goals prior to discharge include placement, watch  BP, monitor K.   Incidental Findings: none.  Family updated today: No    40 MINUTES SPENT BY ME on the date of service doing chart review, history, exam, documentation & further activities per the note.      Interval History   Assumed care from previous hospitalist. The history was fully reviewed.  The patient reports doing well. No chest pain or shortness of breath. No nausea, vomiting, diarrhea, constipation. No fevers. No other specific complaints identified.     -Data reviewed today: I personally reviewed all new labs and imaging results over the last 24 hours.     Physical Exam   Temp: 98.1  F (36.7  C) Temp src: Oral BP: 105/64 Pulse: 94   Resp: 18 SpO2: 96 % O2 Device: None (Room air)    Vitals:    09/23/23 1144 09/24/23 0051 09/25/23 0440   Weight: 97.2 kg (214 lb 4.6 oz) 96.9 kg (213 lb 10 oz) 96.1 kg (211 lb 13.8 oz)     Vital Signs with Ranges  Temp:  [97.3  F (36.3  C)-98.1  F (36.7  C)] 98.1  F (36.7  C)  Pulse:  [80-94] 94  Resp:  [16-18] 18  BP: ()/(57-76) 105/64  SpO2:  [94 %-98 %] 96 %  I/O last 3 completed shifts:  In: 60 [P.O.:60]  Out: -     GENERAL: No apparent distress. Awake, alert, and fully oriented.  HEENT: Normocephalic, atraumatic. Extraocular movements intact.  CARDIOVASCULAR: Regular rate and rhythm without murmurs or rubs. No S3.  PULMONARY: Clear bilaterally.  GASTROINTESTINAL: Soft, non-tender, non-distended. Bowel sounds normoactive.   EXTREMITIES: No cyanosis or clubbing. No edema.  NEUROLOGICAL: CN 2-12 grossly intact, no focal neurological deficits.  DERMATOLOGICAL: No rash, ulcer, bruising, nor jaundice.     Medications    dianeal PD LOW calcium-2.5% dex (calcium 2.5 mEq/L) 6,000 mL PERITONEAL DIALYSATE      dianeal PD LOW calcium-2.5% dex (calcium 2.5 mEq/L) 6,000 mL PERITONEAL DIALYSATE        - MEDICATION INSTRUCTIONS for Dialysis Patients -   Does not apply See Admin Instructions    calcium carbonate  1,000 mg Oral TID w/meals    [Held by provider] cloNIDine  0.1 mg  Oral BID    docusate sodium  100 mg Oral BID    [Held by provider] isosorbide mononitrate  15 mg Oral Daily    metoprolol succinate ER  12.5 mg Oral Daily    pantoprazole  40 mg Oral BID    polyethylene glycol  17 g Oral Daily    sodium chloride (PF)  3 mL Intracatheter Q8H     Data     Laboratory:  Recent Labs   Lab 09/25/23  0542 09/22/23  0648 09/21/23  0637 09/20/23  1218 09/20/23  1202   WBC  --  8.5 9.2  --  9.8   HGB 11.9 11.2* 11.8 11.9 11.0*   HCT  --  35.5 38.0 35 34.7*   MCV  --  82 83  --  82   PLT  --  238 269  --  210     Recent Labs   Lab 09/25/23  0609 09/25/23  0542 09/24/23  0702 09/23/23  0620   NA  --  140 137 136   POTASSIUM  --  3.3* 3.0* 2.9*   CHLORIDE  --  96* 92* 94*   CO2  --  24 24 22   ANIONGAP  --  20* 21* 20*   * 142* 145* 105*   BUN  --  44.7* 47.4* 50.3*   CR  --  12.85* 13.24* 13.83*   GFRESTIMATED  --  3* 3* 3*   RON  --  9.2 9.2 8.9     No results for input(s): CULT in the last 168 hours.    Imaging:  No results found for this or any previous visit (from the past 24 hour(s)).      Myles Avilez DO MPH  [facility] Hospitalist  [address] [place] [address]  09/25/2023

## 2023-09-25 NOTE — PROGRESS NOTES
Cared for [id number]     Pt A/O x 4, VSS; Pt denies pain, headache, dizziness, N/V & SOB. Pt up Asst: 1-2 w/gait belt & walker. Lung sounds clear, RA. Tele: SR. Potassium level 3.3 - not on protocols, MD notified - added 1 X Potassium dose. Blanchable redness on sacrum - foam dressing in place. Neph & Social Work following. Plan to discharge 1-2 days. Will continue with plan of care.      Need to collect U/A     PRIMARY DIAGNOSIS: GENERALIZED WEAKNESS     OUTPATIENT/OBSERVATION GOALS TO BE MET BEFORE DISCHARGE  1. Orthostatic performed: N/A     2. Tolerating PO medications: Yes     3. Return to near baseline physical activity: No     4. Cleared for discharge by consultants (if involved): No     Discharge Planner Nurse   Safe discharge environment identified: No  Barriers to discharge: Yes - peritoneal dialysis pt       Entered by: Jaquelin Interiano RN 09/25/2023 1:52 PM  Please review provider order for any additional goals.   Nurse to notify provider when observation goals have been met and patient is ready for discharge.

## 2023-09-26 NOTE — PROGRESS NOTES
"PRIMARY DIAGNOSIS: \"GENERIC\" NURSING  OUTPATIENT/OBSERVATION GOALS TO BE MET BEFORE DISCHARGE:  ADLs back to baseline: Yes    Activity and level of assistance: Up with maximum assistance. Consider SW and/or PT evaluation.     Pain status: Pain free.    Return to near baseline physical activity: Yes     Discharge Planner Nurse   Safe discharge environment identified: Yes  Barriers to discharge: Yes       Entered by: Juani Hernandez RN 09/26/2023 1:38 PM     Please review provider order for any additional goals.   Nurse to notify provider when observation goals have been met and patient is ready for discharge.  "

## 2023-09-26 NOTE — PLAN OF CARE
"PRIMARY DIAGNOSIS: \"Failure to Thrive\"  OUTPATIENT/OBSERVATION GOALS TO BE MET BEFORE DISCHARGE:  ADLs back to baseline: No    Activity and level of assistance: Up with maximum assistance. Consider SW and/or PT evaluation.     Pain status: Pain free.    Return to near baseline physical activity: No     Discharge Planner Nurse   Safe discharge environment identified: No  Barriers to discharge: Yes       Entered by: Juani Hernandez RN 09/26/2023 5:57 PM   Patient still weak, use bedside commode assist of 2 with walker/belt, refused physical therapy due to being weak/tired, continue PD, VSS, reports no pain.   Please review provider order for any additional goals.   Nurse to notify provider when observation goals have been met and patient is ready for discharge.Goal Outcome Evaluation:                        "

## 2023-09-26 NOTE — PLAN OF CARE
Goal Outcome Evaluation:  A/O x 4. VSS on RA. Pt currently on peritoneal dialysis. Slept most of evening. Refused dinner.

## 2023-09-26 NOTE — PROGRESS NOTES
Cycler set up per protocol and per treatment orders      Results from previous treatment:  Effluent color and clarity: clear, yellow, no fibrin noted  Total UF: 454 mL  Initial Drain: 138 mL  Avg Dwell: 1:44  Added Dwell: 0:23     Cycler Serial Number: [id number]  Total Treatment Volume: [id number]  Total treatment time: 9 hrs  Fill Volume: 2900ml  Last Fill Volume:0ml  Heater ba.5% 6000mL;  Lot #: [id number];  Expiration Date:   Side Bag #1: 1.5% 6000mL;  Lot #: [id number];  Expiration Date:   Number of cycles including final fill: 4  Dwell Time: 1:38 minutes  Last Fill Volume: 0ml  Initial Drain Alarm: 0ml  PD orders reviewed with Patient procedure and ESRD teaching done and questions answered.  Cycler ready for hook-up.    [facility] consent form signed yes

## 2023-09-26 NOTE — PLAN OF CARE
Goal Outcome Evaluation:       VS stable. Diminished LS. +2 edema to bilateral ankles and feet. No complaints of pain. Slept well throughout the night.

## 2023-09-26 NOTE — PROGRESS NOTES
"[facility]    Hospitalist Progress Note  Name: Devora Garcia    MRN: [id number]  Provider:  Myles Avilez DO, MPH  Date of Service: 09/26/2023    Summary of Stay: Devora Garcia is a 62 year old female with PMH HTN, ESRD on PD, and GUILLERMO on CPAP who presents with concerns for syncopal episode at home.     Recently admitted to [facility] from 8/24-9/15 eventually discharge to TCU for left hip and knee pain status post mechanical fall.  She also had an episode of acute encephalopathy and that eventually resolved during admission.  She was quite deconditioned.  She reports she was discharged from TCU on 9/17 back home.     Since discharging home from TCU patient has been growing weaker and weaker.  She has not had dialysis since Sunday 9/17 due to \"not having time\" per the patient.  She also has been feeling increasingly more sleepy.  Denies any concerns such as fever, chills, chest pain, shortness of breath, abdominal pain.  Patient reports she had a fall earlier in the day.  Attempted to call the significant other to get further history but was unable to reach him via phone.  Per the ED provider's note who spoke with the  he was moving her from the chair to start her dialysis when she became increasingly weak and dizzy and had a syncopal event.   denies seizure-like activity and reports she was unconscious for couple minutes and then came to.  No postictal state.  Patient also reported that she has not had anything to eat or drink for \"8 days\" due to lack of appetite.     In the ED, afebrile, blood pressure 104/61, heart rate 56, respirations 18, oxygen 99% on room air.  CMP notable for chloride 91, CO2 20, creatinine 15.39, anion gap 27, alk phos 106. CBC notable for hemoglobin of 11.0. EKG shows sinus rhythm with 1st degree av block.      Syncope  Fatigue   Based on the  report to ED provider sounds like patient had a prodrome of dizziness and weakness while trying to " transferring from chair prior to syncope. Questioning a possible orthostatic hypotension.  Likely multifactorial with her poor oral intake, chronic deconditioning and also missing dialysis for the last 4 days.  Patient is alert and orientated x4 able to answer all my questions appropriately, neuro exam normal. Last echo in May 2023 - normal left ventricular, moderate concentric left ventricular, EF 65-70%, moderate to severe mitral annular calcificiation. Also may have been receiving both metoprolol and carvedilol PTA.   - unable to make urine for UA but no evidence on infection otherwise  - TSH slightly high and free T4 slightly low but unlikely to explain the presentation  - PT/SW consulted, TCU recommended  - Ensure discharge on only one BB at discharge.     ESRD on PD  AGMA likely secondary to ESRD  Anemia of CKD  Hypokalemia  Anion gap slightly higher than her baseline suspect due to missing dialysis for the past 4 days. Also questioning if missing dialysis is contributing to her increased fatigue and sleepiness. Hemoglobin stable. Potassium low at 2.9 this morning.  - Nephrology consult, PD performed nightly  - Patient may need ongoing potassium supplementation as needed, giving 40 meq x1 today     HTN  Hypotensive on arrival.  Resumed clonidine and Imdur 2 days ago which resulted in hypotension.  - continue metoprolol, ensure not discharge on both metoprolol and carvedilol  - hold PTA clonidine and Imdur for now, watch for rebound hypertension but so far blood pressure remains low normal     GUILLERMO  - use CPAP at night      Abnormal TFTs  Only slightly abnormal, showing possibly some hypothyroidism.  - would recheck TFTs in 1 month, if still abnormal consider small dose of levothyroxine.    DVT Prophylaxis: Pneumatic Compression Devices  Code Status: Full Code  Diet: Renal Diet (dialysis)    Taylor Catheter: Not present  Disposition: Expected discharge in 1-2 days to TCU. Goals prior to discharge include  placement, watch BP, monitor potassium.   Incidental Findings: none.  Family updated today: No    40 MINUTES SPENT BY ME on the date of service doing chart review, history, exam, documentation & further activities per the note.      Interval History   The patient reports doing well. No chest pain or shortness of breath. No nausea, vomiting, diarrhea, constipation. No fevers. No other specific complaints identified. Blood pressure better overnight.    -Data reviewed today: I personally reviewed all new labs and imaging results over the last 24 hours.     Physical Exam   Temp: 97.6  F (36.4  C) Temp src: Oral BP: 121/76 Pulse: 73   Resp: 18 SpO2: 96 % O2 Device: None (Room air)    Vitals:    09/23/23 1144 09/24/23 0051 09/25/23 0440   Weight: 97.2 kg (214 lb 4.6 oz) 96.9 kg (213 lb 10 oz) 96.1 kg (211 lb 13.8 oz)     Vital Signs with Ranges  Temp:  [97.6  F (36.4  C)-98.7  F (37.1  C)] 97.6  F (36.4  C)  Pulse:  [71-94] 73  Resp:  [18-20] 18  BP: (100-122)/(50-81) 121/76  SpO2:  [93 %-97 %] 96 %  I/O last 3 completed shifts:  In: 120 [P.O.:120]  Out: -     GENERAL: No apparent distress. Awake, alert, and fully oriented.  HEENT: Normocephalic, atraumatic. Extraocular movements intact.  CARDIOVASCULAR: Regular rate and rhythm without murmurs or rubs. No S3.  PULMONARY: Clear bilaterally.  GASTROINTESTINAL: Soft, non-tender, non-distended. Bowel sounds normoactive.   EXTREMITIES: No cyanosis or clubbing. No edema.  NEUROLOGICAL: CN 2-12 grossly intact, no focal neurological deficits.  DERMATOLOGICAL: No rash, ulcer, bruising, nor jaundice.     Medications    dianeal PD LOW calcium-2.5% dex (calcium 2.5 mEq/L) 6,000 mL PERITONEAL DIALYSATE      dianeal PD LOW calcium-2.5% dex (calcium 2.5 mEq/L) 6,000 mL PERITONEAL DIALYSATE        - MEDICATION INSTRUCTIONS for Dialysis Patients -   Does not apply See Admin Instructions    calcium carbonate  1,000 mg Oral TID w/meals    [Held by provider] cloNIDine  0.1 mg Oral BID     docusate sodium  100 mg Oral BID    [Held by provider] isosorbide mononitrate  15 mg Oral Daily    metoprolol succinate ER  12.5 mg Oral Daily    pantoprazole  40 mg Oral BID    polyethylene glycol  17 g Oral Daily    potassium chloride  40 mEq Oral Once    sodium chloride (PF)  3 mL Intracatheter Q8H     Data     Laboratory:  Recent Labs   Lab 09/25/23  0542 09/22/23  0648 09/21/23  0637 09/20/23  1218 09/20/23  1202   WBC  --  8.5 9.2  --  9.8   HGB 11.9 11.2* 11.8 11.9 11.0*   HCT  --  35.5 38.0 35 34.7*   MCV  --  82 83  --  82   PLT  --  238 269  --  210       Recent Labs   Lab 09/26/23  0809 09/25/23  0609 09/25/23  0542 09/24/23  0702     --  140 137   POTASSIUM 3.1*  --  3.3* 3.0*   CHLORIDE 94*  --  96* 92*   CO2 26  --  24 24   ANIONGAP 18*  --  20* 21*   GLC 90 136* 142* 145*   BUN 43.7*  --  44.7* 47.4*   CR 12.81*  --  12.85* 13.24*   GFRESTIMATED 3*  --  3* 3*   RON 9.0  --  9.2 9.2       No results for input(s): CULT in the last 168 hours.    Imaging:  No results found for this or any previous visit (from the past 24 hour(s)).      Myles Avilez DO MPH  [facility] Hospitalist  [address] [place] [address]  09/26/2023

## 2023-09-27 NOTE — PROGRESS NOTES
PRIMARY DIAGNOSIS: GENERALIZED WEAKNESS    OUTPATIENT/OBSERVATION GOALS TO BE MET BEFORE DISCHARGE  1. Orthostatic performed: No    2. Tolerating PO medications: Yes    3. Return to near baseline physical activity: Yes    4. Cleared for discharge by consultants (if involved): Yes    Discharge Planner Nurse   Safe discharge environment identified: No  Barriers to discharge: Yes, needs placement       Entered by: Juani Hernandez RN 09/27/2023 12:04 PM     Please review provider order for any additional goals.   Nurse to notify provider when observation goals have been met and patient is ready for discharge.

## 2023-09-27 NOTE — PROGRESS NOTES
Care Management Follow Up    Length of Stay (days): 0    Expected Discharge Date: 10/02/2023     Concerns to be Addressed: discharge planning, care coordination/care conferences     Patient plan of care discussed at interdisciplinary rounds: Yes    Anticipated Discharge Disposition: Transitional Care, Other (Comments)      Anticipated Discharge Services: Peritoneal dialysis    Patient/Family in Agreement with the Plan: yes      Additional Information:  CM following for discharge planning and need for Transitional Care Unit with PD. Met with patient and spouse at bedside per their request. Updated them that we are waiting to hear back from a couple of facilities if they are able to do PD. Only known facility to this writer that is able to do PD at Transitional Care Unit is [facility] and patient states she will not return there. Encouraged patient to continue working with Physical Therapy/Occupational Therapy daily as the only other option may be to return home with home care once able to safely ambulate with SBA. Will cont to follow.         Kristie Otero RN BSN CM  Inpatient Care Coordination  [facility]  [phone]

## 2023-09-27 NOTE — PROGRESS NOTES
"PRIMARY DIAGNOSIS: \"GENERIC\" NURSING  OUTPATIENT/OBSERVATION GOALS TO BE MET BEFORE DISCHARGE:  ADLs back to baseline: No    Activity and level of assistance: Up with standby assistance.    Pain status: Pain free.    Return to near baseline physical activity: No     Discharge Planner Nurse   Safe discharge environment identified: No  Barriers to discharge: Yes, on PD awaiting placement at TCU that provides PD.       Entered by: Juani Hernandez RN 09/27/2023 1:26 PM     Please review provider order for any additional goals.   Nurse to notify provider when observation goals have been met and patient is ready for discharge.  "

## 2023-09-27 NOTE — PROGRESS NOTES
[facility]    Nephrology Progress Note    Assessment & Plan     <ESRD, ON PERITONEAL DIALYSIS (CAPD AT HOME, CCPD IN HOSPITAL)    ~9-hour treatment, 4 cycles, 2.9 L fill volume. UF remains small. Perhaps feeling a bit less weak on lower dextrose concentration.     - Agree with holding BP meds. OK to hold ISMD and clonidine but will need to watch for clonidine withdrawal rebound HTN.     -1 bag 1.5% and 1 bag 2.5% againtonight.       Sriram Castaneda MD  Nephrology  [facility]  Cell:[phone]  On [employer]   Pager:[phone]      .........    Interval History     Thinks she is feeling a bit better in terms of fatigue but hasn't gotten up yet.     No new acute issues.     Weight 96.9 on bed scale. Little change.     Temp: 98.3  F (36.8  C) Temp src: Oral BP: 135/84 Pulse: 103   Resp: 18 SpO2: 95 % O2 Device: None (Room air)      Vitals:    09/24/23 0051 09/25/23 0440 09/27/23 0644   Weight: 96.9 kg (213 lb 10 oz) 96.1 kg (211 lb 13.8 oz) 96.9 kg (213 lb 10 oz)       Vital Signs with Ranges    Temp:  [97.3  F (36.3  C)-98.6  F (37  C)] 98.3  F (36.8  C)  Pulse:  [] 103  Resp:  [16-18] 18  BP: (107-135)/(62-87) 135/84  SpO2:  [95 %-100 %] 95 %    I/O last 3 completed shifts:  In: -   Out: 100 [Emesis/NG output:100]    Physical Exam    BP Readings from Last 5 Encounters:   09/27/23 135/84   09/15/23 126/70   05/12/23 (!) 142/67   06/08/22 (!) 155/70   11/15/21 (!) 168/92        Wt Readings from Last 10 Encounters:   09/27/23 96.9 kg (213 lb 10 oz)   09/14/23 97 kg (213 lb 13.5 oz)   05/12/23 100.5 kg (221 lb 9.6 oz)   06/07/22 85.7 kg (189 lb)   11/15/21 93.4 kg (206 lb)   10/17/21 95.3 kg (210 lb)   12/29/20 111.6 kg (246 lb)   12/23/20 109.3 kg (241 lb)   09/05/20 121.6 kg (268 lb 1.6 oz)   11/30/18 124.7 kg (275 lb)     GENERAL: Alert, in no apparent distress.   HEENT:  Normocephalic. No gross abnormalities.  The mouth moist.    Neck The jugular venous pressure is not  elevated  CV: RRR  RESP: Breathing unlabored   GI: Abdomen non-distended  MUSCULOSKELETAL: extremities nl - no gross deformities noted. No edema  SKIN: no new lesions or rashes, dry to touch. Skin still looks like increase turgor to me.   NEURO:  No overt deficit.  PSYCH: mood neutral.        Medications      dianeal PD LOW calcium-2.5% dex (calcium 2.5 mEq/L) 6,000 mL PERITONEAL DIALYSATE      dianeal PD LOW calcium-2.5% dex (calcium 2.5 mEq/L) 6,000 mL PERITONEAL DIALYSATE          - MEDICATION INSTRUCTIONS for Dialysis Patients -   Does not apply See Admin Instructions    calcium carbonate  1,000 mg Oral TID w/meals    [Held by provider] cloNIDine  0.1 mg Oral BID    docusate sodium  100 mg Oral BID    [Held by provider] isosorbide mononitrate  15 mg Oral Daily    metoprolol succinate ER  12.5 mg Oral Daily    pantoprazole  40 mg Oral BID    polyethylene glycol  17 g Oral Daily    sodium chloride (PF)  3 mL Intracatheter Q8H       Data     UA RESULTS:  Recent Labs   Lab Test 05/29/21  1832 08/30/20  1455   COLOR Light Yellow Yellow   APPEARANCE Clear Cloudy*   URINEGLC Negative 150 mg/dL*   URINEBILI Negative Negative   URINEKETONE Negative Negative   SG 1.013 1.005   UBLD Small* Moderate*   URINEPH 7.0 5.0   PROTEIN 100* >=500 mg/dL*   UROBILINOGEN  --  <2.0 E.U./dL   NITRITE Negative Negative   LEUKEST Trace* Large*   RBCU 8* 10-25*   WBCU 6* >100*      BMP  Recent Labs   Lab 09/27/23  0732 09/26/23  0809 09/25/23  0609 09/25/23  0542 09/24/23  0702    138  --  140 137   POTASSIUM 3.1* 3.1*  --  3.3* 3.0*   CHLORIDE 97* 94*  --  96* 92*   RON 9.0 9.0  --  9.2 9.2   CO2 26 26 --  24 24   BUN 42.5* 43.7*  --  44.7* 47.4*   CR 12.66* 12.81*  --  12.85* 13.24*   * 90 136* 142* 145*     Phos@[email or web address](phos:4)  CBC)  Recent Labs   Lab 09/25/23  0542 09/22/23  0648 09/21/23  0637 09/20/23  1218 09/20/23  1202   WBC  --  8.5 9.2  --  9.8   HGB 11.9 11.2* 11.8 11.9 11.0*   HCT  --  35.5 38.0 35 34.7*    MCV  --  82 83  --  82   PLT  --  238 269  --  210     Lab Results   Component Value Date    ALBUMIN 2.5 09/20/2023    ALBUMIN 2.9 09/11/2023    ALBUMIN 2.1 09/04/2023    ALBUMIN 2.4 05/12/2023    ALBUMIN 2.3 05/11/2023    ALBUMIN 2.5 05/10/2023    ALBUMIN 2.5 05/29/2021        Recent Labs   Lab 09/25/23  0542 09/22/23  0648   HGB 11.9 11.2*   HCT  --  35.5   MCV  --  82       Recent Labs   Lab 09/20/23  1202   AST 19   ALT <5   ALKPHOS 106*   BILITOTAL 0.4     No lab results found in last 7 days.  No results found for: D2VIT, D3VIT, DTOT  No results for input(s): PTHI in the last 168 hours.    Attestation:   I have reviewed today's relevant vital signs, notes, medications, labs and imaging.

## 2023-09-27 NOTE — PROGRESS NOTES
Cycler set up per protocol and per treatment orders     Results from previous treatment:  Effluent color and clarity: clear yellow    Total UF: 70  Initial Drain: 16 ml  Avg Dwell: 1:45   Lost Dwell:     Cycler Serial Number: [id number]  Total Treatment Volume: [id number]  Total treatment time: 9 hrs  Fill Volume: 2900ml  Last Fill Volume:0ml  Heater ba.5% 6000mL;  Lot #: [id number];  Expiration Date:   Side Bag #1: 1.5% 6000mL;  Lot #: [id number];  Expiration Date:     Number of cycles including final fill: 4  Dwell Time: 1:38 minutes  Last Fill Volume: 0ml  Initial Drain Alarm: 0ml  PD orders reviewed with Patient procedure and ESRD teaching done and questions answered.  Cycler ready for hook-up.    [facility] consent form signed yes

## 2023-09-27 NOTE — PLAN OF CARE
AOx4, VSS, Assist of 2 with gait belt, refused to get up during this shift despite many attempts and education. Refused flushes for IV site, states she would not like another IV, she would rather have it removed, MD paged. Potassium 3.1 hospitalist notified, no replacement orders.

## 2023-09-27 NOTE — PLAN OF CARE
Goal Outcome Evaluation:      VS stable. Diminished LS. +2 edema to BLE. No complaints of pain. Had one episode of nausea and emesis, resolved with no intervention. Slept well throughout the night.

## 2023-09-27 NOTE — PROGRESS NOTES
"[facility]    Hospitalist Progress Note  Name: Devora Garcia    MRN: [id number]  Provider:  Mylse Avilez DO, MPH  Date of Service: 09/27/2023    Summary of Stay: Devora Garcia is a 62 year old female with PMH HTN, ESRD on PD, and GUILLERMO on CPAP who presents with concerns for syncopal episode at home.     Recently admitted to [facility] from 8/24-9/15 eventually discharge to TCU for left hip and knee pain status post mechanical fall.  She also had an episode of acute encephalopathy and that eventually resolved during admission.  She was quite deconditioned.  She reports she was discharged from TCU on 9/17 back home.     Since discharging home from TCU patient has been growing weaker and weaker.  She has not had dialysis since Sunday 9/17 due to \"not having time\" per the patient.  She also has been feeling increasingly more sleepy.  Denies any concerns such as fever, chills, chest pain, shortness of breath, abdominal pain.  Patient reports she had a fall earlier in the day.  Attempted to call the significant other to get further history but was unable to reach him via phone.  Per the ED provider's note who spoke with the  he was moving her from the chair to start her dialysis when she became increasingly weak and dizzy and had a syncopal event.   denies seizure-like activity and reports she was unconscious for couple minutes and then came to.  No postictal state.  Patient also reported that she has not had anything to eat or drink for \"8 days\" due to lack of appetite.     In the ED, afebrile, blood pressure 104/61, heart rate 56, respirations 18, oxygen 99% on room air.  CMP notable for chloride 91, CO2 20, creatinine 15.39, anion gap 27, alk phos 106. CBC notable for hemoglobin of 11.0. EKG shows sinus rhythm with 1st degree av block.      Syncope  Fatigue   Based on the  report to ED provider sounds like patient had a prodrome of dizziness and weakness while trying to " transferring from chair prior to syncope. Questioning a possible orthostatic hypotension.  Likely multifactorial with her poor oral intake, chronic deconditioning and also missing dialysis for the last 4 days.  Patient is alert and orientated x4 able to answer all my questions appropriately, neuro exam normal. Last echo in May 2023 - normal left ventricular, moderate concentric left ventricular, EF 65-70%, moderate to severe mitral annular calcificiation. Also may have been receiving both metoprolol and carvedilol PTA.   - unable to make urine for UA but no evidence on infection otherwise  - TSH slightly high and free T4 slightly low but unlikely to explain the presentation  - PT/SW consulted, TCU recommended  - Ensure discharge on only one BB at discharge.     ESRD on PD  AGMA likely secondary to ESRD  Anemia of CKD  Hypokalemia  Anion gap slightly higher than her baseline suspect due to missing dialysis for the past 4 days. Also questioning if missing dialysis is contributing to her increased fatigue and sleepiness. Hemoglobin stable. Potassium low at 2.9 this morning.  - Nephrology consult, PD performed nightly  - Patient may need ongoing potassium supplementation as needed, giving 40 meq x1 today     HTN  Hypotensive on arrival.  Resumed clonidine and Imdur 2 days ago which resulted in hypotension.  - continue metoprolol, ensure not discharge on both metoprolol and carvedilol  - hold PTA clonidine and Imdur for now, watch for rebound hypertension but so far blood pressure remains normal     GUILLERMO  - use CPAP at night      Abnormal TFTs  Only slightly abnormal, showing possibly some hypothyroidism.  - would recheck TFTs in 1 month, if still abnormal consider small dose of levothyroxine.    DVT Prophylaxis: Pneumatic Compression Devices  Code Status: Full Code  Diet: Renal Diet (dialysis)    Taylor Catheter: Not present  Disposition: Expected discharge to TCU. Goals prior to discharge include placement. Medically  stable for discharge but placement challenging due to peritoneal dialysis needs.   Incidental Findings: none.  Family updated today: No    30 MINUTES SPENT BY ME on the date of service doing chart review, history, exam, documentation & further activities per the note.      Interval History   The patient reports doing well. No chest pain or shortness of breath. No nausea, vomiting, diarrhea, constipation. No fevers. No other specific complaints identified.    -Data reviewed today: I personally reviewed all new labs and imaging results over the last 24 hours.     Physical Exam   Temp: 98.3  F (36.8  C) Temp src: Oral BP: 135/84 Pulse: 103   Resp: 18 SpO2: 95 % O2 Device: None (Room air)    Vitals:    09/24/23 0051 09/25/23 0440 09/27/23 0644   Weight: 96.9 kg (213 lb 10 oz) 96.1 kg (211 lb 13.8 oz) 96.9 kg (213 lb 10 oz)     Vital Signs with Ranges  Temp:  [97.3  F (36.3  C)-98.6  F (37  C)] 98.3  F (36.8  C)  Pulse:  [] 103  Resp:  [16-18] 18  BP: (107-135)/(62-87) 135/84  SpO2:  [95 %-100 %] 95 %  I/O last 3 completed shifts:  In: -   Out: 100 [Emesis/NG output:100]    GENERAL: No apparent distress. Awake, alert, and fully oriented.  HEENT: Normocephalic, atraumatic. Extraocular movements intact.  CARDIOVASCULAR: Regular rate and rhythm without murmurs or rubs. No S3.  PULMONARY: Clear bilaterally.  GASTROINTESTINAL: Soft, non-tender, non-distended. Bowel sounds normoactive.   EXTREMITIES: No cyanosis or clubbing. No edema.  NEUROLOGICAL: CN 2-12 grossly intact, no focal neurological deficits.  DERMATOLOGICAL: No rash, ulcer, bruising, nor jaundice.     Medications    dianeal PD LOW calcium-2.5% dex (calcium 2.5 mEq/L) 6,000 mL PERITONEAL DIALYSATE      dianeal PD LOW calcium-2.5% dex (calcium 2.5 mEq/L) 6,000 mL PERITONEAL DIALYSATE        - MEDICATION INSTRUCTIONS for Dialysis Patients -   Does not apply See Admin Instructions    calcium carbonate  1,000 mg Oral TID w/meals    [Held by provider] cloNIDine   0.1 mg Oral BID    docusate sodium  100 mg Oral BID    [Held by provider] isosorbide mononitrate  15 mg Oral Daily    metoprolol succinate ER  12.5 mg Oral Daily    pantoprazole  40 mg Oral BID    polyethylene glycol  17 g Oral Daily    sodium chloride (PF)  3 mL Intracatheter Q8H     Data     Laboratory:  Recent Labs   Lab 09/25/23  0542 09/22/23  0648 09/21/23  0637 09/20/23  1218 09/20/23  1202   WBC  --  8.5 9.2  --  9.8   HGB 11.9 11.2* 11.8 11.9 11.0*   HCT  --  35.5 38.0 35 34.7*   MCV  --  82 83  --  82   PLT  --  238 269  --  210       Recent Labs   Lab 09/27/23  0732 09/26/23  0809 09/25/23  0609 09/25/23  0542    138  --  140   POTASSIUM 3.1* 3.1*  --  3.3*   CHLORIDE 97* 94*  --  96*   CO2 26 26  --  24   ANIONGAP 18* 18*  --  20*   * 90 136* 142*   BUN 42.5* 43.7*  --  44.7*   CR 12.66* 12.81*  --  12.85*   GFRESTIMATED 3* 3*  --  3*   RON 9.0 9.0  --  9.2       No results for input(s): CULT in the last 168 hours.    Imaging:  No results found for this or any previous visit (from the past 24 hour(s)).      Myles Avilez DO MPH  [facility]  [address] [place] [address]  09/27/2023

## 2023-09-27 NOTE — PROGRESS NOTES
[facility]    Nephrology Progress Note    Assessment & Plan     <ESRD, ON PERITONEAL DIALYSIS (CAPD AT HOME, CCPD IN HOSPITAL)    ~9-hour treatment, 4 cycles, 2.9 L fill volume. UF remains small.    ~I spoke with Marcia and her  Tavares. Tavares wants to train to assist with the dialysis, however if she were to go home, the home is not set up to get her in and out safely related to steps.     ~I did float the option of converting to hemodialysis temporarily while she gst her strength back. That would hasten discharge likely. Marcia and Tavares are both not in favor of this as prisca had a great deal of difficulty with keeping the catheters patent. We did discuss placing a loop graft with the plan to access it on the early side, but they would rather try to fine a TCU spot with PD (always a difficult prospect and they do not want to go back to [facility]) or home as noted above if can bge done safely.     - Agree with holding BP meds. OK to hold ISMD and clonidine but will need to watch for clonidine withdrawal rebound HTN.     -1 bag 1.5% and 1 bag 2.5% tonight.       Sriram Castaneda MD  Nephrology  [facility]  Cell:[phone]  On [employer]   Pager:[phone]                  .........    Interval History   Still feelign quite week. Feels like the dialysis went OK. Less than 200 mL of UF last night.         Temp: 97.3  F (36.3  C) Temp src: Oral BP: 107/62 Pulse: 80   Resp: 18 SpO2: 100 % O2 Device: None (Room air)      Vitals:    09/23/23 1144 09/24/23 0051 09/25/23 0440   Weight: 97.2 kg (214 lb 4.6 oz) 96.9 kg (213 lb 10 oz) 96.1 kg (211 lb 13.8 oz)       Vital Signs with Ranges    Temp:  [97.3  F (36.3  C)-98.6  F (37  C)] 97.3  F (36.3  C)  Pulse:  [73-88] 80  Resp:  [18-20] 18  BP: (106-125)/(62-76) 107/62  SpO2:  [95 %-100 %] 100 %    No intake/output data recorded.    Physical Exam    BP Readings from Last 5 Encounters:   09/26/23 107/62   09/15/23 126/70   05/12/23  (!) 142/67   06/08/22 (!) 155/70   11/15/21 (!) 168/92        Wt Readings from Last 10 Encounters:   09/25/23 96.1 kg (211 lb 13.8 oz)   09/14/23 97 kg (213 lb 13.5 oz)   05/12/23 100.5 kg (221 lb 9.6 oz)   06/07/22 85.7 kg (189 lb)   11/15/21 93.4 kg (206 lb)   10/17/21 95.3 kg (210 lb)   12/29/20 111.6 kg (246 lb)   12/23/20 109.3 kg (241 lb)   09/05/20 121.6 kg (268 lb 1.6 oz)   11/30/18 124.7 kg (275 lb)       GENERAL: Alert, in no apparent distress.   HEENT:  Normocephalic. No gross abnormalities.  The mouth moist.    Neck The jugular venous pressure is not elevated  CV: RRR  RESP: Breathing unlabored   GI: Abdomen non-distended  MUSCULOSKELETAL: extremities nl - no gross deformities noted. No edema. Skin turgor remains appearing high.   SKIN: no new lesions or rashes, dry to touch.  NEURO:  No overt deficit.  PSYCH: fatigued appearing. Soft speech      Medications      dianeal PD LOW calcium-2.5% dex (calcium 2.5 mEq/L) 6,000 mL PERITONEAL DIALYSATE      dianeal PD LOW calcium-2.5% dex (calcium 2.5 mEq/L) 6,000 mL PERITONEAL DIALYSATE          - MEDICATION INSTRUCTIONS for Dialysis Patients -   Does not apply See Admin Instructions    calcium carbonate  1,000 mg Oral TID w/meals    [Held by provider] cloNIDine  0.1 mg Oral BID    docusate sodium  100 mg Oral BID    [Held by provider] isosorbide mononitrate  15 mg Oral Daily    metoprolol succinate ER  12.5 mg Oral Daily    pantoprazole  40 mg Oral BID    polyethylene glycol  17 g Oral Daily    sodium chloride (PF)  3 mL Intracatheter Q8H       Data     UA RESULTS:  Recent Labs   Lab Test 05/29/21  1832 08/30/20  1455   COLOR Light Yellow Yellow   APPEARANCE Clear Cloudy*   URINEGLC Negative 150 mg/dL*   URINEBILI Negative Negative   URINEKETONE Negative Negative   SG 1.013 1.005   UBLD Small* Moderate*   URINEPH 7.0 5.0   PROTEIN 100* >=500 mg/dL*   UROBILINOGEN  --  <2.0 E.U./dL   NITRITE Negative Negative   LEUKEST Trace* Large*   RBCU 8* 10-25*   WBCU 6*  >100*      BMP  Recent Labs   Lab 09/26/23  0809 09/25/23  0609 09/25/23  0542 09/24/23  0702 09/23/23  0620     --  140 137 136   POTASSIUM 3.1*  --  3.3* 3.0* 2.9*   CHLORIDE 94*  --  96* 92* 94*   RON 9.0  --  9.2 9.2 8.9   CO2 26  --  24 24 22   BUN 43.7*  --  44.7* 47.4* 50.3*   CR 12.81*  --  12.85* 13.24* 13.83*   GLC 90 136* 142* 145* 105*     Phos@[email or web address](phos:4)  CBC)  Recent Labs   Lab 09/25/23  0542 09/22/23  0648 09/21/23  0637 09/20/23  1218 09/20/23  1202   WBC  --  8.5 9.2  --  9.8   HGB 11.9 11.2* 11.8 11.9 11.0*   HCT  --  35.5 38.0 35 34.7*   MCV  --  82 83  --  82   PLT  --  238 269  --  210     Lab Results   Component Value Date    ALBUMIN 2.5 09/20/2023    ALBUMIN 2.9 09/11/2023    ALBUMIN 2.1 09/04/2023    ALBUMIN 2.4 05/12/2023    ALBUMIN 2.3 05/11/2023    ALBUMIN 2.5 05/10/2023    ALBUMIN 2.5 05/29/2021        Recent Labs   Lab 09/25/23  0542 09/22/23  0648   HGB 11.9 11.2*   HCT  --  35.5   MCV  --  82       Recent Labs   Lab 09/20/23  1202   AST 19   ALT <5   ALKPHOS 106*   BILITOTAL 0.4     No lab results found in last 7 days.  No results found for: D2VIT, D3VIT, DTOT  No results for input(s): PTHI in the last 168 hours.    Attestation:   I have reviewed today's relevant vital signs, notes, medications, labs and imaging.

## 2023-09-28 NOTE — PLAN OF CARE
A&Ox4, VSS on RA, Assist of 2 with gait belt, up in chair for a couple of hours this afternoon. IV removed per patient request, patient does not want it replaced. Denies pain, poor appetite.

## 2023-09-28 NOTE — PROGRESS NOTES
"[facility]    Medicine Progress Note - Hospitalist Service    Date of Admission:  9/20/2023    Assessment & Plan   Summary of Stay: Devora Garcia is a 62 year old female with PMH HTN, ESRD on PD, and GUILLERMO on CPAP who presents with concerns for syncopal episode at home.     Recently admitted to [facility] from 8/24-9/15 eventually discharge to TCU for left hip and knee pain status post mechanical fall.  She also had an episode of acute encephalopathy and that eventually resolved during admission.  She was quite deconditioned.  She reports she was discharged from TCU on 9/17 back home.     Since discharging home from TCU patient has been growing weaker and weaker.  She has not had dialysis since Sunday 9/17 due to \"not having time\" per the patient.  She also has been feeling increasingly more sleepy.  Denies any concerns such as fever, chills, chest pain, shortness of breath, abdominal pain.  Patient reports she had a fall earlier in the day.  Attempted to call the significant other to get further history but was unable to reach him via phone.  Per the ED provider's note who spoke with the  he was moving her from the chair to start her dialysis when she became increasingly weak and dizzy and had a syncopal event.   denies seizure-like activity and reports she was unconscious for couple minutes and then came to.  No postictal state.  Patient also reported that she has not had anything to eat or drink for \"8 days\" due to lack of appetite.     In the ED, afebrile, blood pressure 104/61, heart rate 56, respirations 18, oxygen 99% on room air.  CMP notable for chloride 91, CO2 20, creatinine 15.39, anion gap 27, alk phos 106. CBC notable for hemoglobin of 11.0. EKG shows sinus rhythm with 1st degree av block.      Syncope  Fatigue   Based on the  report to ED provider sounds like patient had a prodrome of dizziness and weakness while trying to transferring from chair prior to " syncope. Questioning a possible orthostatic hypotension.  Likely multifactorial with her poor oral intake, chronic deconditioning and also missing dialysis for the last 4 days.  Patient is alert and orientated x4 able to answer all my questions appropriately, neuro exam normal. Last echo in May 2023 - normal left ventricular, moderate concentric left ventricular, EF 65-70%, moderate to severe mitral annular calcificiation. Also may have been receiving both metoprolol and carvedilol PTA.   - unable to make urine for UA but no evidence on infection otherwise  - TSH slightly high and free T4 slightly low but unlikely to explain the presentation  -stop sedating meds including gabapentin, robaxin, diluaded due to sedation this am  - PT/SW consulted, TCU recommended but difficulty placing patient and has been refusing therapy at times per nursing  - Ensure discharge on only one BB at discharge.     ESRD on PD  AGMA likely secondary to ESRD  Anemia of CKD  Hypokalemia  Anion gap slightly higher than her baseline suspect due to missing dialysis for the past 4 days. Also questioning if missing dialysis is contributing to her increased fatigue and sleepiness. Hemoglobin stable. Potassium low at 2.9 this morning.  - Nephrology consult, PD performed nightly  - Patient may need ongoing potassium supplementation as needed     HTN  Hypotensive on arrival.  Resumed clonidine and Imdur 2 days ago which resulted in hypotension.  - continue metoprolol, ensure not discharge on both metoprolol and carvedilol  - hold PTA clonidine and Imdur for now, watch for rebound hypertension but so far blood pressure remains normal     GUILLERMO  - use CPAP at night      Abnormal TFTs  Only slightly abnormal, showing possibly some hypothyroidism.  - would recheck TFTs in 1 month, if still abnormal consider small dose of levothyroxine.       Diet: Renal Diet (dialysis)    DVT Prophylaxis: Pneumatic Compression Devices  Taylor Catheter: Not present  Lines:  None     Cardiac Monitoring: None  Code Status: Full Code        Disposition Plan      Expected Discharge Date: 10/02/2023      Destination: home with help/services  Discharge Comments: Difficult placement- pt needs TCU with peritoneal dialysis, will not go back to [place]. May have to rehab here to go home          Acosta Hebert DO  Hospitalist Service  [facility]  Securely message with [employer] (more info)  Text page via [employer] Paging/Directory   ______________________________________________________________________    Interval History   No overnight events, is somnolent this am but vitals stable. No complaints    Physical Exam   Vital Signs: Temp: 98  F (36.7  C) Temp src: Oral BP: (!) 143/67 Pulse: 86   Resp: 18 SpO2: 97 % O2 Device: None (Room air)    Weight: 209 lbs 3.46 oz    Constitutional: somnolent, briefly arousable  Eyes: pupils equal, round and reactive to light and conjunctiva normal  ENT: normocepalic, without obvious abnormality, atramatic  Respiratory: no increased work of breathing, good air exchange, and clear to auscultation  Cardiovascular: regular rate and rhythm and no murmur noted  GI: normal bowel sounds, soft, and distended  Skin: chronic skin changes bilateral LE  Neurologic: somnolent, moving all extremeties    45 MINUTES SPENT BY ME on the date of service doing chart review, history, exam, documentation & further activities per the note.      Data   ------------------------- PAST 24 HR DATA REVIEWED -----------------------------------------------    I have personally reviewed the following data over the past 24 hrs:    N/A  \   N/A   / N/A     140 97 (L) 39.9 (H) /  101 (H)   3.2 (L) 26 12.90 (H) \       Imaging results reviewed over the past 24 hrs:   No results found for this or any previous visit (from the past 24 hour(s)).

## 2023-09-28 NOTE — PROGRESS NOTES
Cycler set up per protocol and per treatment orders      Results from previous treatment:  Effluent color and clarity: clear yellow    Total UF: 64  Initial Drain: 16 ml  Avg Dwell: 1:45             Last Dwell: 0:28     Cycler Serial Number: [id number]  Total Treatment Volume: [id number]  Total treatment time: 9 hrs  Fill Volume: 2900ml  Last Fill Volume:0ml  Heater ba.5% 6000mL;  Lot #: [id number];  Expiration Date:   Side Bag #1: 1.5% 6000mL;  Lot #: [id number];  Expiration Date:      Number of cycles including final fill: 4  Dwell Time: 1:38 minutes  Last Fill Volume: 0ml  Initial Drain Alarm: 0ml  PD orders reviewed with Patient procedure and ESRD teaching done and questions answered.  Cycler ready for hook-up.    [facility] consent form signed yes

## 2023-09-28 NOTE — PROGRESS NOTES
[facility]    Nephrology Progress Note    Assessment & Plan     <ESRD, ON PERITONEAL DIALYSIS (CAPD AT HOME, CCPD IN HOSPITAL)    ~9-hour treatment, 4 cycles, 2.9 L fill volume. UF remains small. Perhaps feeling a bit less weak on lower dextrose concentration.     - Agree with holding BP meds for now. OK to hold ISMD and clonidine     -1 bag 1.5% and 1 bag 2.5% againtonight.       Sriram Castaneda MD  Nephrology  [facility]  Cell:[phone]  On Vocera   Pager:[phone]    .........    Interval History     Feeling a bit more like herself today, but not 100%    Breathing OK. Doing OK with the dialysis.       Temp: 98  F (36.7  C) Temp src: Oral BP: (!) 143/67 Pulse: 86   Resp: 18 SpO2: 97 % O2 Device: None (Room air)      Vitals:    09/25/23 0440 09/27/23 0644 09/28/23 0500   Weight: 96.1 kg (211 lb 13.8 oz) 96.9 kg (213 lb 10 oz) 94.9 kg (209 lb 3.5 oz)       Vital Signs with Ranges    Temp:  [98  F (36.7  C)-98.3  F (36.8  C)] 98  F (36.7  C)  Pulse:  [72-86] 86  Resp:  [16-18] 18  BP: (130-143)/(67-73) 143/67  SpO2:  [96 %-97 %] 97 %    No intake/output data recorded.    Physical Exam    BP Readings from Last 5 Encounters:   09/28/23 (!) 143/67   09/15/23 126/70   05/12/23 (!) 142/67   06/08/22 (!) 155/70   11/15/21 (!) 168/92        Wt Readings from Last 10 Encounters:   09/28/23 94.9 kg (209 lb 3.5 oz)   09/14/23 97 kg (213 lb 13.5 oz)   05/12/23 100.5 kg (221 lb 9.6 oz)   06/07/22 85.7 kg (189 lb)   11/15/21 93.4 kg (206 lb)   10/17/21 95.3 kg (210 lb)   12/29/20 111.6 kg (246 lb)   12/23/20 109.3 kg (241 lb)   09/05/20 121.6 kg (268 lb 1.6 oz)   11/30/18 124.7 kg (275 lb)     GENERAL: Alert, in no apparent distress.   HEENT:  Normocephalic. No gross abnormalities.  The mouth moist.    Neck The jugular venous pressure is not elevated  CV: RRR  RESP: Breathing unlabored   GI: Abdomen non-distended  MUSCULOSKELETAL: extremities nl - no gross deformities noted. No  edema  SKIN: no new lesions or rashes, dry to touch. Skin still looks like increase turgor to me.   NEURO:  No overt deficit.  PSYCH: mood neutral.        Medications      dianeal PD LOW calcium-1.5% dex (calcium 2.5 mEq/L) 6,000 mL PERITONEAL DIALYSATE      dianeal PD LOW calcium-2.5% dex (calcium 2.5 mEq/L) 6,000 mL PERITONEAL DIALYSATE      dianeal PD LOW calcium-2.5% dex (calcium 2.5 mEq/L) 6,000 mL PERITONEAL DIALYSATE      dianeal PD LOW calcium-2.5% dex (calcium 2.5 mEq/L) 6,000 mL PERITONEAL DIALYSATE          - MEDICATION INSTRUCTIONS for Dialysis Patients -   Does not apply See Admin Instructions    calcium carbonate  1,000 mg Oral TID w/meals    [Held by provider] cloNIDine  0.1 mg Oral BID    docusate sodium  100 mg Oral BID    [Held by provider] isosorbide mononitrate  15 mg Oral Daily    metoprolol succinate ER  12.5 mg Oral Daily    pantoprazole  40 mg Oral BID    polyethylene glycol  17 g Oral Daily    sodium chloride (PF)  3 mL Intracatheter Q8H       Data     UA RESULTS:  Recent Labs   Lab Test 05/29/21  1832 08/30/20  1455   COLOR Light Yellow Yellow   APPEARANCE Clear Cloudy*   URINEGLC Negative 150 mg/dL*   URINEBILI Negative Negative   URINEKETONE Negative Negative   SG 1.013 1.005   UBLD Small* Moderate*   URINEPH 7.0 5.0   PROTEIN 100* >=500 mg/dL*   UROBILINOGEN  --  <2.0 E.U./dL   NITRITE Negative Negative   LEUKEST Trace* Large*   RBCU 8* 10-25*   WBCU 6* >100*      BMP  Recent Labs   Lab 09/28/23  0702 09/27/23  0732 09/26/23  0809 09/25/23  0609 09/25/23  0542    141 138  --  140   POTASSIUM 3.2* 3.1* 3.1*  --  3.3*   CHLORIDE 97* 97* 94*  --  96*   RON 9.5 9.0 9.0  --  9.2   CO2 26 26 26  --  24   BUN 39.9* 42.5* 43.7*  --  44.7*   CR 12.90* 12.66* 12.81*  --  12.85*   * 111* 90 136* 142*     Phos@LABNTIPR(phos:4)  CBC)  Recent Labs   Lab 09/25/23  0542 09/22/23  0648   WBC  --  8.5   HGB 11.9 11.2*   HCT  --  35.5   MCV  --  82   PLT  --  238     Lab Results   Component  Value Date    ALBUMIN 2.5 09/20/2023    ALBUMIN 2.9 09/11/2023    ALBUMIN 2.1 09/04/2023    ALBUMIN 2.4 05/12/2023    ALBUMIN 2.3 05/11/2023    ALBUMIN 2.5 05/10/2023    ALBUMIN 2.5 05/29/2021        Recent Labs   Lab 09/25/23  0542 09/22/23  0648   HGB 11.9 11.2*   HCT  --  35.5   MCV  --  82       No lab results found in last 7 days.    Invalid input(s): BILIRUBININDIRECT  No lab results found in last 7 days.  No results found for: D2VIT, D3VIT, DTOT  No results for input(s): PTHI in the last 168 hours.    Attestation:   I have reviewed today's relevant vital signs, notes, medications, labs and imaging.

## 2023-09-28 NOTE — PLAN OF CARE
Goal Outcome Evaluation:      Plan of Care Reviewed With: patient    Overall Patient Progress: no changeOverall Patient Progress: no change     Patient refused to get up or repositioned in bed, refused to order dinner. Stating has pain with flushing IV, refused to have new line.  Tylenol  and gabapentin given per PRN order for hip pain. Peritoneal dialysis connected. Continue to monitor.

## 2023-09-29 NOTE — PROGRESS NOTES
MD notified: pain in right leg 6 out of 10. Pt requesting pain meds.      Order received for US and pain meds.

## 2023-09-29 NOTE — PLAN OF CARE
"Goal Outcome Evaluation:           Overall Patient Progress: no changeOverall Patient Progress: no change       /87 (BP Location: Right arm)   Pulse 86   Temp 97.9  F (36.6  C) (Oral)   Resp 18   Ht 1.549 m (5' 1\")   Wt 95.5 kg (210 lb 8.6 oz)   SpO2 91%   BMI 39.78 kg/m       A/O. VSS.Pt stated pain in the right thight. MD was notified. Dilaudid was administered.    "

## 2023-09-29 NOTE — PLAN OF CARE
"Care from [id number]    Inpatient Progress Note:  For complete assessment see flow sheet documentation.    BP (!) 168/83 (BP Location: Right arm)   Pulse 84   Temp 98.3  F (36.8  C) (Oral)   Resp 18   Ht 1.549 m (5' 1\")   Wt 94.9 kg (209 lb 3.5 oz)   SpO2 97%   BMI 39.53 kg/m         Orientation: A&OX4  Pain status: C/o moderate cramping on right thigh. PRN tylenol given  Activity: Ax2 with walker and gait blet  Peripheral edema: Mild edema on BLE  Resp: LS diminished  Cardiac: WNL  GI: WNL  : Peritoneal dialysis QHS  Diet: Renal  Discharge Plan: TBD    Will continue to monitor and provide cares.     Domenica Levi RN  "

## 2023-09-29 NOTE — PROGRESS NOTES
"[facility]    Hospitalist Progress Note    Date of Service (when I saw the patient): 09/29/2023  Provider:  Dago Dominguez MD   Text Page  7am - 6PM       Assessment & Plan   Devora Garcia is a 62 year old female with PMH HTN, ESRD on PD, and GUILLERMO on CPAP who presents with concerns for syncopal episode at home.     Recently admitted to [facility] from 8/24-9/15 eventually discharge to TCU for left hip and knee pain status post mechanical fall.  She also had an episode of acute encephalopathy and that eventually resolved during admission.  She was quite deconditioned.  She reports she was discharged from TCU on 9/17 back home.     Since discharging home from TCU patient has been growing weaker and weaker.  She has not had dialysis since Sunday 9/17 due to \"not having time\" per the patient.  She also has been feeling increasingly more sleepy.  Denies any concerns such as fever, chills, chest pain, shortness of breath, abdominal pain.  Patient reports she had a fall earlier in the day.  Attempted to call the significant other to get further history but was unable to reach him via phone.  Per the ED provider's note who spoke with the  he was moving her from the chair to start her dialysis when she became increasingly weak and dizzy and had a syncopal event.   denies seizure-like activity and reports she was unconscious for couple minutes and then came to.  No postictal state.  Patient also reported that she has not had anything to eat or drink for \"8 days\" due to lack of appetite.     In the ED, afebrile, blood pressure 104/61, heart rate 56, respirations 18, oxygen 99% on room air.  CMP notable for chloride 91, CO2 20, creatinine 15.39, anion gap 27, alk phos 106. CBC notable for hemoglobin of 11.0. EKG shows sinus rhythm with 1st degree av block.      Syncope  Fatigue   Based on the  report to ED provider sounds like patient had a prodrome of dizziness and weakness while " trying to transferring from chair prior to syncope. Questioning a possible orthostatic hypotension.  Likely multifactorial with her poor oral intake, chronic deconditioning and also missing dialysis for the last 4 days.  Patient is alert and orientated x4 able to answer all my questions appropriately, neuro exam normal. Last echo in May 2023 - normal left ventricular, moderate concentric left ventricular, EF 65-70%, moderate to severe mitral annular calcificiation. Also may have been receiving both metoprolol and carvedilol PTA.   - unable to make urine for UA but no evidence on infection otherwise  - TSH slightly high and free T4 slightly low but unlikely to explain the presentation  -stop sedating meds including gabapentin, robaxin, diluaded due to sedation this am  - PT/SW consulted, TCU recommended but difficulty placing patient and has been refusing therapy at times per nursing  - Ensure discharge on only one BB at discharge.     ESRD on PD  AGMA likely secondary to ESRD  Anemia of CKD  Hypokalemia  Anion gap slightly higher than her baseline suspect due to missing dialysis for the past 4 days. Also questioning if missing dialysis is contributing to her increased fatigue and sleepiness. Hemoglobin stable. Potassium low at 2.9 this morning.  - Nephrology consult, PD performed nightly  - Patient may need ongoing potassium supplementation as needed     HTN  Hypotensive on arrival.  Resumed clonidine and Imdur 2 days ago which resulted in hypotension.  - continue metoprolol, ensure not discharge on both metoprolol and carvedilol  - hold PTA clonidine and Imdur for now, watch for rebound hypertension but so far blood pressure remains normal     GUILLERMO  - use CPAP at night      Abnormal TFTs  Only slightly abnormal, showing possibly some hypothyroidism.  - would recheck TFTs in 1 month, if still abnormal consider small dose of levothyroxine.     Diet: Renal Diet (dialysis)    DVT Prophylaxis: Pneumatic Compression  Devices  Taylor Catheter: Not present  Lines: None     Cardiac Monitoring: None  Code Status: Full Code          Disposition Plan      Expected Discharge Date: 10/02/2023      Destination: home with help/services  Discharge Comments: Difficult placement- pt needs TCU with peritoneal dialysis, will not go back to [place]. May have to rehab here to go home         DVT Prophylaxis: Pneumatic Compression Devices  Code Status: Full Code      Interval History   Patient feels improvement after dialysis last night.  She is working with physical therapy and Occupational Therapy as well as  for discharge planning.  Plan is to work with her over the weekend and discharge to home on Monday if TCU referrals are back and denied.    -Data reviewed today: I reviewed all new labs and imaging results over the last 24 hours.       Physical Exam   Temp: 97.7  F (36.5  C) Temp src: Oral BP: (Abnormal) 155/72 Pulse: 91   Resp: 16 SpO2: 98 % O2 Device: None (Room air)    Vitals:    09/27/23 0644 09/28/23 0500 09/29/23 0433   Weight: 96.9 kg (213 lb 10 oz) 94.9 kg (209 lb 3.5 oz) 95.5 kg (210 lb 8.6 oz)     Vital Signs with Ranges  Temp:  [97.4  F (36.3  C)-98.3  F (36.8  C)] 97.7  F (36.5  C)  Pulse:  [74-91] 91  Resp:  [16-18] 16  BP: (118-168)/(72-95) 155/72  SpO2:  [91 %-98 %] 98 %  I/O last 3 completed shifts:  In: 53 [Other:53]  Out: -     GEN:  Alert, oriented x 3, appears comfortable, NAD.  HEENT:  Normocephalic/atraumatic, no scleral icterus, no nasal discharge, mouth moist.  CV:  Regular rate and rhythm, no murmur or JVD.  S1 + S2 noted, no S3 or S4.  LUNGS:  Clear to auscultation bilaterally without rales/rhonchi/wheezing/retractions.  Symmetric chest rise on inhalation noted.  ABD:  Active bowel sounds, soft, non-tender/non-distended.  No rebound/guarding/rigidity.  EXT:  No edema or cyanosis.  No joint synovitis noted.  SKIN:  Dry to touch, no exanthems noted in the visualized areas.       Medications    dianeal  PD LOW calcium-1.5% dex (calcium 2.5 mEq/L) 6,000 mL PERITONEAL DIALYSATE      dianeal PD LOW calcium-2.5% dex (calcium 2.5 mEq/L) 6,000 mL PERITONEAL DIALYSATE      dianeal PD LOW calcium-2.5% dex (calcium 2.5 mEq/L) 6,000 mL PERITONEAL DIALYSATE      dianeal PD LOW calcium-2.5% dex (calcium 2.5 mEq/L) 6,000 mL PERITONEAL DIALYSATE        - MEDICATION INSTRUCTIONS for Dialysis Patients -   Does not apply See Admin Instructions    calcium carbonate  1,000 mg Oral TID w/meals    [Held by provider] cloNIDine  0.1 mg Oral BID    docusate sodium  100 mg Oral BID    [Held by provider] isosorbide mononitrate  15 mg Oral Daily    metoprolol succinate ER  12.5 mg Oral Daily    pantoprazole  40 mg Oral BID    polyethylene glycol  17 g Oral Daily    sodium chloride (PF)  3 mL Intracatheter Q8H       Data   Recent Labs   Lab 09/28/23  0702 09/27/23  0732 09/26/23  0809 09/25/23  0609 09/25/23  0542   HGB  --   --   --   --  11.9    141 138  --  140   POTASSIUM 3.2* 3.1* 3.1*  --  3.3*   CHLORIDE 97* 97* 94*  --  96*   CO2 26 26 26  --  24   BUN 39.9* 42.5* 43.7*  --  44.7*   CR 12.90* 12.66* 12.81*  --  12.85*   ANIONGAP 17* 18* 18*  --  20*   RON 9.5 9.0 9.0  --  9.2   * 111* 90   < > 142*    < > = values in this interval not displayed.       Recent Results (from the past 24 hour(s))   US Lower Extremity Venous Duplex Right    Narrative    VENOUS ULTRASOUND RIGHT LEG  9/29/2023 10:23 AM     HISTORY: RLE pain/swelling    COMPARISON: None.    FINDINGS:  Examination of the deep veins with graded compression and  color flow Doppler with spectral wave form analysis was performed.   Limited exam. Unable to compress and some locations due to pain. No  definite DVT identified.      Impression    IMPRESSION: No evidence of deep venous thrombosis.    ALISHA HANDY MD         SYSTEM ID:  [id number]         Securely message with the [employer] (learn more here)  Text page via [facility] Paging/Directory         Disclaimer: This note consists of symbols derived from keyboarding, dictation and/or voice recognition software. As a result, there may be errors in the script that have gone undetected. Please consider this when interpreting information found in this chart.

## 2023-09-29 NOTE — PROGRESS NOTES
Cycler set up per protocol and per treatment orders     Results from previous treatment:  Effluent color and clarity: yellow and clear  Total UF: -53  Initial Drain: 36ml   Avg Dwell: 1:36  Lost Dwell: 0:07    Cycler Serial Number: [id number]  Total Treatment Volume: [id number]  Total treatment time: 9 hrs  Fill Volume: 2900ml  Last Fill Volume:0ml  Heater ba.5% 6000mL;  Lot #: [id number];  Expiration Date:   Side Bag #1: 2.5% 6000mL;  Lot #: [id number];  Expiration Date:     Number of cycles including final fill: 4  Dwell Time: 1:38 minutes  Last Fill Volume: 0ml  Initial Drain Alarm: 0ml  PD orders reviewed with Patient procedure and ESRD teaching done and questions answered.  Cycler ready for hook-up.    Report given to: Luis Pollard consent form signed yes

## 2023-09-29 NOTE — PROGRESS NOTES
Care Management Follow Up    Length of Stay (days): 0    Expected Discharge Date: 10/02/2023     Concerns to be Addressed: discharge planning, care coordination/care conferences     Patient plan of care discussed at interdisciplinary rounds: Yes    Anticipated Discharge Disposition: Transitional Care, Other (Comments) (Patient declines)     Anticipated Discharge Services: None  Anticipated Discharge DME:      Patient/family educated on Medicare website which has current facility and service quality ratings:    Education Provided on the Discharge Plan:    Patient/Family in Agreement with the Plan: unable to assess    Referrals Placed by CM/SW:    Private pay costs discussed: Not applicable    Additional Information:  CM followed up with [facility] and they confirmed patient left AMA after 2 days, would not give their staff a chance.   She has been declined by most all TCU referrals sent. Still have some pending but they will likely decline due to peritoneal dialysis and patient leaving last TCU AMA.     1453  Met with PT, patient and spouse to explore discharge plan. Updated that TCU placement will be highly unlikely due to the very limited facilities that take patients on peritoneal dialysis. We are waiting to hear back from two facilities. Left voicemail at [facility] and [facility] to follow up. Spoke with Funmilayo from [place] and she stated they have one facility, [facility], that would take a patient with peritoneal dialysis however there are currently no beds and a wait list. Discussed need for equipment for patient to return home. She has one step from garage into lower level of home where patient stays. PT provided information on ramps, little step. Patient would benefit from having a bedside commode, wheelchair. Patient has agreed to work here with PT and OT to improve her strength. Will send out homecare referral for PT, OT, HHA, SN. Patient also requested education  again regarding her peritoneal dialysis. Will contact [facility] to arrange this. Will provide resources for private pay services as well as spouse would like more assist with patient personal cares. Care coordination to continue to follow.     1516 Spoke with Jovita ESCAMILLA at [facility] regarding patient admission and patient request to have further instruction. Care coordination to contact [facility] and talk to either Jovita or Carli at [phone] when discharge date known and they will follow patient closely and are able to go out to the home to instruct and set-up.    Hazel Duvall RN  Care Coordinator  [facility]

## 2023-09-29 NOTE — UTILIZATION REVIEW
Concurrent stay review; Secondary Review Determination - [facility]        Under the authority of the Utilization Management Committee, the utilization review process indicated a secondary review on the above patient.  The review outcome is based on review of the medical records, discussions with staff, and applying clinical experience noted on the date of the review.        (x) Observation/outpatient Status Appropriate - Concurrent stay review       RATIONALE FOR DETERMINATION:   Devora Garcia is a 62 year old female with PMH HTN, ESRD on PD, and GUILLERMO on CPAP, and recent discharge from TCU following a mechanical fall who was admitted to observation status on 9/20/2023 with progressive weakness, poor po intake and concerns for syncopal episode.  She is medically stable but awaiting TCU placement.  She requires TCU with peritoneal dialysis capability.      Patient delayed discharge is related to disposition, there is no medical necessity for inpatient admission at the time of this review. If there is a change in patient status, please resend for review.    The information on this document is developed by the utilization review team in order for the business office to ensure compliance.  This only denotes the appropriateness of proper admission status and does not reflect the quality of care rendered.       The definitions of Inpatient Status and Observation Status used in making the determination above are those provided in the CMS Coverage Manual, Chapter 1 and Chapter 6, section 70.4.       Sincerely,    Arlette Cruz MD  Physician Advisor  Utilization Management

## 2023-09-29 NOTE — PROGRESS NOTES
"Pt ambulated to bathroom with assist of 2. While standing to clean pt, pt reported dizziness. Sat patient back down to manage dizziness. When comfortable, used sera steady to get pt back to bed. Pt reports dizziness has improved while sitting up in bed. Vitals checked while back in bed. BP (!) 155/72   Pulse 91   Temp 97.7  F (36.5  C) (Oral)   Resp 16   Ht 1.549 m (5' 1\")   Wt 95.5 kg (210 lb 8.6 oz)   SpO2 98%   BMI 39.78 kg/m    VSS x HTN.   "

## 2023-09-30 NOTE — PLAN OF CARE
"Shift: 5830-7116    OBS Pt.    Vitals: VSS x HTN  BP (!) 155/72   Pulse 91   Temp 97.7  F (36.5  C) (Oral)   Resp 16   Ht 1.549 m (5' 1\")   Wt 95.5 kg (210 lb 8.6 oz)   SpO2 98%   BMI 39.78 kg/m      O2: Room air  Orientation: AOx4  Pain: 8/10 pain managed by Tylenol PRN, but Tylenol emesis. Not tolerating PO.  Activity: Assist of 2 GB Walker. Pt was up today, assisted to bed outside of room to go to Ultrasound. Up to bathroom. See previous note about dizziness after ambulation.  Lines \ Drains: PIV L arm.   Lungs: Dim.   GI: Last BM 9/28  : Limited urine output, pt on PD, PD is ready for next admin.  Diet: Renal diet. Not tolerating PO intake.  Skin: Dryness cracked scattered.  Considerations: Pt is frequently nauseous, treated with PO Zofran and IV Compazine.   Plan: Rehab here.      Goal Outcome Evaluation:      Plan of Care Reviewed With: patient    Overall Patient Progress: improvingOverall Patient Progress: improving           "

## 2023-09-30 NOTE — PLAN OF CARE
A/O x4, renal diet, peritoneal dialysis running at night, Ax2 with walker and GB. Complaint of N/V, IV Zofran was given. 8/10 abdomen cramps, IV dilaudid was given, pain subsided.

## 2023-09-30 NOTE — PROGRESS NOTES
Cycler set up per protocol and per treatment orders     Results from previous treatment:  Effluent color and clarity: yellow, clear  Total UF: 490ml  Initial Drain: 24ml  Avg Dwell: 1:00  Lost Dwell: 2:32    Cycler Serial Number: [id number]  Total Treatment Volume: 11,600mL  Total treatment time: 9 hrs  Fill Volume: 2900ml  Last Fill Volume:0ml  Heater ba.5% 6000mL;  Lot #: [id number];  Expiration Date: 2025  Side Bag #1: 1.5% 6000mL;  Lot #: [id number];  Expiration Date: 2024  Number of cycles including final fill: 4  Dwell Time: 1:38 minutes  Last Fill Volume: 0ml  Initial Drain Alarm: 0ml  PD orders reviewed with Patient procedure and ESRD teaching done and questions answered.  Cycler ready for hook-up.    Report given to: FANTASMA Turner RN

## 2023-09-30 NOTE — PROGRESS NOTES
09/30/23 1112   Appointment Info   Signing Clinician's Name / Credentials (OT) Johanna Cullen, OTR/L   Living Environment   People in Home spouse   Current Living Arrangements house   Home Accessibility stairs to enter home   Number of Stairs, Main Entrance 1   Number of Stairs, Within Home, Primary greater than 10 stairs   Stair Railings, Within Home, Primary railings safe and in good condition   Transportation Anticipated family or friend will provide   Living Environment Comments Pt lives in [facility] with spouse, 1 BERYL.   Self-Care   Usual Activity Tolerance fair   Current Activity Tolerance poor   Equipment Currently Used at Home walker, standard   Fall history within last six months yes   Number of times patient has fallen within last six months 2   Activity/Exercise/Self-Care Comment Pt reports mod I in all ADLs and mobility tasks with use of walker. Pt owns RTS, w/c.   General Information   Onset of Illness/Injury or Date of Surgery 09/20/23   Referring Physician Dago Dominguez MD   Patient/Family Therapy Goal Statement (OT) Pt's goal is to get stronger   Additional Occupational Profile Info/Pertinent History of Current Problem Per chart: Pt is a 63 year old syncopal episode at home.     Recently admitted to [facility] from 8/24-9/15 eventually discharge to TCU for left hip and knee pain status post mechanical fall.  She also had an episode of acute encephalopathy and that eventually resolved during admission.  She was quite deconditioned.   Existing Precautions/Restrictions fall   Cognitive Status Examination   Orientation Status orientation to person, place and time   Sensory   Sensory Quick Adds sensation intact   Pain Assessment   Patient Currently in Pain No   Range of Motion Comprehensive   Comment, General Range of Motion BUEs WFL   Strength Comprehensive (MMT)   Comment, General Manual Muscle Testing (MMT) Assessment Generalized weakness   Bed Mobility   Bed Mobility supine-sit;sit-supine    Scooting/Bridging [place] (Bed Mobility) contact guard   Supine-Sit [place] (Bed Mobility) not tested   Comment (Bed Mobility) Pt remained in chair at end of session   Transfers   Transfers bed-chair transfer;sit-stand transfer;toilet transfer;shower transfer   Transfer Skill: Bed to Chair/Chair to Bed   Bed-Chair [place] (Transfers) contact guard   Assistive Device (Bed-Chair Transfers) rolling walker   Sit-Stand Transfer   Sit-Stand [place] (Transfers) minimum assist (75% patient effort)   Assistive Device (Sit-Stand Transfers) walker, front-wheeled   Shower Transfer   [place] Level (Shower Transfer) not tested   Toilet Transfer   [place] Level (Toilet Transfer) unable to assess   Activities of Daily Living   BADL Assessment/Intervention upper body dressing;lower body dressing;grooming;toileting   Upper Body Dressing Assessment/Training   [place] Level (Upper Body Dressing) contact guard assist   Lower Body Dressing Assessment/Training   [place] Level (Lower Body Dressing) moderate assist (50% patient effort)   Grooming Assessment/Training   [place] Level (Grooming) contact guard assist   Toileting   [place] Level (Toileting) moderate assist (50% patient effort)   Clinical Impression   Criteria for Skilled Therapeutic Interventions Met (OT) Yes, treatment indicated   OT Diagnosis Impaired ADLs, IADLs and mobility tasks   OT Problem List-Impairments impacting ADL problems related to;activity tolerance impaired;balance;strength   ADL comments/analysis Pt below baseline level of functioning   Assessment of Occupational Performance 5 or more Performance Deficits   Identified Performance Deficits Bathing, dressing, grooming, toileting, homemaking, transfers   Planned Therapy Interventions (OT) ADL retraining;IADL retraining;strengthening;transfer training   Clinical Decision Making Complexity (OT) low complexity   Risk & Benefits of therapy have been explained  evaluation/treatment results reviewed;care plan/treatment goals reviewed;risks/benefits reviewed;current/potential barriers reviewed;participants voiced agreement with care plan;participants included;patient   OT Total Evaluation Time   OT Eval, Low Complexity Minutes (86055) 10   OT Goals   Therapy Frequency (OT) Daily   OT Predicted Duration/Target Date for Goal Attainment 10/13/23   OT Goals Hygiene/Grooming;Lower Body Dressing;Toilet Transfer/Toileting;OT Goal 1   OT: Hygiene/Grooming supervision/stand-by assist;using adaptive equipment;while standing   OT: Upper Body Dressing Supervision/stand-by assist;using adaptive equipment   OT: Lower Body Dressing Supervision/stand-by assist;using adaptive equipment   OT: Toilet Transfer/Toileting Supervision/stand-by assist;toilet transfer;cleaning and garment management;using adaptive equipment   OT: Goal 1 Pt will engage in 5-10 minutes of functional activity in order to improve tolerance needed for ADLs in discharge environment.   OT Discharge Planning   OT Discharge Recommendation (DC Rec) Transitional Care Facility   OT Rationale for DC Rec Pt presenting below baseline level of functioning, limited by impaired activity tolerance and strength. Recommend ongoing skilled OT while IP and in TCU setting to improve strength, functional activity tolerance, balance and safety needed for daily tasks. If returns home, would require Ax1 for ADLs/IADLs and mobility tasks and HH therapies.   OT Brief overview of current status CGA supine > sit, sit > stand from EOB to FWW with CGA and bed height elevated, ambulated to bedside chair w/ CGA   Total Session Time   Total Session Time (sum of timed and untimed services) 10      [facility]  OUTPATIENT OCCUPATIONAL THERAPY  EVALUATION  PLAN OF TREATMENT FOR OUTPATIENT REHABILITATION  (COMPLETE FOR INITIAL CLAIMS ONLY)  Patient's Last Name, First Name, M.I.  YOB: 1960  Devora Garcia   R                          Provider's Name  [facility] Medical Record No.  [id number]                             Onset Date:  09/20/23   Start of Care Date:  09/30/23   Type:     ___PT   _X_OT   ___SLP Medical Diagnosis:  Syncope                    OT Diagnosis:  Impaired ADLs, IADLs and mobility tasks Visits from SOC:  1     See note for plan of treatment, functional goals and certification details    I CERTIFY THE NEED FOR THESE SERVICES FURNISHED UNDER        THIS PLAN OF TREATMENT AND WHILE UNDER MY CARE     (Physician co-signature of this document indicates review and certification of the therapy plan).

## 2023-09-30 NOTE — PROVIDER NOTIFICATION
Pt. complains of nausea and cramping pain in her abdomen. She was given compazine but still nauseated, will try zofran,  She tried tylenol for the cramping pain but threw that up. She is requesting something for pain and nausea. MD paged

## 2023-09-30 NOTE — PROGRESS NOTES
"PRIMARY DIAGNOSIS: \"GENERIC\" NURSING  OUTPATIENT/OBSERVATION GOALS TO BE MET BEFORE DISCHARGE:  ADLs back to baseline: No    Activity and level of assistance: Up with maximum assistance. Consider SW and/or PT evaluation.     Pain status: Improved-controlled with oral pain medications.    Return to near baseline physical activity: No     Discharge Planner Nurse   Safe discharge environment identified: Yes  Barriers to discharge: Yes       Entered by: Laz Marcum RN 09/30/2023 2:59 AM     Please review provider order for any additional goals.   Nurse to notify provider when observation goals have been met and patient is ready for discharge.  "

## 2023-09-30 NOTE — PROGRESS NOTES
"She reports of having a vomiting episode last night, she has Zofran after that.  [facility]    Hospitalist Progress Note    Date of Service (when I saw the patient): 09/30/2023  Provider:  Dago Dominguez MD   Text Page  7am - 6PM       Assessment & Plan   Devora Garcia is a 62 year old female with PMH HTN, ESRD on PD, and GUILLERMO on CPAP who presents with concerns for syncopal episode at home.     Recently admitted to [facility] from 8/24-9/15 eventually discharge to TCU for left hip and knee pain status post mechanical fall.  She also had an episode of acute encephalopathy and that eventually resolved during admission.  She was quite deconditioned.  She reports she was discharged from TCU on 9/17 back home.     Since discharging home from TCU patient has been growing weaker and weaker.  She has not had dialysis since Sunday 9/17 due to \"not having time\" per the patient.  She also has been feeling increasingly more sleepy.  Denies any concerns such as fever, chills, chest pain, shortness of breath, abdominal pain.  Patient reports she had a fall earlier in the day.  Attempted to call the significant other to get further history but was unable to reach him via phone.  Per the ED provider's note who spoke with the  he was moving her from the chair to start her dialysis when she became increasingly weak and dizzy and had a syncopal event.   denies seizure-like activity and reports she was unconscious for couple minutes and then came to.  No postictal state.  Patient also reported that she has not had anything to eat or drink for \"8 days\" due to lack of appetite.     In the ED, afebrile, blood pressure 104/61, heart rate 56, respirations 18, oxygen 99% on room air.  CMP notable for chloride 91, CO2 20, creatinine 15.39, anion gap 27, alk phos 106. CBC notable for hemoglobin of 11.0. EKG shows sinus rhythm with 1st degree av block.      Syncope  Fatigue   Based on the  report to " ED provider sounds like patient had a prodrome of dizziness and weakness while trying to transferring from chair prior to syncope. Questioning a possible orthostatic hypotension.  Likely multifactorial with her poor oral intake, chronic deconditioning and also missing dialysis for the last 4 days.  Patient is alert and orientated x4 able to answer all my questions appropriately, neuro exam normal. Last echo in May 2023 - normal left ventricular, moderate concentric left ventricular, EF 65-70%, moderate to severe mitral annular calcificiation. Also may have been receiving both metoprolol and carvedilol PTA.   - unable to make urine for UA but no evidence on infection otherwise  - TSH slightly high and free T4 slightly low but unlikely to explain the presentation  -stop sedating meds including gabapentin, robaxin, diluaded due to sedation this am  - PT/SW consulted, TCU recommended but difficulty placing patient and has been refusing therapy at times per nursing  - Ensure discharge on only one BB at discharge.     ESRD on PD  AGMA likely secondary to ESRD  Anemia of CKD  Hypokalemia  Anion gap slightly higher than her baseline suspect due to missing dialysis for the past 4 days. Also questioning if missing dialysis is contributing to her increased fatigue and sleepiness. Hemoglobin stable. Potassium low at 2.9 this morning.  - Nephrology consult, PD performed nightly  - Patient may need ongoing potassium supplementation as needed     HTN  Hypotensive on arrival.  Resumed clonidine and Imdur 2 days ago which resulted in hypotension.  - continue metoprolol, ensure not discharge on both metoprolol and carvedilol  - hold PTA clonidine and Imdur for now, watch for rebound hypertension but so far blood pressure remains normal     GUILLERMO  - use CPAP at night      Abnormal TFTs  Only slightly abnormal, showing possibly some hypothyroidism.  - would recheck TFTs in 1 month, if still abnormal consider small dose of  levothyroxine.     Diet: Renal Diet (dialysis)    DVT Prophylaxis: Pneumatic Compression Devices  Taylor Catheter: Not present  Lines: None     Cardiac Monitoring: None  Code Status: Full Code          Disposition Plan      Expected Discharge Date: 10/02/2023      Destination: home with help/services  Discharge Comments: Difficult placement- pt needs TCU with peritoneal dialysis, will not go back to [place]. May have to rehab here to go home         DVT Prophylaxis: Pneumatic Compression Devices  Code Status: Full Code      Interval History   She reports of having  an emesis episode last night, had Zofran, no more events.  She completed peritoneal dialysis overnight. She is working with physical therapy and Occupational Therapy as well as  for discharge planning.  Plan is to work with her over the weekend and discharge to home on Monday if TCU referrals are back and denied.  Vital signs are stable except for recurrent elevation of systolic blood pressure.    -Data reviewed today: I reviewed all new labs and imaging results over the last 24 hours.       Physical Exam   Temp: 98.3  F (36.8  C) Temp src: Oral BP: 138/58 Pulse: 90   Resp: 16 SpO2: 98 % O2 Device: None (Room air)    Vitals:    09/27/23 0644 09/28/23 0500 09/29/23 0433   Weight: 96.9 kg (213 lb 10 oz) 94.9 kg (209 lb 3.5 oz) 95.5 kg (210 lb 8.6 oz)     Vital Signs with Ranges  Temp:  [97.4  F (36.3  C)-98.3  F (36.8  C)] 98.3  F (36.8  C)  Pulse:  [74-95] 90  Resp:  [16-18] 16  BP: (131-175)/() 138/58  SpO2:  [95 %-99 %] 98 %  I/O last 3 completed shifts:  In: 53 [Other:53]  Out: 100 [Emesis/NG output:100]    GEN:  Alert, oriented x 3, appears comfortable, NAD.  HEENT:  Normocephalic/atraumatic, no scleral icterus, no nasal discharge, mouth moist.  CV:  Regular rate and rhythm, no murmur or JVD.  S1 + S2 noted, no S3 or S4.  LUNGS:  Clear to auscultation bilaterally without rales/rhonchi/wheezing/retractions.  Symmetric chest rise on  inhalation noted.  ABD:  Active bowel sounds, soft, non-tender/non-distended.  No rebound/guarding/rigidity.  EXT:  No edema or cyanosis.  No joint synovitis noted.  SKIN:  Dry to touch, no exanthems noted in the visualized areas.       Medications    dianeal PD LOW calcium-1.5% dex (calcium 2.5 mEq/L) 6,000 mL PERITONEAL DIALYSATE      dianeal PD LOW calcium-2.5% dex (calcium 2.5 mEq/L) 6,000 mL PERITONEAL DIALYSATE      dianeal PD LOW calcium-2.5% dex (calcium 2.5 mEq/L) 6,000 mL PERITONEAL DIALYSATE      dianeal PD LOW calcium-2.5% dex (calcium 2.5 mEq/L) 6,000 mL PERITONEAL DIALYSATE        - MEDICATION INSTRUCTIONS for Dialysis Patients -   Does not apply See Admin Instructions    calcium carbonate  1,000 mg Oral TID w/meals    [Held by provider] cloNIDine  0.1 mg Oral BID    docusate sodium  100 mg Oral BID    [Held by provider] isosorbide mononitrate  15 mg Oral Daily    metoprolol succinate ER  12.5 mg Oral Daily    pantoprazole  40 mg Oral BID    polyethylene glycol  17 g Oral Daily    sodium chloride (PF)  3 mL Intracatheter Q8H       Data   Recent Labs   Lab 09/28/23  0702 09/27/23  0732 09/26/23  0809 09/25/23  0609 09/25/23  0542   HGB  --   --   --   --  11.9    141 138  --  140   POTASSIUM 3.2* 3.1* 3.1*  --  3.3*   CHLORIDE 97* 97* 94*  --  96*   CO2 26 26 26  --  24   BUN 39.9* 42.5* 43.7*  --  44.7*   CR 12.90* 12.66* 12.81*  --  12.85*   ANIONGAP 17* 18* 18*  --  20*   RON 9.5 9.0 9.0  --  9.2   * 111* 90   < > 142*    < > = values in this interval not displayed.         No results found for this or any previous visit (from the past 24 hour(s)).        Securely message with the Vocera Web Console (learn more here)  Text page via [employer] Paging/Directory        Disclaimer: This note consists of symbols derived from keyboarding, dictation and/or voice recognition software. As a result, there may be errors in the script that have gone undetected. Please consider this when interpreting  information found in this chart.

## 2023-10-01 NOTE — PROGRESS NOTES
Code Blue called 1424. Pt cardiac arrested. Flyer responded. NATASHA placed. IO x 2 placed in lower extremities. PT originally in PEA, after some medicine intervention Pt in V-TACH/torsades. Pt given EPI x3, Sodium Bicarb x 2, Calcium chloride x 1, Mg IV push 2 mg,150 mg IV push of amiodarone. PT shocked with 150 J, no pulse continued CPR. PT in Torsades/Vtach, shocked with 200 J. CPR continued. Following pt in asystole. Per Ultrasound by [facility], Cardiac activity not witnessed. CPR continued. TOD 1448.

## 2023-10-01 NOTE — CODE DOCUMENTATION
Code Blue:  Devora Garcia is a 62 year old female with PMH HTN, ESRD on PD, and GUILLERMO on CPAP who presents with concerns for syncopal episode at home.     Patient was about ready to work with physical therapy.  Per report, she was sitting at the edge of the bed about to stand when she suddenly lost consciousness and slumped over.  She was pulseless.  She was immediately returned to bed and code blue was called.    I was at bedside within 30 seconds and CPR was already initiated.  She was attached to the ZOLL.  She did not have any IV access and so an IO was placed.  Initial rhythm on the ZOLL was PEA.  She was given 1mg of epinephrine ~every 4 minutes.  She also received 2x 1 amp bicarbonate during the code, magnesium, and calcium gluconate.      During the code PEA --> VTACH at which time she was defibrillated.  Next pulse/rhythm check appeared to be vfib at which time she was defibrillated again.  Next pulse/rhythm check was asystole.  CPR was continued.     Morning labs unremarkable.  BLE US 2 days ago without DVT making PE unlikely.  Patient presents with episodes of syncope and so I wonder if cardiac arrhythmia was the cause.  She is also ESRD making risk of sudden cardiac death high.     CPR was continued for 25 minutes and patient remained pulseless and in asystole.  In this patient with ESRD on PD (sounds like she wasn't doing her PD consistently), poor oral intake, and multiple falls meaningful recovery was thought unlikely.     The patient was pronounced dead at 1448.     Family was notified of cardiac arrest early/mid code and were updated once they arrived by primary nephrologist and primary hospitalist and primary nurse.     Waqas Ignacio,

## 2023-10-01 NOTE — PLAN OF CARE
"Goal Outcome Evaluation:           Overall Patient Progress: no changeOverall Patient Progress: no change     /62 (BP Location: Right arm)   Pulse 91   Temp 98.2  F (36.8  C) (Oral)   Resp 16   Ht 1.549 m (5' 1\")   Wt 95.5 kg (210 lb 8.6 oz)   SpO2 100%   BMI 39.78 kg/m      A/O. VSS. Pt denies pain during the shift. Assist of 1    "

## 2023-10-01 NOTE — PROGRESS NOTES
Nephrology    Patient coded. In midst of resuscitation right now.     I did contact her  Tavares and let him know that she lost her pulse and blood pressure and that they have not yet been able to restore circulation.     He is coming to the hospital immediately.     Sriram Castaneda MD  Nephrology  [facility]  Cell:[phone]  On [employer]   Pager:[phone]

## 2023-10-01 NOTE — SIGNIFICANT EVENT
PT: Patient working with physical therapy (walked 10 feet, did single step similar to session yesterday).  Patient requested to return to sitting at EOB.  Therapist lowered bed rail, assisted patient to sitting.  Patient's UEs began to shake, patient stopped talking to therapist, therapist laid patient onto bed, nursing came to room, RRT and then Code Blue called.

## 2023-10-01 NOTE — PROGRESS NOTES
Met with Marcia's  Tavares shortly after his arrival. He was understandably shocked and grieving.     Reviewed the events of Marcia's arrest and told him about the efforts of the resuscitation. Offered my condolences and encouraged him to spend the time he needs with Marcia.     I secure messaged Dr. Haley, Marcia's usual nephrologist of her death.       Sriram Castaneda MD  Nephrology  [facility]  Cell:[phone]  On [facility]   Pager:[phone]

## 2023-10-01 NOTE — PROGRESS NOTES
Pt alert and orientated, up with assist of 2 gait belt and walker. Pt does not have IV access.  Renal diet being tolerated. Pt BP's have been on softer side. On peritoneal dialysis. VSS on RA. Pt has poor appetite.

## 2023-10-01 NOTE — PLAN OF CARE
Goal Outcome Evaluation:      Plan of Care Reviewed With: patient      Pt alert and orientated x4 with flat affect.  Pt up with assist of 1-2 gb/walker.

## 2023-10-01 NOTE — PROGRESS NOTES
RN called on vocera by PT Kathryn Alysia, stating she was in room 332 and needed help now. This RN ran to the room asked for additional help. PT was there with patient laying half way on the bed, pt was not responding. RRT called, 5 seconds later a code Blue was called. After placing pt back on bed this RN started compressions immediately. Code Team arrived, Cedrick placed, airway placed, IO x2 placed in lower extremities. Pt was give EPI x3, Sodium Bicarb x2, Calcium chloride x1, Mg IV push 2mg, 150 mg push of amiodarone. Pt shocked, no pulse continued CPR, pt went into Torsades/Vtach, shocked again with 200 J. CPR continued. Pt went into asystole. No pulses or cardiac activity witnessed. CPR continued. MD called TOD at 8143

## 2023-10-01 NOTE — PROGRESS NOTES
RT note: responded to code blue, RT took over BVM. Rescue pod in line, placed oral aiway until pt intubated at 1435 with size 7 ETT, secured 23cm at the teeth.

## 2023-10-01 NOTE — ANESTHESIA CARE TRANSFER NOTE
Patient: Devora Garcia    Procedure: * No procedures listed *       Diagnosis: * No pre-op diagnosis entered *  Diagnosis Additional Information: No value filed.    Anesthesia Type:   No value filed.     Note:    Oropharynx: endotracheal tube in place  Level of Consciousness: unresponsive      Independent Airway: airway patency not satisfactory and stable  Dentition: dentition unchanged    Report to RN Given: handoff report given  Patient transferred to: Medical/Surgical Unit    Handoff Report: Identifed the Patient, Identified the Reponsible Provider, Reviewed the pertinent medical history, Discussed the surgical course, Reviewed Intra-OP anesthesia mangement and issues during anesthesia, Set expectations for post-procedure period and Allowed opportunity for questions and acknowledgement of understanding      Vitals:  Vitals Value Taken Time   BP     Temp     Pulse     Resp     SpO2         Electronically Signed By: FAINA Stiles CRNA  October 1, 2023  2:57 PM

## 2023-10-01 NOTE — PLAN OF CARE
"Goal Outcome Evaluation:      Plan of Care Reviewed With: patient    Overall Patient Progress: no changeOverall Patient Progress: no change     PRIMARY DIAGNOSIS: \"GENERIC\" NURSING  OUTPATIENT/OBSERVATION GOALS TO BE MET BEFORE DISCHARGE:  ADLs back to baseline: Yes    Activity and level of assistance: Up with assist of 1, gait belt and walker    Pain status: Improved with use of alternative comfort measures i.e.: positioning    Return to near baseline physical activity: No, pt up with assist of 1, gait belt and walker     Discharge Planner Nurse   Safe discharge environment identified: No  Barriers to discharge: Yes, pt. To discharge to TCU for rehab, will need a TCU that accepts PD pt's. Pt. Refuses to return to [facility].        Entered by: Apurva Hoyos RN 10/01/2023 4:40 AM     Please review provider order for any additional goals.   Nurse to notify provider when observation goals have been met and patient is ready for discharge.       "

## 2023-10-01 NOTE — PROGRESS NOTES
Pt passed away at 1448,  visited and was encouraged to spend all the time needed with his wife Marcia. Pt spouse spent time with her and left at around 1650 with pt belongings. The body was prepared,ice pack applied on the eyes as per organ donor's request, security was called and the body was taken downstairs at the morgue at around 1730.  was called, given the number to call the hospital supervisor when he has made his mind about the  home or cremation center he has decided on.

## 2023-10-01 NOTE — PROGRESS NOTES
NEPHROLOGY    No acute issues. Dialysis without event.     Marcia was able to ambulate a bit better today.     Dialysis orders entered. No change from yesterday.

## 2023-10-01 NOTE — DEATH PRONOUNCEMENT
MD DEATH PRONOUNCEMENT    Called to pronounce Devora Garcia dead.    Physical Exam: Unresponsive to noxious stimuli, Spontaneous respirations absent, Breath sounds absent, Carotid pulse absent, Heart sounds absent, and Pupillary light reflex absent    Patient was pronounced dead at 14:48, 2023.    Preliminary Cause of Death: Cardiac Arrest    Principal Problem:    Acidosis  Active Problems:    Failure to thrive in adult    Syncope       Infectious disease present?: NO    Communicable disease present? (examples: HIV, chicken pox, TB, Ebola, CJD) :  NO    Multi-drug resistant organism present? (example: MRSA): NO    Please consider an autopsy if any of the following exist:  NO Unexpected or unexplained death during or following any dental, medical, or surgical diagnostic treatment procedures.   NO Death of mother at or up to seven days after delivery.     NO All  and pediatric deaths.     NO Death where the cause is sufficiently obscure to delay completion of the death certificate.   NO Deaths in which autopsy would confirm a suspected illness/condition that would affect surviving family members or recipients of transplanted organs.     The following deaths must be reported to the 's Office:  NO A death that may be due entirely or in part to any factors other than natural disease (recent surgery, recent trauma, suspected abuse/neglect).   NO A death that may be an accident, suicide, or homicide.     NO Any sudden, unexpected death in which there is no prior history of significant heart disease or any other condition associated with sudden death.   NO A death under suspicious, unusual, or unexpected circumstances.    NO Any death which is apparently due to natural causes but in which the  does not have a personal physician familiar with the patient s medical history, social, or environmental situation or the circumstances of the terminal event.   NO Any death apparently due to  Sudden Infant Death Syndrome.     NO Deaths that occur during, in association with, or as consequences of a diagnostic, therapeutic, or anesthetic procedure.   NO Any death in which a fracture of a major bone has occurred within the past (6) six months.   NO A death of persons note seen by their physician within 120 days of demise.     NO Any death in which the  was an inmate of a public institution or was in the custody of Law Enforcement personnel.   NO  All unexpected deaths of children   NO Solid organ donors   NO Unidentified bodies   NO Deaths of persons whose bodies are to be cremated or otherwise disposed of so that the bodies will later be unavailable for examination;   NO Deaths unattended by a physician outside of a licensed healthcare facility or licensed residential hospice program   NO Deaths occurring within 24 hours of arrival to a health care facility if death is unexpected.    NO Deaths associated with the decedent s employment.   NO Deaths attributed to acts of terrorism.   NO Any death in which there is uncertainty as to whether it is a medical examiner s care should be discussed with the medical investigator.        Body disposition: Body released to the [facility].    Fina Whitt DO  3:23 PM 10/01/23

## 2023-10-01 NOTE — DISCHARGE SUMMARY
"[facility]    Death Summary - Hospitalist Service     Date of Admission:  9/20/2023  Date of Death: 10/1/2023  Discharging Provider: Fina Whitt MD    Discharge Diagnoses   Cardiac Arrest   Syncope, orthostatic vs dysrhythmia   ESRD on PD   Poor oral intake  Anemia of CKD  Hypokalemia  GUILLERMO  HTN      Cause of death: Cardiac Arrest    Hospital Course     Devora was recently admitted to [facility] 8/24-9/15 following a mechanical fall with discharge to TCU for left hip and knee pain as well as significant deconditioning.  She left TCU on 9/17 discharged home.  Following her discharge she reportedly had been growing weaker and weaker, and did not do PD at home due to \"not having time \".  During time at home she also had no appetite and had not been eating and drinking, and while getting up from the chair with her  she had a syncopal event without injury.  She was brought to the ED found to be hypotensive, EKG with sinus rhythm with first-degree AV block.  We suspect that given her prodrome of dizziness, weakness prior to syncope while transferring from chair possibly experiencing orthostatic hypotension or possibly dysrhythmia; probably multifactorial given poor oral intake, chronic deconditioning, missing dialysis.  Plan was for placement to TCU for further therapies, unfortunately she experienced a cardiac arrest on 10/1.  The event was witnessed and occurred while she was sitting at the edge of the bed about to stand when she suddenly lost consciousness and slumped over, found to be pulseless was immediately returned to bed and a CODE BLUE was called.  CPR was initiated immediately and she was attached to monitor with initial rhythm PEA.  She has declined IV access, an IO was placed.  She received appropriate ACLS protocol with epi, bicarb, magnesium, calcium gluconate given underlying ESRD, though morning labs were grossly normal save for slightly low potassium.  On rhythm check she " was V. tach and was defibrillated, repeated second time.  Following pulse check was asystole, continued CPR and ACLS.  However, discontinued at 25 minutes as patient remained pulseless and in asystole, given overall clinical assessment meaningful recovery was thought unlikely at this point.  There was no cardiac activity with bedside ultrasound.  Suspect arrhythmia as inciting cause given collapse and history of ESRD making risk of sudden cardiac death high.  Myself and members of the medical team met with her .  She was pronounced dead at 1448 10/1.    Fina Whitt [facility]  ______________________________________________________________________        Consultations This Hospital Stay   NEPHROLOGY IP CONSULT  PHYSICAL THERAPY ADULT IP CONSULT  CARE MANAGEMENT / SOCIAL WORK IP CONSULT  OCCUPATIONAL THERAPY ADULT IP CONSULT    Primary Care Physician   Josesito Evans    Time Spent on this Encounter   IFina MD, personally saw the patient today and spent greater than 30 minutes discharging this patient.

## 2023-10-01 NOTE — PROGRESS NOTES
Pt alert and orientated, up with assist of 2 gait belt and walker. Pt does not have IV access.  Renal diet being tolerated. Pt BP's have been on softer side. On peritoneal dialysis. VSS on RA

## 2023-10-01 NOTE — PROGRESS NOTES
SPIRITUAL HEALTH SERVICES - Progress Note  RH Med/Surg 3    Referral Source: On-call page; pt's spouse requested  support upon pt's death.    Pt's spouse Tavares was at the bedside.  Provided emotional support and a prayer service.  Afterwards, Tavares engaged in memory sharing and reflected on pt's personality.  Provided a list of  homes and cremation societies.    Plan: Evening on-call  remains available per family request, as needs arise.    Acosta Avalos M.Div., [facility]  Staff     [facility] routine referrals *[phone]  [facility] available  for emergent requests/referrals, either by paging the on-call  or by entering an ASAP/STAT consult in Epic (this will also page the on-call ).

## 2023-10-01 NOTE — PLAN OF CARE
Physical Therapy Discharge Summary    Reason for therapy discharge:    Patient with cardiac arrest    Progress towards therapy goal(s). See goals on Care Plan in [facility] electronic health record for goal details.  Goals not met.    Therapy recommendation(s):    NA

## 2023-10-01 NOTE — PLAN OF CARE
Goal Outcome Evaluation:     SBAx1 with walker, mostly in bed. Nausea and abdominal pain/cramping kept pt from eating this evening. Breathing shallow but denies SOB and sats stable. Slightly elevated BPs in evening, asymptomatic and returned to baseline without intervention. Complained of  abdominal pain and requested IV pain meds despite not having IV access, alternatives offered and declined.PD started by charge nurse DAVE.

## 2023-11-03 NOTE — PLAN OF CARE
PRIMARY DIAGNOSIS: FALL/ LEFT HIP PAIN  OUTPATIENT/OBSERVATION GOALS TO BE MET BEFORE DISCHARGE:  1. Pain Status: Pain free.    2. Return to near baseline physical activity: No    3. Cleared for discharge by consultants (if involved): No    Discharge Planner Nurse   Safe discharge environment identified: Yes  Barriers to discharge: No       Entered by: Luciano Hoyos RN 08/24/2023 8:13 PM     Please review provider order for any additional goals.   Nurse to notify provider when observation goals have been met and patient is ready for discharge.Goal Outcome Evaluation:                         Pt arrives w/complaints of ETOH intoxication.  Pt seen last night for same.  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

## 2023-12-11 NOTE — PROGRESS NOTES
[facility]  OUTPATIENT PHYSICAL THERAPY EVALUATION  PLAN OF TREATMENT FOR OUTPATIENT REHABILITATION  (COMPLETE FOR INITIAL CLAIMS ONLY)  Patient's Last Name, First Name, M.I.  YOB: 1960  JoseDevora FISHER                        Provider's Name  [facility] Medical Record No.  [id number]                             Onset Date:  09/20/23   Start of Care Date:  (P) 09/21/23   Type:     _X_PT   ___OT   ___SLP Medical Diagnosis:  (P) acidosis              PT Diagnosis:  impaired mobility Visits from SOC:  1     See note for plan of treatment, functional goals and certification details    I CERTIFY THE NEED FOR THESE SERVICES FURNISHED UNDER        THIS PLAN OF TREATMENT AND WHILE UNDER MY CARE     (Physician co-signature of this document indicates review and certification of the therapy plan).                 09/21/23 0789   Appointment Info   Signing Clinician's Name / Credentials (PT) Yasmin Toussaint DPT   Quick Adds   Quick Adds Certification   Living Environment   People in Home spouse   Current Living Arrangements house   Home Accessibility stairs to enter home   Number of Stairs, Main Entrance 1   Number of Stairs, Within Home, Primary greater than 10 stairs   Stair Railings, Within Home, Primary railings safe and in good condition   Transportation Anticipated family or friend will provide   Living Environment Comments Pt lives in townhouse with 5,6 split level house;   Self-Care   Usual Activity Tolerance fair   Current Activity Tolerance poor   Equipment Currently Used at Home walker, standard   Fall history within last six months yes   Number of times patient has fallen within last six months 2   General Information   Onset of Illness/Injury or Date of Surgery 09/20/23   Referring Physician Briseyda West PA-C   Patient/Family Therapy Goals Statement (PT) find a different TCU, continue to get rehab per spouse he can not help at home  like he did   Pertinent History of Current Problem (include personal factors and/or comorbidities that impact the POC) 62 year old female with PMH HTN, ESRD on PD, and GUILLERMO on CPAP who presents with concerns for syncopal episode at home.   Weight-Bearing Status - LUE full weight-bearing   Weight-Bearing Status - RUE full weight-bearing   Weight-Bearing Status - LLE full weight-bearing   Weight-Bearing Status - RLE full weight-bearing   Cognition   Affect/Mental Status (Cognition) WFL   Orientation Status (Cognition) oriented x 4   Pain Assessment   Patient Currently in Pain Yes, see Vital Sign flowsheet  (reports cramps in R LE)   Range of Motion (ROM)   Range of Motion ROM deficits secondary to weakness   Bed Mobility   Comment, (Bed Mobility) Max A x 1 supine > sit, Max A x 2 sit > supine   Transfers   Comment, (Transfers) Mod A x 1 from elevated surface with FWW   Balance   Balance Comments Poor sitting balance related to fatigue leaning towards right, poor standing balance and unable to stand > 1 minute d/t LE buckling and shakiness   Sensory Examination   Sensory Perception patient reports no sensory changes   Clinical Impression   Criteria for Skilled Therapeutic Intervention Yes, treatment indicated   PT Diagnosis (PT) impaired mobility   Influenced by the following impairments pain, weakness, impaired balance   Functional limitations due to impairments fall risk, decreased activity tolerance   Clinical Presentation (PT Evaluation Complexity) Stable/Uncomplicated   Clinical Presentation Rationale clinical judgement   Clinical Decision Making (Complexity) low complexity   Planned Therapy Interventions (PT) balance training;bed mobility training;gait training;stair training;strengthening;transfer training;progressive activity/exercise   Risk & Benefits of therapy have been explained evaluation/treatment results reviewed;care plan/treatment goals reviewed;risks/benefits reviewed;current/potential barriers  reviewed;participants voiced agreement with care plan;participants included;patient   PT Total Evaluation Time   PT Eval, Low Complexity Minutes (83822) 20   Therapy Certification   Start of care date 09/21/23   Certification date from 09/21/23   Certification date to 10/01/23   Medical Diagnosis acidosis   Physical Therapy Goals   PT Frequency 4x/week   PT Predicted Duration/Target Date for Goal Attainment 09/29/23   PT: Bed Mobility Minimal assist;Supine to/from sit   PT: Transfers Minimal assist;Bed to/from chair;Assistive device   PT: Gait Minimal assist;Rolling walker;50 feet   PT: Stairs Minimal assist;1 stair  (has 1 BERYL, could live on main level living but ideally needs to be able to do 5-6 stairs to access full bathroom and bedroom)   Therapeutic Activity   Therapeutic Activities: dynamic activities to improve functional performance Minutes (59557) 15   Symptoms Noted During/After Treatment Fatigue   Treatment Detail/Skilled Intervention Cued for improved sitting balance with UE support on bed and pt was able to correct progressing from Mod A sitting balance to CGA. Cues for safe standing but ultimately needing elevated bed height and Mod A x 1 with FWW. Standing x 1 mins with cues for weight shifting and pre-gait activities but pt unable to take step and fell backwards towards bed. Unable to scoot higher, Max A x 2 for sit > supine and dependent with supine scoot with ceiling lift. Left with all needs in reach and bed alarm on.   Gait Training   Distance in Feet unable to ambulate today d/t weakness and fatigue (per pt she hasn't eaten in 8 days)   PT Discharge Planning   PT Plan bed mob, bed <> chair transfers, LE strengthening   PT Discharge Recommendation (DC Rec) Transitional Care Facility   PT Rationale for DC Rec Pt currently needing A x 1-2 people for safe mobility; spouse was unable to provide the level of assist needed for patient as she did not finish her rehab intended at TCU. Needed 2 people to  enter 1 BERYL into house and pt was unable to safely ambulate having a fall at home. Currently pt needing Mod Ax1-2 for bed mob and transfers, unable to safely ambulate at this time and recommend TCU prior to discharge home. Pt and family do not want to return to previous TCU.   PT Brief overview of current status Mod A x 1-2 for bed mob, transfers, fall risk   PT Equipment Needed at Discharge walker, rolling;wheelchair   Total Session Time   Timed Code Treatment Minutes 15   Total Session Time (sum of timed and untimed services) 35

## 2024-09-25 NOTE — PLAN OF CARE
"Goal Outcome Evaluation:      Plan of Care Reviewed With: patient    Overall Patient Progress: no changeOverall Patient Progress: no change     PRIMARY DIAGNOSIS: \"GENERIC\" NURSING  OUTPATIENT/OBSERVATION GOALS TO BE MET BEFORE DISCHARGE:  ADLs back to baseline: Yes    Activity and level of assistance: Up with standby assistance.    Pain status: Improved with use of alternative comfort measures i.e.: positioning    Return to near baseline physical activity: No, up with SBA, gait belt and walker     Discharge Planner Nurse   Safe discharge environment identified: No  Barriers to discharge: Yes, pt. To discharge to TCU that accepts PD pt's. (Refuses to return to [facility])       Entered by: Apurva Hoyos RN 10/01/2023 4:31 AM     Please review provider order for any additional goals.   Nurse to notify provider when observation goals have been met and patient is ready for discharge.       " Resident